# Patient Record
Sex: MALE | Race: WHITE | NOT HISPANIC OR LATINO | Employment: PART TIME | URBAN - METROPOLITAN AREA
[De-identification: names, ages, dates, MRNs, and addresses within clinical notes are randomized per-mention and may not be internally consistent; named-entity substitution may affect disease eponyms.]

---

## 2020-03-25 ENCOUNTER — PATIENT OUTREACH (OUTPATIENT)
Dept: SURGERY | Facility: CLINIC | Age: 45
End: 2020-03-25

## 2020-03-25 ENCOUNTER — CLINICAL SUPPORT (OUTPATIENT)
Dept: SURGERY | Facility: CLINIC | Age: 45
End: 2020-03-25

## 2020-03-25 DIAGNOSIS — Z11.3 SCREENING FOR STD (SEXUALLY TRANSMITTED DISEASE): ICD-10-CM

## 2020-03-25 DIAGNOSIS — Z20.2 EXPOSURE TO SEXUALLY TRANSMITTED DISEASE (STD): ICD-10-CM

## 2020-03-25 DIAGNOSIS — Z72.89 OTHER PROBLEMS RELATED TO LIFESTYLE: ICD-10-CM

## 2020-03-25 DIAGNOSIS — D72.89 OTHER SPECIFIED DISORDERS OF WHITE BLOOD CELLS: ICD-10-CM

## 2020-03-25 DIAGNOSIS — B20 HUMAN IMMUNODEFICIENCY VIRUS (HIV) DISEASE (HCC): Primary | ICD-10-CM

## 2020-03-25 RX ORDER — TRAZODONE HYDROCHLORIDE 50 MG/1
50 TABLET ORAL DAILY PRN
COMMUNITY
End: 2020-04-22 | Stop reason: HOSPADM

## 2020-03-25 NOTE — PROGRESS NOTES
Ct presented to intake  Ct is a 38year old homosexual male dx in 11/1998 through [facility] contact  Ct is currently is residing in [place] recently moved from [place]  Ct's current housing is stable and living with his   Ct stated he would like assistance from legal to help with living will and will  Ct reports he is sexually active with  and does not always use protection  Ct is currently in good health  CM and Ct discussed at risk behaviors and importance of using protection and maintaining medication adherence  Ct is currently medically adherent and has about 2 months worth of medication left  cm and Ct applying for SPBP and MA and faxed it  Ct reports he currently does not have any issues with transportation and drives himself  Ct currently does not work and currently is living off of savings  Ct will soon be seeing Dr Jimmie Galvan for specialty care and Leopold Bell for PCP  Ct does not smoke cigarettes  Ct reports he has been dx with anxitey disorder and is on medication  Ct interested in speaking with behavioral health about keeping up with tx  Ct reports no past issues with drugs or alcohol  Ct states there are no domestic violence issues  Ct believes that his last dental appointment was 10/2019 and has stated he will set up an appointment with Gurjit Landon

## 2020-03-25 NOTE — PROGRESS NOTES
Pt into clinic for new pt intake  Pt also met with Kieran Perera CM  Pt will be coming to [place] for primary and ID care  [facility] and was previously getting care at [facility] signed for those records  Pt is  and partner also intends to receive care from [address] [place] [address] Ave  Pt takes [email or web address]  Reports issue with remembering meds and missing 1-2 doses every other week  Discussed barriers to taking meds-he forgets cause he takes them midday to separate from other meds in morning  Encouraged pt to use phone alarm to remind him and discussed importance of 100% adherence as it relates to VL, CD4 and resistance  Pt stated understanding  Pt deos not have health coverage since moving to [facility] [place]  SPBP and MA application completed with [facility]  Pt has transportation  [place] policies reviewed and lab slips given to pt with instructions to complete once SPBP approved  Once labs complete, pt will be scheduled for primary and ID appts

## 2020-03-30 ENCOUNTER — PATIENT OUTREACH (OUTPATIENT)
Dept: SURGERY | Facility: CLINIC | Age: 45
End: 2020-03-30

## 2020-03-30 NOTE — PROGRESS NOTES
SPBP called and stated the application has some missed documents and missed scanned pages  Cm completed proof of residency and social security letter and re-scanned and e-mailed

## 2020-04-02 ENCOUNTER — PATIENT OUTREACH (OUTPATIENT)
Dept: SURGERY | Facility: CLINIC | Age: 45
End: 2020-04-02

## 2020-04-07 ENCOUNTER — APPOINTMENT (OUTPATIENT)
Dept: LAB | Facility: CLINIC | Age: 45
End: 2020-04-07

## 2020-04-07 DIAGNOSIS — B20 HUMAN IMMUNODEFICIENCY VIRUS (HIV) DISEASE (HCC): ICD-10-CM

## 2020-04-07 DIAGNOSIS — D72.89 OTHER SPECIFIED DISORDERS OF WHITE BLOOD CELLS: ICD-10-CM

## 2020-04-07 DIAGNOSIS — Z11.3 SCREENING FOR STD (SEXUALLY TRANSMITTED DISEASE): ICD-10-CM

## 2020-04-07 DIAGNOSIS — Z72.89 OTHER PROBLEMS RELATED TO LIFESTYLE: ICD-10-CM

## 2020-04-07 DIAGNOSIS — Z20.2 EXPOSURE TO SEXUALLY TRANSMITTED DISEASE (STD): ICD-10-CM

## 2020-04-07 LAB
ALBUMIN SERPL BCP-MCNC: 3.4 G/DL (ref 3.5–5)
ALP SERPL-CCNC: 57 U/L (ref 46–116)
ALT SERPL W P-5'-P-CCNC: 41 U/L (ref 12–78)
ANION GAP SERPL CALCULATED.3IONS-SCNC: 10 MMOL/L (ref 4–13)
AST SERPL W P-5'-P-CCNC: 22 U/L (ref 5–45)
BASOPHILS # BLD AUTO: 0.03 THOUSANDS/ΜL (ref 0–0.1)
BASOPHILS NFR BLD AUTO: 1 % (ref 0–1)
BILIRUB SERPL-MCNC: 0.5 MG/DL (ref 0.2–1)
BILIRUB UR QL STRIP: NEGATIVE
BUN SERPL-MCNC: 18 MG/DL (ref 5–25)
CALCIUM SERPL-MCNC: 8.8 MG/DL (ref 8.3–10.1)
CHLORIDE SERPL-SCNC: 105 MMOL/L (ref 100–108)
CHOLEST SERPL-MCNC: 248 MG/DL (ref 50–200)
CLARITY UR: CLEAR
CO2 SERPL-SCNC: 25 MMOL/L (ref 21–32)
COLOR UR: NORMAL
CREAT SERPL-MCNC: 1.06 MG/DL (ref 0.6–1.3)
EOSINOPHIL # BLD AUTO: 0.1 THOUSAND/ΜL (ref 0–0.61)
EOSINOPHIL NFR BLD AUTO: 2 % (ref 0–6)
ERYTHROCYTE [DISTWIDTH] IN BLOOD BY AUTOMATED COUNT: 12.8 % (ref 11.6–15.1)
EST. AVERAGE GLUCOSE BLD GHB EST-MCNC: 100 MG/DL
GFR SERPL CREATININE-BSD FRML MDRD: 84 ML/MIN/1.73SQ M
GLUCOSE P FAST SERPL-MCNC: 97 MG/DL (ref 65–99)
GLUCOSE UR STRIP-MCNC: NEGATIVE MG/DL
HAV AB SER QL IA: REACTIVE
HBA1C MFR BLD: 5.1 %
HBV SURFACE AB SER-ACNC: 78.47 MIU/ML
HBV SURFACE AG SER QL: NORMAL
HCT VFR BLD AUTO: 48 % (ref 36.5–49.3)
HCV AB SER QL: NORMAL
HDLC SERPL-MCNC: 54 MG/DL
HGB BLD-MCNC: 16.2 G/DL (ref 12–17)
HGB UR QL STRIP.AUTO: NEGATIVE
IMM GRANULOCYTES # BLD AUTO: 0.02 THOUSAND/UL (ref 0–0.2)
IMM GRANULOCYTES NFR BLD AUTO: 0 % (ref 0–2)
KETONES UR STRIP-MCNC: NEGATIVE MG/DL
LDLC SERPL CALC-MCNC: 156 MG/DL (ref 0–100)
LEUKOCYTE ESTERASE UR QL STRIP: NEGATIVE
LYMPHOCYTES # BLD AUTO: 2.57 THOUSANDS/ΜL (ref 0.6–4.47)
LYMPHOCYTES NFR BLD AUTO: 40 % (ref 14–44)
MCH RBC QN AUTO: 30.3 PG (ref 26.8–34.3)
MCHC RBC AUTO-ENTMCNC: 33.8 G/DL (ref 31.4–37.4)
MCV RBC AUTO: 90 FL (ref 82–98)
MONOCYTES # BLD AUTO: 0.56 THOUSAND/ΜL (ref 0.17–1.22)
MONOCYTES NFR BLD AUTO: 9 % (ref 4–12)
NEUTROPHILS # BLD AUTO: 3.11 THOUSANDS/ΜL (ref 1.85–7.62)
NEUTS SEG NFR BLD AUTO: 48 % (ref 43–75)
NITRITE UR QL STRIP: NEGATIVE
NRBC BLD AUTO-RTO: 0 /100 WBCS
PH UR STRIP.AUTO: 6.5 [PH]
PLATELET # BLD AUTO: 245 THOUSANDS/UL (ref 149–390)
PMV BLD AUTO: 11.1 FL (ref 8.9–12.7)
POTASSIUM SERPL-SCNC: 4 MMOL/L (ref 3.5–5.3)
PROT SERPL-MCNC: 7.6 G/DL (ref 6.4–8.2)
PROT UR STRIP-MCNC: NEGATIVE MG/DL
RBC # BLD AUTO: 5.34 MILLION/UL (ref 3.88–5.62)
RPR SER QL: NORMAL
SODIUM SERPL-SCNC: 140 MMOL/L (ref 136–145)
SP GR UR STRIP.AUTO: <=1.005 (ref 1–1.03)
TRIGL SERPL-MCNC: 190 MG/DL
UROBILINOGEN UR QL STRIP.AUTO: 0.2 E.U./DL
WBC # BLD AUTO: 6.39 THOUSAND/UL (ref 4.31–10.16)

## 2020-04-07 PROCEDURE — 80053 COMPREHEN METABOLIC PANEL: CPT

## 2020-04-07 PROCEDURE — 86645 CMV ANTIBODY IGM: CPT

## 2020-04-07 PROCEDURE — 86778 TOXOPLASMA ANTIBODY IGM: CPT

## 2020-04-07 PROCEDURE — 86708 HEPATITIS A ANTIBODY: CPT

## 2020-04-07 PROCEDURE — 86803 HEPATITIS C AB TEST: CPT

## 2020-04-07 PROCEDURE — 85025 COMPLETE CBC W/AUTO DIFF WBC: CPT

## 2020-04-07 PROCEDURE — 86706 HEP B SURFACE ANTIBODY: CPT

## 2020-04-07 PROCEDURE — 87389 HIV-1 AG W/HIV-1&-2 AB AG IA: CPT

## 2020-04-07 PROCEDURE — 81381 HLA I TYPING 1 ALLELE HR: CPT

## 2020-04-07 PROCEDURE — 86644 CMV ANTIBODY: CPT

## 2020-04-07 PROCEDURE — 81003 URINALYSIS AUTO W/O SCOPE: CPT

## 2020-04-07 PROCEDURE — 86702 HIV-2 ANTIBODY: CPT

## 2020-04-07 PROCEDURE — 83036 HEMOGLOBIN GLYCOSYLATED A1C: CPT

## 2020-04-07 PROCEDURE — 86592 SYPHILIS TEST NON-TREP QUAL: CPT

## 2020-04-07 PROCEDURE — 87340 HEPATITIS B SURFACE AG IA: CPT

## 2020-04-07 PROCEDURE — 86777 TOXOPLASMA ANTIBODY: CPT

## 2020-04-07 PROCEDURE — 80061 LIPID PANEL: CPT

## 2020-04-07 PROCEDURE — 87491 CHLMYD TRACH DNA AMP PROBE: CPT

## 2020-04-07 PROCEDURE — 86360 T CELL ABSOLUTE COUNT/RATIO: CPT

## 2020-04-07 PROCEDURE — 87591 N.GONORRHOEAE DNA AMP PROB: CPT

## 2020-04-07 PROCEDURE — 87536 HIV-1 QUANT&REVRSE TRNSCRPJ: CPT

## 2020-04-07 PROCEDURE — 86701 HIV-1ANTIBODY: CPT

## 2020-04-07 PROCEDURE — 36415 COLL VENOUS BLD VENIPUNCTURE: CPT

## 2020-04-08 LAB
BASOPHILS # BLD AUTO: 0 X10E3/UL (ref 0–0.2)
BASOPHILS NFR BLD AUTO: 1 %
C TRACH DNA SPEC QL NAA+PROBE: NEGATIVE
CD3+CD4+ CELLS # BLD: 788 /UL (ref 359–1519)
CD3+CD4+ CELLS NFR BLD: 37.5 % (ref 30.8–58.5)
CD3+CD4+ CELLS/CD3+CD8+ CLL BLD: 0.99 % (ref 0.92–3.72)
CD3+CD8+ CELLS # BLD: 796 /UL (ref 109–897)
CD3+CD8+ CELLS NFR BLD: 37.9 % (ref 12–35.5)
CLINICAL COMMENT: NORMAL
CMV IGG SERPL IA-ACNC: >10 U/ML (ref 0–0.59)
CMV IGM SERPL IA-ACNC: <30 AU/ML (ref 0–29.9)
EOSINOPHIL # BLD AUTO: 0.1 X10E3/UL (ref 0–0.4)
EOSINOPHIL NFR BLD AUTO: 2 %
ERYTHROCYTE [DISTWIDTH] IN BLOOD BY AUTOMATED COUNT: 13 % (ref 11.6–15.4)
HCT VFR BLD AUTO: 34.5 % (ref 37.5–51)
HGB BLD-MCNC: 11.8 G/DL (ref 13–17.7)
IMM GRANULOCYTES # BLD: 0 X10E3/UL (ref 0–0.1)
IMM GRANULOCYTES NFR BLD: 0 %
LYMPHOCYTES # BLD AUTO: 2.1 X10E3/UL (ref 0.7–3.1)
LYMPHOCYTES NFR BLD AUTO: 35 %
MCH RBC QN AUTO: 30.3 PG (ref 26.6–33)
MCHC RBC AUTO-ENTMCNC: 34.2 G/DL (ref 31.5–35.7)
MCV RBC AUTO: 89 FL (ref 79–97)
MONOCYTES # BLD AUTO: 0.6 X10E3/UL (ref 0.1–0.9)
MONOCYTES NFR BLD AUTO: 11 %
N GONORRHOEA DNA SPEC QL NAA+PROBE: NEGATIVE
NEUTROPHILS # BLD AUTO: 3.1 X10E3/UL (ref 1.4–7)
NEUTROPHILS NFR BLD AUTO: 51 %
PLATELET # BLD AUTO: 301 X10E3/UL (ref 150–450)
RBC # BLD AUTO: 3.89 X10E6/UL (ref 4.14–5.8)
T GONDII IGG SERPL IA-ACNC: <3 IU/ML (ref 0–7.1)
T GONDII IGM SER IA-ACNC: <3 AU/ML (ref 0–7.9)
WBC # BLD AUTO: 6 X10E3/UL (ref 3.4–10.8)

## 2020-04-09 LAB
HIV 1+2 AB+HIV1 P24 AG SERPL QL IA: REACTIVE
HIV1 RNA # SERPL NAA+PROBE: 270 COPIES/ML
HIV1 RNA SERPL NAA+PROBE-LOG#: 2.43 LOG10COPY/ML

## 2020-04-10 LAB
HIV 2 AB SERPL QL IA: NEGATIVE
HIV1 AB SERPL QL IA: POSITIVE
SL AMB NOTE:: ABNORMAL

## 2020-04-13 ENCOUNTER — PATIENT OUTREACH (OUTPATIENT)
Dept: SURGERY | Facility: CLINIC | Age: 45
End: 2020-04-13

## 2020-04-14 LAB — HLA-B*57:01 SPEC QL: NEGATIVE

## 2020-04-22 ENCOUNTER — TELEMEDICINE (OUTPATIENT)
Dept: SURGERY | Facility: CLINIC | Age: 45
End: 2020-04-22

## 2020-04-22 DIAGNOSIS — R03.0 TRANSIENT ELEVATED BLOOD PRESSURE: ICD-10-CM

## 2020-04-22 DIAGNOSIS — B20 SYMPTOMATIC HIV INFECTION (HCC): ICD-10-CM

## 2020-04-22 DIAGNOSIS — F41.9 ANXIETY: Primary | ICD-10-CM

## 2020-04-22 PROCEDURE — G2012 BRIEF CHECK IN BY MD/QHP: HCPCS | Performed by: NURSE PRACTITIONER

## 2020-04-22 RX ORDER — BUSPIRONE HYDROCHLORIDE 5 MG/1
5 TABLET ORAL 2 TIMES DAILY
Qty: 60 TABLET | Refills: 1 | Status: SHIPPED | OUTPATIENT
Start: 2020-04-22 | End: 2020-05-20 | Stop reason: HOSPADM

## 2020-04-27 ENCOUNTER — TELEPHONE (OUTPATIENT)
Dept: SURGERY | Facility: CLINIC | Age: 45
End: 2020-04-27

## 2020-04-28 ENCOUNTER — PATIENT OUTREACH (OUTPATIENT)
Dept: SURGERY | Facility: CLINIC | Age: 45
End: 2020-04-28

## 2020-04-28 ENCOUNTER — TELEPHONE (OUTPATIENT)
Dept: SURGERY | Facility: CLINIC | Age: 45
End: 2020-04-28

## 2020-05-07 ENCOUNTER — DOCUMENTATION (OUTPATIENT)
Dept: SURGERY | Facility: CLINIC | Age: 45
End: 2020-05-07

## 2020-05-08 ENCOUNTER — PATIENT OUTREACH (OUTPATIENT)
Dept: SURGERY | Facility: CLINIC | Age: 45
End: 2020-05-08

## 2020-05-19 ENCOUNTER — TELEPHONE (OUTPATIENT)
Dept: SURGERY | Facility: CLINIC | Age: 45
End: 2020-05-19

## 2020-05-20 ENCOUNTER — TELEMEDICINE (OUTPATIENT)
Dept: SURGERY | Facility: CLINIC | Age: 45
End: 2020-05-20

## 2020-05-20 DIAGNOSIS — B20 SYMPTOMATIC HIV INFECTION (HCC): Primary | ICD-10-CM

## 2020-05-20 DIAGNOSIS — Z23 NEED FOR PNEUMOCOCCAL VACCINATION: ICD-10-CM

## 2020-05-20 DIAGNOSIS — Z23 NEED FOR MENINGITIS VACCINATION: ICD-10-CM

## 2020-05-20 DIAGNOSIS — F32.A ANXIETY AND DEPRESSION: ICD-10-CM

## 2020-05-20 DIAGNOSIS — F41.9 ANXIETY AND DEPRESSION: ICD-10-CM

## 2020-05-20 DIAGNOSIS — Z71.89 EDUCATED ABOUT COVID-19 VIRUS INFECTION: ICD-10-CM

## 2020-05-20 PROCEDURE — 99214 OFFICE O/P EST MOD 30 MIN: CPT | Performed by: NURSE PRACTITIONER

## 2020-05-20 RX ORDER — NEFAZODONE HYDROCHLORIDE 50 MG/1
50 TABLET ORAL 2 TIMES DAILY
Qty: 60 TABLET | Refills: 1 | Status: SHIPPED | OUTPATIENT
Start: 2020-05-20 | End: 2020-07-06 | Stop reason: HOSPADM

## 2020-05-22 ENCOUNTER — TELEPHONE (OUTPATIENT)
Dept: SURGERY | Facility: CLINIC | Age: 45
End: 2020-05-22

## 2020-05-26 ENCOUNTER — TELEPHONE (OUTPATIENT)
Dept: SURGERY | Facility: CLINIC | Age: 45
End: 2020-05-26

## 2020-05-26 ENCOUNTER — OFFICE VISIT (OUTPATIENT)
Dept: SURGERY | Facility: CLINIC | Age: 45
End: 2020-05-26

## 2020-05-26 VITALS
BODY MASS INDEX: 31.45 KG/M2 | HEART RATE: 87 BPM | HEIGHT: 67 IN | DIASTOLIC BLOOD PRESSURE: 100 MMHG | SYSTOLIC BLOOD PRESSURE: 130 MMHG | OXYGEN SATURATION: 97 % | TEMPERATURE: 98.9 F | WEIGHT: 200.4 LBS

## 2020-05-26 DIAGNOSIS — Z23 NEED FOR MENINGOCOCCAL VACCINATION: ICD-10-CM

## 2020-05-26 DIAGNOSIS — R03.0 TRANSIENT ELEVATED BLOOD PRESSURE: ICD-10-CM

## 2020-05-26 DIAGNOSIS — F32.A ANXIETY AND DEPRESSION: ICD-10-CM

## 2020-05-26 DIAGNOSIS — E78.5 DYSLIPIDEMIA: ICD-10-CM

## 2020-05-26 DIAGNOSIS — R63.5 WEIGHT GAIN: ICD-10-CM

## 2020-05-26 DIAGNOSIS — B20 SYMPTOMATIC HIV INFECTION (HCC): Primary | ICD-10-CM

## 2020-05-26 DIAGNOSIS — Z23 NEED FOR PNEUMOCOCCAL VACCINE: ICD-10-CM

## 2020-05-26 DIAGNOSIS — F41.9 ANXIETY AND DEPRESSION: ICD-10-CM

## 2020-05-26 PROCEDURE — 90734 MENACWYD/MENACWYCRM VACC IM: CPT

## 2020-05-26 PROCEDURE — 90732 PPSV23 VACC 2 YRS+ SUBQ/IM: CPT

## 2020-05-26 PROCEDURE — 90471 IMMUNIZATION ADMIN: CPT

## 2020-05-26 PROCEDURE — 99205 OFFICE O/P NEW HI 60 MIN: CPT | Performed by: INTERNAL MEDICINE

## 2020-05-26 PROCEDURE — 90472 IMMUNIZATION ADMIN EACH ADD: CPT

## 2020-05-28 ENCOUNTER — PATIENT OUTREACH (OUTPATIENT)
Dept: SURGERY | Facility: CLINIC | Age: 45
End: 2020-05-28

## 2020-06-03 ENCOUNTER — TELEPHONE (OUTPATIENT)
Dept: SURGERY | Facility: CLINIC | Age: 45
End: 2020-06-03

## 2020-06-03 ENCOUNTER — PATIENT OUTREACH (OUTPATIENT)
Dept: SURGERY | Facility: CLINIC | Age: 45
End: 2020-06-03

## 2020-06-04 ENCOUNTER — PATIENT OUTREACH (OUTPATIENT)
Dept: SURGERY | Facility: CLINIC | Age: 45
End: 2020-06-04

## 2020-06-08 ENCOUNTER — TELEPHONE (OUTPATIENT)
Dept: SURGERY | Facility: CLINIC | Age: 45
End: 2020-06-08

## 2020-06-09 ENCOUNTER — TRANSCRIBE ORDERS (OUTPATIENT)
Dept: LAB | Facility: CLINIC | Age: 45
End: 2020-06-09

## 2020-06-09 ENCOUNTER — APPOINTMENT (OUTPATIENT)
Dept: LAB | Facility: CLINIC | Age: 45
End: 2020-06-09
Payer: COMMERCIAL

## 2020-06-09 DIAGNOSIS — F32.A ANXIETY AND DEPRESSION: ICD-10-CM

## 2020-06-09 DIAGNOSIS — B20 SYMPTOMATIC HIV INFECTION (HCC): ICD-10-CM

## 2020-06-09 DIAGNOSIS — F41.9 ANXIETY AND DEPRESSION: ICD-10-CM

## 2020-06-09 LAB
ALBUMIN SERPL BCP-MCNC: 3.7 G/DL (ref 3.5–5)
ALP SERPL-CCNC: 53 U/L (ref 46–116)
ALT SERPL W P-5'-P-CCNC: 30 U/L (ref 12–78)
ANION GAP SERPL CALCULATED.3IONS-SCNC: 9 MMOL/L (ref 4–13)
AST SERPL W P-5'-P-CCNC: 24 U/L (ref 5–45)
BASOPHILS # BLD AUTO: 0.04 THOUSANDS/ΜL (ref 0–0.1)
BASOPHILS NFR BLD AUTO: 1 % (ref 0–1)
BILIRUB SERPL-MCNC: 0.62 MG/DL (ref 0.2–1)
BUN SERPL-MCNC: 24 MG/DL (ref 5–25)
CALCIUM SERPL-MCNC: 9 MG/DL (ref 8.3–10.1)
CHLORIDE SERPL-SCNC: 105 MMOL/L (ref 100–108)
CO2 SERPL-SCNC: 25 MMOL/L (ref 21–32)
CREAT SERPL-MCNC: 1.21 MG/DL (ref 0.6–1.3)
EOSINOPHIL # BLD AUTO: 0.09 THOUSAND/ΜL (ref 0–0.61)
EOSINOPHIL NFR BLD AUTO: 1 % (ref 0–6)
ERYTHROCYTE [DISTWIDTH] IN BLOOD BY AUTOMATED COUNT: 12.3 % (ref 11.6–15.1)
GFR SERPL CREATININE-BSD FRML MDRD: 71 ML/MIN/1.73SQ M
GLUCOSE P FAST SERPL-MCNC: 93 MG/DL (ref 65–99)
HCT VFR BLD AUTO: 48 % (ref 36.5–49.3)
HGB BLD-MCNC: 16 G/DL (ref 12–17)
IMM GRANULOCYTES # BLD AUTO: 0.01 THOUSAND/UL (ref 0–0.2)
IMM GRANULOCYTES NFR BLD AUTO: 0 % (ref 0–2)
LYMPHOCYTES # BLD AUTO: 2.68 THOUSANDS/ΜL (ref 0.6–4.47)
LYMPHOCYTES NFR BLD AUTO: 39 % (ref 14–44)
MCH RBC QN AUTO: 30.3 PG (ref 26.8–34.3)
MCHC RBC AUTO-ENTMCNC: 33.3 G/DL (ref 31.4–37.4)
MCV RBC AUTO: 91 FL (ref 82–98)
MONOCYTES # BLD AUTO: 0.6 THOUSAND/ΜL (ref 0.17–1.22)
MONOCYTES NFR BLD AUTO: 9 % (ref 4–12)
NEUTROPHILS # BLD AUTO: 3.47 THOUSANDS/ΜL (ref 1.85–7.62)
NEUTS SEG NFR BLD AUTO: 50 % (ref 43–75)
NRBC BLD AUTO-RTO: 0 /100 WBCS
PLATELET # BLD AUTO: 273 THOUSANDS/UL (ref 149–390)
PMV BLD AUTO: 10.9 FL (ref 8.9–12.7)
POTASSIUM SERPL-SCNC: 4.1 MMOL/L (ref 3.5–5.3)
PROT SERPL-MCNC: 7.8 G/DL (ref 6.4–8.2)
RBC # BLD AUTO: 5.28 MILLION/UL (ref 3.88–5.62)
SODIUM SERPL-SCNC: 139 MMOL/L (ref 136–145)
WBC # BLD AUTO: 6.89 THOUSAND/UL (ref 4.31–10.16)

## 2020-06-09 PROCEDURE — 87536 HIV-1 QUANT&REVRSE TRNSCRPJ: CPT

## 2020-06-09 PROCEDURE — 85025 COMPLETE CBC W/AUTO DIFF WBC: CPT

## 2020-06-09 PROCEDURE — 36415 COLL VENOUS BLD VENIPUNCTURE: CPT

## 2020-06-09 PROCEDURE — 86360 T CELL ABSOLUTE COUNT/RATIO: CPT

## 2020-06-09 PROCEDURE — 80053 COMPREHEN METABOLIC PANEL: CPT

## 2020-06-10 ENCOUNTER — TELEPHONE (OUTPATIENT)
Dept: SURGERY | Facility: CLINIC | Age: 45
End: 2020-06-10

## 2020-06-10 LAB
BASOPHILS # BLD AUTO: 0 X10E3/UL (ref 0–0.2)
BASOPHILS NFR BLD AUTO: 1 %
CD3+CD4+ CELLS # BLD: 1035 /UL (ref 359–1519)
CD3+CD4+ CELLS NFR BLD: 39.8 % (ref 30.8–58.5)
CD3+CD4+ CELLS/CD3+CD8+ CLL BLD: 1.07 % (ref 0.92–3.72)
CD3+CD8+ CELLS # BLD: 965 /UL (ref 109–897)
CD3+CD8+ CELLS NFR BLD: 37.1 % (ref 12–35.5)
EOSINOPHIL # BLD AUTO: 0.1 X10E3/UL (ref 0–0.4)
EOSINOPHIL NFR BLD AUTO: 1 %
ERYTHROCYTE [DISTWIDTH] IN BLOOD BY AUTOMATED COUNT: 13 % (ref 11.6–15.4)
HCT VFR BLD AUTO: 43.5 % (ref 37.5–51)
HGB BLD-MCNC: 14.4 G/DL (ref 13–17.7)
IMM GRANULOCYTES # BLD: 0 X10E3/UL (ref 0–0.1)
IMM GRANULOCYTES NFR BLD: 0 %
LYMPHOCYTES # BLD AUTO: 2.6 X10E3/UL (ref 0.7–3.1)
LYMPHOCYTES NFR BLD AUTO: 39 %
MCH RBC QN AUTO: 30.6 PG (ref 26.6–33)
MCHC RBC AUTO-ENTMCNC: 33.1 G/DL (ref 31.5–35.7)
MCV RBC AUTO: 93 FL (ref 79–97)
MONOCYTES # BLD AUTO: 0.5 X10E3/UL (ref 0.1–0.9)
MONOCYTES NFR BLD AUTO: 8 %
NEUTROPHILS # BLD AUTO: 3.4 X10E3/UL (ref 1.4–7)
NEUTROPHILS NFR BLD AUTO: 51 %
PLATELET # BLD AUTO: 297 X10E3/UL (ref 150–450)
RBC # BLD AUTO: 4.7 X10E6/UL (ref 4.14–5.8)
WBC # BLD AUTO: 6.7 X10E3/UL (ref 3.4–10.8)

## 2020-06-12 ENCOUNTER — PATIENT OUTREACH (OUTPATIENT)
Dept: SURGERY | Facility: CLINIC | Age: 45
End: 2020-06-12

## 2020-06-12 LAB
HIV1 RNA # SERPL NAA+PROBE: <20 COPIES/ML
HIV1 RNA SERPL NAA+PROBE-LOG#: NORMAL LOG10COPY/ML

## 2020-06-15 ENCOUNTER — APPOINTMENT (OUTPATIENT)
Dept: LAB | Facility: CLINIC | Age: 45
End: 2020-06-15
Payer: COMMERCIAL

## 2020-06-15 PROCEDURE — 36415 COLL VENOUS BLD VENIPUNCTURE: CPT

## 2020-06-15 PROCEDURE — 86480 TB TEST CELL IMMUN MEASURE: CPT

## 2020-06-16 LAB
GAMMA INTERFERON BACKGROUND BLD IA-ACNC: 0.04 IU/ML
M TB IFN-G BLD-IMP: NEGATIVE
M TB IFN-G CD4+ BCKGRND COR BLD-ACNC: 0.01 IU/ML
M TB IFN-G CD4+ BCKGRND COR BLD-ACNC: 0.03 IU/ML
MITOGEN IGNF BCKGRD COR BLD-ACNC: >10 IU/ML

## 2020-06-19 ENCOUNTER — PATIENT OUTREACH (OUTPATIENT)
Dept: SURGERY | Facility: CLINIC | Age: 45
End: 2020-06-19

## 2020-06-22 ENCOUNTER — PATIENT OUTREACH (OUTPATIENT)
Dept: SURGERY | Facility: CLINIC | Age: 45
End: 2020-06-22

## 2020-06-23 DIAGNOSIS — B20 SYMPTOMATIC HIV INFECTION (HCC): ICD-10-CM

## 2020-06-23 RX ORDER — BICTEGRAVIR SODIUM, EMTRICITABINE, AND TENOFOVIR ALAFENAMIDE FUMARATE 50; 200; 25 MG/1; MG/1; MG/1
TABLET ORAL
Qty: 30 TABLET | Refills: 1 | Status: SHIPPED | OUTPATIENT
Start: 2020-06-23 | End: 2020-07-29 | Stop reason: SDUPTHER

## 2020-07-06 ENCOUNTER — TELEPHONE (OUTPATIENT)
Dept: SURGERY | Facility: CLINIC | Age: 45
End: 2020-07-06

## 2020-07-06 NOTE — TELEPHONE ENCOUNTER
Called pt to confirm kimber for tomorrow and to ask the screening questions  Also wanted to give him instructions for when he gets to the office and to move his kimber to 5pm  Pt voice mail full and cannot leave a message

## 2020-07-07 ENCOUNTER — TELEPHONE (OUTPATIENT)
Dept: SURGERY | Facility: CLINIC | Age: 45
End: 2020-07-07

## 2020-07-07 ENCOUNTER — OFFICE VISIT (OUTPATIENT)
Dept: SURGERY | Facility: CLINIC | Age: 45
End: 2020-07-07
Payer: COMMERCIAL

## 2020-07-07 VITALS
HEIGHT: 67 IN | TEMPERATURE: 98.3 F | BODY MASS INDEX: 31.36 KG/M2 | DIASTOLIC BLOOD PRESSURE: 90 MMHG | SYSTOLIC BLOOD PRESSURE: 130 MMHG | WEIGHT: 199.8 LBS | HEART RATE: 92 BPM | OXYGEN SATURATION: 96 %

## 2020-07-07 DIAGNOSIS — F41.9 ANXIETY AND DEPRESSION: ICD-10-CM

## 2020-07-07 DIAGNOSIS — F32.A ANXIETY AND DEPRESSION: ICD-10-CM

## 2020-07-07 DIAGNOSIS — R03.0 TRANSIENT ELEVATED BLOOD PRESSURE: ICD-10-CM

## 2020-07-07 DIAGNOSIS — R63.5 WEIGHT GAIN: ICD-10-CM

## 2020-07-07 DIAGNOSIS — E78.5 DYSLIPIDEMIA: ICD-10-CM

## 2020-07-07 DIAGNOSIS — B20 SYMPTOMATIC HIV INFECTION (HCC): Primary | ICD-10-CM

## 2020-07-07 PROCEDURE — 99215 OFFICE O/P EST HI 40 MIN: CPT | Performed by: INTERNAL MEDICINE

## 2020-07-07 NOTE — PROGRESS NOTES
Progress Note - Infectious Disease   Beatrice Pratt 54 y o  male MRN: [id number]  Unit/Bed#:  Encounter: [id number]      Impression/Plan:  1  HIV-improved with viral load now undetectable and a CD4 count of greater than 1000  He missed a dose earlier this week but does not know why  Suggested cell phone alarm to make sure he does not miss doses  Perhaps the previous bump in the viral load was related to drug interactions with the vitamins or flaxseed oil; could be from non adherent  Will continue Donna Ordaz for now, recheck labs in 2 months and follow up in 3 month period stressed adherence     2  Anxiety and depression-treatment as per the primary  Will connect to mental health services      3  Dyslipidemia-focus on diet and exercise for now with weight loss      4  Weight gain-weight is stable  will continue stressed the importance of diet and exercise      5  Elevated blood pressure-slightly elevated diastolic  asymptomatic  Discussed in detail with the primary who will address      Patient was provided medication, adherence and prevention education    Subjective:  Routine follow-up for HIV  Patient claims 100% adherence with Biktarvy    Patient denies any notable side effects  Overall the feeling well  The patient denies any fever chills or sweats, denies any nausea vomiting or diarrhea, denies any cough or shortness of breath  ROS:  A complete review of systems is negative other than that noted above in the subjective    Followup portions patient history reviewed and updated as: Allergies, current medications, past medical history, past social history, past surgical history, and the problem list    Objective:  Vitals:  Vitals:    07/07/20 1659   BP: 130/90   Pulse: 92   Temp: 98 3 °F (36 8 °C)   SpO2: 96%   Weight: 90 6 kg (199 lb 12 8 oz)   Height: 5' 7" (1 702 m)       Physical Exam:   General Appearance:  Alert, interactive, appearing well,  nontoxic, no acute distress     Neck:   Supple without lymphadenopathy, no thyromegaly or masses   Throat: Oropharynx moist without lesions  Lungs:   Clear to auscultation bilaterally; no wheezes, rhonchi or rales; respirations unlabored   Heart:  RRR; no murmur, rub or gallop   Abdomen:   Soft, non-tender, non-distended, positive bowel sounds  Extremities: No clubbing, cyanosis or edema   Skin: No new rashes or lesions  No draining wounds noted  Labs, Imaging, & Other studies:   All pertinent labs and imaging studies were personally reviewed    Lab Results   Component Value Date    K 4 1 06/09/2020     06/09/2020    CO2 25 06/09/2020    BUN 24 06/09/2020    CREATININE 1 21 06/09/2020    GLUF 93 06/09/2020    CALCIUM 9 0 06/09/2020    AST 24 06/09/2020    ALT 30 06/09/2020    ALKPHOS 53 06/09/2020    EGFR 71 06/09/2020     Lab Results   Component Value Date    WBC 6 89 06/09/2020    WBC 6 7 06/09/2020    HGB 16 0 06/09/2020    HGB 14 4 06/09/2020    HCT 48 0 06/09/2020    HCT 43 5 06/09/2020    MCV 91 06/09/2020    MCV 93 06/09/2020     06/09/2020     06/09/2020     Lab Results   Component Value Date    HEPCAB Non-reactive 04/07/2020     Lab Results   Component Value Date    HAV Reactive (A) 04/07/2020    HEPCAB Non-reactive 04/07/2020     Lab Results   Component Value Date    RPR Non-Reactive 04/07/2020     CD4 ABS   Date/Time Value Ref Range Status   06/09/2020 10:27 AM 1035 359 - 1519 /uL Final     HIV-1 RNA by PCR, Qn   Date/Time Value Ref Range Status   06/09/2020 10:27 AM <20 copies/mL Final     Comment:     HIV-1 RNA not detected  The reportable range for this assay is 20 to 10,000,000  copies HIV-1 RNA/mL             Current Outpatient Medications:     BIKTARVY -25 MG tablet, TAKE 1 TABLET BY MOUTH EVERY DAY, Disp: 30 tablet, Rfl: 1

## 2020-07-08 NOTE — PROGRESS NOTES
Assessment/Plan:      Diagnoses and all orders for this visit:    Symptomatic HIV infection ([address] )  -     CBC and differential; Future  -     T-helper cells CD4/CD8 %; Future  -     HIV-1 RNA, quantitative, PCR; Future  -     Comprehensive metabolic panel; Future    Anxiety and depression    Transient elevated blood pressure    Dyslipidemia    Weight gain          Discussion: S met with patient for his behavioral health consultation, meeting pt in person for the first time due to [place] which had limited previous sessions to phone sessions  BHS inquired about pt's anxiety and depression symptoms  Pt reported doing well at this time  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) however he is not distressed about this  BHS reviewed status of pt's insurance with him confirming that his insurance is now active  BHS confirmed that pt still desires to be referred to a [facility] agency so he can receive ongoing counseling and psychiatry services to manage his anxiety  BHS informed pt that she will put the referral in to [facility] and gave pt a copy of [facility] brochure for more info  PHQ-9 screener was completed  Score = 7  Subjective:     Patient ID: Chriss Gardner is a 54 y o  male      HPI: The patient is being seen at the [facility] today for a routine behavioral health follow up     Objective:    Orientation    Person: Yes   Place: Yes   Time: Yes     Appearance     Well Developed: Yes   Healthy: Yes   Uncomfortable: No  Normal Body Odor: Yes   Grooming Unkempt: No  Poor Eye Contact: No    Mood and Affect    Appropriate: Yes   Euthymic: Yes   Anxious: No  Depressed: No    Harm to Self or Others: denied  Substance Abuse: none reported or observed

## 2020-07-16 ENCOUNTER — PATIENT OUTREACH (OUTPATIENT)
Dept: SURGERY | Facility: CLINIC | Age: 45
End: 2020-07-16

## 2020-07-16 ENCOUNTER — TRANSCRIBE ORDERS (OUTPATIENT)
Dept: LAB | Facility: HOSPITAL | Age: 45
End: 2020-07-16

## 2020-07-16 NOTE — PROGRESS NOTES
Ct called to state that he took a covid test on 7- updating staff  Cm updated Clinic  Cm stated to please let cm results

## 2020-07-17 NOTE — PROGRESS NOTES
Per order from [address] [place] [address], made pt aware he should continue to self quarantine until he receives his results  Pt aware to seek treatment at ED if his s/s worsen, call us with results as soon as he gets them, continue quarantining if the results are positive and that his  should also be self quarantining pending pt's results  Pt stated his understanding to all above

## 2020-07-17 NOTE — PROGRESS NOTES
Called pt to check in  Pt states he is congested, has a sore throat and mild headache  Pt did have diarrhea and aching in his legs previously but denies those symptoms now  Pt denies fever, SOB, nausea, vomiting, loss of taste or smell  Pt had COVID test done at [facility] and [facility] on 7/14/20 and is waiting on results  I counseled pt on self isolation in home, cleaning and masking  His  is not exhibiting any s/s  Recommended his  also quarantine until results come back

## 2020-07-20 NOTE — PROGRESS NOTES
Pt called stating he still hasn't received results for covid test  He was told results could take up to 6-10 days and today is day 6  He states his symptoms have subsided and he wanted to know if he should still quarantine  Told him he would to continue to quarantine particularly since results still not back and hasn't been long enough since his last symptom  Pt stated understanding

## 2020-07-21 ENCOUNTER — PATIENT OUTREACH (OUTPATIENT)
Dept: SURGERY | Facility: CLINIC | Age: 45
End: 2020-07-21

## 2020-07-21 NOTE — PROGRESS NOTES
Ct text Cm frustrated have not received his labs yet  Cm stated that labs are backed up at the moment and too give it more time  Ct stated he understands and just wanted to express his feelings about it  Ct states he feels symptoms free at the moment

## 2020-07-29 ENCOUNTER — OFFICE VISIT (OUTPATIENT)
Dept: SURGERY | Facility: CLINIC | Age: 45
End: 2020-07-29
Payer: COMMERCIAL

## 2020-07-29 VITALS
HEIGHT: 67 IN | TEMPERATURE: 97.6 F | SYSTOLIC BLOOD PRESSURE: 142 MMHG | DIASTOLIC BLOOD PRESSURE: 118 MMHG | OXYGEN SATURATION: 98 % | BODY MASS INDEX: 31.77 KG/M2 | HEART RATE: 76 BPM | WEIGHT: 202.4 LBS

## 2020-07-29 DIAGNOSIS — U07.1 COVID-19: ICD-10-CM

## 2020-07-29 DIAGNOSIS — Z23 NEED FOR MENACTRA VACCINATION: ICD-10-CM

## 2020-07-29 DIAGNOSIS — E78.5 DYSLIPIDEMIA: ICD-10-CM

## 2020-07-29 DIAGNOSIS — B20 SYMPTOMATIC HIV INFECTION (HCC): Primary | ICD-10-CM

## 2020-07-29 DIAGNOSIS — I10 ESSENTIAL HYPERTENSION: ICD-10-CM

## 2020-07-29 PROCEDURE — 90734 MENACWYD/MENACWYCRM VACC IM: CPT

## 2020-07-29 PROCEDURE — 99214 OFFICE O/P EST MOD 30 MIN: CPT | Performed by: NURSE PRACTITIONER

## 2020-07-29 PROCEDURE — 90471 IMMUNIZATION ADMIN: CPT

## 2020-07-29 RX ORDER — ATORVASTATIN CALCIUM 40 MG/1
40 TABLET, FILM COATED ORAL DAILY
Qty: 30 TABLET | Refills: 2 | Status: SHIPPED | OUTPATIENT
Start: 2020-07-29 | End: 2020-10-19

## 2020-07-29 RX ORDER — ATORVASTATIN CALCIUM 40 MG/1
40 TABLET, FILM COATED ORAL DAILY
Qty: 30 TABLET | Refills: 2 | Status: SHIPPED | OUTPATIENT
Start: 2020-07-29 | End: 2020-07-29 | Stop reason: SDUPTHER

## 2020-07-29 RX ORDER — ASPIRIN 81 MG/1
81 TABLET, CHEWABLE ORAL DAILY
Qty: 30 TABLET | Refills: 2 | Status: SHIPPED | OUTPATIENT
Start: 2020-07-29 | End: 2020-07-29 | Stop reason: SDUPTHER

## 2020-07-29 RX ORDER — LISINOPRIL AND HYDROCHLOROTHIAZIDE 12.5; 1 MG/1; MG/1
1 TABLET ORAL DAILY
Qty: 30 TABLET | Refills: 2 | Status: SHIPPED | OUTPATIENT
Start: 2020-07-29 | End: 2020-09-09 | Stop reason: HOSPADM

## 2020-07-29 RX ORDER — ASPIRIN 81 MG/1
81 TABLET, CHEWABLE ORAL DAILY
Qty: 30 TABLET | Refills: 2 | Status: SHIPPED | OUTPATIENT
Start: 2020-07-29 | End: 2020-10-19

## 2020-07-29 RX ORDER — LISINOPRIL AND HYDROCHLOROTHIAZIDE 12.5; 1 MG/1; MG/1
1 TABLET ORAL DAILY
Qty: 30 TABLET | Refills: 2 | Status: SHIPPED | OUTPATIENT
Start: 2020-07-29 | End: 2020-07-29 | Stop reason: SDUPTHER

## 2020-07-29 NOTE — ASSESSMENT & PLAN NOTE
Blood pressure:   BP Readings from Last 3 Encounters:   07/29/20 (!) 142/118   07/07/20 130/90   05/26/20 130/100       Start lisinopril/HCTZ  Check CMP in 4 weeks  Provided with pt  educational handout  Educated on potential side effects  Educated on the following lifestyle modifications to lower BP and decrease cardiovascular risk factors  limit alcohol intake, reduce salt in diet, maintain a healthy weight, engage in 30 minutes of cardiovascular exercise daily, and not smoke  ASCVD score 9 0%  Start low dose ASA

## 2020-07-29 NOTE — ASSESSMENT & PLAN NOTE
LIPIDS:   Total cholesterol 248    HDL 54  Triglycerides 190    Start atorvastatin    Eat a low fat/low cholesterol diet  Follow up with dietician for additional education

## 2020-07-29 NOTE — PROGRESS NOTES
Assessment/Plan:    Essential hypertension  Blood pressure:   BP Readings from Last 3 Encounters:   20 (!) 142/118   20 130/90   20 130/100       Start lisinopril/HCTZ  Check CMP in 4 weeks  Provided with pt  educational handout  Educated on potential side effects  Educated on the following lifestyle modifications to lower BP and decrease cardiovascular risk factors  limit alcohol intake, reduce salt in diet, maintain a healthy weight, engage in 30 minutes of cardiovascular exercise daily, and not smoke  ASCVD score 9 0%  Start low dose ASA  Symptomatic HIV infection ([facility] )  No results found for: [id number]  CD4 ABS   Date/Time Value Ref Range Status   2020 10:27 AM 1035 359 - 1519 /uL Final     HIV-1 RNA by PCR, Qn   Date/Time Value Ref Range Status   2020 10:27 AM <20 copies/mL Final     Comment:     HIV-1 RNA not detected  The reportable range for this assay is 20 to 10,000,000  copies HIV-1 RNA/mL  HIV-1 RNA Viral Load Log   Date/Time Value Ref Range Status   2020 10:27 AM COMMENT [id number]/mL Final     Comment:     Unable to calculate result since non-numeric result obtained for  component test          ART: Christel  Reports 100% adherence  Denies side effects  Stressed the importance of adherence  Continue 3 month follow up with ID clinic  Reviewed most recent labs, including Cd4 and viral load  Discussed the risks and benefits of treatment options, instructions for management, importance of treatment adherence, and reduction of risk factor  Educated on possible  medication side effects  Counseled on routes of HIV transmission, including the risk of  infection  Emphasized that viral suppression is the best method to prevent HIV transmission  At this time pt denies the need for HIV testing of anyone in their life  Total encounter time was 45 minutes  Greater then 20 minutes were spent on counseling and patient education   Pt voices understanding and agreement with treatment plan  Dyslipidemia  LIPIDS:   Total cholesterol 248    HDL 54  Triglycerides 190    Start atorvastatin    Eat a low fat/low cholesterol diet  Follow up with dietician for additional education  Diagnoses and all orders for this visit:    Essential hypertension  -     Discontinue: aspirin 81 mg chewable tablet; Chew 1 tablet (81 mg total) daily  -     Discontinue: lisinopril-hydrochlorothiazide (PRINZIDE,ZESTORETIC) 10-12 5 MG per tablet; Take 1 tablet by mouth daily  -     aspirin 81 mg chewable tablet; Chew 1 tablet (81 mg total) daily  -     lisinopril-hydrochlorothiazide (PRINZIDE,ZESTORETIC) 10-12 5 MG per tablet; Take 1 tablet by mouth daily  -     CBC and differential; Future  -     Comprehensive metabolic panel; Future    Need for Menactra vaccination  -     Meningococcal Conjugate Vaccine MCV4P IM    Symptomatic HIV infection ([facility] [address] )  -     Discontinue: bictegravir-emtricitab-tenofovir alafenamide (Biktarvy) -25 MG tablet; Take 1 tablet by mouth daily  -     bictegravir-emtricitab-tenofovir alafenamide (Biktarvy) -25 MG tablet; Take 1 tablet by mouth daily    Dyslipidemia  -     Discontinue: aspirin 81 mg chewable tablet; Chew 1 tablet (81 mg total) daily  -     Discontinue: atorvastatin (LIPITOR) 40 mg tablet; Take 1 tablet (40 mg total) by mouth daily  -     aspirin 81 mg chewable tablet; Chew 1 tablet (81 mg total) daily  -     atorvastatin (LIPITOR) 40 mg tablet; Take 1 tablet (40 mg total) by mouth daily    COVID-19  -     SARS-CoV2 Antibody, Total (IgG, IgA, IgM) [facility]; Future          Subjective:      Patient ID: Marianna Dent is a 54 y o  male  Luz Tolentino presents to the clinic today for primary care follow-up of chronic conditions  I have previously only seen Luz Tolentino via telemedicine visit due to [place]  He had a COVID test performed by [facility]   He received the results 3 days ago which were negative    Past medical history significant for HIV, anxiety, and hyperlipidemia  Barrett Leung reported elevated blood pressures at last virtual visit  He was provided with a home blood pressure cuff and instructed to keep a log and review to follow-up at next PCP visit  Barrett Leung did not do this, but his blood pressure is 148/118 today  He denies headache, chest pain, or change in vision  Barrett Leung denies cough, shortness of breath,fever, nausea, diarrhea,anosmia,or ageusia  Denies exposure to sick contacts  The following portions of the patient's history were reviewed and updated as appropriate: allergies, current medications, past family history, past medical history, past social history, past surgical history and problem list     Review of Systems   Constitutional: Negative for activity change, appetite change, chills, diaphoresis, fatigue, fever and unexpected weight change  HENT: Negative for congestion, dental problem, ear pain, hearing loss, mouth sores, rhinorrhea and sore throat  Eyes: Negative for pain, redness and visual disturbance  Respiratory: Negative for shortness of breath and wheezing  Cardiovascular: Negative for chest pain and leg swelling  Gastrointestinal: Negative for abdominal pain, constipation, diarrhea, nausea and vomiting  Endocrine: Negative for polydipsia, polyphagia and polyuria  Genitourinary: Negative for difficulty urinating and dysuria  Musculoskeletal: Negative for back pain, joint swelling and myalgias  Skin: Negative for rash  Neurological: Negative for syncope and headaches  Psychiatric/Behavioral: Negative for behavioral problems and suicidal ideas           Objective:      BP (!) 142/118   Pulse 76   Temp 97 6 °F (36 4 °C)   Ht 5' 7" (1 702 m)   Wt 91 8 kg (202 lb 6 4 oz)   SpO2 98%   BMI 31 70 kg/m²       Lab Results   Component Value Date    K 4 1 06/09/2020     06/09/2020    CO2 25 06/09/2020    BUN 24 06/09/2020    CREATININE 1 21 06/09/2020    GLUF 93 06/09/2020    CALCIUM 9 0 06/09/2020    AST 24 06/09/2020    ALT 30 06/09/2020    ALKPHOS 53 06/09/2020    EGFR 71 06/09/2020     Lab Results   Component Value Date    WBC 6 89 06/09/2020    WBC 6 7 06/09/2020    HGB 16 0 06/09/2020    HGB 14 4 06/09/2020    HCT 48 0 06/09/2020    HCT 43 5 06/09/2020    MCV 91 06/09/2020    MCV 93 06/09/2020     06/09/2020     06/09/2020     Lab Results   Component Value Date    HEPCAB Non-reactive 04/07/2020     Lab Results   Component Value Date    HAV Reactive (A) 04/07/2020    HEPCAB Non-reactive 04/07/2020     Lab Results   Component Value Date    RPR Non-Reactive 04/07/2020            Physical Exam   Constitutional: He is oriented to person, place, and time  He appears well-developed and well-nourished  No distress  HENT:   Head: Normocephalic  Right Ear: External ear normal    Left Ear: External ear normal    Nose: Nose normal    Mouth/Throat: Oropharynx is clear and moist  No oropharyngeal exudate  Eyes: Pupils are equal, round, and reactive to light  Conjunctivae are normal  Right eye exhibits no discharge  Left eye exhibits no discharge  Neck: Normal range of motion  No thyromegaly present  Cardiovascular: Normal rate, regular rhythm, normal heart sounds and intact distal pulses  No murmur heard  Pulmonary/Chest: Effort normal and breath sounds normal  He has no wheezes  Abdominal: Soft  Bowel sounds are normal  He exhibits no mass  There is no tenderness  Musculoskeletal: Normal range of motion  He exhibits no edema or tenderness  Lymphadenopathy:     He has no cervical adenopathy  Neurological: He is alert and oriented to person, place, and time  Skin: Skin is warm and dry  No rash noted  Psychiatric: He has a normal mood and affect   His behavior is normal

## 2020-07-29 NOTE — PATIENT INSTRUCTIONS
Lisinopril/Hydrochlorothiazide (By mouth)   Hydrochlorothiazide (fabián-droe-klor-yc-QNKM-l-zide), Lisinopril (lye-SIN-oh-pril)  Treats high blood pressure  This medicine contains an ACE inhibitor and a diuretic (water pill)  Brand Name(s): Prinzide, Zestoretic   There may be other brand names for this medicine  When This Medicine Should Not Be Used: This medicine is not right for everyone  Do not use it if you had an allergic reaction to lisinopril, hydrochlorothiazide, another ACE inhibitor, or a sulfa drug, or if you are pregnant  How to Use This Medicine:   Tablet  · Take your medicine as directed  Your dose may need to be changed several times to find what works best for you  · Missed dose: Take a dose as soon as you remember  If it is almost time for your next dose, wait until then and take a regular dose  Do not take extra medicine to make up for a missed dose  · Store the medicine in a closed container at room temperature, away from heat, moisture, and direct light  Drugs and Foods to Avoid:   Ask your doctor or pharmacist before using any other medicine, including over-the-counter medicines, vitamins, and herbal products  · Do not use this medicine together with aliskiren if you have diabetes  · Some foods and medicines can affect how lisinopril/hydrochlorothiazide works  Tell your doctor if you are using any of the following:  ¨ Aliskiren, cholestyramine, colestipol, everolimus, lithium, sirolimus, temsirolimus  ¨ Another blood pressure medicine, including an angiotensin receptor blocker (ARB)  ¨ Insulin or oral diabetes medicine  ¨ NSAID pain or arthritis medicine (including aspirin, celecoxib, diclofenac, ibuprofen, or naproxen)  ¨ Steroid medicine (including hydrocortisone, methylprednisolone, prednisolone, prednisone)  · Ask your doctor before you use any medicine, supplement, or salt substitute that contains potassium    · Alcohol, narcotic pain relievers, or sleeping pills may cause you to feel more lightheaded, dizzy, or faint when used with this medicine  Warnings While Using This Medicine:   · It is not safe to take this medicine during pregnancy  It could harm an unborn baby  Tell your doctor right away if you become pregnant  · Tell your doctor if you are breastfeeding, or if you have kidney disease, liver disease, diabetes, heart failure, heart or blood vessel disease, high cholesterol, gout, lupus or a similar disorder, trouble urinating, or a history of allergies or asthma  · This medicine may cause the following problems:  ¨ Angioedema (severe swelling)  ¨ Serious liver problems  ¨ Kidney problems  ¨ Glaucoma  · This medicine could lower your blood pressure too much, especially when you first use it or if you are dehydrated  Stand or sit up slowly if you feel lightheaded or dizzy  · Do not stop using the medicine without asking your doctor, even if you feel well  This medicine will not cure your high blood pressure, but it will help keep it in the normal range  You may have to take blood pressure medicine for the rest of your life  · Tell any doctor or dentist who treats you that you are using this medicine  · Your doctor will do lab tests at regular visits to check on the effects of this medicine  Keep all appointments  · Keep all medicine out of the reach of children  Never share your medicine with anyone    Possible Side Effects While Using This Medicine:   Call your doctor right away if you notice any of these side effects:  · Allergic reaction: Itching or hives, swelling in your face or hands, swelling or tingling in your mouth or throat, chest tightness, trouble breathing  · Blistering, peeling, or red skin rash  · Change in how much or how often you urinate  · Confusion, weakness, uneven heartbeat, trouble breathing, numbness in your hands, feet, or lips  · Dark urine or pale stools, nausea, vomiting, loss of appetite, stomach pain, yellow skin or eyes  · Dry mouth, increased thirst, muscle cramps  · Eye pain, vision changes, seeing halos around lights  · Fever, chills, sore throat, body aches  · Lightheadedness, dizziness, fainting  · Severe stomach pain (with or without nausea or vomiting)  If you notice these less serious side effects, talk with your doctor:   · Dry cough  If you notice other side effects that you think are caused by this medicine, tell your doctor  Call your doctor for medical advice about side effects  You may report side effects to FDA at 0-490-FDA-9010  © 2017 [address] Information is for End User's use only and may not be sold, redistributed or otherwise used for commercial purposes  The above information is an  only  It is not intended as medical advice for individual conditions or treatments  Talk to your doctor, nurse or pharmacist before following any medical regimen to see if it is safe and effective for you  Aspirin (By mouth)   Aspirin (AS-pir-in)  Treats pain, fever, and inflammation  May lower risk of heart attack and stroke  Brand Name(s): Ascriptin Regular Strength, Aspergum, Aspir Low, Aspirin Adult Low Dose, Aspirin Low Dose, Hi Aspirin Children's, Hi Aspirin Regimen, Hi Extra Strength, Hi Genuine Aspirin, Bufferin, Bufferin Low Dose, Durlaza, Ecotrin, Ecpirin, Enteric Aspirin   There may be other brand names for this medicine  When This Medicine Should Not Be Used: This medicine is not right for everyone  Do not use it if you had an allergic reaction to aspirin or other NSAIDs, or if you have a history of asthma with nasal polyps and rhinitis  How to Use This Medicine:   Delayed Release Capsule, Long Acting Capsule, Gum, Tablet, Chewable Tablet, Fizzy Tablet, Coated Tablet, Long Acting Tablet, 24 Hour Capsule  · Your doctor will tell you how much medicine to use  Do not use more than directed  · It is best to take this medicine with food or milk    · Capsule, tablet, or coated tablet: Swallow whole  Do not crush, break, or chew it  · Chewable tablet: You may chew it completely or swallow it whole  · Gum: Chew completely to make sure you get as much medicine as possible  Drink a full glass (8 ounces) of water after chewing the gum  · Swallow the extended-release capsule whole  Do not crush, break, or chew it  Take the capsule with a full glass of water at the same time each day  · Follow the instructions on the medicine label if you are using this medicine without a prescription  · Missed dose: If you miss a dose of Durlaza, skip the missed dose and go back to your regular dosing schedule  Do not take extra medicine to make up for a missed dose  · Store the medicine in a closed container at room temperature, away from heat, moisture, and direct light  Drugs and Foods to Avoid:   Ask your doctor or pharmacist before using any other medicine, including over-the-counter medicines, vitamins, and herbal products  · Some foods and medicines can affect how aspirin works  Tell your doctor if you are using any of the following:  ¨ Dipyridamole, methotrexate, probenecid, sulfinpyrazone, ticlopidine  ¨ Blood thinner (including clopidogrel, prasugrel, ticagrelor, warfarin)  ¨ Blood pressure medicine  ¨ Medicine to treat seizures (including phenytoin, valproic acid)  ¨ NSAID pain or arthritis medicine (including celecoxib, diclofenac, ibuprofen, naproxen)  ¨ Steroid medicine (including dexamethasone, hydrocortisone, methylprednisolone, prednisolone, prednisone)  · Do not take Durlaza 2 hours before or 1 hour after you drink alcohol or take medicines that contain alcohol  Warnings While Using This Medicine:   · Tell your doctor if you are pregnant or breastfeeding  Do not use this medicine during the later part of a pregnancy unless your doctor tells you to  · Tell your doctor if you have kidney disease, liver disease, high blood pressure, heart disease, or a history of stomach bleeding or ulcers    · This medicine may increase your risk for bleeding, including stomach ulcers  · Do not give aspirin to a child or teenager who has chickenpox or flu symptoms, unless the doctor says it is okay  Aspirin can cause a life-threatening reaction called Reye syndrome  · Tell any doctor or dentist who treats you that you are using this medicine  This medicine may affect certain medical test results  · Keep all medicine out of the reach of children  Never share your medicine with anyone  Possible Side Effects While Using This Medicine:   Call your doctor right away if you notice any of these side effects:  · Allergic reaction: Itching or hives, swelling in your face or hands, swelling or tingling in your mouth or throat, chest tightness, trouble breathing  · Bloody or black stools, bloody vomit or vomit that looks like coffee grounds  · Chest tightness, wheezing  · Ringing in the ears  · Severe stomach pain  · Unusual bleeding, bruising, or weakness  If you notice other side effects that you think are caused by this medicine, tell your doctor  Call your doctor for medical advice about side effects  You may report side effects to FDA at 5-962-FDA-1994  © 2017 [address] Information is for End User's use only and may not be sold, redistributed or otherwise used for commercial purposes  The above information is an  only  It is not intended as medical advice for individual conditions or treatments  Talk to your doctor, nurse or pharmacist before following any medical regimen to see if it is safe and effective for you  Atorvastatin (By mouth)   Atorvastatin (a-tor-va-STAT-in)  Treats high cholesterol and triglyceride levels  Reduces the risk of angina, stroke, heart attack, or certain heart and blood vessel problems  This medicine is a statin  Brand Name(s): Lipitor   There may be other brand names for this medicine  When This Medicine Should Not Be Used:    This medicine is not right for everyone  Do not use it if you had an allergic reaction to atorvastatin, if you have active liver disease, or if you are pregnant or breastfeeding  How to Use This Medicine:   Tablet  · Take your medicine as directed  Your dose may need to be changed several times to find what works best for you  · Take this medicine at the same time each day  · Swallow the tablet whole  Do not break it  · Missed dose: Take a dose as soon as you remember  If it is less than 12 hours until your next dose, skip the missed dose and take the next dose at the regular time  Do not take 2 doses of this medicine within 12 hours  · Read and follow the patient instructions that come with this medicine  Talk to your doctor or pharmacist if you have any questions  · Store the medicine in a closed container at room temperature, away from heat, moisture, and direct light  Drugs and Foods to Avoid:   Ask your doctor or pharmacist before using any other medicine, including over-the-counter medicines, vitamins, and herbal products  · Some medicines can affect how atorvastatin works  Tell your doctor if you also use birth control pills, boceprevir, cimetidine, colchicine, cyclosporine, digoxin, niacin, rifampin, spironolactone, telaprevir, medicine to treat an infection, or medicine to treat HIV/AIDS  Warnings While Using This Medicine:   · It is not safe to take this medicine during pregnancy  It could harm an unborn baby  Tell your doctor right away if you become pregnant  · Tell your doctor if you have kidney disease, diabetes, muscle pain or weakness, thyroid problems, have recently had a stroke or TIA (transient ischemic attack), or have a history of liver disease  Tell your doctor if you drink grapefruit juice or drink alcohol regularly  · This medicine can cause muscle problems, which can lead to kidney problems  · Tell any doctor or dentist who treats you that you use this medicine   You may need to stop using it if you have surgery, have an injury, or develop serious health problems  · Your doctor will do lab tests at regular visits to check on the effects of this medicine  Keep all appointments  · Keep all medicine out of the reach of children  Never share your medicine with anyone  Possible Side Effects While Using This Medicine:   Call your doctor right away if you notice any of these side effects:  · Allergic reaction: Itching or hives, swelling in your face or hands, swelling or tingling in your mouth or throat, chest tightness, trouble breathing  · Blistering, peeling, red skin rash  · Change in how much or how often you urinate  · Dark urine or pale stools, nausea, vomiting, loss of appetite, stomach pain, yellow skin or eyes  · Fever  · Muscle pain, tenderness, or weakness  · Unusual tiredness  If you notice these less serious side effects, talk with your doctor:   · Diarrhea  · Joint pain  If you notice other side effects that you think are caused by this medicine, tell your doctor  Call your doctor for medical advice about side effects  You may report side effects to FDA at 7-867-FDA-1354  © 2017 [address] Information is for End User's use only and may not be sold, redistributed or otherwise used for commercial purposes  The above information is an  only  It is not intended as medical advice for individual conditions or treatments  Talk to your doctor, nurse or pharmacist before following any medical regimen to see if it is safe and effective for you

## 2020-07-29 NOTE — ASSESSMENT & PLAN NOTE
No results found for: [id number]  CD4 ABS   Date/Time Value Ref Range Status   2020 10:27 AM 1035 359 - 1519 /uL Final     HIV-1 RNA by PCR, Qn   Date/Time Value Ref Range Status   2020 10:27 AM <20 copies/mL Final     Comment:     HIV-1 RNA not detected  The reportable range for this assay is 20 to 10,000,000  copies HIV-1 RNA/mL  HIV-1 RNA Viral Load Log   Date/Time Value Ref Range Status   2020 10:27 AM COMMENT [id number]/mL Final     Comment:     Unable to calculate result since non-numeric result obtained for  component test          ART: Christel  Reports 100% adherence  Denies side effects  Stressed the importance of adherence  Continue 3 month follow up with ID clinic  Reviewed most recent labs, including Cd4 and viral load  Discussed the risks and benefits of treatment options, instructions for management, importance of treatment adherence, and reduction of risk factor  Educated on possible  medication side effects  Counseled on routes of HIV transmission, including the risk of  infection  Emphasized that viral suppression is the best method to prevent HIV transmission  At this time pt denies the need for HIV testing of anyone in their life  Total encounter time was 45 minutes  Greater then 20 minutes were spent on counseling and patient education  Pt voices understanding and agreement with treatment plan

## 2020-07-29 NOTE — PROGRESS NOTES
Assessment/Plan:      Diagnoses and all orders for this visit:    Symptomatic HIV infection ([address] )  -     Discontinue: bictegravir-emtricitab-tenofovir alafenamide (Biktarvy) -25 MG tablet; Take 1 tablet by mouth daily  -     bictegravir-emtricitab-tenofovir alafenamide (Biktarvy) -25 MG tablet; Take 1 tablet by mouth daily    Need for Menactra vaccination  -     Meningococcal Conjugate Vaccine MCV4P IM    Essential hypertension  -     Discontinue: aspirin 81 mg chewable tablet; Chew 1 tablet (81 mg total) daily  -     Discontinue: lisinopril-hydrochlorothiazide (PRINZIDE,ZESTORETIC) 10-12 5 MG per tablet; Take 1 tablet by mouth daily  -     aspirin 81 mg chewable tablet; Chew 1 tablet (81 mg total) daily  -     lisinopril-hydrochlorothiazide (PRINZIDE,ZESTORETIC) 10-12 5 MG per tablet; Take 1 tablet by mouth daily  -     CBC and differential; Future  -     Comprehensive metabolic panel; Future    Dyslipidemia  -     Discontinue: aspirin 81 mg chewable tablet; Chew 1 tablet (81 mg total) daily  -     Discontinue: atorvastatin (LIPITOR) 40 mg tablet; Take 1 tablet (40 mg total) by mouth daily  -     aspirin 81 mg chewable tablet; Chew 1 tablet (81 mg total) daily  -     atorvastatin (LIPITOR) 40 mg tablet; Take 1 tablet (40 mg total) by mouth daily    COVID-19  -     SARS-CoV2 Antibody, Total (IgG, IgA, IgM) [facility]; Future              Discussion: S checked in with pt today regarding linking with mental health services  Pt stated that he had not heard from referred [facility]  provider yet  S informed pt that she will follow up on his behalf  [facility] assessed anxiety symptoms  No acute concerns  [facility] reviewed previously discussed coping skills, pt seemed to be using them well  Subjective:     Patient ID: Lisa Friend is a 54 y o  male      HPI: The patient is being seen at the [facility] today for a routine behavioral health follow up     Objective:    Orientation    Person: Yes   Place: Yes   Time: Yes Appearance     Well Developed: Yes   Healthy: Yes   Uncomfortable: No  Normal Body Odor: Yes   Grooming Unkempt: No  Poor Eye Contact: No    Mood and Affect    Appropriate: Yes   Euthymic: Yes   Anxious: No     Harm to Self or Others: denied    Substance Abuse: none reported or observed

## 2020-07-30 ENCOUNTER — APPOINTMENT (OUTPATIENT)
Dept: LAB | Facility: CLINIC | Age: 45
End: 2020-07-30
Payer: COMMERCIAL

## 2020-07-30 DIAGNOSIS — U07.1 COVID-19: ICD-10-CM

## 2020-07-30 DIAGNOSIS — I10 ESSENTIAL HYPERTENSION: ICD-10-CM

## 2020-07-30 LAB
ALBUMIN SERPL BCP-MCNC: 3.8 G/DL (ref 3.5–5)
ALP SERPL-CCNC: 61 U/L (ref 46–116)
ALT SERPL W P-5'-P-CCNC: 43 U/L (ref 12–78)
ANION GAP SERPL CALCULATED.3IONS-SCNC: 12 MMOL/L (ref 4–13)
AST SERPL W P-5'-P-CCNC: 34 U/L (ref 5–45)
BASOPHILS # BLD AUTO: 0.05 THOUSANDS/ΜL (ref 0–0.1)
BASOPHILS NFR BLD AUTO: 1 % (ref 0–1)
BILIRUB SERPL-MCNC: 0.58 MG/DL (ref 0.2–1)
BUN SERPL-MCNC: 19 MG/DL (ref 5–25)
CALCIUM SERPL-MCNC: 8.8 MG/DL (ref 8.3–10.1)
CHLORIDE SERPL-SCNC: 102 MMOL/L (ref 100–108)
CO2 SERPL-SCNC: 25 MMOL/L (ref 21–32)
CREAT SERPL-MCNC: 1.06 MG/DL (ref 0.6–1.3)
EOSINOPHIL # BLD AUTO: 0.08 THOUSAND/ΜL (ref 0–0.61)
EOSINOPHIL NFR BLD AUTO: 1 % (ref 0–6)
ERYTHROCYTE [DISTWIDTH] IN BLOOD BY AUTOMATED COUNT: 12 % (ref 11.6–15.1)
GFR SERPL CREATININE-BSD FRML MDRD: 84 ML/MIN/1.73SQ M
GLUCOSE P FAST SERPL-MCNC: 93 MG/DL (ref 65–99)
HCT VFR BLD AUTO: 46.6 % (ref 36.5–49.3)
HGB BLD-MCNC: 15.8 G/DL (ref 12–17)
IMM GRANULOCYTES # BLD AUTO: 0.02 THOUSAND/UL (ref 0–0.2)
IMM GRANULOCYTES NFR BLD AUTO: 0 % (ref 0–2)
LYMPHOCYTES # BLD AUTO: 2.88 THOUSANDS/ΜL (ref 0.6–4.47)
LYMPHOCYTES NFR BLD AUTO: 31 % (ref 14–44)
MCH RBC QN AUTO: 30.9 PG (ref 26.8–34.3)
MCHC RBC AUTO-ENTMCNC: 33.9 G/DL (ref 31.4–37.4)
MCV RBC AUTO: 91 FL (ref 82–98)
MONOCYTES # BLD AUTO: 0.77 THOUSAND/ΜL (ref 0.17–1.22)
MONOCYTES NFR BLD AUTO: 8 % (ref 4–12)
NEUTROPHILS # BLD AUTO: 5.53 THOUSANDS/ΜL (ref 1.85–7.62)
NEUTS SEG NFR BLD AUTO: 59 % (ref 43–75)
NRBC BLD AUTO-RTO: 0 /100 WBCS
PLATELET # BLD AUTO: 269 THOUSANDS/UL (ref 149–390)
PMV BLD AUTO: 11.5 FL (ref 8.9–12.7)
POTASSIUM SERPL-SCNC: 3.6 MMOL/L (ref 3.5–5.3)
PROT SERPL-MCNC: 7.9 G/DL (ref 6.4–8.2)
RBC # BLD AUTO: 5.11 MILLION/UL (ref 3.88–5.62)
SODIUM SERPL-SCNC: 139 MMOL/L (ref 136–145)
WBC # BLD AUTO: 9.33 THOUSAND/UL (ref 4.31–10.16)

## 2020-07-30 PROCEDURE — 80053 COMPREHEN METABOLIC PANEL: CPT

## 2020-07-30 PROCEDURE — 86769 SARS-COV-2 COVID-19 ANTIBODY: CPT

## 2020-07-30 PROCEDURE — 85025 COMPLETE CBC W/AUTO DIFF WBC: CPT

## 2020-07-30 PROCEDURE — 36415 COLL VENOUS BLD VENIPUNCTURE: CPT

## 2020-07-31 LAB — SARS-COV-2 IGG+IGM SERPL QL IA: NORMAL

## 2020-08-05 ENCOUNTER — TELEPHONE (OUTPATIENT)
Dept: SURGERY | Facility: CLINIC | Age: 45
End: 2020-08-05

## 2020-08-07 NOTE — TELEPHONE ENCOUNTER
Per pt he has not yet received his shipment of medication  Pt given Riya's number @ [facility] to track package  I also called Carlos Flores, who had not yet heard from the pt  Per Carlos Flores she tracked the package to [facility] and it should be delivered to the patient tomorrow  Carlos Flores will call the patient to update him

## 2020-08-10 NOTE — TELEPHONE ENCOUNTER
Pt called and was unable to get a hold of pharmacy  He said he was told by Gerardo Mathews the med shipment would be received by Saturday  Told pt I would contact [facility] and see if package can be tracked   LVM for [facility]

## 2020-08-10 NOTE — TELEPHONE ENCOUNTER
I called the pt to find out if his medication was delivered and he has still not received it  Pt will call Marilynn Wen at [facility] and have her track the package

## 2020-08-11 ENCOUNTER — TELEPHONE (OUTPATIENT)
Dept: SURGERY | Facility: CLINIC | Age: 45
End: 2020-08-11

## 2020-08-11 NOTE — TELEPHONE ENCOUNTER
[facility] called pt to discuss and confirm that pt is no longer interested in receiving MH services because [place] sent [facility] an email that when called, pt said he was not interested  Pt was unavailable and VM box was full

## 2020-09-08 ENCOUNTER — TELEPHONE (OUTPATIENT)
Dept: SURGERY | Facility: CLINIC | Age: 45
End: 2020-09-08

## 2020-09-09 ENCOUNTER — OFFICE VISIT (OUTPATIENT)
Dept: SURGERY | Facility: CLINIC | Age: 45
End: 2020-09-09
Payer: COMMERCIAL

## 2020-09-09 VITALS
DIASTOLIC BLOOD PRESSURE: 80 MMHG | HEART RATE: 90 BPM | SYSTOLIC BLOOD PRESSURE: 110 MMHG | WEIGHT: 197 LBS | OXYGEN SATURATION: 96 % | BODY MASS INDEX: 30.92 KG/M2 | HEIGHT: 67 IN | TEMPERATURE: 97.4 F

## 2020-09-09 DIAGNOSIS — I10 ESSENTIAL HYPERTENSION: ICD-10-CM

## 2020-09-09 DIAGNOSIS — B20 SYMPTOMATIC HIV INFECTION (HCC): Primary | ICD-10-CM

## 2020-09-09 DIAGNOSIS — E78.5 DYSLIPIDEMIA: ICD-10-CM

## 2020-09-09 PROCEDURE — 99214 OFFICE O/P EST MOD 30 MIN: CPT | Performed by: NURSE PRACTITIONER

## 2020-09-09 RX ORDER — LISINOPRIL 5 MG/1
5 TABLET ORAL DAILY
Qty: 30 TABLET | Refills: 1 | Status: SHIPPED | OUTPATIENT
Start: 2020-09-09 | End: 2020-10-19

## 2020-09-09 RX ORDER — HYDROCHLOROTHIAZIDE 12.5 MG/1
12.5 CAPSULE, GELATIN COATED ORAL DAILY
Qty: 30 CAPSULE | Refills: 2 | Status: SHIPPED | OUTPATIENT
Start: 2020-09-09 | End: 2020-11-16

## 2020-09-09 NOTE — ASSESSMENT & PLAN NOTE
No results found for: [id number]  CD4 ABS   Date/Time Value Ref Range Status   2020 10:27 AM 1035 359 - 1519 /uL Final     HIV-1 RNA by PCR, Qn   Date/Time Value Ref Range Status   2020 10:27 AM <20 copies/mL Final     Comment:     HIV-1 RNA not detected  The reportable range for this assay is 20 to 10,000,000  copies HIV-1 RNA/mL  HIV-1 RNA Viral Load Log   Date/Time Value Ref Range Status   2020 10:27 AM COMMENT [id number]/mL Final     Comment:     Unable to calculate result since non-numeric result obtained for  component test          ART: Christel  Reports 100% adherence  Denies side effects  Stressed the importance of adherence  Continue follow up with ID clinic  Reviewed most recent labs, including Cd4 and viral load  Discussed the risks and benefits of treatment options, instructions for management, importance of treatment adherence, and reduction of risk factor  Educated on possible  medication side effects  Counseled on routes of HIV transmission, including the risk of  infection  Emphasized that viral suppression is the best method to prevent HIV transmission  At this time pt denies the need for HIV testing of anyone in their life  Total encounter time was 45 minutes  Greater then 20 minutes were spent on counseling and patient education  Pt voices understanding and agreement with treatment plan

## 2020-09-09 NOTE — PROGRESS NOTES
Assessment/Plan:      Diagnoses and all orders for this visit:    Symptomatic HIV infection ([address] )    Essential hypertension  -     lisinopril (ZESTRIL) 5 mg tablet; Take 1 tablet (5 mg total) by mouth daily  -     hydrochlorothiazide (MICROZIDE) 12 5 mg capsule; Take 1 capsule (12 5 mg total) by mouth daily    Dyslipidemia            Discussion: [facility] checked in on pt's [address] as well as followed up regarding him declining ongoing [address] services after he had previously expressed strong interest in it  Patient stated that his mood fluctuates but overall he feels that he is okay and does not need therapy at this time  Pt discussed general disinterest in doing anything 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 or finding a new career path  [facility] validated pt's feelings acknowledging that this is a tough time due to CoVID  S provided encouragement and suggested that pt needs to find a new purpose or venture to engage in which would helps boost his mood and reduce his anxiety about what's next  Pt agreed  [facility] shared a volunteer opportunity with pt by giving him a flyer and also sent by email  Pt shared that he started looking in to getting involved with the [employer] campaign but abandoned those efforts  [facility] encouraged pt to push through and get at least 1 thing going regularly so he can have something to look forward to, focus on, and enjoy a sense of accomplishment  Pt agreed to look in to the volunteer opportunities discussed  [facility] reminded pt to call [facility] if symptoms worsened or he needed support  Also suggested to call [facility] if he changes his mind re:ongoing  services  Pt agreed  TOMASA-7 was completed and score = 6 indicative of mild anxiety  Pt continues to decline ongoing therapy and will use coping skills discussed  Pt declined depression stating that his depression "comes and goes "        Subjective:     Patient ID: Claudetta Bones is a 54 y o  male      HPI: The patient is being seen at the [facility] [address] [place] [address] today for a routine behavioral health follow up     Objective:    Orientation    Person: Yes   Place: Yes   Time: Yes     Appearance     Well Developed: Yes   Healthy: Yes   Uncomfortable: No  Normal Body Odor: Yes   Grooming Unkempt: No  Poor Eye Contact: No    Mood and Affect    Appropriate: Yes   Anxious: Yes, mild   Depresses: Yes, mild     Harm to Self or Others: denied    Substance Abuse: none reported or observed

## 2020-09-09 NOTE — ASSESSMENT & PLAN NOTE
Blood pressure: Improved  Renal function stable  BP Readings from Last 3 Encounters:   09/09/20 110/80   07/29/20 (!) 142/118   07/07/20 130/90       Reduce antihypertensive dose to 5mg lisinopril and 12 5mg HCTZ  Educated on the following lifestyle modifications to lower BP and decrease cardiovascular risk factors  limit alcohol intake, reduce salt in diet, maintain a healthy weight, engage in 30 minutes of cardiovascular exercise daily, and not smoke

## 2020-09-09 NOTE — ASSESSMENT & PLAN NOTE
Continue current statin  Tolerating medication well and denies muscle pain or weakness  LFTs are not elevated  Eat a low fat/low cholesterol diet  Follow up with dietician for additional education

## 2020-09-09 NOTE — PATIENT INSTRUCTIONS
Hypertension   AMBULATORY CARE:   Hypertension  is high blood pressure (BP)  Your BP is the force of your blood moving against the walls of your arteries  Normal BP is less than 120/80  Prehypertension is between 120/80 and 139/89  Hypertension is 140/90 or higher  Hypertension causes your BP to get so high that your heart has to work much harder than normal  This can damage your heart  You can control hypertension with a healthy lifestyle or medicines  A controlled blood pressure helps protect your organs, such as your heart, lungs, brain, and kidneys  Common symptoms include the following:   · Headache     · Blurred vision     · Chest pain     · Dizziness or weakness     · Trouble breathing    · Nosebleeds  Call 911 for any of the following:   · You have discomfort in your chest that feels like squeezing, pressure, fullness, or pain  · You become confused or have difficulty speaking  · You suddenly feel lightheaded or have trouble breathing  · You have pain or discomfort in your back, neck, jaw, stomach, or arm  Seek care immediately if:   · You have a severe headache or vision loss  · You have weakness in an arm or leg  Contact your healthcare provider if:   · You feel faint, dizzy, confused, or drowsy  · You have been taking your BP medicine and your BP is still higher than your healthcare provider says it should be  · You have questions or concerns about your condition or care  Treatment for hypertension  may include medicine to lower your BP and lower your cholesterol level  A low cholesterol level helps prevent heart disease and makes it easier to control your blood pressure  You may also need to make lifestyle changes  Take your medicine exactly as directed  Manage hypertension:  Talk with your healthcare provider about these and other ways to manage hypertension:  · Check your BP at home  Sit and rest for 5 minutes before you take your BP   Extend your arm and support it on a flat surface  Your arm should be at the same level as your heart  Follow the directions that came with your BP monitor  If possible, take at least 2 BP readings each time  Take your BP at least twice a day at the same times each day, such as morning and evening  Keep a record of your BP readings and bring it to your follow-up visits  Ask your healthcare provider what your BP should be  · Limit sodium (salt) as directed  Too much sodium can affect your fluid balance  Check labels to find low-sodium or no-salt-added foods  Some low-sodium foods use potassium salts for flavor  Too much potassium can also cause health problems  Your healthcare provider will tell you how much sodium and potassium are safe for you to have in a day  He or she may recommend that you limit sodium to 2,300 mg a day  · Follow the meal plan recommended by your healthcare provider  A dietitian or your provider can give you more information on low-sodium plans or the DASH (Dietary Approaches to Stop Hypertension) eating plan  The DASH plan is low in sodium, unhealthy fats, and total fat  It is high in potassium, calcium, and fiber  · Exercise to maintain a healthy weight  Exercise at least 30 minutes per day, on most days of the week  This will help decrease your blood pressure  Ask your healthcare provider about the best exercise plan for you  · Decrease stress  This may help lower your BP  Learn ways to relax, such as deep breathing or listening to music  · Limit alcohol  Women should limit alcohol to 1 drink a day  Men should limit alcohol to 2 drinks a day  A drink of alcohol is 12 ounces of beer, 5 ounces of wine, or 1½ ounces of liquor  · Do not smoke  Nicotine and other chemicals in cigarettes and cigars can increase your BP and also cause lung damage  Ask your healthcare provider for information if you currently smoke and need help to quit  E-cigarettes or smokeless tobacco still contain nicotine  Talk to your healthcare provider before you use these products  · Manage any other health conditions you have  Health conditions such as diabetes can increase your risk for hypertension  Follow your healthcare provider's instructions and take all your medicines as directed  Follow up with your healthcare provider as directed: You will need to return to have your BP checked and to have other lab tests done  Write down your questions so you remember to ask them during your visits  © 2017 [employer] Information is for End User's use only and may not be sold, redistributed or otherwise used for commercial purposes  All illustrations and images included in CareNotes® are the copyrighted property of A [employer]  or [employer]annon [employer]  The above information is an  only  It is not intended as medical advice for individual conditions or treatments  Talk to your doctor, nurse or pharmacist before following any medical regimen to see if it is safe and effective for you

## 2020-09-09 NOTE — PROGRESS NOTES
Assessment/Plan:    Symptomatic HIV infection ([facility] [address] )  No results found for: [id number]  CD4 ABS   Date/Time Value Ref Range Status   2020 10:27 AM 1035 359 - 1519 /uL Final     HIV-1 RNA by PCR, Qn   Date/Time Value Ref Range Status   2020 10:27 AM <20 copies/mL Final     Comment:     HIV-1 RNA not detected  The reportable range for this assay is 20 to 10,000,000  copies HIV-1 RNA/mL  HIV-1 RNA Viral Load Log   Date/Time Value Ref Range Status   2020 10:27 AM COMMENT [id number]/mL Final     Comment:     Unable to calculate result since non-numeric result obtained for  component test          ART: Biktarvy  Reports 100% adherence  Denies side effects  Stressed the importance of adherence  Continue follow up with ID clinic  Reviewed most recent labs, including Cd4 and viral load  Discussed the risks and benefits of treatment options, instructions for management, importance of treatment adherence, and reduction of risk factor  Educated on possible  medication side effects  Counseled on routes of HIV transmission, including the risk of  infection  Emphasized that viral suppression is the best method to prevent HIV transmission  At this time pt denies the need for HIV testing of anyone in their life  Total encounter time was 45 minutes  Greater then 20 minutes were spent on counseling and patient education  Pt voices understanding and agreement with treatment plan  Essential hypertension  Blood pressure: Improved  Renal function stable  BP Readings from Last 3 Encounters:   20 110/80   20 (!) 142/118   20 130/90       Reduce antihypertensive dose to 5mg lisinopril and 12 5mg HCTZ  Educated on the following lifestyle modifications to lower BP and decrease cardiovascular risk factors  limit alcohol intake, reduce salt in diet, maintain a healthy weight, engage in 30 minutes of cardiovascular exercise daily, and not smoke  Dyslipidemia  Continue current statin  Tolerating medication well and denies muscle pain or weakness  LFTs are not elevated  Eat a low fat/low cholesterol diet  Follow up with dietician for additional education  Diagnoses and all orders for this visit:    Symptomatic HIV infection ([address] )    Essential hypertension  -     lisinopril (ZESTRIL) 5 mg tablet; Take 1 tablet (5 mg total) by mouth daily  -     hydrochlorothiazide (MICROZIDE) 12 5 mg capsule; Take 1 capsule (12 5 mg total) by mouth daily    Dyslipidemia          Subjective:      Patient ID: Mitali Campos is a 54 y o  male  Rosmery Dhillon presents to the clinic today for primary care follow-up for HTN and new start lisinopril-HCTZ  Past medical history significant for HIV, anxiety, and hyperlipidemia  Rosmery Dhillon reports occasional dizziness since starting new medication  He has been taking half a tablet of his BP medication and symptoms have improved  The following portions of the patient's history were reviewed and updated as appropriate: allergies, current medications, past family history, past medical history, past social history, past surgical history and problem list     Review of Systems   Constitutional: Negative for activity change, appetite change, chills, diaphoresis, fatigue, fever and unexpected weight change  HENT: Negative for congestion, dental problem, ear pain, hearing loss, mouth sores, rhinorrhea and sore throat  Eyes: Negative for pain, redness and visual disturbance  Respiratory: Negative for shortness of breath and wheezing  Cardiovascular: Negative for chest pain and leg swelling  Gastrointestinal: Negative for abdominal pain, constipation, diarrhea, nausea and vomiting  Endocrine: Negative for polydipsia, polyphagia and polyuria  Genitourinary: Negative for difficulty urinating and dysuria  Musculoskeletal: Negative for back pain, joint swelling and myalgias     Skin: Negative for rash    Neurological: Negative for syncope and headaches  Psychiatric/Behavioral: Negative for behavioral problems and suicidal ideas  Objective:      /80   Pulse 90   Temp (!) 97 4 °F (36 3 °C)   Ht 5' 7" (1 702 m)   Wt 89 4 kg (197 lb)   SpO2 96%   BMI 30 85 kg/m²       Lab Results   Component Value Date    K 3 6 07/30/2020     07/30/2020    CO2 25 07/30/2020    BUN 19 07/30/2020    CREATININE 1 06 07/30/2020    GLUF 93 07/30/2020    CALCIUM 8 8 07/30/2020    AST 34 07/30/2020    ALT 43 07/30/2020    ALKPHOS 61 07/30/2020    EGFR 84 07/30/2020     Lab Results   Component Value Date    WBC 9 33 07/30/2020    HGB 15 8 07/30/2020    HCT 46 6 07/30/2020    MCV 91 07/30/2020     07/30/2020     Lab Results   Component Value Date    HEPCAB Non-reactive 04/07/2020     Lab Results   Component Value Date    HAV Reactive (A) 04/07/2020    HEPCAB Non-reactive 04/07/2020     Lab Results   Component Value Date    RPR Non-Reactive 04/07/2020            Physical Exam  Constitutional:       General: He is not in acute distress  Appearance: He is well-developed  HENT:      Head: Normocephalic  Right Ear: External ear normal       Left Ear: External ear normal       Nose: Nose normal       Mouth/Throat:      Pharynx: No oropharyngeal exudate  Eyes:      General:         Right eye: No discharge  Left eye: No discharge  Conjunctiva/sclera: Conjunctivae normal       Pupils: Pupils are equal, round, and reactive to light  Neck:      Musculoskeletal: Normal range of motion  Thyroid: No thyromegaly  Cardiovascular:      Rate and Rhythm: Normal rate and regular rhythm  Heart sounds: Normal heart sounds  No murmur  Pulmonary:      Effort: Pulmonary effort is normal       Breath sounds: Normal breath sounds  No wheezing  Abdominal:      General: Bowel sounds are normal       Palpations: Abdomen is soft  There is no mass  Tenderness:  There is no abdominal tenderness  Musculoskeletal: Normal range of motion  General: No tenderness  Lymphadenopathy:      Cervical: No cervical adenopathy  Skin:     General: Skin is warm and dry  Findings: No rash  Neurological:      Mental Status: He is alert and oriented to person, place, and time     Psychiatric:         Behavior: Behavior normal

## 2020-09-11 ENCOUNTER — PATIENT OUTREACH (OUTPATIENT)
Dept: SURGERY | Facility: CLINIC | Age: 45
End: 2020-09-11

## 2020-09-18 ENCOUNTER — TELEPHONE (OUTPATIENT)
Dept: SURGERY | Facility: CLINIC | Age: 45
End: 2020-09-18

## 2020-09-18 DIAGNOSIS — B20 SYMPTOMATIC HIV INFECTION (HCC): ICD-10-CM

## 2020-09-18 NOTE — TELEPHONE ENCOUNTER
[facility] called pt for MH check following office visit last week and discussion of symptoms  Pt did not   LVM

## 2020-09-21 RX ORDER — BICTEGRAVIR SODIUM, EMTRICITABINE, AND TENOFOVIR ALAFENAMIDE FUMARATE 50; 200; 25 MG/1; MG/1; MG/1
TABLET ORAL
Qty: 30 TABLET | Refills: 1 | Status: SHIPPED | OUTPATIENT
Start: 2020-09-21 | End: 2020-11-16

## 2020-09-23 ENCOUNTER — TELEPHONE (OUTPATIENT)
Dept: SURGERY | Facility: CLINIC | Age: 45
End: 2020-09-23

## 2020-09-28 ENCOUNTER — APPOINTMENT (OUTPATIENT)
Dept: LAB | Facility: CLINIC | Age: 45
End: 2020-09-28
Payer: COMMERCIAL

## 2020-09-28 DIAGNOSIS — B20 SYMPTOMATIC HIV INFECTION (HCC): ICD-10-CM

## 2020-09-28 LAB
ALBUMIN SERPL BCP-MCNC: 3.8 G/DL (ref 3.5–5)
ALP SERPL-CCNC: 58 U/L (ref 46–116)
ALT SERPL W P-5'-P-CCNC: 41 U/L (ref 12–78)
ANION GAP SERPL CALCULATED.3IONS-SCNC: 14 MMOL/L (ref 4–13)
AST SERPL W P-5'-P-CCNC: 26 U/L (ref 5–45)
BASOPHILS # BLD AUTO: 0.03 THOUSANDS/ΜL (ref 0–0.1)
BASOPHILS NFR BLD AUTO: 0 % (ref 0–1)
BILIRUB SERPL-MCNC: 0.6 MG/DL (ref 0.2–1)
BUN SERPL-MCNC: 19 MG/DL (ref 5–25)
CALCIUM SERPL-MCNC: 9.2 MG/DL (ref 8.3–10.1)
CHLORIDE SERPL-SCNC: 105 MMOL/L (ref 100–108)
CO2 SERPL-SCNC: 24 MMOL/L (ref 21–32)
CREAT SERPL-MCNC: 1.11 MG/DL (ref 0.6–1.3)
EOSINOPHIL # BLD AUTO: 0.09 THOUSAND/ΜL (ref 0–0.61)
EOSINOPHIL NFR BLD AUTO: 1 % (ref 0–6)
ERYTHROCYTE [DISTWIDTH] IN BLOOD BY AUTOMATED COUNT: 11.9 % (ref 11.6–15.1)
GFR SERPL CREATININE-BSD FRML MDRD: 79 ML/MIN/1.73SQ M
GLUCOSE P FAST SERPL-MCNC: 99 MG/DL (ref 65–99)
HCT VFR BLD AUTO: 46.8 % (ref 36.5–49.3)
HGB BLD-MCNC: 15.9 G/DL (ref 12–17)
IMM GRANULOCYTES # BLD AUTO: 0.03 THOUSAND/UL (ref 0–0.2)
IMM GRANULOCYTES NFR BLD AUTO: 0 % (ref 0–2)
LYMPHOCYTES # BLD AUTO: 2.52 THOUSANDS/ΜL (ref 0.6–4.47)
LYMPHOCYTES NFR BLD AUTO: 35 % (ref 14–44)
MCH RBC QN AUTO: 30.7 PG (ref 26.8–34.3)
MCHC RBC AUTO-ENTMCNC: 34 G/DL (ref 31.4–37.4)
MCV RBC AUTO: 90 FL (ref 82–98)
MONOCYTES # BLD AUTO: 0.57 THOUSAND/ΜL (ref 0.17–1.22)
MONOCYTES NFR BLD AUTO: 8 % (ref 4–12)
NEUTROPHILS # BLD AUTO: 4.04 THOUSANDS/ΜL (ref 1.85–7.62)
NEUTS SEG NFR BLD AUTO: 56 % (ref 43–75)
NRBC BLD AUTO-RTO: 0 /100 WBCS
PLATELET # BLD AUTO: 275 THOUSANDS/UL (ref 149–390)
PMV BLD AUTO: 10.4 FL (ref 8.9–12.7)
POTASSIUM SERPL-SCNC: 4.3 MMOL/L (ref 3.5–5.3)
PROT SERPL-MCNC: 7.4 G/DL (ref 6.4–8.2)
RBC # BLD AUTO: 5.18 MILLION/UL (ref 3.88–5.62)
SODIUM SERPL-SCNC: 143 MMOL/L (ref 136–145)
WBC # BLD AUTO: 7.28 THOUSAND/UL (ref 4.31–10.16)

## 2020-09-28 PROCEDURE — 86360 T CELL ABSOLUTE COUNT/RATIO: CPT

## 2020-09-28 PROCEDURE — 36415 COLL VENOUS BLD VENIPUNCTURE: CPT

## 2020-09-28 PROCEDURE — 80053 COMPREHEN METABOLIC PANEL: CPT

## 2020-09-28 PROCEDURE — 87536 HIV-1 QUANT&REVRSE TRNSCRPJ: CPT

## 2020-09-28 PROCEDURE — 85025 COMPLETE CBC W/AUTO DIFF WBC: CPT

## 2020-09-29 LAB
BASOPHILS # BLD AUTO: 0 X10E3/UL (ref 0–0.2)
BASOPHILS NFR BLD AUTO: 0 %
CD3+CD4+ CELLS # BLD: 994 /UL (ref 359–1519)
CD3+CD4+ CELLS NFR BLD: 41.4 % (ref 30.8–58.5)
CD3+CD4+ CELLS/CD3+CD8+ CLL BLD: 1.19 % (ref 0.92–3.72)
CD3+CD8+ CELLS # BLD: 838 /UL (ref 109–897)
CD3+CD8+ CELLS NFR BLD: 34.9 % (ref 12–35.5)
EOSINOPHIL # BLD AUTO: 0.1 X10E3/UL (ref 0–0.4)
EOSINOPHIL NFR BLD AUTO: 1 %
ERYTHROCYTE [DISTWIDTH] IN BLOOD BY AUTOMATED COUNT: 12.5 % (ref 11.6–15.4)
HCT VFR BLD AUTO: 45.5 % (ref 37.5–51)
HGB BLD-MCNC: 15.4 G/DL (ref 13–17.7)
IMM GRANULOCYTES # BLD: 0 X10E3/UL (ref 0–0.1)
IMM GRANULOCYTES NFR BLD: 0 %
LYMPHOCYTES # BLD AUTO: 2.4 X10E3/UL (ref 0.7–3.1)
LYMPHOCYTES NFR BLD AUTO: 34 %
MCH RBC QN AUTO: 30.9 PG (ref 26.6–33)
MCHC RBC AUTO-ENTMCNC: 33.8 G/DL (ref 31.5–35.7)
MCV RBC AUTO: 91 FL (ref 79–97)
MONOCYTES # BLD AUTO: 0.6 X10E3/UL (ref 0.1–0.9)
MONOCYTES NFR BLD AUTO: 8 %
NEUTROPHILS # BLD AUTO: 3.8 X10E3/UL (ref 1.4–7)
NEUTROPHILS NFR BLD AUTO: 57 %
PLATELET # BLD AUTO: 286 X10E3/UL (ref 150–450)
RBC # BLD AUTO: 4.99 X10E6/UL (ref 4.14–5.8)
WBC # BLD AUTO: 6.9 X10E3/UL (ref 3.4–10.8)

## 2020-09-30 LAB
HIV1 RNA # SERPL NAA+PROBE: <20 COPIES/ML
HIV1 RNA SERPL NAA+PROBE-LOG#: NORMAL LOG10COPY/ML

## 2020-10-01 ENCOUNTER — TELEPHONE (OUTPATIENT)
Dept: SURGERY | Facility: CLINIC | Age: 45
End: 2020-10-01

## 2020-10-06 ENCOUNTER — OFFICE VISIT (OUTPATIENT)
Dept: SURGERY | Facility: CLINIC | Age: 45
End: 2020-10-06
Payer: COMMERCIAL

## 2020-10-06 VITALS
OXYGEN SATURATION: 96 % | DIASTOLIC BLOOD PRESSURE: 80 MMHG | WEIGHT: 201.6 LBS | HEIGHT: 67 IN | TEMPERATURE: 97.6 F | HEART RATE: 95 BPM | SYSTOLIC BLOOD PRESSURE: 110 MMHG | BODY MASS INDEX: 31.64 KG/M2

## 2020-10-06 DIAGNOSIS — I10 ESSENTIAL HYPERTENSION: ICD-10-CM

## 2020-10-06 DIAGNOSIS — B20 SYMPTOMATIC HIV INFECTION (HCC): Primary | ICD-10-CM

## 2020-10-06 DIAGNOSIS — E78.5 DYSLIPIDEMIA: ICD-10-CM

## 2020-10-06 DIAGNOSIS — R63.5 WEIGHT GAIN: ICD-10-CM

## 2020-10-06 DIAGNOSIS — R11.0 NAUSEA: ICD-10-CM

## 2020-10-06 DIAGNOSIS — F32.A ANXIETY AND DEPRESSION: ICD-10-CM

## 2020-10-06 DIAGNOSIS — F41.9 ANXIETY AND DEPRESSION: ICD-10-CM

## 2020-10-06 DIAGNOSIS — Z23 NEED FOR INFLUENZA VACCINATION: ICD-10-CM

## 2020-10-06 PROCEDURE — 90682 RIV4 VACC RECOMBINANT DNA IM: CPT

## 2020-10-06 PROCEDURE — 99215 OFFICE O/P EST HI 40 MIN: CPT | Performed by: INTERNAL MEDICINE

## 2020-10-06 PROCEDURE — 90471 IMMUNIZATION ADMIN: CPT

## 2020-10-12 ENCOUNTER — PATIENT OUTREACH (OUTPATIENT)
Dept: SURGERY | Facility: CLINIC | Age: 45
End: 2020-10-12

## 2020-10-13 ENCOUNTER — PATIENT OUTREACH (OUTPATIENT)
Dept: SURGERY | Facility: CLINIC | Age: 45
End: 2020-10-13

## 2020-10-14 ENCOUNTER — PATIENT OUTREACH (OUTPATIENT)
Dept: SURGERY | Facility: CLINIC | Age: 45
End: 2020-10-14

## 2020-10-16 DIAGNOSIS — E78.5 DYSLIPIDEMIA: ICD-10-CM

## 2020-10-16 DIAGNOSIS — I10 ESSENTIAL HYPERTENSION: ICD-10-CM

## 2020-10-19 RX ORDER — ATORVASTATIN CALCIUM 40 MG/1
TABLET, FILM COATED ORAL
Qty: 30 TABLET | Refills: 2 | Status: SHIPPED | OUTPATIENT
Start: 2020-10-19 | End: 2021-01-08

## 2020-10-19 RX ORDER — ASPIRIN 81 MG/1
TABLET, CHEWABLE ORAL
Qty: 30 TABLET | Refills: 2 | Status: SHIPPED | OUTPATIENT
Start: 2020-10-19 | End: 2021-01-08

## 2020-10-19 RX ORDER — LISINOPRIL 5 MG/1
TABLET ORAL
Qty: 30 TABLET | Refills: 1 | Status: SHIPPED | OUTPATIENT
Start: 2020-10-19 | End: 2020-12-15

## 2020-10-27 ENCOUNTER — PATIENT OUTREACH (OUTPATIENT)
Dept: SURGERY | Facility: CLINIC | Age: 45
End: 2020-10-27

## 2020-11-13 DIAGNOSIS — B20 SYMPTOMATIC HIV INFECTION (HCC): ICD-10-CM

## 2020-11-13 DIAGNOSIS — I10 ESSENTIAL HYPERTENSION: ICD-10-CM

## 2020-11-16 RX ORDER — BICTEGRAVIR SODIUM, EMTRICITABINE, AND TENOFOVIR ALAFENAMIDE FUMARATE 50; 200; 25 MG/1; MG/1; MG/1
TABLET ORAL
Qty: 30 TABLET | Refills: 1 | Status: SHIPPED | OUTPATIENT
Start: 2020-11-16 | End: 2021-01-08

## 2020-11-16 RX ORDER — HYDROCHLOROTHIAZIDE 12.5 MG/1
CAPSULE, GELATIN COATED ORAL
Qty: 30 CAPSULE | Refills: 2 | Status: SHIPPED | OUTPATIENT
Start: 2020-11-16 | End: 2021-02-05

## 2020-12-07 ENCOUNTER — PATIENT OUTREACH (OUTPATIENT)
Dept: SURGERY | Facility: CLINIC | Age: 45
End: 2020-12-07

## 2020-12-08 ENCOUNTER — TELEPHONE (OUTPATIENT)
Dept: SURGERY | Facility: CLINIC | Age: 45
End: 2020-12-08

## 2020-12-09 ENCOUNTER — OFFICE VISIT (OUTPATIENT)
Dept: SURGERY | Facility: CLINIC | Age: 45
End: 2020-12-09
Payer: COMMERCIAL

## 2020-12-09 VITALS
OXYGEN SATURATION: 96 % | WEIGHT: 199.6 LBS | HEIGHT: 67 IN | SYSTOLIC BLOOD PRESSURE: 120 MMHG | TEMPERATURE: 99.1 F | DIASTOLIC BLOOD PRESSURE: 88 MMHG | BODY MASS INDEX: 31.33 KG/M2 | HEART RATE: 78 BPM

## 2020-12-09 DIAGNOSIS — I10 ESSENTIAL HYPERTENSION: ICD-10-CM

## 2020-12-09 DIAGNOSIS — F32.A ANXIETY AND DEPRESSION: ICD-10-CM

## 2020-12-09 DIAGNOSIS — E78.5 DYSLIPIDEMIA: ICD-10-CM

## 2020-12-09 DIAGNOSIS — B20 SYMPTOMATIC HIV INFECTION (HCC): Primary | ICD-10-CM

## 2020-12-09 DIAGNOSIS — R07.9 CHEST PAIN, UNSPECIFIED TYPE: ICD-10-CM

## 2020-12-09 DIAGNOSIS — F41.9 ANXIETY AND DEPRESSION: ICD-10-CM

## 2020-12-09 PROCEDURE — 99214 OFFICE O/P EST MOD 30 MIN: CPT | Performed by: NURSE PRACTITIONER

## 2020-12-14 DIAGNOSIS — I10 ESSENTIAL HYPERTENSION: ICD-10-CM

## 2020-12-15 RX ORDER — LISINOPRIL 5 MG/1
TABLET ORAL
Qty: 30 TABLET | Refills: 1 | Status: SHIPPED | OUTPATIENT
Start: 2020-12-15 | End: 2021-02-05

## 2020-12-30 ENCOUNTER — APPOINTMENT (OUTPATIENT)
Dept: LAB | Facility: CLINIC | Age: 45
End: 2020-12-30
Payer: COMMERCIAL

## 2020-12-30 DIAGNOSIS — B20 SYMPTOMATIC HIV INFECTION (HCC): ICD-10-CM

## 2020-12-30 DIAGNOSIS — E78.5 DYSLIPIDEMIA: ICD-10-CM

## 2020-12-30 LAB
ALBUMIN SERPL BCP-MCNC: 3.6 G/DL (ref 3.5–5)
ALP SERPL-CCNC: 65 U/L (ref 46–116)
ALT SERPL W P-5'-P-CCNC: 54 U/L (ref 12–78)
ANION GAP SERPL CALCULATED.3IONS-SCNC: 7 MMOL/L (ref 4–13)
AST SERPL W P-5'-P-CCNC: 31 U/L (ref 5–45)
BASOPHILS # BLD AUTO: 0.04 THOUSANDS/ΜL (ref 0–0.1)
BASOPHILS NFR BLD AUTO: 1 % (ref 0–1)
BILIRUB SERPL-MCNC: 0.68 MG/DL (ref 0.2–1)
BUN SERPL-MCNC: 17 MG/DL (ref 5–25)
CALCIUM SERPL-MCNC: 9 MG/DL (ref 8.3–10.1)
CHLORIDE SERPL-SCNC: 102 MMOL/L (ref 100–108)
CHOLEST SERPL-MCNC: 155 MG/DL (ref 50–200)
CO2 SERPL-SCNC: 28 MMOL/L (ref 21–32)
CREAT SERPL-MCNC: 1.11 MG/DL (ref 0.6–1.3)
EOSINOPHIL # BLD AUTO: 0.09 THOUSAND/ΜL (ref 0–0.61)
EOSINOPHIL NFR BLD AUTO: 1 % (ref 0–6)
ERYTHROCYTE [DISTWIDTH] IN BLOOD BY AUTOMATED COUNT: 12.3 % (ref 11.6–15.1)
GFR SERPL CREATININE-BSD FRML MDRD: 79 ML/MIN/1.73SQ M
GLUCOSE P FAST SERPL-MCNC: 79 MG/DL (ref 65–99)
HCT VFR BLD AUTO: 46.1 % (ref 36.5–49.3)
HDLC SERPL-MCNC: 59 MG/DL
HGB BLD-MCNC: 15.5 G/DL (ref 12–17)
IMM GRANULOCYTES # BLD AUTO: 0.02 THOUSAND/UL (ref 0–0.2)
IMM GRANULOCYTES NFR BLD AUTO: 0 % (ref 0–2)
LDLC SERPL CALC-MCNC: 73 MG/DL (ref 0–100)
LYMPHOCYTES # BLD AUTO: 2.76 THOUSANDS/ΜL (ref 0.6–4.47)
LYMPHOCYTES NFR BLD AUTO: 36 % (ref 14–44)
MCH RBC QN AUTO: 30.6 PG (ref 26.8–34.3)
MCHC RBC AUTO-ENTMCNC: 33.6 G/DL (ref 31.4–37.4)
MCV RBC AUTO: 91 FL (ref 82–98)
MONOCYTES # BLD AUTO: 0.73 THOUSAND/ΜL (ref 0.17–1.22)
MONOCYTES NFR BLD AUTO: 10 % (ref 4–12)
NEUTROPHILS # BLD AUTO: 4.04 THOUSANDS/ΜL (ref 1.85–7.62)
NEUTS SEG NFR BLD AUTO: 52 % (ref 43–75)
NRBC BLD AUTO-RTO: 0 /100 WBCS
PLATELET # BLD AUTO: 277 THOUSANDS/UL (ref 149–390)
PMV BLD AUTO: 10.4 FL (ref 8.9–12.7)
POTASSIUM SERPL-SCNC: 3.7 MMOL/L (ref 3.5–5.3)
PROT SERPL-MCNC: 7.3 G/DL (ref 6.4–8.2)
RBC # BLD AUTO: 5.07 MILLION/UL (ref 3.88–5.62)
SODIUM SERPL-SCNC: 137 MMOL/L (ref 136–145)
TRIGL SERPL-MCNC: 114 MG/DL
WBC # BLD AUTO: 7.68 THOUSAND/UL (ref 4.31–10.16)

## 2020-12-30 PROCEDURE — 87536 HIV-1 QUANT&REVRSE TRNSCRPJ: CPT

## 2020-12-30 PROCEDURE — 80053 COMPREHEN METABOLIC PANEL: CPT

## 2020-12-30 PROCEDURE — 85025 COMPLETE CBC W/AUTO DIFF WBC: CPT

## 2020-12-30 PROCEDURE — 80061 LIPID PANEL: CPT

## 2020-12-30 PROCEDURE — 86360 T CELL ABSOLUTE COUNT/RATIO: CPT

## 2020-12-30 PROCEDURE — 36415 COLL VENOUS BLD VENIPUNCTURE: CPT

## 2020-12-31 LAB
BASOPHILS # BLD AUTO: 0.1 X10E3/UL (ref 0–0.2)
BASOPHILS NFR BLD AUTO: 1 %
CD3+CD4+ CELLS # BLD: 1031 /UL (ref 359–1519)
CD3+CD4+ CELLS NFR BLD: 38.2 % (ref 30.8–58.5)
CD3+CD4+ CELLS/CD3+CD8+ CLL BLD: 1.03 % (ref 0.92–3.72)
CD3+CD8+ CELLS # BLD: 999 /UL (ref 109–897)
CD3+CD8+ CELLS NFR BLD: 37 % (ref 12–35.5)
EOSINOPHIL # BLD AUTO: 0.1 X10E3/UL (ref 0–0.4)
EOSINOPHIL NFR BLD AUTO: 1 %
ERYTHROCYTE [DISTWIDTH] IN BLOOD BY AUTOMATED COUNT: 12.8 % (ref 11.6–15.4)
HCT VFR BLD AUTO: 45.7 % (ref 37.5–51)
HGB BLD-MCNC: 15.5 G/DL (ref 13–17.7)
IMM GRANULOCYTES # BLD: 0 X10E3/UL (ref 0–0.1)
IMM GRANULOCYTES NFR BLD: 0 %
LYMPHOCYTES # BLD AUTO: 2.7 X10E3/UL (ref 0.7–3.1)
LYMPHOCYTES NFR BLD AUTO: 36 %
MCH RBC QN AUTO: 31.2 PG (ref 26.6–33)
MCHC RBC AUTO-ENTMCNC: 33.9 G/DL (ref 31.5–35.7)
MCV RBC AUTO: 92 FL (ref 79–97)
MONOCYTES # BLD AUTO: 0.7 X10E3/UL (ref 0.1–0.9)
MONOCYTES NFR BLD AUTO: 9 %
NEUTROPHILS # BLD AUTO: 4.1 X10E3/UL (ref 1.4–7)
NEUTROPHILS NFR BLD AUTO: 53 %
PLATELET # BLD AUTO: 294 X10E3/UL (ref 150–450)
RBC # BLD AUTO: 4.97 X10E6/UL (ref 4.14–5.8)
WBC # BLD AUTO: 7.6 X10E3/UL (ref 3.4–10.8)

## 2021-01-01 LAB
HIV1 RNA # SERPL NAA+PROBE: <20 COPIES/ML
HIV1 RNA SERPL NAA+PROBE-LOG#: NORMAL LOG10COPY/ML

## 2021-01-06 ENCOUNTER — HOSPITAL ENCOUNTER (OUTPATIENT)
Dept: NON INVASIVE DIAGNOSTICS | Facility: HOSPITAL | Age: 46
Discharge: HOME | End: 2021-01-06
Payer: COMMERCIAL

## 2021-01-06 DIAGNOSIS — R07.9 CHEST PAIN, UNSPECIFIED TYPE: ICD-10-CM

## 2021-01-06 PROCEDURE — 93351 STRESS TTE COMPLETE: CPT | Performed by: INTERNAL MEDICINE

## 2021-01-06 PROCEDURE — 93350 STRESS TTE ONLY: CPT

## 2021-01-07 LAB
CHEST PAIN STATEMENT: NORMAL
MAX DIASTOLIC BP: 90 MMHG
MAX HEART RATE: 155 BPM
MAX PREDICTED HEART RATE: 174 BPM
MAX. SYSTOLIC BP: 168 MMHG
PROTOCOL NAME: NORMAL
REASON FOR TERMINATION: NORMAL
TARGET HR FORMULA: NORMAL
TEST INDICATION: NORMAL
TIME IN EXERCISE PHASE: NORMAL

## 2021-01-08 DIAGNOSIS — E78.5 DYSLIPIDEMIA: ICD-10-CM

## 2021-01-08 DIAGNOSIS — I10 ESSENTIAL HYPERTENSION: ICD-10-CM

## 2021-01-08 DIAGNOSIS — B20 SYMPTOMATIC HIV INFECTION (HCC): ICD-10-CM

## 2021-01-08 RX ORDER — ATORVASTATIN CALCIUM 40 MG/1
TABLET, FILM COATED ORAL
Qty: 30 TABLET | Refills: 2 | Status: SHIPPED | OUTPATIENT
Start: 2021-01-08 | End: 2021-03-10 | Stop reason: SDUPTHER

## 2021-01-08 RX ORDER — BICTEGRAVIR SODIUM, EMTRICITABINE, AND TENOFOVIR ALAFENAMIDE FUMARATE 50; 200; 25 MG/1; MG/1; MG/1
TABLET ORAL
Qty: 30 TABLET | Refills: 1 | Status: SHIPPED | OUTPATIENT
Start: 2021-01-08 | End: 2021-03-08

## 2021-01-08 RX ORDER — ASPIRIN 81 MG/1
TABLET, CHEWABLE ORAL
Qty: 30 TABLET | Refills: 2 | Status: SHIPPED | OUTPATIENT
Start: 2021-01-08 | End: 2021-03-10 | Stop reason: SDUPTHER

## 2021-01-12 ENCOUNTER — OFFICE VISIT (OUTPATIENT)
Dept: SURGERY | Facility: CLINIC | Age: 46
End: 2021-01-12
Payer: COMMERCIAL

## 2021-01-12 VITALS
DIASTOLIC BLOOD PRESSURE: 90 MMHG | HEIGHT: 67 IN | HEART RATE: 86 BPM | OXYGEN SATURATION: 96 % | TEMPERATURE: 98.6 F | SYSTOLIC BLOOD PRESSURE: 138 MMHG | WEIGHT: 201.2 LBS | BODY MASS INDEX: 31.58 KG/M2

## 2021-01-12 DIAGNOSIS — E78.5 DYSLIPIDEMIA: ICD-10-CM

## 2021-01-12 DIAGNOSIS — F41.9 ANXIETY AND DEPRESSION: ICD-10-CM

## 2021-01-12 DIAGNOSIS — Z72.89 OTHER PROBLEMS RELATED TO LIFESTYLE: ICD-10-CM

## 2021-01-12 DIAGNOSIS — I10 ESSENTIAL HYPERTENSION: ICD-10-CM

## 2021-01-12 DIAGNOSIS — R63.5 WEIGHT GAIN: ICD-10-CM

## 2021-01-12 DIAGNOSIS — B20 SYMPTOMATIC HIV INFECTION (HCC): Primary | ICD-10-CM

## 2021-01-12 DIAGNOSIS — Z79.899 OTHER LONG TERM (CURRENT) DRUG THERAPY: ICD-10-CM

## 2021-01-12 DIAGNOSIS — Z20.2 CONTACT WITH AND (SUSPECTED) EXPOSURE TO INFECTIONS WITH A PREDOMINANTLY SEXUAL MODE OF TRANSMISSION: ICD-10-CM

## 2021-01-12 DIAGNOSIS — D72.89 OTHER SPECIFIED DISORDERS OF WHITE BLOOD CELLS: ICD-10-CM

## 2021-01-12 DIAGNOSIS — Z13.1 SCREENING FOR DIABETES MELLITUS: ICD-10-CM

## 2021-01-12 DIAGNOSIS — Z11.3 ENCOUNTER FOR SCREENING FOR BACTERIAL SEXUALLY TRANSMITTED DISEASE: ICD-10-CM

## 2021-01-12 DIAGNOSIS — F32.A ANXIETY AND DEPRESSION: ICD-10-CM

## 2021-01-12 PROCEDURE — 99215 OFFICE O/P EST HI 40 MIN: CPT | Performed by: INTERNAL MEDICINE

## 2021-01-12 NOTE — PROGRESS NOTES
Progress Note - Infectious Disease   Heladio Leung 54 y o  male MRN: [id number]  Unit/Bed#:  Encounter: [id number]      Impression/Plan:  1  HIV-improved with viral load now undetectable and a CD4 count of greater than  1000  Will continue Jordin Tony for now, recheck labs in 2 months and follow up in 3 month period stressed adherence     2  Anxiety and depression-treatment as per the primary  Patient refused to connect with mental health services      3  Dyslipidemia-focus on diet and exercise for now with weight loss      4  Weight gain-weight is stable   will continue stressed the importance of diet and exercise      5  Hypertension-improved blood pressure on hydrochlorothiazide and lisinopril but suboptimal   Monitor  Discussed with the primary      6  Nausea-possibly medication effect  Doubt related to the Jordin Tony and has improved  Discussed with the primary      Patient was provided medication, adherence and prevention education    Subjective:  Routine follow-up for HIV  Patient claims 100% adherence with Biktarvy    Patient denies any notable side effects  Overall the feeling well  The patient denies any fever chills or sweats, denies any nausea vomiting or diarrhea, denies any cough or shortness of breath  ROS:  A complete review of systems is negative other than that noted above in the subjective    Followup portions patient history reviewed and updated as: Allergies, current medications, past medical history, past social history, past surgical history, and the problem list    Objective:  Vitals:  Vitals:    01/12/21 1706   BP: 138/90   Pulse: 86   Temp: 98 6 °F (37 °C)   SpO2: 96%   Weight: 91 3 kg (201 lb 3 2 oz)   Height: 5' 7" (1 702 m)       Physical Exam:   General Appearance:  Alert, interactive, appearing well,  nontoxic, no acute distress  Neck:   Supple without lymphadenopathy, no thyromegaly or masses   Throat: Oropharynx moist without lesions      Lungs:   Clear to auscultation bilaterally; no wheezes, rhonchi or rales; respirations unlabored   Heart:  RRR; no murmur, rub or gallop   Abdomen:   Soft, non-tender, non-distended, positive bowel sounds  Extremities: No clubbing, cyanosis or edema   Skin: No new rashes or lesions  No draining wounds noted  Labs, Imaging, & Other studies:   All pertinent labs and imaging studies were personally reviewed    Lab Results   Component Value Date    K 3 7 12/30/2020     12/30/2020    CO2 28 12/30/2020    BUN 17 12/30/2020    CREATININE 1 11 12/30/2020    GLUF 79 12/30/2020    CALCIUM 9 0 12/30/2020    AST 31 12/30/2020    ALT 54 12/30/2020    ALKPHOS 65 12/30/2020    EGFR 79 12/30/2020     Lab Results   Component Value Date    WBC 7 68 12/30/2020    WBC 7 6 12/30/2020    HGB 15 5 12/30/2020    HGB 15 5 12/30/2020    HCT 46 1 12/30/2020    HCT 45 7 12/30/2020    MCV 91 12/30/2020    MCV 92 12/30/2020     12/30/2020     12/30/2020     Lab Results   Component Value Date    HEPCAB Non-reactive 04/07/2020     Lab Results   Component Value Date    HAV Reactive (A) 04/07/2020    HEPCAB Non-reactive 04/07/2020     Lab Results   Component Value Date    RPR Non-Reactive 04/07/2020     CD4 ABS   Date/Time Value Ref Range Status   12/30/2020 11:06 AM 1031 359 - 1519 /uL Final     HIV-1 RNA by PCR, Qn   Date/Time Value Ref Range Status   12/30/2020 11:06 AM <20 copies/mL Final     Comment:     HIV-1 RNA not detected  The reportable range for this assay is 20 to 10,000,000  copies HIV-1 RNA/mL             Current Outpatient Medications:     aspirin 81 mg chewable tablet, CHEW 1 TABLET DAILY, Disp: 30 tablet, Rfl: 2    atorvastatin (LIPITOR) 40 mg tablet, TAKE 1 TABLET BY MOUTH DAILY, Disp: 30 tablet, Rfl: 2    Biktarvy -25 MG tablet, TAKE 1 TABLET BY MOUTH DAILY, Disp: 30 tablet, Rfl: 1    hydrochlorothiazide (MICROZIDE) 12 5 mg capsule, TAKE 1 CAPSULE BY MOUTH DAILY, Disp: 30 capsule, Rfl: 2    lisinopril (ZESTRIL) 5 mg tablet, TAKE 1 TABLET BY MOUTH DAILY, Disp: 30 tablet, Rfl: 1

## 2021-01-13 NOTE — PROGRESS NOTES
Assessment/Plan:      Diagnoses and all orders for this visit:    Symptomatic HIV infection ([facility] [address] )  -     CBC and differential; Future  -     T-helper cells CD4/CD8 %; Future  -     HIV-1 RNA, quantitative, PCR; Future  -     Comprehensive metabolic panel; Future  -     RPR; Future  -     Hepatitis C antibody; Future  -     Chlamydia/GC amplified DNA by PCR; Future  -     UA (URINE) with reflex to Scope; Future  -     Hemoglobin A1C; Future    Essential hypertension  -     Hemoglobin A1C; Future    Weight gain  -     Hemoglobin A1C; Future    Screening for diabetes mellitus  -     Hemoglobin A1C; Future    Other long term (current) drug therapy  -     Hemoglobin A1C; Future    Contact with and (suspected) exposure to infections with a predominantly sexual mode of transmission  -     RPR; Future  -     Chlamydia/GC amplified DNA by PCR; Future    Encounter for screening for bacterial sexually transmitted disease  -     RPR; Future  -     Chlamydia/GC amplified DNA by PCR; Future    Other problems related to lifestyle  -     RPR; Future  -     Chlamydia/GC amplified DNA by PCR; Future    Other specified disorders of white blood cells  -     RPR; Future  -     Chlamydia/GC amplified DNA by PCR; Future    Anxiety and depression    Dyslipidemia          Behavorial Health Recommends continuing to follow up with mental health services, increasing his physical activity and enjoying his painting  Discussion: S met with pt to follow up on referral made (12/9/20) to link pt with ongoing mental health services  Pt was pleased to report that he finally followed through this time and that he had completed his intake interview with [facility]  Pt expressed some disappointment that ongoing counseling sessions had not yet begun  Pt also expressed not being sure what to expect in and from counseling  S gave an overview of the process and expectation of counseling to alleviate pt's concerns   Pt admitted that he has a new voicemail from his new therapist at [facility] and that he is yet to return the call  S encouraged pt to call back so he can begin his ongoing counseling  Pt agreed to do so  [facility] evaluated current depression symptoms  Pt seemed to be doing a little better  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 today for a routine behavioral health follow up     Objective:    Orientation    Person: Yes   Place: Yes   Time: Yes     Appearance     Well Developed: Yes   Healthy: Yes   Acutely Ill: No  Normal Body Odor: Yes   Grooming Unkempt: No  Poor Eye Contact: No    Mood and Affect    Appropriate: Yes   Euthymic: Yes   Anxious: No  Depressed: No    Harm to Self or Others: denied    Substance Abuse: none reported or observed

## 2021-01-22 ENCOUNTER — PATIENT OUTREACH (OUTPATIENT)
Dept: SURGERY | Facility: CLINIC | Age: 46
End: 2021-01-22

## 2021-02-05 DIAGNOSIS — I10 ESSENTIAL HYPERTENSION: ICD-10-CM

## 2021-02-05 RX ORDER — LISINOPRIL 5 MG/1
TABLET ORAL
Qty: 30 TABLET | Refills: 1 | Status: SHIPPED | OUTPATIENT
Start: 2021-02-05 | End: 2021-03-10 | Stop reason: SDUPTHER

## 2021-02-05 RX ORDER — HYDROCHLOROTHIAZIDE 12.5 MG/1
CAPSULE, GELATIN COATED ORAL
Qty: 30 CAPSULE | Refills: 2 | Status: SHIPPED | OUTPATIENT
Start: 2021-02-05 | End: 2021-03-10 | Stop reason: SDUPTHER

## 2021-03-08 ENCOUNTER — PATIENT OUTREACH (OUTPATIENT)
Dept: SURGERY | Facility: CLINIC | Age: 46
End: 2021-03-08

## 2021-03-08 DIAGNOSIS — B20 SYMPTOMATIC HIV INFECTION (HCC): ICD-10-CM

## 2021-03-08 RX ORDER — BICTEGRAVIR SODIUM, EMTRICITABINE, AND TENOFOVIR ALAFENAMIDE FUMARATE 50; 200; 25 MG/1; MG/1; MG/1
TABLET ORAL
Qty: 30 TABLET | Refills: 1 | Status: SHIPPED | OUTPATIENT
Start: 2021-03-08 | End: 2021-03-10 | Stop reason: SDUPTHER

## 2021-03-09 ENCOUNTER — TELEPHONE (OUTPATIENT)
Dept: SURGERY | Facility: CLINIC | Age: 46
End: 2021-03-09

## 2021-03-10 ENCOUNTER — OFFICE VISIT (OUTPATIENT)
Dept: SURGERY | Facility: CLINIC | Age: 46
End: 2021-03-10
Payer: COMMERCIAL

## 2021-03-10 VITALS
TEMPERATURE: 98.7 F | BODY MASS INDEX: 31.39 KG/M2 | DIASTOLIC BLOOD PRESSURE: 90 MMHG | HEIGHT: 67 IN | OXYGEN SATURATION: 97 % | WEIGHT: 200 LBS | SYSTOLIC BLOOD PRESSURE: 122 MMHG | HEART RATE: 77 BPM

## 2021-03-10 DIAGNOSIS — Z00.00 ANNUAL PHYSICAL EXAM: ICD-10-CM

## 2021-03-10 DIAGNOSIS — B20 SYMPTOMATIC HIV INFECTION (HCC): Primary | ICD-10-CM

## 2021-03-10 DIAGNOSIS — J30.2 SEASONAL ALLERGIES: ICD-10-CM

## 2021-03-10 DIAGNOSIS — Z23 ENCOUNTER FOR IMMUNIZATION: ICD-10-CM

## 2021-03-10 DIAGNOSIS — I10 ESSENTIAL HYPERTENSION: ICD-10-CM

## 2021-03-10 DIAGNOSIS — E78.5 DYSLIPIDEMIA: ICD-10-CM

## 2021-03-10 DIAGNOSIS — D22.9 ATYPICAL MOLE: ICD-10-CM

## 2021-03-10 PROCEDURE — 3080F DIAST BP >= 90 MM HG: CPT | Performed by: NURSE PRACTITIONER

## 2021-03-10 PROCEDURE — 99396 PREV VISIT EST AGE 40-64: CPT | Performed by: NURSE PRACTITIONER

## 2021-03-10 PROCEDURE — 3008F BODY MASS INDEX DOCD: CPT | Performed by: DERMATOLOGY

## 2021-03-10 PROCEDURE — 3074F SYST BP LT 130 MM HG: CPT | Performed by: NURSE PRACTITIONER

## 2021-03-10 RX ORDER — LISINOPRIL 5 MG/1
5 TABLET ORAL DAILY
Qty: 30 TABLET | Refills: 1 | Status: SHIPPED | OUTPATIENT
Start: 2021-03-10 | End: 2021-06-14

## 2021-03-10 RX ORDER — HYDROCHLOROTHIAZIDE 12.5 MG/1
12.5 CAPSULE, GELATIN COATED ORAL DAILY
Qty: 30 CAPSULE | Refills: 2 | Status: SHIPPED | OUTPATIENT
Start: 2021-03-10 | End: 2021-08-23

## 2021-03-10 RX ORDER — FLUTICASONE PROPIONATE 50 MCG
1 SPRAY, SUSPENSION (ML) NASAL DAILY
Qty: 1 BOTTLE | Refills: 2 | Status: SHIPPED | OUTPATIENT
Start: 2021-03-10 | End: 2021-07-13

## 2021-03-10 RX ORDER — BICTEGRAVIR SODIUM, EMTRICITABINE, AND TENOFOVIR ALAFENAMIDE FUMARATE 50; 200; 25 MG/1; MG/1; MG/1
1 TABLET ORAL DAILY
Qty: 30 TABLET | Refills: 1 | Status: SHIPPED | OUTPATIENT
Start: 2021-03-10 | End: 2021-07-13

## 2021-03-10 RX ORDER — ATORVASTATIN CALCIUM 40 MG/1
40 TABLET, FILM COATED ORAL DAILY
Qty: 30 TABLET | Refills: 2 | Status: SHIPPED | OUTPATIENT
Start: 2021-03-10 | End: 2021-07-13

## 2021-03-10 RX ORDER — ASPIRIN 81 MG/1
81 TABLET, CHEWABLE ORAL DAILY
Qty: 30 TABLET | Refills: 2 | Status: SHIPPED | OUTPATIENT
Start: 2021-03-10 | End: 2021-07-13

## 2021-03-10 NOTE — ASSESSMENT & PLAN NOTE
Blood pressure: Stable  BP Readings from Last 3 Encounters:   03/10/21 122/90   01/12/21 138/90   12/09/20 120/88       Continue current antihypertensive  Provided with BP cuff today and reviewed acceptable BP range  Educated on the following lifestyle modifications to lower BP and decrease cardiovascular risk factors  limit alcohol intake, reduce salt in diet, maintain a healthy weight, engage in 30 minutes of cardiovascular exercise daily, and not smoke

## 2021-03-10 NOTE — PROGRESS NOTES
[address] [place]    NAME: To Fonseca  AGE: 54 y o  SEX: male  : 1975     DATE: 3/10/2021     Assessment and Plan:     Problem List Items Addressed This Visit        Cardiovascular and Mediastinum    Essential hypertension     Blood pressure: Stable  BP Readings from Last 3 Encounters:   03/10/21 122/90   21 138/90   20 120/88       Continue current antihypertensive  Provided with BP cuff today and reviewed acceptable BP range  Educated on the following lifestyle modifications to lower BP and decrease cardiovascular risk factors  limit alcohol intake, reduce salt in diet, maintain a healthy weight, engage in 30 minutes of cardiovascular exercise daily, and not smoke  Relevant Medications    aspirin 81 mg chewable tablet    hydrochlorothiazide (MICROZIDE) 12 5 mg capsule    lisinopril (ZESTRIL) 5 mg tablet       Other    Symptomatic HIV infection ([facility] [address] ) - Primary     No results found for: [id number]  CD4 ABS   Date/Time Value Ref Range Status   2020 11:06 AM 1031 359 - 1519 /uL Final     HIV-1 RNA by PCR, Qn   Date/Time Value Ref Range Status   2020 11:06 AM <20 copies/mL Final     Comment:     HIV-1 RNA not detected  The reportable range for this assay is 20 to 10,000,000  copies HIV-1 RNA/mL  HIV-1 RNA Viral Load Log   Date/Time Value Ref Range Status   2020 11:06 AM COMMENT [id number]/mL Final     Comment:     Unable to calculate result since non-numeric result obtained for  component test          ART: Joseph Raymond        Denies side effects  Stressed the importance of adherence  Continue follow up with ID clinic  Reviewed most recent labs, including Cd4 and viral load  Discussed the risks and benefits of treatment options, instructions for management, importance of treatment adherence, and reduction of risk factor  Educated on possible  medication side effects       Counseled on routes of HIV transmission, including the risk of  infection  Emphasized that viral suppression is the best method to prevent HIV transmission  At this time pt denies the need for HIV testing of anyone in their life  Total encounter time was 45 minutes  Greater then 20 minutes were spent on counseling and patient education  Pt voices understanding and agreement with treatment plan  Relevant Medications    bictegravir-emtricitab-tenofovir alafenamide (Biktarvy) -25 MG tablet    Dyslipidemia     Continue current statin  Tolerating medication well and denies muscle pain or weakness  LFTs are not elevated  Eat a low fat/low cholesterol diet  Follow up with dietician for additional education  Relevant Medications    aspirin 81 mg chewable tablet    atorvastatin (LIPITOR) 40 mg tablet    Seasonal allergies    Relevant Medications    fluticasone (FLONASE) 50 mcg/act nasal spray      Other Visit Diagnoses     Atypical mole        Relevant Orders    Ambulatory referral to Dermatology    Annual physical exam              Immunizations and preventive care screenings were discussed with patient today  Appropriate education was printed on patient's after visit summary  Counseling:  Dental Health: discussed importance of regular tooth brushing, flossing, and dental visits  · Exercise: the importance of regular exercise/physical activity was discussed  Recommend exercise 3-5 times per week for at least 30 minutes  No follow-ups on file  Chief Complaint:     Chief Complaint   Patient presents with    Facial Pain     Pt c/o nasal pain and pressure x 3 weeks   Nevus     Pt c/o mole on head x 2-3 months   Skin Lesion     Pt c/o "spots" on head x few years  History of Present Illness:     Adult Annual Physical   Patient here for a comprehensive physical exam  The patient reports problems - seasonal allergies and atypical moles on head      Diet and Physical Activity  · Diet/Nutrition: well balanced diet and consuming 3-5 servings of fruits/vegetables daily  · Exercise: walking and 3-4 times a week on average  Depression Screening  PHQ-9 Depression Screening    PHQ-9:   Frequency of the following problems over the past two weeks:           General Health  · Sleep: gets 7-8 hours of sleep on average  · Hearing: normal - bilateral   · Vision: no vision problems  · Dental: regular dental visits   Health  · Symptoms include: none     Review of Systems:     Review of Systems   Constitutional: Negative for activity change, appetite change, chills, diaphoresis, fatigue, fever and unexpected weight change  HENT: Negative for congestion, dental problem, ear pain, hearing loss, mouth sores, rhinorrhea and sore throat  Eyes: Negative for pain, redness and visual disturbance  Respiratory: Negative for shortness of breath and wheezing  Cardiovascular: Negative for chest pain and leg swelling  Gastrointestinal: Negative for abdominal pain, constipation, diarrhea, nausea and vomiting  Endocrine: Negative for polydipsia, polyphagia and polyuria  Genitourinary: Negative for difficulty urinating and dysuria  Musculoskeletal: Negative for back pain, joint swelling and myalgias  Skin: Negative for rash  Multiple moles on top of head  Concern regarding one mole, raised, hypopigmented, recent change in size and shape  Neurological: Negative for syncope and headaches  Psychiatric/Behavioral: Negative for behavioral problems and suicidal ideas  Past Medical History:     Past Medical History:   Diagnosis Date    Abscess of left groin     Dental decay     Depression     Elevated BP without diagnosis of hypertension     HIV disease ([facility] [address] ) 11/25/1998    mode of transmission: MSM    Hx of herpes zoster     Hypogonadism in male     Tear of right biceps muscle 2017      Past Surgical History:     History reviewed   No pertinent surgical history     Family History:     Family History   Problem Relation Age of Onset    Hypertension Mother     Hypertension Father     Heart attack Father       Social History:        Social History     Socioeconomic History    Marital status: /Civil Union     Spouse name: None    Number of children: None    Years of education: None    Highest education level: None   Occupational History    None   Social Needs    Financial resource strain: None    Food insecurity     Worry: None     Inability: None    Transportation needs     Medical: None     Non-medical: None   Tobacco Use    Smoking status: Never Smoker    Smokeless tobacco: Never Used   Substance and Sexual Activity    Alcohol use: Yes     Frequency: 4 or more times a week     Drinks per session: 1 or 2     Comment: 1 glass wine/day    Drug use: Never    Sexual activity: Not Currently     Partners: Male     Birth control/protection: Condom Male   Lifestyle    Physical activity     Days per week: None     Minutes per session: None    Stress: None   Relationships    Social connections     Talks on phone: None     Gets together: None     Attends Buddhist service: None     Active member of club or organization: None     Attends meetings of clubs or organizations: None     Relationship status: None    Intimate partner violence     Fear of current or ex partner: None     Emotionally abused: None     Physically abused: None     Forced sexual activity: None   Other Topics Concern    None   Social History Narrative    None      Current Medications:     Current Outpatient Medications   Medication Sig Dispense Refill    aspirin 81 mg chewable tablet Chew 1 tablet (81 mg total) daily 30 tablet 2    atorvastatin (LIPITOR) 40 mg tablet Take 1 tablet (40 mg total) by mouth daily 30 tablet 2    bictegravir-emtricitab-tenofovir alafenamide (Biktarvy) -25 MG tablet Take 1 tablet by mouth daily 30 tablet 1    hydrochlorothiazide (MICROZIDE) 12 5 mg capsule Take 1 capsule (12 5 mg total) by mouth daily 30 capsule 2    lisinopril (ZESTRIL) 5 mg tablet Take 1 tablet (5 mg total) by mouth daily 30 tablet 1    fluticasone (FLONASE) 50 mcg/act nasal spray 1 spray into each nostril daily 1 Bottle 2     No current facility-administered medications for this visit  Allergies:     No Known Allergies   Physical Exam:     /90   Pulse 77   Temp 98 7 °F (37 1 °C)   Ht 5' 7" (1 702 m)   Wt 90 7 kg (200 lb)   SpO2 97%   BMI 31 32 kg/m²     Physical Exam  Vitals signs and nursing note reviewed  Constitutional:       Appearance: He is well-developed  HENT:      Head: Normocephalic and atraumatic  Eyes:      Conjunctiva/sclera: Conjunctivae normal    Neck:      Musculoskeletal: Neck supple  Cardiovascular:      Rate and Rhythm: Normal rate and regular rhythm  Heart sounds: No murmur  Pulmonary:      Effort: Pulmonary effort is normal  No respiratory distress  Breath sounds: Normal breath sounds  Abdominal:      Palpations: Abdomen is soft  Tenderness: There is no abdominal tenderness  Skin:     General: Skin is warm and dry  Comments: Multiple nevi on entire body  Neurological:      Mental Status: He is alert  CHERRI Hartman  ASC AT [facility]  BMI Counseling: Body mass index is 31 32 kg/m²  The BMI is above normal  Nutrition recommendations include 3-5 servings of fruits/vegetables daily and consuming healthier snacks  Exercise recommendations include exercising 3-5 times per week

## 2021-03-10 NOTE — ASSESSMENT & PLAN NOTE
No results found for: [id number]  CD4 ABS   Date/Time Value Ref Range Status   2020 11:06 AM 1031 359 - 1519 /uL Final     HIV-1 RNA by PCR, Qn   Date/Time Value Ref Range Status   2020 11:06 AM <20 copies/mL Final     Comment:     HIV-1 RNA not detected  The reportable range for this assay is 20 to 10,000,000  copies HIV-1 RNA/mL  HIV-1 RNA Viral Load Log   Date/Time Value Ref Range Status   2020 11:06 AM COMMENT [id number]/mL Final     Comment:     Unable to calculate result since non-numeric result obtained for  component test          ART: Brook Sykes        Denies side effects  Stressed the importance of adherence  Continue follow up with ID clinic  Reviewed most recent labs, including Cd4 and viral load  Discussed the risks and benefits of treatment options, instructions for management, importance of treatment adherence, and reduction of risk factor  Educated on possible  medication side effects  Counseled on routes of HIV transmission, including the risk of  infection  Emphasized that viral suppression is the best method to prevent HIV transmission  At this time pt denies the need for HIV testing of anyone in their life  Total encounter time was 45 minutes  Greater then 20 minutes were spent on counseling and patient education  Pt voices understanding and agreement with treatment plan

## 2021-03-10 NOTE — PATIENT INSTRUCTIONS

## 2021-03-10 NOTE — PROGRESS NOTES
Assessment/Plan:      Diagnoses and all orders for this visit:    Symptomatic HIV infection ([facility] [address] )  -     bictegravir-emtricitab-tenofovir alafenamide (Biktarvy) -25 MG tablet; Take 1 tablet by mouth daily    Atypical mole  -     Ambulatory referral to Dermatology; Future    Annual physical exam    Seasonal allergies  -     fluticasone (FLONASE) 50 mcg/act nasal spray; 1 spray into each nostril daily    Essential hypertension  -     aspirin 81 mg chewable tablet; Chew 1 tablet (81 mg total) daily  -     hydrochlorothiazide (MICROZIDE) 12 5 mg capsule; Take 1 capsule (12 5 mg total) by mouth daily  -     lisinopril (ZESTRIL) 5 mg tablet; Take 1 tablet (5 mg total) by mouth daily    Dyslipidemia  -     aspirin 81 mg chewable tablet; Chew 1 tablet (81 mg total) daily  -     atorvastatin (LIPITOR) 40 mg tablet; Take 1 tablet (40 mg total) by mouth daily            Discussion: Today patient presents with no mental health concerns  [facility] reviewed pt's compliance and [facility]  Patient shared that he had attended his virtual therapy sessions for about a month and is now considering discontinuing them  S explored pt's concerns regarding therapy and reasons for considering termination  Pt shared that therapy might be getting "too intense" for him and that the focus might be drifting away from what he originally intended to work on  [facility] discovered that pt had not raised concerns with his therapist therefore [facility] encouraged pt to bring up his concerns in a future session before deciding on how to proceed  [facility] provided education on how therapy typically progresses and suggested that pt can revisit and revise his goals with his therapist if needed  Pt can also take a break from therapy if needed and return at a later time however [facility] strongly suggested that pt have the conversation with his therapist rather than just no showing and falling off  Pt understood   [facility] reminded pt to call if things changes and pt would like to process thoughts/feelings with [facility]  [facility] reviewed pt's other coping skills  Pt shared that he is going to the gym more regularly now at about 3-4x/week  Pt denied depression symptoms  [facility] observed pt being in an improved mood this day  Subjective:     Patient ID: Yonis Zelaya is a 54 y o  male      HPI: The patient is being seen at the [facility] today for a routine behavioral health follow up     Objective:    Orientation    Person: Yes   Place: Yes   Time: Yes     Appearance     Well Developed: Yes   Healthy: Yes   Acutely Ill: No  Normal Body Odor: Yes   Grooming Unkempt: No  Poor Eye Contact: No    Mood and Affect    Appropriate: Yes   Euthymic: Yes   Anxious: No  Depressed: No    Harm to Self or Others: denied    Substance Abuse: none reported or observed

## 2021-03-12 NOTE — PROGRESS NOTES
Assessment:     Elevated BMI, hypertension, previous elevated lipid panel    Nutrition Diagnosis    Problem: Excessive energy intake    Related to: Estimated intake inconsistent with measured nutritional needs    As Evidenced By: Patient Interview, Dietary Recall and BMI >30    Nutrition Education Intervention: Provided     Person Educated: Patient    Topics Discussed: Wt loss goals/counseling education intervention    Teaching Method: Verbal and Other appt made for additional nutrition education    Visit Summary    [place] was seen today to access readiness to change r/t elevated BMI, elevated lipid panel prior to starting atorvastatin, HTN  Pt reports limited cooking of food, eating out often  Prior limited exercise r/t COVID restrictions  Indicates resuming physical activity at the gym 3-4x weekly  Discussion with patient lends towards limited understanding of nutrition intake and chronic disease  Pt has scheduled continuing nutrition education with RD on 3/15/2021    German Cortes RD,LDN,[facility]

## 2021-03-16 ENCOUNTER — CLINICAL SUPPORT (OUTPATIENT)
Dept: SURGERY | Facility: CLINIC | Age: 46
End: 2021-03-16

## 2021-03-16 DIAGNOSIS — R63.5 WEIGHT GAIN: Primary | ICD-10-CM

## 2021-03-16 NOTE — PROGRESS NOTES
Health Coaching Follow Up/Nutrition  Topics Discussed: Current nutrition/diet intake, cooking habits, portion control, balanced meals   Current Stage of Change: pre-contemplation  Goals  Goal #1: Wt loss  Goal Progress: Initiated  Barriers Identified: unemployed/need for nutrition education/identifying wt gain but not in action stage of change at this time  Session Summary/Feedback Provided   Kwesi Bauer was seen today for f/u from interventions at PCP visit r/t wt gain, BMI 31, current eating patterns  Pt has identified lack of cooking and nutrition education, snacking/drinking, elevated stress  Education provided on topics of cooking with less fat, food groups CHO, calorie, fat levels of each, portion control, grocery store psychology, and creating balanced meals  Intervention done verbal, written, and teach back method  No follow up visit scheduled at this time, Kwesi Bauer is pre-contemplative at working on goals at this time  Will continue to provide interventions and follow up    Lexis Hooker RD,LDN,[facility]  Weight: 200 BMI: 31

## 2021-03-17 ENCOUNTER — PATIENT OUTREACH (OUTPATIENT)
Dept: SURGERY | Facility: CLINIC | Age: 46
End: 2021-03-17

## 2021-03-18 ENCOUNTER — OFFICE VISIT (OUTPATIENT)
Dept: DERMATOLOGY | Facility: CLINIC | Age: 46
End: 2021-03-18
Payer: COMMERCIAL

## 2021-03-18 VITALS — TEMPERATURE: 97.3 F | BODY MASS INDEX: 31.83 KG/M2 | WEIGHT: 203.2 LBS

## 2021-03-18 DIAGNOSIS — L81.4 LENTIGO: ICD-10-CM

## 2021-03-18 DIAGNOSIS — L82.1 SEBORRHEIC KERATOSIS: Primary | ICD-10-CM

## 2021-03-18 DIAGNOSIS — L63.9 ALOPECIA AREATA: ICD-10-CM

## 2021-03-18 PROCEDURE — 1036F TOBACCO NON-USER: CPT | Performed by: DERMATOLOGY

## 2021-03-18 PROCEDURE — 17110 DESTRUCTION B9 LES UP TO 14: CPT | Performed by: DERMATOLOGY

## 2021-03-18 PROCEDURE — 99243 OFF/OP CNSLTJ NEW/EST LOW 30: CPT | Performed by: DERMATOLOGY

## 2021-03-18 NOTE — PROGRESS NOTES
[facility] Note     Patient Name: Melia Delgadillo  Encounter Date: 03/18/202     Have you been cared for by a [facility] Dermatologist in the last 3 years and, if so, which one? No    · Have you traveled outside of the [address] [place] [address] in the past 3 months or outside of the [facility] area in the last 2 weeks? No     May we call your Preferred Phone number to discuss your specific medical information? Yes     May we leave a detailed message that includes your specific medical information? Yes      Today's Chief Concerns:   Concern #1:  Mole check     Past Medical History:  Have you personally ever had or currently have any of the following? · Skin cancer (such as Melanoma, Basal Cell Carcinoma, Squamous Cell Carcinoma? (If Yes, please provide more detail)- No  · Eczema: No  · Psoriasis: No  · HIV/AIDS: YES, HIV  · Hepatitis B or C: No  · Tuberculosis: No  · Systemic Immunosuppression such as Diabetes, Biologic or Immunotherapy, Chemotherapy, Organ Transplantation, Bone Marrow Transplantation (If YES, please provide more detail): No  · Radiation Treatment (If YES, please provide more detail): No  · Any other major medical conditions/concerns? 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 No  · Eczema, Asthma, Hay Fever or Seasonal Allergies: No  · Psoriasis or Psoriatic Arthritis: No  · Do any other medical conditions seem to run in your family? If Yes, what condition and which relatives? YES, Heart issues       Current Medications:         Current Outpatient Medications:     aspirin 81 mg chewable tablet, Chew 1 tablet (81 mg total) daily, Disp: 30 tablet, Rfl: 2    atorvastatin (LIPITOR) 40 mg tablet, Take 1 tablet (40 mg total) by mouth daily, Disp: 30 tablet, Rfl: 2    bictegravir-emtricitab-tenofovir alafenamide (Biktarvy) -25 MG tablet, Take 1 tablet by mouth daily, Disp: 30 tablet, Rfl: 1    fluticasone (FLONASE) 50 mcg/act nasal spray, 1 spray into each nostril daily, Disp: 1 Bottle, Rfl: 2    hydrochlorothiazide (MICROZIDE) 12 5 mg capsule, Take 1 capsule (12 5 mg total) by mouth daily, Disp: 30 capsule, Rfl: 2    lisinopril (ZESTRIL) 5 mg tablet, Take 1 tablet (5 mg total) by mouth daily, Disp: 30 tablet, Rfl: 1      Review of Systems:  Have you recently had or currently have any of the following? If YES, what are you doing for the problem? · Fever, chills or unintended weight loss: No  · Sudden loss or change in your vision: No  · Nausea, vomiting or blood in your stool: No  · Painful or swollen joints: No  · Wheezing or cough: No  · Changing mole or non-healing wound: YES, changing moles   · Nosebleeds: No  · Excessive sweating: No  · Easy or prolonged bleeding? No  · Over the last 2 weeks, how often have you been bothered by the following problems? · Taking little interest or pleasure in doing things: 1 - Not at All  · Feeling down, depressed, or hopeless: 1 - Not at All  · Rapid heartbeat with epinephrine:  No    Any known allergies? No Known Allergies      Physical Exam:     Was a chaperone (Derm Clinical Assistant) present throughout the entire Physical Exam? Yes     Did the Dermatology Team specifically  the patient on the importance of a Full Skin Exam to be sure that nothing is missed clinically?  Yes}  o Did the patient ultimately request or accept a Full Skin Exam?  NO  o Did the patient specifically refuse to have the areas "under-the-bra" examined by the Dermatologist? Kailyn helay Did the patient specifically refuse to have the areas "under-the-underwear" examined by the Dermatologist? YES    CONSTITUTIONAL:   Vitals:    03/18/21 1023   Temp: Leora Metzger 97 3 °F (36 3 °C)   TempSrc: Tympanic   Weight: 92 2 kg (203 lb 3 2 oz)       PSYCH: Normal mood and affect  EYES: Normal conjunctiva  ENT: Normal lips and oral mucosa  CARDIOVASCULAR: No edema  RESPIRATORY: Normal respirations  HEME/LYMPH/IMMUNO:  No regional lymphadenopathy except as noted below in "ASSESSMENT AND PLAN BY DIAGNOSIS"    SKIN:  FULL ORGAN SYSTEM EXAM  Hair, Scalp, Ears, Face Normal except as noted below in Assessment   Neck, Normal except as noted below in Assessment        Assessment and Plan by Diagnosis:    History of Present Condition:     Duration:  How long has this been an issue for you?    o  4-5 months    Location Affected:  Where on the body is this affecting you?    o  Right temple   Quality:  Is there any bleeding, pain, itch, burning/irritation, or redness associated with the skin lesion? o  pain, itching   Severity:  Describe any bleeding, pain, itch, burning/irritation, or redness on a scale of 1 to 10 (with 10 being the worst)  o  5   Timing:  Does this condition seem to be there pretty constantly or do you notice it more at specific times throughout the day?    o  Consistent    Context:  Have you ever noticed that this condition seems to be associated with specific activities you do?    o  denies    Modifying Factors:    o Anything that seems to make the condition worse?    -  denies  o What have you tried to do to make the condition better?    -  denies   Associated Signs and Symptoms:  Does this skin lesion seem to be associated with any of the following:  o  SL AMB DERM SIGNS AND SYMPTOMS: Itching and Scratching   1  LENTIGO    Physical Exam:   Anatomic Location Affected:  Scalp   Morphological Description:  Light tan to brown macule   Pertinent Positives:   Pertinent Negatives: Additional History of Present Condition:  Patient presents for a scan on the scalp  He has some sun spots on top of the scalp that he would like to have looked at   There is also one spot on the right temple that has grown in the last 4-5 months, patient states that it itches form time to time  Assessment and Plan:  Based on a thorough discussion of this condition and the management approach to it (including a comprehensive discussion of the known risks, side effects and potential benefits of treatment), the patient (family) agrees to implement the following specific plan:   monitor for changes   Benign, monitor for changes  What is a lentigo? A lentigo is a pigmented flat or slightly raised lesion with a clearly defined edge  Unlike an ephelis (freckle), it does not fade in the winter months  There are several kinds of lentigo  The name lentigo originally referred to its appearance resembling a small lentil  The plural of lentigo is lentigines, although lentigos is also in common use  Who gets lentigines? Lentigines can affect males and females of all ages and races  Solar lentigines are especially prevalent in fair skinned adults  Lentigines associated with syndromes are present at birth or arise during childhood  What causes lentigines? Common forms of lentigo are due to exposure to ultraviolet radiation:   Sun damage including sunburn    Indoor tanning    Phototherapy, especially photochemotherapy (PUVA)    Ionizing radiation, eg radiation therapy, can also cause lentigines  Several familial syndromes associated with widespread lentigines originate from mutations in Wali-MAP kinase, mTOR signaling and PTEN pathways  What are the clinical features of lentigines? Lentigines have been classified into several different types depending on what they look like, where they appear on the body, causative factors, and whether they are associated to other diseases or conditions  Lentigines may be solitary or more often, multiple  Most lentigines are smaller than 5 mm in diameter      Lentigo simplex   A precursor to junctional naevus    Arises during childhood and early adult life    Found on trunk and limbs    Small brown round or oval macule or thin plaque    Jagged or smooth edge    May have a dry surface    May disappear in time  Solar lentigo   A precursor to seborrhoeic keratosis    Found on chronically sun exposed sites such as hands, face, lower legs    May also follow sunburn to shoulders    Yellow, light or dark brown regular or irregular macule or thin plaque    May have a dry surface    Often has moth-eaten outline    Can slowly enlarge to several centimeters in diameter    May disappear, often through the process known as lichenoid keratosis    When atypical in appearance, may be difficult to distinguish from melanoma in situ  Ink spot lentigo   Also known as reticulated lentigo    Few in number compared to solar lentigines    Follows sunburn in very fair skinned individuals    Dark brown to black irregular ink spot-like macule  PUVA lentigo   Similar to ink spot lentigo but follows photochemotherapy (PUVA)    Location anywhere exposed to PUVA  Tanning bed lentigo   Similar to ink spot lentigo but follows indoor tanning    Location anywhere exposed to tanning bed  Radiation lentigo   Occurs in site of irradiation (accidental or therapeutic)    Associated with late-stage radiation dermatitis: epidermal atrophy, subcutaneous fibrosis, keratosis, telangiectasias  Melanotic macule   Mucosal surfaces or adjacent glabrous skin eg lip, vulva, penis, anus    Light to dark brown    Also called mucosal melanosis  Generalised lentigines   Found on any exposed or covered site from early childhood    Small macules may merge to form larger patches    Not associated with a syndrome    Also called lentigines profusa, multiple lentigines  Agminated lentigines   Naevoid eruption of lentigos confined to a single segmental area    Sharp demarcation in midline    May have associated neurological and developmental abnormalities  Patterned lentigines   Inherited tendency to lentigines on face, lips, buttocks, palms, soles    Recognised mainly in people of  ethnicity  Centrofacial neurodysraphic lentiginosis  Associated with mental retardation  Lentiginosis syndromes   Syndromes include LEOPARD/Mikhail, Peutz-Jeghers, Laugier-Hunziker, Moynahan, Xeroderma pigmentosum, myxoma syndromes (PATTEN, NAME, Ruano), Ruvalcaba-Myhre-Gray, Bannayan-Zonnana syndrome, Cowden disease (multiple hamartoma syndrome )    Inheritance is autosomal dominant; sporadic cases common    Widespread lentigines present at birth or arise in early childhood    Associated with neural, endocrine, and mesenchymal tumors    How is the diagnosis made? Lentigines are usually diagnosed clinically by their typical appearance  Concern regarding possibility of melanoma may lead to:   Dermatoscopy    Diagnostic excision biopsy    Histopathology of a lentigo shows:   Thickened epidermis    An increased number of melanocytes along the basal layer of epidermis    Unlike junctional melanocytic naevus, there are no nests of melanocytes    Increased melanin pigment within the keratinocytes    Additional features depending on type of lentigo    In contrast, an ephelis (freckle) shows sun-induced increased melanin within the keratinocytes, without an increase in number of cells  What is the treatment for lentigines? Most lentigines are left alone  Attempts to lighten them may not be successful  The following approaches are used:   SPF 50+ broad-spectrum sunscreen    Hydroquinone bleaching cream    Alpha hydroxy acids    Vitamin C    Retinoids    Azelaic acid    Chemical peels  Individual lesions can be permanently removed using:   Cryotherapy    Intense pulsed light    Pigment lasers    How can lentigines be prevented? Lentigines associated with exposure ultraviolet radiation can be prevented by very careful sun protection   Clothing is more successful at preventing new lentigines than are sunscreens  What is the outlook for lentigines? Lentigines usually persist  They may increase in number with age and sun exposure  Some in sun-protected sites may fade and disappear  2  SEBORRHEIC KERATOSIS; INFLAMED    Physical Exam:   Anatomic Location Affected:  Right frontal scalp   Morphological Description:  Flat and raised, waxy, smooth to warty textured, yellow to brownish-grey to dark brown to blackish, discrete, "stuck-on" appearing papules   Pertinent Positives:   Pertinent Negatives: Additional History of Present Condition:  Patient reports new bumps on the skin  Denies itch, burn, pain, bleeding or ulceration  Present constantly; nothing seems to make it worse or better  No prior treatment  Assessment and Plan:  Based on a thorough discussion of this condition and the management approach to it (including a comprehensive discussion of the known risks, side effects and potential benefits of treatment), the patient (family) agrees to implement the following specific plan:   Cryotherapy done today in office   Consent obtained  Seborrheic Keratosis  A seborrheic keratosis is a harmless warty spot that appears during adult life as a common sign of skin aging  Seborrheic keratoses can arise on any area of skin, covered or uncovered, with the usual exception of the palms and soles  They do not arise from mucous membranes  Seborrheic keratoses can have highly variable appearance  Seborrheic keratoses are extremely common  It has been estimated that over 90% of adults over the age of 61 years have one or more of them  They occur in males and females of all races, typically beginning to erupt in the 35s or 45s  They are uncommon under the age of 21 years  The precise cause of seborrhoeic keratoses is not known  Seborrhoeic keratoses are considered degenerative in nature  As time goes by, seborrheic keratoses tend to become more numerous   Some people inherit a tendency to develop a very large number of them; some people may have hundreds of them  The name "seborrheic keratosis" is misleading, because these lesions are not limited to a seborrhoeic distribution (scalp, mid-face, chest, upper back), nor are they formed from sebaceous glands, nor are they associated with sebum -- which is greasy  Seborrheic keratosis may also be called "SK," "Seb K," "basal cell papilloma," "senile wart," or "barnacle "      Researchers have noted:   Eruptive seborrhoeic keratoses can follow sunburn or dermatitis   Skin friction may be the reason they appear in body folds   Viral cause (e g , human papillomavirus) seems unlikely   Stable and clonal mutations or activation of FRFR3, PIK3CA, MAO, AKT1 and EGFR genes are found in seborrhoeic keratoses   Seborrhoeic keratosis can arise from solar lentigo   FRFR3 mutations also arise in solar lentigines  These mutations are associated with increased age and location on the head and neck, suggesting a role of ultraviolet radiation in these lesions   Seborrheic keratoses do not harbour tumour suppressor gene mutations   Epidermal growth factor receptor inhibitors, which are used to treat some cancers, often result in an increase in verrucal (warty) keratoses  There is no easy way to remove multiple lesions on a single occasion  Unless a specific lesion is "inflamed" and is causing pain or stinging/burning or is bleeding, most insurance companies do not authorize treatment  PROCEDURE:  DESTRUCTION OF PRE-MALIGNANT LESIONS  After a thorough discussion of treatment options and risk/benefits/alternatives (including but not limited to local pain, scarring, dyspigmentation, blistering, and possible superinfection), verbal and written consent were obtained and the aforementioned lesions were treated on with cryotherapy using liquid nitrogen x 1 cycle for 5-10 seconds       TOTAL NUMBER of 1 pre-malignant lesions were treated today on the ANATOMIC LOCATION: right frontal scalp  The patient tolerated the procedure well, and after-care instructions were provided  3  ALOPECIA    Physical Exam:   Anatomic Location Affected:  Frontal scalp    Morphological Description:  Male pattern    Pertinent Positives:   Pertinent Negatives: Additional History of Present Condition:  Patient states that he has noticed hair loss on top of his scalp  Patient has used minoxidil in the past with no improvement  Assessment and Plan:  Based on a thorough discussion of this condition and the management approach to it (including a comprehensive discussion of the known risks, side effects and potential benefits of treatment), the patient (family) agrees to implement the following specific plan:   Advised patient can try minoxidil for about 6 months at the [facility], patient can also try Propecia   PRP discussed as a possible treatment   Hair transplant discussed as a possible treatment at a outpatient facility  It may be characterized by one or more of the following signs and symptoms:   Patchy hair loss that begins in one or more coin-sized areas on the scalp   A few short hairs that occur in or at the edges of the bare spots that get narrower at the bottom like an exclamation micah   Clumps of hair on the pillow or in the shower    Hair loss can occur mostly on the scalp, but can involve eyebrows, eyelashes, and beards   Nail problems such as pitting, white spots, or lines  Rarely, the nails can change shape or fall off    Benign lentigos noted on exam along with male pattern alopecia  Discussed benign quality and treatments  Patient to try minoxidil for 6 months and consider propecia and PRP in the future  Irritated SK treated with LN2 x 8 seconds x 3 cycles  Well tolerated  Discussed after effects of LN2 and need for return for new or changing lesions      Scribe Attestation    I,:  [employer], MA am acting as a scribe while in the presence of the attending physician :       I,:  Johnny Rubin MD personally performed the services described in this documentation    as scribed in my presence :

## 2021-03-18 NOTE — PATIENT INSTRUCTIONS
1  LENTIGO    Assessment and Plan:  Based on a thorough discussion of this condition and the management approach to it (including a comprehensive discussion of the known risks, side effects and potential benefits of treatment), the patient (family) agrees to implement the following specific plan:   monitor for changes   Benign, monitor for changes  What is a lentigo? A lentigo is a pigmented flat or slightly raised lesion with a clearly defined edge  Unlike an ephelis (freckle), it does not fade in the winter months  There are several kinds of lentigo  The name lentigo originally referred to its appearance resembling a small lentil  The plural of lentigo is lentigines, although lentigos is also in common use  Who gets lentigines? Lentigines can affect males and females of all ages and races  Solar lentigines are especially prevalent in fair skinned adults  Lentigines associated with syndromes are present at birth or arise during childhood  What causes lentigines? Common forms of lentigo are due to exposure to ultraviolet radiation:   Sun damage including sunburn    Indoor tanning    Phototherapy, especially photochemotherapy (PUVA)    Ionizing radiation, eg radiation therapy, can also cause lentigines  Several familial syndromes associated with widespread lentigines originate from mutations in Wali-MAP kinase, mTOR signaling and PTEN pathways  What are the clinical features of lentigines? Lentigines have been classified into several different types depending on what they look like, where they appear on the body, causative factors, and whether they are associated to other diseases or conditions  Lentigines may be solitary or more often, multiple  Most lentigines are smaller than 5 mm in diameter      Lentigo simplex   A precursor to junctional naevus    Arises during childhood and early adult life    Found on trunk and limbs    Small brown round or oval macule or thin plaque    Jagged or smooth edge    May have a dry surface    May disappear in time  Solar lentigo   A precursor to seborrhoeic keratosis    Found on chronically sun exposed sites such as hands, face, lower legs    May also follow sunburn to shoulders    Yellow, light or dark brown regular or irregular macule or thin plaque    May have a dry surface    Often has moth-eaten outline    Can slowly enlarge to several centimeters in diameter    May disappear, often through the process known as lichenoid keratosis    When atypical in appearance, may be difficult to distinguish from melanoma in situ  Ink spot lentigo   Also known as reticulated lentigo    Few in number compared to solar lentigines    Follows sunburn in very fair skinned individuals    Dark brown to black irregular ink spot-like macule  PUVA lentigo   Similar to ink spot lentigo but follows photochemotherapy (PUVA)    Location anywhere exposed to PUVA  Tanning bed lentigo   Similar to ink spot lentigo but follows indoor tanning    Location anywhere exposed to tanning bed  Radiation lentigo   Occurs in site of irradiation (accidental or therapeutic)    Associated with late-stage radiation dermatitis: epidermal atrophy, subcutaneous fibrosis, keratosis, telangiectasias  Melanotic macule   Mucosal surfaces or adjacent glabrous skin eg lip, vulva, penis, anus    Light to dark brown    Also called mucosal melanosis  Generalised lentigines   Found on any exposed or covered site from early childhood    Small macules may merge to form larger patches    Not associated with a syndrome    Also called lentigines profusa, multiple lentigines  Agminated lentigines   Naevoid eruption of lentigos confined to a single segmental area    Sharp demarcation in midline    May have associated neurological and developmental abnormalities  Patterned lentigines   Inherited tendency to lentigines on face, lips, buttocks, palms, soles    Recognised mainly in people of  ethnicity  Centrofacial neurodysraphic lentiginosis  Associated with mental retardation  Lentiginosis syndromes   Syndromes include LEOPARD/Copeland, Peutz-Jeghers, Laugier-Hunziker, Moynahan, Xeroderma pigmentosum, myxoma syndromes (PATTEN, NAME, Ruano), Ruvalcaba-Myhre-Gray, Bannayan-Zonnana syndrome, Cowden disease (multiple hamartoma syndrome )    Inheritance is autosomal dominant; sporadic cases common    Widespread lentigines present at birth or arise in early childhood    Associated with neural, endocrine, and mesenchymal tumors    How is the diagnosis made? Lentigines are usually diagnosed clinically by their typical appearance  Concern regarding possibility of melanoma may lead to:   Dermatoscopy    Diagnostic excision biopsy    Histopathology of a lentigo shows:   Thickened epidermis    An increased number of melanocytes along the basal layer of epidermis    Unlike junctional melanocytic naevus, there are no nests of melanocytes    Increased melanin pigment within the keratinocytes    Additional features depending on type of lentigo    In contrast, an ephelis (freckle) shows sun-induced increased melanin within the keratinocytes, without an increase in number of cells  What is the treatment for lentigines? Most lentigines are left alone  Attempts to lighten them may not be successful  The following approaches are used:   SPF 50+ broad-spectrum sunscreen    Hydroquinone bleaching cream    Alpha hydroxy acids    Vitamin C    Retinoids    Azelaic acid    Chemical peels  Individual lesions can be permanently removed using:   Cryotherapy    Intense pulsed light    Pigment lasers    How can lentigines be prevented? Lentigines associated with exposure ultraviolet radiation can be prevented by very careful sun protection  Clothing is more successful at preventing new lentigines than are sunscreens  What is the outlook for lentigines?   Lentigines usually persist  They may increase in number with age and sun exposure  Some in sun-protected sites may fade and disappear  2  SEBORRHEIC KERATOSIS; INFLAMED    Assessment and Plan:  Based on a thorough discussion of this condition and the management approach to it (including a comprehensive discussion of the known risks, side effects and potential benefits of treatment), the patient (family) agrees to implement the following specific plan:   Cryotherapy done today in office   Consent obtained  Seborrheic Keratosis  A seborrheic keratosis is a harmless warty spot that appears during adult life as a common sign of skin aging  Seborrheic keratoses can arise on any area of skin, covered or uncovered, with the usual exception of the palms and soles  They do not arise from mucous membranes  Seborrheic keratoses can have highly variable appearance  Seborrheic keratoses are extremely common  It has been estimated that over 90% of adults over the age of 61 years have one or more of them  They occur in males and females of all races, typically beginning to erupt in the 35s or 45s  They are uncommon under the age of 21 years  The precise cause of seborrhoeic keratoses is not known  Seborrhoeic keratoses are considered degenerative in nature  As time goes by, seborrheic keratoses tend to become more numerous  Some people inherit a tendency to develop a very large number of them; some people may have hundreds of them  The name "seborrheic keratosis" is misleading, because these lesions are not limited to a seborrhoeic distribution (scalp, mid-face, chest, upper back), nor are they formed from sebaceous glands, nor are they associated with sebum -- which is greasy    Seborrheic keratosis may also be called "SK," "Seb K," "basal cell papilloma," "senile wart," or "barnacle "      Researchers have noted:   Eruptive seborrhoeic keratoses can follow sunburn or dermatitis   Skin friction may be the reason they appear in body folds   Viral cause (e g , human papillomavirus) seems unlikely   Stable and clonal mutations or activation of FRFR3, PIK3CA, MAO, AKT1 and EGFR genes are found in seborrhoeic keratoses   Seborrhoeic keratosis can arise from solar lentigo   FRFR3 mutations also arise in solar lentigines  These mutations are associated with increased age and location on the head and neck, suggesting a role of ultraviolet radiation in these lesions   Seborrheic keratoses do not harbour tumour suppressor gene mutations   Epidermal growth factor receptor inhibitors, which are used to treat some cancers, often result in an increase in verrucal (warty) keratoses  There is no easy way to remove multiple lesions on a single occasion  Unless a specific lesion is "inflamed" and is causing pain or stinging/burning or is bleeding, most insurance companies do not authorize treatment  PROCEDURE:  DESTRUCTION OF PRE-MALIGNANT LESIONS  After a thorough discussion of treatment options and risk/benefits/alternatives (including but not limited to local pain, scarring, dyspigmentation, blistering, and possible superinfection), verbal and written consent were obtained and the aforementioned lesions were treated on with cryotherapy using liquid nitrogen x 1 cycle for 5-10 seconds   TOTAL NUMBER of 1 pre-malignant lesions were treated today on the ANATOMIC LOCATION: right frontal scalp  The patient tolerated the procedure well, and after-care instructions were provided  3  ALOPECIA AREATA    Assessment and Plan:  Based on a thorough discussion of this condition and the management approach to it (including a comprehensive discussion of the known risks, side effects and potential benefits of treatment), the patient (family) agrees to implement the following specific plan:   Advised patient can try minoxidil for about 6 months at the mini, patient can also try Propecia   PRP discussed as a possible treatment      Hair transplant discussed as a possible treatment at a outpatient facility  It may be characterized by one or more of the following signs and symptoms:   Patchy hair loss that begins in one or more coin-sized areas on the scalp   A few short hairs that occur in or at the edges of the bare spots that get narrower at the bottom like an exclamation micah   Clumps of hair on the pillow or in the shower    Hair loss can occur mostly on the scalp, but can involve eyebrows, eyelashes, and beards   Nail problems such as pitting, white spots, or lines  Rarely, the nails can change shape or fall off    Diagnosis of alopecia areata can usually be made with a good history and careful physical exam  Most patients with alopecia areata are otherwise healthy; screening with blood work may be warranted for those with specific symptoms such as thyroid dysfunction  The dermatologist will sometimes perform a skin biopsy to confirm the diagnosis  When alopecia areata occurs in a child, it is important to to give proper support and education about the condition  Providing your child with positive messages and praise is crucial for self-esteem  In many cases, alopecia areata will resolve by itself over the course of several months  Many patients lose their hair more than once before the condition goes away on its own  No one can predict when hair might re-grow or fall out again  Treatment can help the hair regrow more quickly  Some specific treatment options include:   Corticosteroids in the form of strong topical creams and ointments  These are applied to bare spots and surrounding skin  Steroid injections work better than topical medciations and may be used in older children who can tolerate needles  o Patients receive shots ever 3 to 6 weeks, with hair growth generally beginning 4 weeks after the last shot   Minoxidil 5% is a hair regrowth medicine that is applied twice a day to the scalp, brows, or beard   It generally takes about 3 months to begin working   Anthralin is a tar-like substance that alters the skin's immune function  It is left on the skin for 20 to 60 minutes and then washed off to avoid irritation   Diphencyprone can be used when other treatments fail  It causes a small allergic reaction when applied to bare skin, which tricks the immune system into fighting the inflammation on the scalp  This method can take up to three months for the hair to start re-growing

## 2021-03-21 ENCOUNTER — IMMUNIZATIONS (OUTPATIENT)
Dept: FAMILY MEDICINE CLINIC | Facility: HOSPITAL | Age: 46
End: 2021-03-21

## 2021-03-21 DIAGNOSIS — Z23 ENCOUNTER FOR IMMUNIZATION: Primary | ICD-10-CM

## 2021-03-21 PROCEDURE — 91300 SARS-COV-2 / COVID-19 MRNA VACCINE (PFIZER-BIONTECH) 30 MCG: CPT

## 2021-03-21 PROCEDURE — 0001A SARS-COV-2 / COVID-19 MRNA VACCINE (PFIZER-BIONTECH) 30 MCG: CPT

## 2021-03-26 ENCOUNTER — PATIENT OUTREACH (OUTPATIENT)
Dept: SURGERY | Facility: CLINIC | Age: 46
End: 2021-03-26

## 2021-03-26 NOTE — PROGRESS NOTES
Ct completed [employer] satisfaction survey with writer, stated aware he need to meet with  to update file, stated he will call [facility]ee to set up a face to face appointment for this

## 2021-03-29 ENCOUNTER — PATIENT OUTREACH (OUTPATIENT)
Dept: SURGERY | Facility: CLINIC | Age: 46
End: 2021-03-29

## 2021-03-31 NOTE — PROGRESS NOTES
Cm received notice that Survey was complete by Cm supervisor  Cm called Ct to schedule recert  Cm left Vm

## 2021-04-05 ENCOUNTER — PATIENT OUTREACH (OUTPATIENT)
Dept: SURGERY | Facility: CLINIC | Age: 46
End: 2021-04-05

## 2021-04-06 ENCOUNTER — PATIENT OUTREACH (OUTPATIENT)
Dept: SURGERY | Facility: CLINIC | Age: 46
End: 2021-04-06

## 2021-04-11 ENCOUNTER — IMMUNIZATIONS (OUTPATIENT)
Dept: FAMILY MEDICINE CLINIC | Facility: HOSPITAL | Age: 46
End: 2021-04-11

## 2021-04-11 DIAGNOSIS — Z23 ENCOUNTER FOR IMMUNIZATION: Primary | ICD-10-CM

## 2021-04-11 PROCEDURE — 91300 SARS-COV-2 / COVID-19 MRNA VACCINE (PFIZER-BIONTECH) 30 MCG: CPT

## 2021-04-11 PROCEDURE — 0002A SARS-COV-2 / COVID-19 MRNA VACCINE (PFIZER-BIONTECH) 30 MCG: CPT

## 2021-04-23 ENCOUNTER — PATIENT OUTREACH (OUTPATIENT)
Dept: SURGERY | Facility: CLINIC | Age: 46
End: 2021-04-23

## 2021-04-28 ENCOUNTER — PATIENT OUTREACH (OUTPATIENT)
Dept: SURGERY | Facility: CLINIC | Age: 46
End: 2021-04-28

## 2021-04-28 NOTE — PROGRESS NOTES
Ct presented to [facility] due to [id number]  Ct's current housing is stable and living with his   Ct reports he is not sexually active  Ct is currently in good health  CM and Ct discussed at risk behaviors and importance of using protection and maintaining medication adherence  Ct is currently medically adherent and attending all of his appointments  Ct reports to have been struggling mentally  Ct states he has been in care with amauri before but fell out of care  Cm will refer Ct back to [facility]  Ct has [place] for [place]  Cm assisted in completing a [place] application for [place]  Ct reports he currently does not have any issues with transportation and drives himself  Ct currently does not work and currently is living off of savings and is on unemployment  Ct is seeing Dr Shai Anderson for ID care and [facility] for PCP care  Ct does not smoke cigarettes  Ct reports no past issues with drugs or alcohol  Ct states there are no domestic violence issues  Ct last dental appointment was 4/2021 and with Ralph Jeff  There are no signatures due to COVID-19  Cm completed Acuity Scale Ct's score is a 16  Cm completed a  sliding fee scale application will send 7-  Ct did bring a bill from [address] [facility]  Ct had a bill from 5- for a Sohail visit  Cm called [employer] and seen if they can retro active the bill due to ct being approved from [place] shortly after  Billing states that Ct should call [facility]  Cm assisted ct in calling [facility] states Ct will need to send bill to be reviewed  Cm faxed renewal kimber and bill to [address]

## 2021-05-06 ENCOUNTER — PATIENT OUTREACH (OUTPATIENT)
Dept: SURGERY | Facility: CLINIC | Age: 46
End: 2021-05-06

## 2021-05-07 NOTE — PROGRESS NOTES
Cm received text from Ct regarding his DHS renewal  Ct states he will come in Monday to bring in better documentation

## 2021-05-10 ENCOUNTER — PATIENT OUTREACH (OUTPATIENT)
Dept: SURGERY | Facility: CLINIC | Age: 46
End: 2021-05-10

## 2021-05-12 ENCOUNTER — APPOINTMENT (OUTPATIENT)
Dept: LAB | Facility: CLINIC | Age: 46
End: 2021-05-12
Payer: COMMERCIAL

## 2021-05-12 ENCOUNTER — TRANSCRIBE ORDERS (OUTPATIENT)
Dept: LAB | Facility: CLINIC | Age: 46
End: 2021-05-12

## 2021-05-12 DIAGNOSIS — I10 ESSENTIAL HYPERTENSION: ICD-10-CM

## 2021-05-12 DIAGNOSIS — Z72.89 OTHER PROBLEMS RELATED TO LIFESTYLE: ICD-10-CM

## 2021-05-12 DIAGNOSIS — B20 SYMPTOMATIC HIV INFECTION (HCC): ICD-10-CM

## 2021-05-12 DIAGNOSIS — D72.89 OTHER SPECIFIED DISORDERS OF WHITE BLOOD CELLS: ICD-10-CM

## 2021-05-12 DIAGNOSIS — Z11.3 ENCOUNTER FOR SCREENING FOR BACTERIAL SEXUALLY TRANSMITTED DISEASE: ICD-10-CM

## 2021-05-12 DIAGNOSIS — Z20.2 CONTACT WITH AND (SUSPECTED) EXPOSURE TO INFECTIONS WITH A PREDOMINANTLY SEXUAL MODE OF TRANSMISSION: ICD-10-CM

## 2021-05-12 DIAGNOSIS — Z79.899 OTHER LONG TERM (CURRENT) DRUG THERAPY: ICD-10-CM

## 2021-05-12 DIAGNOSIS — Z13.1 SCREENING FOR DIABETES MELLITUS: ICD-10-CM

## 2021-05-12 DIAGNOSIS — R63.5 WEIGHT GAIN: ICD-10-CM

## 2021-05-12 LAB
ALBUMIN SERPL BCP-MCNC: 3.9 G/DL (ref 3.5–5)
ALP SERPL-CCNC: 61 U/L (ref 46–116)
ALT SERPL W P-5'-P-CCNC: 37 U/L (ref 12–78)
ANION GAP SERPL CALCULATED.3IONS-SCNC: 9 MMOL/L (ref 4–13)
AST SERPL W P-5'-P-CCNC: 26 U/L (ref 5–45)
BACTERIA UR QL AUTO: NORMAL /HPF
BASOPHILS # BLD AUTO: 0.03 THOUSANDS/ΜL (ref 0–0.1)
BASOPHILS NFR BLD AUTO: 0 % (ref 0–1)
BILIRUB SERPL-MCNC: 0.65 MG/DL (ref 0.2–1)
BILIRUB UR QL STRIP: NEGATIVE
BUN SERPL-MCNC: 20 MG/DL (ref 5–25)
CALCIUM SERPL-MCNC: 9.2 MG/DL (ref 8.3–10.1)
CHLORIDE SERPL-SCNC: 102 MMOL/L (ref 100–108)
CLARITY UR: CLEAR
CO2 SERPL-SCNC: 27 MMOL/L (ref 21–32)
COLOR UR: ABNORMAL
CREAT SERPL-MCNC: 1.15 MG/DL (ref 0.6–1.3)
EOSINOPHIL # BLD AUTO: 0.07 THOUSAND/ΜL (ref 0–0.61)
EOSINOPHIL NFR BLD AUTO: 1 % (ref 0–6)
ERYTHROCYTE [DISTWIDTH] IN BLOOD BY AUTOMATED COUNT: 12.2 % (ref 11.6–15.1)
EST. AVERAGE GLUCOSE BLD GHB EST-MCNC: 103 MG/DL
GFR SERPL CREATININE-BSD FRML MDRD: 75 ML/MIN/1.73SQ M
GLUCOSE P FAST SERPL-MCNC: 92 MG/DL (ref 65–99)
GLUCOSE UR STRIP-MCNC: NEGATIVE MG/DL
HBA1C MFR BLD: 5.2 %
HCT VFR BLD AUTO: 46.9 % (ref 36.5–49.3)
HCV AB SER QL: NORMAL
HGB BLD-MCNC: 15.9 G/DL (ref 12–17)
HGB UR QL STRIP.AUTO: NEGATIVE
IMM GRANULOCYTES # BLD AUTO: 0.02 THOUSAND/UL (ref 0–0.2)
IMM GRANULOCYTES NFR BLD AUTO: 0 % (ref 0–2)
KETONES UR STRIP-MCNC: NEGATIVE MG/DL
LEUKOCYTE ESTERASE UR QL STRIP: ABNORMAL
LYMPHOCYTES # BLD AUTO: 2.32 THOUSANDS/ΜL (ref 0.6–4.47)
LYMPHOCYTES NFR BLD AUTO: 33 % (ref 14–44)
MCH RBC QN AUTO: 30.3 PG (ref 26.8–34.3)
MCHC RBC AUTO-ENTMCNC: 33.9 G/DL (ref 31.4–37.4)
MCV RBC AUTO: 89 FL (ref 82–98)
MONOCYTES # BLD AUTO: 0.55 THOUSAND/ΜL (ref 0.17–1.22)
MONOCYTES NFR BLD AUTO: 8 % (ref 4–12)
NEUTROPHILS # BLD AUTO: 4.04 THOUSANDS/ΜL (ref 1.85–7.62)
NEUTS SEG NFR BLD AUTO: 58 % (ref 43–75)
NITRITE UR QL STRIP: NEGATIVE
NON-SQ EPI CELLS URNS QL MICRO: NORMAL /HPF
NRBC BLD AUTO-RTO: 0 /100 WBCS
PH UR STRIP.AUTO: 6.5 [PH]
PLATELET # BLD AUTO: 263 THOUSANDS/UL (ref 149–390)
PMV BLD AUTO: 11.4 FL (ref 8.9–12.7)
POTASSIUM SERPL-SCNC: 3.7 MMOL/L (ref 3.5–5.3)
PROT SERPL-MCNC: 8 G/DL (ref 6.4–8.2)
PROT UR STRIP-MCNC: NEGATIVE MG/DL
RBC # BLD AUTO: 5.25 MILLION/UL (ref 3.88–5.62)
RBC #/AREA URNS AUTO: NORMAL /HPF
SODIUM SERPL-SCNC: 138 MMOL/L (ref 136–145)
SP GR UR STRIP.AUTO: 1.02 (ref 1–1.03)
UROBILINOGEN UR QL STRIP.AUTO: 0.2 E.U./DL
WBC # BLD AUTO: 7.03 THOUSAND/UL (ref 4.31–10.16)
WBC #/AREA URNS AUTO: NORMAL /HPF

## 2021-05-12 PROCEDURE — 83036 HEMOGLOBIN GLYCOSYLATED A1C: CPT

## 2021-05-12 PROCEDURE — 86803 HEPATITIS C AB TEST: CPT

## 2021-05-12 PROCEDURE — 85025 COMPLETE CBC W/AUTO DIFF WBC: CPT

## 2021-05-12 PROCEDURE — 87591 N.GONORRHOEAE DNA AMP PROB: CPT

## 2021-05-12 PROCEDURE — 87536 HIV-1 QUANT&REVRSE TRNSCRPJ: CPT

## 2021-05-12 PROCEDURE — 86592 SYPHILIS TEST NON-TREP QUAL: CPT

## 2021-05-12 PROCEDURE — 80053 COMPREHEN METABOLIC PANEL: CPT

## 2021-05-12 PROCEDURE — 87491 CHLMYD TRACH DNA AMP PROBE: CPT

## 2021-05-12 PROCEDURE — 36415 COLL VENOUS BLD VENIPUNCTURE: CPT

## 2021-05-12 PROCEDURE — 86360 T CELL ABSOLUTE COUNT/RATIO: CPT

## 2021-05-12 PROCEDURE — 81001 URINALYSIS AUTO W/SCOPE: CPT

## 2021-05-13 LAB
BASOPHILS # BLD AUTO: 0 X10E3/UL (ref 0–0.2)
BASOPHILS NFR BLD AUTO: 1 %
C TRACH DNA SPEC QL NAA+PROBE: NEGATIVE
CD3+CD4+ CELLS # BLD: 867 /UL (ref 359–1519)
CD3+CD4+ CELLS NFR BLD: 37.7 % (ref 30.8–58.5)
CD3+CD4+ CELLS/CD3+CD8+ CLL BLD: 1.1 % (ref 0.92–3.72)
CD3+CD8+ CELLS # BLD: 789 /UL (ref 109–897)
CD3+CD8+ CELLS NFR BLD: 34.3 % (ref 12–35.5)
EOSINOPHIL # BLD AUTO: 0.1 X10E3/UL (ref 0–0.4)
EOSINOPHIL NFR BLD AUTO: 1 %
ERYTHROCYTE [DISTWIDTH] IN BLOOD BY AUTOMATED COUNT: 12.8 % (ref 11.6–15.4)
HCT VFR BLD AUTO: 47.6 % (ref 37.5–51)
HGB BLD-MCNC: 16.1 G/DL (ref 13–17.7)
HIV1 RNA # SERPL NAA+PROBE: <20 COPIES/ML
HIV1 RNA SERPL NAA+PROBE-LOG#: NORMAL LOG10COPY/ML
IMM GRANULOCYTES # BLD: 0 X10E3/UL (ref 0–0.1)
IMM GRANULOCYTES NFR BLD: 0 %
LYMPHOCYTES # BLD AUTO: 2.3 X10E3/UL (ref 0.7–3.1)
LYMPHOCYTES NFR BLD AUTO: 33 %
MCH RBC QN AUTO: 30.4 PG (ref 26.6–33)
MCHC RBC AUTO-ENTMCNC: 33.8 G/DL (ref 31.5–35.7)
MCV RBC AUTO: 90 FL (ref 79–97)
MONOCYTES # BLD AUTO: 0.5 X10E3/UL (ref 0.1–0.9)
MONOCYTES NFR BLD AUTO: 8 %
N GONORRHOEA DNA SPEC QL NAA+PROBE: NEGATIVE
NEUTROPHILS # BLD AUTO: 3.9 X10E3/UL (ref 1.4–7)
NEUTROPHILS NFR BLD AUTO: 57 %
PLATELET # BLD AUTO: 279 X10E3/UL (ref 150–450)
RBC # BLD AUTO: 5.29 X10E6/UL (ref 4.14–5.8)
RPR SER QL: NORMAL
WBC # BLD AUTO: 6.8 X10E3/UL (ref 3.4–10.8)

## 2021-05-17 ENCOUNTER — PATIENT OUTREACH (OUTPATIENT)
Dept: SURGERY | Facility: CLINIC | Age: 46
End: 2021-05-17

## 2021-05-20 ENCOUNTER — PATIENT OUTREACH (OUTPATIENT)
Dept: SURGERY | Facility: CLINIC | Age: 46
End: 2021-05-20

## 2021-05-20 NOTE — PROGRESS NOTES
Cm text ct for follow up documentation  Ct text that he doesn't have the income proof of the stimulus  Cm explained they need just updated income  Ct text pdf of bank statement  Cm requested Ct e-mail the pdf  Ct e-mailed pdf  Cm sent to [facility]

## 2021-06-01 ENCOUNTER — OFFICE VISIT (OUTPATIENT)
Dept: SURGERY | Facility: CLINIC | Age: 46
End: 2021-06-01
Payer: COMMERCIAL

## 2021-06-01 VITALS
HEART RATE: 87 BPM | BODY MASS INDEX: 30.13 KG/M2 | TEMPERATURE: 97.9 F | DIASTOLIC BLOOD PRESSURE: 86 MMHG | WEIGHT: 192 LBS | HEIGHT: 67 IN | SYSTOLIC BLOOD PRESSURE: 122 MMHG | OXYGEN SATURATION: 95 %

## 2021-06-01 DIAGNOSIS — B20 SYMPTOMATIC HIV INFECTION (HCC): Primary | ICD-10-CM

## 2021-06-01 DIAGNOSIS — F32.A ANXIETY AND DEPRESSION: ICD-10-CM

## 2021-06-01 DIAGNOSIS — I10 ESSENTIAL HYPERTENSION: ICD-10-CM

## 2021-06-01 DIAGNOSIS — R63.5 WEIGHT GAIN: ICD-10-CM

## 2021-06-01 DIAGNOSIS — E78.5 DYSLIPIDEMIA: ICD-10-CM

## 2021-06-01 DIAGNOSIS — F41.9 ANXIETY AND DEPRESSION: ICD-10-CM

## 2021-06-01 DIAGNOSIS — R11.0 NAUSEA: ICD-10-CM

## 2021-06-01 PROCEDURE — 99215 OFFICE O/P EST HI 40 MIN: CPT | Performed by: INTERNAL MEDICINE

## 2021-06-01 NOTE — PROGRESS NOTES
Progress Note - Infectious Disease   Marianna Dent 51 y o  male MRN: [id number]  Unit/Bed#:  Encounter: [id number]      Impression/Plan:  1  HIV-improved with viral load now undetectable and a CD4 count of greater than  800  Will continue [facility] for now, recheck labs in 2 months and follow up in 3 month period stressed adherence     2  Anxiety and depression-treatment as per the primary   Patient refused to connect with mental health services      3  Dyslipidemia-focus on diet and exercise for now with weight loss  On Lipitor     4  Weight gain- has lost some weight   will continue stressed the importance of diet and exercise      5  Hypertension-improved blood pressure on hydrochlorothiazide and lisinopril but suboptimal   Monitor   Discussed with the primary      6  Nausea-Doubt related to the [facility] and has improved  Discussed with the primary  Patient was provided medication, adherence and prevention education    Subjective:  Routine follow-up for HIV  Patient claims 100% adherence with   Biktarvy    Patient denies any notable side effects  Overall the feeling well  The patient denies any fever chills or sweats, denies any nausea vomiting or diarrhea, denies any cough or shortness of breath  ROS:  A complete review of systems is negative other than that noted above in the subjective    Followup portions patient history reviewed and updated as: Allergies, current medications, past medical history, past social history, past surgical history, and the problem list    Objective:  Vitals:  Vitals:    06/01/21 1633   BP: 122/86   Pulse: 87   Temp: 97 9 °F (36 6 °C)   SpO2: 95%   Weight: 87 1 kg (192 lb)   Height: 5' 7" (1 702 m)       Physical Exam:   General Appearance:  Alert, interactive, appearing well,  nontoxic, no acute distress  Neck:   Supple without lymphadenopathy, no thyromegaly or masses   Throat: Oropharynx moist without lesions      Lungs:   Clear to auscultation bilaterally; no wheezes, rhonchi or rales; respirations unlabored   Heart:  RRR; no murmur, rub or gallop   Abdomen:   Soft, non-tender, non-distended, positive bowel sounds  Extremities: No clubbing, cyanosis or edema   Skin: No new rashes or lesions  No draining wounds noted  Labs, Imaging, & Other studies:   All pertinent labs and imaging studies were personally reviewed    Lab Results   Component Value Date    K 3 7 05/12/2021     05/12/2021    CO2 27 05/12/2021    BUN 20 05/12/2021    CREATININE 1 15 05/12/2021    GLUF 92 05/12/2021    CALCIUM 9 2 05/12/2021    AST 26 05/12/2021    ALT 37 05/12/2021    ALKPHOS 61 05/12/2021    EGFR 75 05/12/2021     Lab Results   Component Value Date    WBC 7 03 05/12/2021    WBC 6 8 05/12/2021    HGB 15 9 05/12/2021    HGB 16 1 05/12/2021    HCT 46 9 05/12/2021    HCT 47 6 05/12/2021    MCV 89 05/12/2021    MCV 90 05/12/2021     05/12/2021     05/12/2021     Lab Results   Component Value Date    HEPCAB Non-reactive 05/12/2021     Lab Results   Component Value Date    HAV Reactive (A) 04/07/2020    HEPCAB Non-reactive 05/12/2021     Lab Results   Component Value Date    RPR Non-Reactive 05/12/2021     CD4 ABS   Date/Time Value Ref Range Status   05/12/2021 11:26  359 - 1519 /uL Final     HIV-1 RNA by PCR, Qn   Date/Time Value Ref Range Status   05/12/2021 11:26 AM <20 copies/mL Final     Comment:     HIV-1 RNA not detected  The reportable range for this assay is 20 to 10,000,000  copies HIV-1 RNA/mL             Current Outpatient Medications:     aspirin 81 mg chewable tablet, Chew 1 tablet (81 mg total) daily, Disp: 30 tablet, Rfl: 2    atorvastatin (LIPITOR) 40 mg tablet, Take 1 tablet (40 mg total) by mouth daily, Disp: 30 tablet, Rfl: 2    bictegravir-emtricitab-tenofovir alafenamide (Biktarvy) -25 MG tablet, Take 1 tablet by mouth daily, Disp: 30 tablet, Rfl: 1    hydrochlorothiazide (MICROZIDE) 12 5 mg capsule, Take 1 capsule (12 5 mg total) by mouth daily, Disp: 30 capsule, Rfl: 2    lisinopril (ZESTRIL) 5 mg tablet, Take 1 tablet (5 mg total) by mouth daily, Disp: 30 tablet, Rfl: 1    fluticasone (FLONASE) 50 mcg/act nasal spray, 1 spray into each nostril daily (Patient not taking: Reported on 6/1/2021), Disp: 1 Bottle, Rfl: 2

## 2021-06-02 ENCOUNTER — PATIENT OUTREACH (OUTPATIENT)
Dept: SURGERY | Facility: CLINIC | Age: 46
End: 2021-06-02

## 2021-06-02 NOTE — PROGRESS NOTES
Cm text Ct to check in about his renewal  Ct called and states that he has not heard from them but states his insurance is still active

## 2021-06-04 ENCOUNTER — PATIENT OUTREACH (OUTPATIENT)
Dept: SURGERY | Facility: CLINIC | Age: 46
End: 2021-06-04

## 2021-06-07 ENCOUNTER — PATIENT OUTREACH (OUTPATIENT)
Dept: SURGERY | Facility: CLINIC | Age: 46
End: 2021-06-07

## 2021-06-07 NOTE — PROGRESS NOTES
Ct was discussed at team meeting regarding her appointment  Cm discussed Ct's appointment being rescheduled  Cm text Ct to check-in

## 2021-06-08 ENCOUNTER — OFFICE VISIT (OUTPATIENT)
Dept: SURGERY | Facility: CLINIC | Age: 46
End: 2021-06-08
Payer: COMMERCIAL

## 2021-06-08 VITALS
WEIGHT: 190.8 LBS | HEIGHT: 67 IN | BODY MASS INDEX: 29.95 KG/M2 | TEMPERATURE: 98 F | DIASTOLIC BLOOD PRESSURE: 74 MMHG | SYSTOLIC BLOOD PRESSURE: 128 MMHG | OXYGEN SATURATION: 96 % | HEART RATE: 76 BPM

## 2021-06-08 DIAGNOSIS — F32.A DEPRESSION, UNSPECIFIED DEPRESSION TYPE: ICD-10-CM

## 2021-06-08 DIAGNOSIS — I10 ESSENTIAL HYPERTENSION: ICD-10-CM

## 2021-06-08 DIAGNOSIS — R63.5 WEIGHT GAIN: ICD-10-CM

## 2021-06-08 DIAGNOSIS — B20 SYMPTOMATIC HIV INFECTION (HCC): Primary | ICD-10-CM

## 2021-06-08 DIAGNOSIS — F33.1 MODERATE EPISODE OF RECURRENT MAJOR DEPRESSIVE DISORDER (HCC): ICD-10-CM

## 2021-06-08 PROCEDURE — 3074F SYST BP LT 130 MM HG: CPT | Performed by: NURSE PRACTITIONER

## 2021-06-08 PROCEDURE — 99214 OFFICE O/P EST MOD 30 MIN: CPT | Performed by: NURSE PRACTITIONER

## 2021-06-08 PROCEDURE — 3008F BODY MASS INDEX DOCD: CPT | Performed by: NURSE PRACTITIONER

## 2021-06-08 PROCEDURE — 3078F DIAST BP <80 MM HG: CPT | Performed by: NURSE PRACTITIONER

## 2021-06-08 PROCEDURE — 3725F SCREEN DEPRESSION PERFORMED: CPT | Performed by: NURSE PRACTITIONER

## 2021-06-08 PROCEDURE — 1036F TOBACCO NON-USER: CPT | Performed by: NURSE PRACTITIONER

## 2021-06-08 RX ORDER — BUPROPION HYDROCHLORIDE 150 MG/1
150 TABLET ORAL DAILY
Qty: 30 TABLET | Refills: 1 | Status: SHIPPED | OUTPATIENT
Start: 2021-06-08 | End: 2021-07-27

## 2021-06-08 NOTE — PROGRESS NOTES
Assessment/Plan:    Symptomatic HIV infection ([facility] [address] )  No results found for: [id number]  CD4 ABS   Date/Time Value Ref Range Status   2021 11:26  359 - 1519 /uL Final     HIV-1 RNA by PCR, Qn   Date/Time Value Ref Range Status   2021 11:26 AM <20 copies/mL Final     Comment:     HIV-1 RNA not detected  The reportable range for this assay is 20 to 10,000,000  copies HIV-1 RNA/mL  HIV-1 RNA Viral Load Log   Date/Time Value Ref Range Status   2021 11:26 AM COMMENT [id number]/mL Final     Comment:     Unable to calculate result since non-numeric result obtained for  component test          Siobhan Duenas          Denies side effects  Stressed the importance of adherence  Continue follow up with ID clinic  Reviewed most recent labs, including Cd4 and viral load  Discussed the risks and benefits of treatment options, instructions for management, importance of treatment adherence, and reduction of risk factor  Educated on possible  medication side effects  Counseled on routes of HIV transmission, including the risk of  infection  Emphasized that viral suppression is the best method to prevent HIV transmission  At this time pt denies the need for HIV testing of anyone in their life  Total encounter time was 45 minutes  Greater then 20 minutes were spent on counseling and patient education  Pt voices understanding and agreement with treatment plan  Essential hypertension  Blood pressure:   BP Readings from Last 3 Encounters:   21 128/74   21 122/86   03/10/21 122/90       Continue current antihypertensive  Educated on the following lifestyle modifications to lower BP and decrease cardiovascular risk factors  limit alcohol intake, reduce salt in diet, maintain a healthy weight, engage in 30 minutes of cardiovascular exercise daily, and not smoke  Moderate episode of recurrent major depressive disorder (HCC)  Start bupropion 150mg SR daily   Genevieve Linares has been on this medication in the past with good result  Reports some nausea in the past, but educated to take medication with food  Return in 4 weeks to access effectiveness of treatment  [facility]/U with [facility]  Weight gain  Body mass index is 29 88 kg/m²  Wt Readings from Last 3 Encounters:   06/08/21 86 5 kg (190 lb 12 8 oz)   06/01/21 87 1 kg (192 lb)   03/18/21 92 2 kg (203 lb 3 2 oz)     Height: 5' 7" (170 2 cm)     Leesa Voss has been using diet and exercise to lose weight with success  Lab Results   Component Value Date    HGBA1C 5 2 05/12/2021     No results found for: GLUCOSE;    Educated on the health risks of obesity  Advised to lose weight  Encouraged eating a healthy diet and daily cardiac exercise  Recommended increasing fruits and vegetables and limiting processed foods  Refer to dietitian for additional education  Diagnoses and all orders for this visit:    Symptomatic HIV infection ([address] )    Essential hypertension    Depression, unspecified depression type  -     buPROPion (WELLBUTRIN XL) 150 mg 24 hr tablet; Take 1 tablet (150 mg total) by mouth daily    Moderate episode of recurrent major depressive disorder (HCC)    Weight gain          Subjective:      Patient ID: Ivon Saab is a 51 y o  male  Leesa Voss presents to the clinic today for primary care follow-up  Nayely Underwood Past medical history significant for HIV, anxiety, HTN and hyperlipidemia  Rica Gray is c/o increased depression and lack of motivation  He is requesting to restart treatment with bupropion  The following portions of the patient's history were reviewed and updated as appropriate: allergies, current medications, past family history, past medical history, past social history, past surgical history and problem list     Review of Systems   Constitutional: Negative for chills and fever  HENT: Negative for ear pain and sore throat  Eyes: Negative for pain and visual disturbance     Respiratory: Negative for cough and shortness of breath  Cardiovascular: Negative for chest pain and palpitations  Gastrointestinal: Negative for abdominal pain and vomiting  Genitourinary: Negative for dysuria and hematuria  Musculoskeletal: Negative for arthralgias and back pain  Skin: Negative for color change and rash  Neurological: Negative for seizures and syncope  Psychiatric/Behavioral:        Depressed   All other systems reviewed and are negative  Objective:      /74   Pulse 76   Temp 98 °F (36 7 °C)   Ht 5' 7" (1 702 m)   Wt 86 5 kg (190 lb 12 8 oz)   SpO2 96%   BMI 29 88 kg/m²       Lab Results   Component Value Date    K 3 7 05/12/2021     05/12/2021    CO2 27 05/12/2021    BUN 20 05/12/2021    CREATININE 1 15 05/12/2021    GLUF 92 05/12/2021    CALCIUM 9 2 05/12/2021    AST 26 05/12/2021    ALT 37 05/12/2021    ALKPHOS 61 05/12/2021    EGFR 75 05/12/2021     Lab Results   Component Value Date    WBC 7 03 05/12/2021    WBC 6 8 05/12/2021    HGB 15 9 05/12/2021    HGB 16 1 05/12/2021    HCT 46 9 05/12/2021    HCT 47 6 05/12/2021    MCV 89 05/12/2021    MCV 90 05/12/2021     05/12/2021     05/12/2021     Lab Results   Component Value Date    HEPCAB Non-reactive 05/12/2021     Lab Results   Component Value Date    HAV Reactive (A) 04/07/2020    HEPCAB Non-reactive 05/12/2021     Lab Results   Component Value Date    RPR Non-Reactive 05/12/2021              Physical Exam  Constitutional:       General: He is not in acute distress  Appearance: He is well-developed  HENT:      Head: Normocephalic  Right Ear: External ear normal       Left Ear: External ear normal       Nose: Nose normal       Mouth/Throat:      Pharynx: No oropharyngeal exudate  Eyes:      General:         Right eye: No discharge  Left eye: No discharge  Conjunctiva/sclera: Conjunctivae normal       Pupils: Pupils are equal, round, and reactive to light     Neck: Musculoskeletal: Normal range of motion  Thyroid: No thyromegaly  Cardiovascular:      Rate and Rhythm: Normal rate and regular rhythm  Heart sounds: Normal heart sounds  No murmur  Pulmonary:      Effort: Pulmonary effort is normal       Breath sounds: Normal breath sounds  No wheezing  Abdominal:      General: Bowel sounds are normal       Palpations: Abdomen is soft  There is no mass  Tenderness: There is no abdominal tenderness  Musculoskeletal: Normal range of motion  General: No tenderness  Lymphadenopathy:      Cervical: No cervical adenopathy  Skin:     General: Skin is warm and dry  Findings: No rash  Neurological:      Mental Status: He is alert and oriented to person, place, and time  Psychiatric:         Mood and Affect: Affect is flat           Behavior: Behavior normal

## 2021-06-08 NOTE — ASSESSMENT & PLAN NOTE
Start bupropion 150mg SR daily  Beti Mariscal has been on this medication in the past with good result  Reports some nausea in the past, but educated to take medication with food  Return in 4 weeks to access effectiveness of treatment  F/U with [facility]

## 2021-06-08 NOTE — ASSESSMENT & PLAN NOTE
Body mass index is 29 88 kg/m²  Wt Readings from Last 3 Encounters:   06/08/21 86 5 kg (190 lb 12 8 oz)   06/01/21 87 1 kg (192 lb)   03/18/21 92 2 kg (203 lb 3 2 oz)     Height: 5' 7" (170 2 cm)     Chaim Montiel has been using diet and exercise to lose weight with success  Lab Results   Component Value Date    HGBA1C 5 2 05/12/2021     No results found for: GLUCOSE;    Educated on the health risks of obesity  Advised to lose weight  Encouraged eating a healthy diet and daily cardiac exercise  Recommended increasing fruits and vegetables and limiting processed foods  Refer to dietitian for additional education

## 2021-06-08 NOTE — ASSESSMENT & PLAN NOTE
Blood pressure:   BP Readings from Last 3 Encounters:   06/08/21 128/74   06/01/21 122/86   03/10/21 122/90       Continue current antihypertensive  Educated on the following lifestyle modifications to lower BP and decrease cardiovascular risk factors  limit alcohol intake, reduce salt in diet, maintain a healthy weight, engage in 30 minutes of cardiovascular exercise daily, and not smoke

## 2021-06-08 NOTE — ASSESSMENT & PLAN NOTE
No results found for: [id number]  CD4 ABS   Date/Time Value Ref Range Status   2021 11:26  359 - 1519 /uL Final     HIV-1 RNA by PCR, Qn   Date/Time Value Ref Range Status   2021 11:26 AM <20 copies/mL Final     Comment:     HIV-1 RNA not detected  The reportable range for this assay is 20 to 10,000,000  copies HIV-1 RNA/mL  HIV-1 RNA Viral Load Log   Date/Time Value Ref Range Status   2021 11:26 AM COMMENT [id number]/mL Final     Comment:     Unable to calculate result since non-numeric result obtained for  component test          Solo Youngblood          Denies side effects  Stressed the importance of adherence  Continue follow up with ID clinic  Reviewed most recent labs, including Cd4 and viral load  Discussed the risks and benefits of treatment options, instructions for management, importance of treatment adherence, and reduction of risk factor  Educated on possible  medication side effects  Counseled on routes of HIV transmission, including the risk of  infection  Emphasized that viral suppression is the best method to prevent HIV transmission  At this time pt denies the need for HIV testing of anyone in their life  Total encounter time was 45 minutes  Greater then 20 minutes were spent on counseling and patient education  Pt voices understanding and agreement with treatment plan

## 2021-06-08 NOTE — PROGRESS NOTES
Assessment/Plan:      Diagnoses and all orders for this visit:    Symptomatic HIV infection ([address] )    Essential hypertension    Depression, unspecified depression type  -     buPROPion (WELLBUTRIN XL) 150 mg 24 hr tablet; Take 1 tablet (150 mg total) by mouth daily            Discussion: Patient shared that he is experiencing increased depression and anxiety symptoms  PHQ-9 screener was completed for a more thorough assessment  Score = 13 indicative of major depressive disorder, mild  Pt shared he has occasional fleeting suicidal thoughts but no plans  BHS reviewed past h/x of suicidality, assessed for current risk and determined risk was low  BHS reminded pt to call 911 if symptoms worsen  Pt has been restarted on Wellbutrin today by PCP  Patient is also willing to re-start MH therapy through [facility] and will call to schedule  BHS reviewed with pt his likes/dislikes about therapy based on his prior experience and encouraged pt to advocate for his preferred approach which included sessions every 2 weeks instead of every week  Pt shared he was on a hiking trip in [place] which he enjoyed and is looking forward to going to [id number] to celebrate his parents 50th wedding anniversary this summer  Subjective:     Patient ID: Horacio Olvera is a 51 y o  male      HPI: The patient is being seen at the [facility] today for a routine behavioral health follow up     Objective:    Orientation    Person: Yes   Place: Yes   Time: Yes     Appearance     Well Developed: Yes   Healthy: Yes   Uncomfortable: No  Normal Body Odor: Yes   Grooming Unkempt: No  Poor Eye Contact: No    Mood and Affect    Appropriate: Yes   Euthymic: Yes   Angry: No  Anxious: No  Depressed: No      Harm to Self or Others: denied    Substance Abuse: none reported or observed

## 2021-06-08 NOTE — PROGRESS NOTES
[address] Affiliation None   Spiritual Beliefs/Perceptions   Concept of God Nonexistent  (Nothing is known or may be known about God's existeniences)   Psychosocial   Psychosocial (WDL) WDL   Length of Time/Family Visitation 6-15 min   Stress Factors   Patient Stress Factors None identified   Plan of Care   Care Plan Initiated Yes   Provide Spiritual Support (Visits) 1 time   Comments   (Did introduction and encouraged f/u assessment  )    Pastoral Care Progress Note    2021  Patient: Arleen Landon : 1975  Encounter Date & Time: 2021   MRN: [id number]      The  initiated a relationship of care and support  Mr Marina Nieto was hesitant to meet with the , but agreed to a brief introduction  The  introduced herself and shared her role in the interdisplinary team   Mr Marina Nieto shared the [address] was his childhood Yazdanism; however, he currently considers himself Agnostic  The  honored Mr Alvarez time and encouraged a follow-up spiritual assessment meeting  Mr Marina Nieto nodded  The  will follow-up with him for future assessment  Chaplaincy Interventions Utilized:     Relationship Building: Cultivated a relationship of care and support      Chaplaincy Outcomes Achieved:  Unknown outcome       Spiritual Coping Strategies Utilized:   Acknowledgement of his current beliefs

## 2021-06-09 NOTE — PROGRESS NOTES
Assessment    Annual    Clinical Data/Client History    HIV: YES  AIDS: NO    : Roni Yepez    Socio- Economic Status: Eats Out and Cooks    Living Situation: Apartment    Food Prep/Access: Refrigerator, Stove and Microwave    Psycho Social Factors: Depression and Anxiety    Functional Status: Ambulatory, Able to Food Shop and  prepares most meals    Activity Level: Normal    Oral Problems: None reported    Typical Food/Beverage Intake:  · Dinner protein, vegetables, starch  · Snacks various snacks, trying to cut back  · Fluids water    Appetite: Good    Supplements: NO    Food Intolerance: none reported    Usual Body Weight: 200#    Current Body Weight: 190#    Nutrition Diagnosis    Problem: Excessive energy intake and Overweight/Obesity    Related to: Estimated intake inconsistent with measured nutritional needs    As Evidenced By: Patient Interview and Dietary Recall    Intervention Diet Prescription    Nutritional needs based on: # (72kg)    · 1656-1800kcal  · 72g-20g protein  · 1656mL-1800mL fluid  · 2 0g Na    Current intake: exceeds nutritional needs    Nutrition Education Intervention: Provided     Person Educated: Patient    Topics Discussed: Current intake/wt loss/activity level    Teaching Method: Verbal    Visit Summary    Annual nutrition assessment  Pt has recently lost 10# # (86 5kg)  BMI remains elevated at 29, encourage pt for wt loss and desired continued loss  Cal Gleason was not as engaged as prior encounters at this time  Discussed current quality of intake, utilizied mobile market but forgot to  food twice  Offered to pick food up and have at [facility] for him to get, not interested at this time  HbA1c from 5/12/20 at 5 2% WNL  Will continue to assess and provide interventions    Rick Gross RD,LDN,[facility]

## 2021-06-11 DIAGNOSIS — I10 ESSENTIAL HYPERTENSION: ICD-10-CM

## 2021-06-14 ENCOUNTER — PATIENT OUTREACH (OUTPATIENT)
Dept: SURGERY | Facility: CLINIC | Age: 46
End: 2021-06-14

## 2021-06-14 RX ORDER — LISINOPRIL 5 MG/1
TABLET ORAL
Qty: 30 TABLET | Refills: 1 | Status: SHIPPED | OUTPATIENT
Start: 2021-06-14 | End: 2021-08-23

## 2021-07-07 ENCOUNTER — PATIENT OUTREACH (OUTPATIENT)
Dept: SURGERY | Facility: CLINIC | Age: 46
End: 2021-07-07

## 2021-07-12 DIAGNOSIS — I10 ESSENTIAL HYPERTENSION: ICD-10-CM

## 2021-07-12 DIAGNOSIS — J30.2 SEASONAL ALLERGIES: ICD-10-CM

## 2021-07-12 DIAGNOSIS — E78.5 DYSLIPIDEMIA: ICD-10-CM

## 2021-07-12 DIAGNOSIS — B20 SYMPTOMATIC HIV INFECTION (HCC): ICD-10-CM

## 2021-07-13 RX ORDER — ATORVASTATIN CALCIUM 40 MG/1
TABLET, FILM COATED ORAL
Qty: 30 TABLET | Refills: 2 | Status: SHIPPED | OUTPATIENT
Start: 2021-07-13 | End: 2021-11-02

## 2021-07-13 RX ORDER — FLUTICASONE PROPIONATE 50 MCG
SPRAY, SUSPENSION (ML) NASAL
Qty: 16 G | Refills: 2 | Status: SHIPPED | OUTPATIENT
Start: 2021-07-13 | End: 2021-11-08 | Stop reason: SDUPTHER

## 2021-07-13 RX ORDER — BICTEGRAVIR SODIUM, EMTRICITABINE, AND TENOFOVIR ALAFENAMIDE FUMARATE 50; 200; 25 MG/1; MG/1; MG/1
1 TABLET ORAL DAILY
Qty: 30 TABLET | Refills: 1 | Status: SHIPPED | OUTPATIENT
Start: 2021-07-13 | End: 2021-09-27

## 2021-07-13 RX ORDER — ASPIRIN 81 MG/1
TABLET, CHEWABLE ORAL
Qty: 30 TABLET | Refills: 2 | Status: SHIPPED | OUTPATIENT
Start: 2021-07-13 | End: 2021-11-02

## 2021-07-26 DIAGNOSIS — F32.A DEPRESSION, UNSPECIFIED DEPRESSION TYPE: ICD-10-CM

## 2021-07-27 RX ORDER — BUPROPION HYDROCHLORIDE 150 MG/1
TABLET ORAL
Qty: 30 TABLET | Refills: 1 | Status: SHIPPED | OUTPATIENT
Start: 2021-07-27 | End: 2021-08-09 | Stop reason: HOSPADM

## 2021-08-04 ENCOUNTER — PATIENT OUTREACH (OUTPATIENT)
Dept: SURGERY | Facility: CLINIC | Age: 46
End: 2021-08-04

## 2021-08-04 ENCOUNTER — APPOINTMENT (OUTPATIENT)
Dept: LAB | Facility: CLINIC | Age: 46
End: 2021-08-04
Payer: COMMERCIAL

## 2021-08-04 DIAGNOSIS — B20 SYMPTOMATIC HIV INFECTION (HCC): ICD-10-CM

## 2021-08-04 LAB
ALBUMIN SERPL BCP-MCNC: 3.5 G/DL (ref 3.5–5)
ALP SERPL-CCNC: 60 U/L (ref 46–116)
ALT SERPL W P-5'-P-CCNC: 20 U/L (ref 12–78)
ANION GAP SERPL CALCULATED.3IONS-SCNC: 9 MMOL/L (ref 4–13)
AST SERPL W P-5'-P-CCNC: 21 U/L (ref 5–45)
BASOPHILS # BLD AUTO: 0.05 THOUSANDS/ΜL (ref 0–0.1)
BASOPHILS NFR BLD AUTO: 1 % (ref 0–1)
BILIRUB SERPL-MCNC: 0.43 MG/DL (ref 0.2–1)
BUN SERPL-MCNC: 18 MG/DL (ref 5–25)
CALCIUM SERPL-MCNC: 8.8 MG/DL (ref 8.3–10.1)
CHLORIDE SERPL-SCNC: 107 MMOL/L (ref 100–108)
CO2 SERPL-SCNC: 26 MMOL/L (ref 21–32)
CREAT SERPL-MCNC: 1.09 MG/DL (ref 0.6–1.3)
EOSINOPHIL # BLD AUTO: 0.05 THOUSAND/ΜL (ref 0–0.61)
EOSINOPHIL NFR BLD AUTO: 1 % (ref 0–6)
ERYTHROCYTE [DISTWIDTH] IN BLOOD BY AUTOMATED COUNT: 12.6 % (ref 11.6–15.1)
GFR SERPL CREATININE-BSD FRML MDRD: 80 ML/MIN/1.73SQ M
GLUCOSE P FAST SERPL-MCNC: 90 MG/DL (ref 65–99)
HCT VFR BLD AUTO: 45.5 % (ref 36.5–49.3)
HGB BLD-MCNC: 15.3 G/DL (ref 12–17)
IMM GRANULOCYTES # BLD AUTO: 0.03 THOUSAND/UL (ref 0–0.2)
IMM GRANULOCYTES NFR BLD AUTO: 0 % (ref 0–2)
LYMPHOCYTES # BLD AUTO: 1.97 THOUSANDS/ΜL (ref 0.6–4.47)
LYMPHOCYTES NFR BLD AUTO: 19 % (ref 14–44)
MCH RBC QN AUTO: 31 PG (ref 26.8–34.3)
MCHC RBC AUTO-ENTMCNC: 33.6 G/DL (ref 31.4–37.4)
MCV RBC AUTO: 92 FL (ref 82–98)
MONOCYTES # BLD AUTO: 0.62 THOUSAND/ΜL (ref 0.17–1.22)
MONOCYTES NFR BLD AUTO: 6 % (ref 4–12)
NEUTROPHILS # BLD AUTO: 7.42 THOUSANDS/ΜL (ref 1.85–7.62)
NEUTS SEG NFR BLD AUTO: 73 % (ref 43–75)
NRBC BLD AUTO-RTO: 0 /100 WBCS
PLATELET # BLD AUTO: 283 THOUSANDS/UL (ref 149–390)
PMV BLD AUTO: 11.1 FL (ref 8.9–12.7)
POTASSIUM SERPL-SCNC: 3.8 MMOL/L (ref 3.5–5.3)
PROT SERPL-MCNC: 7.4 G/DL (ref 6.4–8.2)
RBC # BLD AUTO: 4.94 MILLION/UL (ref 3.88–5.62)
SODIUM SERPL-SCNC: 142 MMOL/L (ref 136–145)
WBC # BLD AUTO: 10.14 THOUSAND/UL (ref 4.31–10.16)

## 2021-08-04 PROCEDURE — 85025 COMPLETE CBC W/AUTO DIFF WBC: CPT

## 2021-08-04 PROCEDURE — 36415 COLL VENOUS BLD VENIPUNCTURE: CPT

## 2021-08-04 PROCEDURE — 86480 TB TEST CELL IMMUN MEASURE: CPT

## 2021-08-04 PROCEDURE — 86360 T CELL ABSOLUTE COUNT/RATIO: CPT

## 2021-08-04 PROCEDURE — 87536 HIV-1 QUANT&REVRSE TRNSCRPJ: CPT

## 2021-08-04 PROCEDURE — 80053 COMPREHEN METABOLIC PANEL: CPT

## 2021-08-05 LAB
BASOPHILS # BLD AUTO: 0.1 X10E3/UL (ref 0–0.2)
BASOPHILS NFR BLD AUTO: 1 %
CD3+CD4+ CELLS # BLD: 830 /UL (ref 359–1519)
CD3+CD4+ CELLS NFR BLD: 41.5 % (ref 30.8–58.5)
CD3+CD4+ CELLS/CD3+CD8+ CLL BLD: 1.24 % (ref 0.92–3.72)
CD3+CD8+ CELLS # BLD: 672 /UL (ref 109–897)
CD3+CD8+ CELLS NFR BLD: 33.6 % (ref 12–35.5)
EOSINOPHIL # BLD AUTO: 0.1 X10E3/UL (ref 0–0.4)
EOSINOPHIL NFR BLD AUTO: 1 %
ERYTHROCYTE [DISTWIDTH] IN BLOOD BY AUTOMATED COUNT: 13 % (ref 11.6–15.4)
GAMMA INTERFERON BACKGROUND BLD IA-ACNC: 0.05 IU/ML
HCT VFR BLD AUTO: 45.6 % (ref 37.5–51)
HGB BLD-MCNC: 15.5 G/DL (ref 13–17.7)
IMM GRANULOCYTES # BLD: 0 X10E3/UL (ref 0–0.1)
IMM GRANULOCYTES NFR BLD: 0 %
LYMPHOCYTES # BLD AUTO: 2 X10E3/UL (ref 0.7–3.1)
LYMPHOCYTES NFR BLD AUTO: 20 %
M TB IFN-G BLD-IMP: NEGATIVE
M TB IFN-G CD4+ BCKGRND COR BLD-ACNC: 0 IU/ML
M TB IFN-G CD4+ BCKGRND COR BLD-ACNC: 0.02 IU/ML
MCH RBC QN AUTO: 31 PG (ref 26.6–33)
MCHC RBC AUTO-ENTMCNC: 34 G/DL (ref 31.5–35.7)
MCV RBC AUTO: 91 FL (ref 79–97)
MITOGEN IGNF BCKGRD COR BLD-ACNC: 9.75 IU/ML
MONOCYTES # BLD AUTO: 0.6 X10E3/UL (ref 0.1–0.9)
MONOCYTES NFR BLD AUTO: 6 %
NEUTROPHILS # BLD AUTO: 7.3 X10E3/UL (ref 1.4–7)
NEUTROPHILS NFR BLD AUTO: 72 %
PLATELET # BLD AUTO: 276 X10E3/UL (ref 150–450)
RBC # BLD AUTO: 5 X10E6/UL (ref 4.14–5.8)
WBC # BLD AUTO: 10 X10E3/UL (ref 3.4–10.8)

## 2021-08-06 LAB
HIV1 RNA # SERPL NAA+PROBE: <20 COPIES/ML
HIV1 RNA SERPL NAA+PROBE-LOG#: NORMAL LOG10COPY/ML

## 2021-08-09 ENCOUNTER — PATIENT OUTREACH (OUTPATIENT)
Dept: SURGERY | Facility: CLINIC | Age: 46
End: 2021-08-09

## 2021-08-09 ENCOUNTER — OFFICE VISIT (OUTPATIENT)
Dept: SURGERY | Facility: CLINIC | Age: 46
End: 2021-08-09
Payer: COMMERCIAL

## 2021-08-09 VITALS
HEART RATE: 93 BPM | TEMPERATURE: 97.4 F | WEIGHT: 192.2 LBS | SYSTOLIC BLOOD PRESSURE: 122 MMHG | DIASTOLIC BLOOD PRESSURE: 74 MMHG | OXYGEN SATURATION: 95 % | BODY MASS INDEX: 30.17 KG/M2 | HEIGHT: 67 IN

## 2021-08-09 DIAGNOSIS — F33.1 MODERATE EPISODE OF RECURRENT MAJOR DEPRESSIVE DISORDER (HCC): ICD-10-CM

## 2021-08-09 DIAGNOSIS — I10 ESSENTIAL HYPERTENSION: ICD-10-CM

## 2021-08-09 DIAGNOSIS — B20 SYMPTOMATIC HIV INFECTION (HCC): Primary | ICD-10-CM

## 2021-08-09 PROBLEM — R63.5 WEIGHT GAIN: Status: RESOLVED | Noted: 2020-05-26 | Resolved: 2021-08-09

## 2021-08-09 PROBLEM — J30.2 SEASONAL ALLERGIES: Status: RESOLVED | Noted: 2021-03-10 | Resolved: 2021-08-09

## 2021-08-09 PROCEDURE — 3008F BODY MASS INDEX DOCD: CPT | Performed by: NURSE PRACTITIONER

## 2021-08-09 PROCEDURE — 99214 OFFICE O/P EST MOD 30 MIN: CPT | Performed by: NURSE PRACTITIONER

## 2021-08-09 NOTE — ASSESSMENT & PLAN NOTE
No results found for: [id number]  CD4 ABS   Date/Time Value Ref Range Status   2021 12:09  359 - 1519 /uL Final     HIV-1 RNA by PCR, Qn   Date/Time Value Ref Range Status   2021 12:09 PM <20 copies/mL Final     Comment:     HIV-1 RNA detected  The reportable range for this assay is 20 to 10,000,000  copies HIV-1 RNA/mL  HIV-1 RNA Viral Load Log   Date/Time Value Ref Range Status   2021 12:09 PM COMMENT [id number]/mL Final     Comment:     Unable to calculate result since non-numeric result obtained for  component test          ART: Lyla Jennings        Denies side effects  Stressed the importance of adherence  Continue follow up with ID clinic  Reviewed most recent labs, including Cd4 and viral load  Discussed the risks and benefits of treatment options, instructions for management, importance of treatment adherence, and reduction of risk factor  Educated on possible  medication side effects  Counseled on routes of HIV transmission, including the risk of  infection  Emphasized that viral suppression is the best method to prevent HIV transmission  At this time pt denies the need for HIV testing of anyone in their life  Total encounter time was 45 minutes  Greater then 20 minutes were spent on counseling and patient education  Pt voices understanding and agreement with treatment plan

## 2021-08-09 NOTE — ASSESSMENT & PLAN NOTE
Encouraged open communication with his spouse  Follow-up with [facility]  for additional support

## 2021-08-09 NOTE — PROGRESS NOTES
Assessment/Plan:    Symptomatic HIV infection ([facility] [address] )  No results found for: [id number]  CD4 ABS   Date/Time Value Ref Range Status   2021 12:09  359 - 1519 /uL Final     HIV-1 RNA by PCR, Qn   Date/Time Value Ref Range Status   2021 12:09 PM <20 copies/mL Final     Comment:     HIV-1 RNA detected  The reportable range for this assay is 20 to 10,000,000  copies HIV-1 RNA/mL  HIV-1 RNA Viral Load Log   Date/Time Value Ref Range Status   2021 12:09 PM COMMENT [id number]/mL Final     Comment:     Unable to calculate result since non-numeric result obtained for  component test          ART: Sheri Lorenzo        Denies side effects  Stressed the importance of adherence  Continue follow up with ID clinic  Reviewed most recent labs, including Cd4 and viral load  Discussed the risks and benefits of treatment options, instructions for management, importance of treatment adherence, and reduction of risk factor  Educated on possible  medication side effects  Counseled on routes of HIV transmission, including the risk of  infection  Emphasized that viral suppression is the best method to prevent HIV transmission  At this time pt denies the need for HIV testing of anyone in their life  Total encounter time was 45 minutes  Greater then 20 minutes were spent on counseling and patient education  Pt voices understanding and agreement with treatment plan  Moderate episode of recurrent major depressive disorder (HCC)    Encouraged open communication with his spouse  Follow-up with [facility]  for additional support  Essential hypertension  Blood pressure:   Stable  BP Readings from Last 3 Encounters:   21 122/74   21 128/74   21 122/86       Continue current antihypertensive  Educated on the following lifestyle modifications to lower BP and decrease cardiovascular risk factors    limit alcohol intake, reduce salt in diet, maintain a healthy weight, engage in 30 minutes of cardiovascular exercise daily, and not smoke  Diagnoses and all orders for this visit:    Symptomatic HIV infection ([facility] [address] )    Moderate episode of recurrent major depressive disorder ([facility] [address] )    Essential hypertension          Subjective:      Patient ID: Marlena Martini is a 51 y o  male  Mateo Fernandes presents to the clinic today for primary care follow-up  He was evaluated in June was increased symptoms of depression and started on  bupropion  Mateo Fernandes states he did not start the medication due to fear of increased weight gain  He reports his symptoms of depression have improved due to increased physical activity and change in the seasons  He declines additional intervention for now  Mateo Fernandes does report continued dissatisfaction in his marriage but declines marriage counseling  The following portions of the patient's history were reviewed and updated as appropriate: allergies, current medications, past family history, past medical history, past social history, past surgical history and problem list     Review of Systems   Constitutional: Negative for chills and fever  HENT: Negative for ear pain and sore throat  Eyes: Negative for pain and visual disturbance  Respiratory: Negative for cough and shortness of breath  Cardiovascular: Negative for chest pain and palpitations  Gastrointestinal: Negative for abdominal pain and vomiting  Genitourinary: Negative for dysuria and hematuria  Musculoskeletal: Negative for arthralgias and back pain  Skin: Negative for color change and rash  Neurological: Negative for seizures and syncope  All other systems reviewed and are negative          Objective:      /74 (BP Location: Left arm)   Pulse 93   Temp (!) 97 4 °F (36 3 °C)   Ht 5' 7" (1 702 m)   Wt 87 2 kg (192 lb 3 2 oz)   SpO2 95%   BMI 30 10 kg/m²       Lab Results   Component Value Date    K 3 8 08/04/2021     08/04/2021    CO2 26 08/04/2021    BUN 18 08/04/2021    CREATININE 1 09 08/04/2021    GLUF 90 08/04/2021    CALCIUM 8 8 08/04/2021    AST 21 08/04/2021    ALT 20 08/04/2021    ALKPHOS 60 08/04/2021    EGFR 80 08/04/2021     Lab Results   Component Value Date    WBC 10 14 08/04/2021    WBC 10 0 08/04/2021    HGB 15 3 08/04/2021    HGB 15 5 08/04/2021    HCT 45 5 08/04/2021    HCT 45 6 08/04/2021    MCV 92 08/04/2021    MCV 91 08/04/2021     08/04/2021     08/04/2021     Lab Results   Component Value Date    HEPCAB Non-reactive 05/12/2021     Lab Results   Component Value Date    HAV Reactive (A) 04/07/2020    HEPCAB Non-reactive 05/12/2021     Lab Results   Component Value Date    RPR Non-Reactive 05/12/2021            Physical Exam  Constitutional:       General: He is not in acute distress  Appearance: He is well-developed  HENT:      Head: Normocephalic  Right Ear: External ear normal       Left Ear: External ear normal       Nose: Nose normal       Mouth/Throat:      Pharynx: No oropharyngeal exudate  Eyes:      General:         Right eye: No discharge  Left eye: No discharge  Conjunctiva/sclera: Conjunctivae normal       Pupils: Pupils are equal, round, and reactive to light  Neck:      Thyroid: No thyromegaly  Cardiovascular:      Rate and Rhythm: Normal rate and regular rhythm  Heart sounds: Normal heart sounds  No murmur heard  Pulmonary:      Effort: Pulmonary effort is normal       Breath sounds: Normal breath sounds  No wheezing  Abdominal:      General: Bowel sounds are normal       Palpations: Abdomen is soft  There is no mass  Tenderness: There is no abdominal tenderness  Musculoskeletal:         General: No tenderness  Normal range of motion  Cervical back: Normal range of motion  Lymphadenopathy:      Cervical: No cervical adenopathy  Skin:     General: Skin is warm and dry  Findings: No rash     Neurological:      Mental Status: He is alert and oriented to person, place, and time     Psychiatric:         Behavior: Behavior normal

## 2021-08-09 NOTE — PROGRESS NOTES
Assessment/Plan:      There are no diagnoses linked to this encounter  [address] continued maintenance of well-being  Discussion: BHS met with pt to inquire on whether he followed up with [facility] to reinstate MH therapy to manage his then increased depression symptoms and how his recent r/x of Wellbutrin was working  Pt reported that he did not f/u with [facility] because his depression symptoms had greatly reduced  In addition, pt discontinued Wellbutrin because he was "not feeling it " Pt stated that he was doing well and denied depression multiple times  Pt speculated that maybe his symptoms were seasonal  BHS provided related psychoEd and suggested pt monitors his symptoms as seasons change to fall and to winter  Pt agreed  Note that pt just returned from a 10-day trip to [place] to celebrate his parent's anniversary which probably contributed to his improved mood  Pt has also begun to read more regularly and believes this is helping his mood  BHS validated pt's observation and encouraged continued use of this coping skill  Subjective:     Patient ID: Filiberto Glover is a 51 y o  male      HPI: The patient is being seen at the [facility] today for a routine behavioral health follow up     Objective:    Orientation    Person: Yes   Place: Yes   Time: Yes     Appearance     Well Developed: Yes   Healthy: Yes   Uncomfortable: No  Normal Body Odor: Yes   Grooming Unkempt: No  Poor Eye Contact: No    Mood and Affect    Appropriate: Yes   Euthymic: Yes   Irritable: No  Angry: No  Anxious: No  Depressed: No    Harm to Self or Others: denied    Substance Abuse: none reported or observed

## 2021-08-09 NOTE — ASSESSMENT & PLAN NOTE
Blood pressure:   Stable  BP Readings from Last 3 Encounters:   08/09/21 122/74   06/08/21 128/74   06/01/21 122/86       Continue current antihypertensive  Educated on the following lifestyle modifications to lower BP and decrease cardiovascular risk factors  limit alcohol intake, reduce salt in diet, maintain a healthy weight, engage in 30 minutes of cardiovascular exercise daily, and not smoke

## 2021-08-20 DIAGNOSIS — I10 ESSENTIAL HYPERTENSION: ICD-10-CM

## 2021-08-23 RX ORDER — HYDROCHLOROTHIAZIDE 12.5 MG/1
CAPSULE, GELATIN COATED ORAL
Qty: 30 CAPSULE | Refills: 2 | Status: SHIPPED | OUTPATIENT
Start: 2021-08-23 | End: 2021-11-08 | Stop reason: HOSPADM

## 2021-08-23 RX ORDER — LISINOPRIL 5 MG/1
TABLET ORAL
Qty: 30 TABLET | Refills: 1 | Status: SHIPPED | OUTPATIENT
Start: 2021-08-23 | End: 2021-11-02

## 2021-09-03 ENCOUNTER — PATIENT OUTREACH (OUTPATIENT)
Dept: SURGERY | Facility: CLINIC | Age: 46
End: 2021-09-03

## 2021-09-07 ENCOUNTER — OFFICE VISIT (OUTPATIENT)
Dept: SURGERY | Facility: CLINIC | Age: 46
End: 2021-09-07
Payer: COMMERCIAL

## 2021-09-07 VITALS
OXYGEN SATURATION: 95 % | HEIGHT: 67 IN | SYSTOLIC BLOOD PRESSURE: 121 MMHG | WEIGHT: 192.8 LBS | DIASTOLIC BLOOD PRESSURE: 85 MMHG | BODY MASS INDEX: 30.26 KG/M2 | TEMPERATURE: 97.3 F | HEART RATE: 84 BPM

## 2021-09-07 DIAGNOSIS — I10 ESSENTIAL HYPERTENSION: ICD-10-CM

## 2021-09-07 DIAGNOSIS — R63.5 WEIGHT GAIN: ICD-10-CM

## 2021-09-07 DIAGNOSIS — Z13.220 ENCOUNTER FOR LIPID SCREENING FOR CARDIOVASCULAR DISEASE: ICD-10-CM

## 2021-09-07 DIAGNOSIS — Z13.6 ENCOUNTER FOR LIPID SCREENING FOR CARDIOVASCULAR DISEASE: ICD-10-CM

## 2021-09-07 DIAGNOSIS — E78.5 DYSLIPIDEMIA: ICD-10-CM

## 2021-09-07 DIAGNOSIS — B20 SYMPTOMATIC HIV INFECTION (HCC): Primary | ICD-10-CM

## 2021-09-07 DIAGNOSIS — F33.1 MODERATE EPISODE OF RECURRENT MAJOR DEPRESSIVE DISORDER (HCC): ICD-10-CM

## 2021-09-07 PROCEDURE — 99215 OFFICE O/P EST HI 40 MIN: CPT | Performed by: INTERNAL MEDICINE

## 2021-09-07 NOTE — PROGRESS NOTES
Progress Note - Infectious Disease   Ger Farias 51 y o  male MRN: [id number]  Unit/Bed#:  Encounter: [id number]      Impression/Plan:  1  HIV-improved with viral load now undetectable and a CD4 count of greater than  800  Will continue Valere Peppers for now, recheck labs in 2 months and follow up in 3 month period stressed adherence  Patient interested in looking into injectable HIV treatment  Discussed this with the primary who will begin the arrangements      2  Anxiety and depression-treatment as per the primary   stopped all his medications and he seems to be doing better      3  Dyslipidemia-focus on diet and exercise for now with weight loss  On Lipitor     4  Weight gain- has lost some weight  And now stabilized   will continue stressed the importance of diet and exercise      5  Hypertension-improved blood pressure on hydrochlorothiazide and lisinopril but suboptimal   Monitor   Discussed with the primary       6  Healthcare maintenance- patient is interested in changing to injectable HIV treatment  Patient was provided medication, adherence and prevention education    Subjective:  Routine follow-up for HIV  Patient claims 100% adherence with    Biktarvy   Patient denies any notable side effects  Overall the feeling well  The patient denies any fever chills or sweats, denies any nausea vomiting or diarrhea, denies any cough or shortness of breath  ROS:  A complete review of systems is negative other than that noted above in the subjective    Followup portions patient history reviewed and updated as:   Allergies, current medications, past medical history, past social history, past surgical history, and the problem list    Objective:  Vitals:  Vitals:    09/07/21 1659   BP: 121/85   Pulse: 84   Temp: (!) 97 3 °F (36 3 °C)   SpO2: 95%   Weight: 87 5 kg (192 lb 12 8 oz)   Height: 5' 7" (1 702 m)       Physical Exam:   General Appearance:  Alert, interactive, appearing well,  nontoxic, no acute distress  Neck:   Supple without lymphadenopathy, no thyromegaly or masses   Throat: Oropharynx moist without lesions  Lungs:   Clear to auscultation bilaterally; no wheezes, rhonchi or rales; respirations unlabored   Heart:  RRR; no murmur, rub or gallop   Abdomen:   Soft, non-tender, non-distended, positive bowel sounds  Extremities: No clubbing, cyanosis or edema   Skin: No new rashes or lesions  No draining wounds noted  Labs, Imaging, & Other studies:   All pertinent labs and imaging studies were personally reviewed    Lab Results   Component Value Date    K 3 8 08/04/2021     08/04/2021    CO2 26 08/04/2021    BUN 18 08/04/2021    CREATININE 1 09 08/04/2021    GLUF 90 08/04/2021    CALCIUM 8 8 08/04/2021    AST 21 08/04/2021    ALT 20 08/04/2021    ALKPHOS 60 08/04/2021    EGFR 80 08/04/2021     Lab Results   Component Value Date    WBC 10 14 08/04/2021    WBC 10 0 08/04/2021    HGB 15 3 08/04/2021    HGB 15 5 08/04/2021    HCT 45 5 08/04/2021    HCT 45 6 08/04/2021    MCV 92 08/04/2021    MCV 91 08/04/2021     08/04/2021     08/04/2021     Lab Results   Component Value Date    HEPCAB Non-reactive 05/12/2021     Lab Results   Component Value Date    HAV Reactive (A) 04/07/2020    HEPCAB Non-reactive 05/12/2021     Lab Results   Component Value Date    RPR Non-Reactive 05/12/2021     CD4 ABS   Date/Time Value Ref Range Status   08/04/2021 12:09  359 - 1519 /uL Final     HIV-1 RNA by PCR, Qn   Date/Time Value Ref Range Status   08/04/2021 12:09 PM <20 copies/mL Final     Comment:     HIV-1 RNA detected  The reportable range for this assay is 20 to 10,000,000  copies HIV-1 RNA/mL             Current Outpatient Medications:     aspirin 81 mg chewable tablet, CHEW 1 TABLET DAILY, Disp: 30 tablet, Rfl: 2    atorvastatin (LIPITOR) 40 mg tablet, TAKE 1 TABLET BY MOUTH DAILY, Disp: 30 tablet, Rfl: 2    Biktarvy -25 MG tablet, TAKE 1 TABLET BY MOUTH DAILY, Disp: 30 tablet, Rfl: 1    fluticasone (FLONASE) 50 mcg/act nasal spray, USE 1 SPRAY INTO IN EACH NOSTRIL DAILY, Disp: 16 g, Rfl: 2    hydrochlorothiazide (MICROZIDE) 12 5 mg capsule, TAKE 1 CAPSULE BY MOUTH DAILY, Disp: 30 capsule, Rfl: 2    lisinopril (ZESTRIL) 5 mg tablet, TAKE 1 TABLET BY MOUTH DAILY, Disp: 30 tablet, Rfl: 1

## 2021-09-08 ENCOUNTER — PATIENT OUTREACH (OUTPATIENT)
Dept: SURGERY | Facility: CLINIC | Age: 46
End: 2021-09-08

## 2021-09-16 ENCOUNTER — PATIENT OUTREACH (OUTPATIENT)
Dept: SURGERY | Facility: CLINIC | Age: 46
End: 2021-09-16

## 2021-09-16 NOTE — PROGRESS NOTES
Ct text Cm to check if Cm was aware of any CNA appointments  Cm looked into options there wasn't much free options   The options found Ct would need to provide information for as far as payment and answering questions of interest

## 2021-09-24 DIAGNOSIS — B20 SYMPTOMATIC HIV INFECTION (HCC): ICD-10-CM

## 2021-09-27 RX ORDER — BICTEGRAVIR SODIUM, EMTRICITABINE, AND TENOFOVIR ALAFENAMIDE FUMARATE 50; 200; 25 MG/1; MG/1; MG/1
1 TABLET ORAL DAILY
Qty: 30 TABLET | Refills: 1 | Status: SHIPPED | OUTPATIENT
Start: 2021-09-27 | End: 2021-11-08 | Stop reason: SDUPTHER

## 2021-09-30 ENCOUNTER — PATIENT OUTREACH (OUTPATIENT)
Dept: SURGERY | Facility: CLINIC | Age: 46
End: 2021-09-30

## 2021-10-12 ENCOUNTER — TELEPHONE (OUTPATIENT)
Dept: SURGERY | Facility: CLINIC | Age: 46
End: 2021-10-12

## 2021-10-12 ENCOUNTER — PATIENT OUTREACH (OUTPATIENT)
Dept: SURGERY | Facility: CLINIC | Age: 46
End: 2021-10-12

## 2021-10-13 DIAGNOSIS — B20 HIV INFECTION, UNSPECIFIED SYMPTOM STATUS (HCC): Primary | ICD-10-CM

## 2021-10-15 ENCOUNTER — PATIENT OUTREACH (OUTPATIENT)
Dept: SURGERY | Facility: CLINIC | Age: 46
End: 2021-10-15

## 2021-10-18 ENCOUNTER — PATIENT OUTREACH (OUTPATIENT)
Dept: SURGERY | Facility: CLINIC | Age: 46
End: 2021-10-18

## 2021-10-19 ENCOUNTER — PATIENT OUTREACH (OUTPATIENT)
Dept: SURGERY | Facility: CLINIC | Age: 46
End: 2021-10-19

## 2021-10-25 ENCOUNTER — TELEPHONE (OUTPATIENT)
Dept: SURGERY | Facility: CLINIC | Age: 46
End: 2021-10-25

## 2021-10-26 ENCOUNTER — OFFICE VISIT (OUTPATIENT)
Dept: SURGERY | Facility: CLINIC | Age: 46
End: 2021-10-26
Payer: COMMERCIAL

## 2021-10-26 VITALS
OXYGEN SATURATION: 96 % | TEMPERATURE: 98.3 F | SYSTOLIC BLOOD PRESSURE: 126 MMHG | HEIGHT: 67 IN | BODY MASS INDEX: 30.29 KG/M2 | WEIGHT: 193 LBS | DIASTOLIC BLOOD PRESSURE: 77 MMHG | HEART RATE: 94 BPM

## 2021-10-26 DIAGNOSIS — I10 ESSENTIAL HYPERTENSION: ICD-10-CM

## 2021-10-26 DIAGNOSIS — Z23 NEED FOR INFLUENZA VACCINATION: ICD-10-CM

## 2021-10-26 DIAGNOSIS — J30.2 SEASONAL ALLERGIES: ICD-10-CM

## 2021-10-26 DIAGNOSIS — B20 SYMPTOMATIC HIV INFECTION (HCC): Primary | ICD-10-CM

## 2021-10-26 PROCEDURE — 99214 OFFICE O/P EST MOD 30 MIN: CPT | Performed by: NURSE PRACTITIONER

## 2021-10-29 DIAGNOSIS — E78.5 DYSLIPIDEMIA: ICD-10-CM

## 2021-10-29 DIAGNOSIS — I10 ESSENTIAL HYPERTENSION: ICD-10-CM

## 2021-11-02 ENCOUNTER — APPOINTMENT (OUTPATIENT)
Dept: LAB | Facility: CLINIC | Age: 46
End: 2021-11-02
Payer: COMMERCIAL

## 2021-11-02 DIAGNOSIS — I10 ESSENTIAL HYPERTENSION: ICD-10-CM

## 2021-11-02 DIAGNOSIS — Z13.6 ENCOUNTER FOR LIPID SCREENING FOR CARDIOVASCULAR DISEASE: ICD-10-CM

## 2021-11-02 DIAGNOSIS — B20 SYMPTOMATIC HIV INFECTION (HCC): ICD-10-CM

## 2021-11-02 DIAGNOSIS — Z13.220 ENCOUNTER FOR LIPID SCREENING FOR CARDIOVASCULAR DISEASE: ICD-10-CM

## 2021-11-02 LAB
ALBUMIN SERPL BCP-MCNC: 3.6 G/DL (ref 3.5–5)
ALP SERPL-CCNC: 62 U/L (ref 46–116)
ALT SERPL W P-5'-P-CCNC: 42 U/L (ref 12–78)
ANION GAP SERPL CALCULATED.3IONS-SCNC: 10 MMOL/L (ref 4–13)
AST SERPL W P-5'-P-CCNC: 29 U/L (ref 5–45)
BASOPHILS # BLD AUTO: 0.04 THOUSANDS/ΜL (ref 0–0.1)
BASOPHILS NFR BLD AUTO: 1 % (ref 0–1)
BILIRUB SERPL-MCNC: 0.44 MG/DL (ref 0.2–1)
BUN SERPL-MCNC: 22 MG/DL (ref 5–25)
CALCIUM SERPL-MCNC: 8.9 MG/DL (ref 8.3–10.1)
CHLORIDE SERPL-SCNC: 106 MMOL/L (ref 100–108)
CHOLEST SERPL-MCNC: 189 MG/DL (ref 50–200)
CO2 SERPL-SCNC: 26 MMOL/L (ref 21–32)
CREAT SERPL-MCNC: 1.05 MG/DL (ref 0.6–1.3)
EOSINOPHIL # BLD AUTO: 0.14 THOUSAND/ΜL (ref 0–0.61)
EOSINOPHIL NFR BLD AUTO: 2 % (ref 0–6)
ERYTHROCYTE [DISTWIDTH] IN BLOOD BY AUTOMATED COUNT: 12.4 % (ref 11.6–15.1)
GFR SERPL CREATININE-BSD FRML MDRD: 84 ML/MIN/1.73SQ M
GLUCOSE P FAST SERPL-MCNC: 92 MG/DL (ref 65–99)
HCT VFR BLD AUTO: 44.7 % (ref 36.5–49.3)
HDLC SERPL-MCNC: 55 MG/DL
HGB BLD-MCNC: 14.9 G/DL (ref 12–17)
IMM GRANULOCYTES # BLD AUTO: 0.02 THOUSAND/UL (ref 0–0.2)
IMM GRANULOCYTES NFR BLD AUTO: 0 % (ref 0–2)
LDLC SERPL CALC-MCNC: 103 MG/DL (ref 0–100)
LYMPHOCYTES # BLD AUTO: 2.56 THOUSANDS/ΜL (ref 0.6–4.47)
LYMPHOCYTES NFR BLD AUTO: 35 % (ref 14–44)
MCH RBC QN AUTO: 30.5 PG (ref 26.8–34.3)
MCHC RBC AUTO-ENTMCNC: 33.3 G/DL (ref 31.4–37.4)
MCV RBC AUTO: 91 FL (ref 82–98)
MONOCYTES # BLD AUTO: 0.49 THOUSAND/ΜL (ref 0.17–1.22)
MONOCYTES NFR BLD AUTO: 7 % (ref 4–12)
NEUTROPHILS # BLD AUTO: 4.09 THOUSANDS/ΜL (ref 1.85–7.62)
NEUTS SEG NFR BLD AUTO: 55 % (ref 43–75)
NRBC BLD AUTO-RTO: 0 /100 WBCS
PLATELET # BLD AUTO: 294 THOUSANDS/UL (ref 149–390)
PMV BLD AUTO: 10.6 FL (ref 8.9–12.7)
POTASSIUM SERPL-SCNC: 4.2 MMOL/L (ref 3.5–5.3)
PROT SERPL-MCNC: 7.2 G/DL (ref 6.4–8.2)
RBC # BLD AUTO: 4.89 MILLION/UL (ref 3.88–5.62)
SODIUM SERPL-SCNC: 142 MMOL/L (ref 136–145)
TRIGL SERPL-MCNC: 154 MG/DL
WBC # BLD AUTO: 7.34 THOUSAND/UL (ref 4.31–10.16)

## 2021-11-02 PROCEDURE — 36415 COLL VENOUS BLD VENIPUNCTURE: CPT

## 2021-11-02 PROCEDURE — 80053 COMPREHEN METABOLIC PANEL: CPT

## 2021-11-02 PROCEDURE — 85025 COMPLETE CBC W/AUTO DIFF WBC: CPT

## 2021-11-02 PROCEDURE — 86360 T CELL ABSOLUTE COUNT/RATIO: CPT

## 2021-11-02 PROCEDURE — 87536 HIV-1 QUANT&REVRSE TRNSCRPJ: CPT

## 2021-11-02 PROCEDURE — 80061 LIPID PANEL: CPT

## 2021-11-02 RX ORDER — LISINOPRIL 5 MG/1
TABLET ORAL
Qty: 30 TABLET | Refills: 1 | Status: SHIPPED | OUTPATIENT
Start: 2021-11-02 | End: 2021-11-08 | Stop reason: HOSPADM

## 2021-11-02 RX ORDER — ATORVASTATIN CALCIUM 40 MG/1
TABLET, FILM COATED ORAL
Qty: 30 TABLET | Refills: 2 | Status: SHIPPED | OUTPATIENT
Start: 2021-11-02 | End: 2021-11-08 | Stop reason: SDUPTHER

## 2021-11-02 RX ORDER — ASPIRIN 81 MG/1
TABLET, CHEWABLE ORAL
Qty: 30 TABLET | Refills: 2 | Status: SHIPPED | OUTPATIENT
Start: 2021-11-02 | End: 2021-11-08 | Stop reason: SDUPTHER

## 2021-11-03 LAB
BASOPHILS # BLD AUTO: 0 X10E3/UL (ref 0–0.2)
BASOPHILS NFR BLD AUTO: 1 %
CD3+CD4+ CELLS # BLD: 931 /UL (ref 359–1519)
CD3+CD4+ CELLS NFR BLD: 38.8 % (ref 30.8–58.5)
CD3+CD4+ CELLS/CD3+CD8+ CLL BLD: 1.09 % (ref 0.92–3.72)
CD3+CD8+ CELLS # BLD: 854 /UL (ref 109–897)
CD3+CD8+ CELLS NFR BLD: 35.6 % (ref 12–35.5)
EOSINOPHIL # BLD AUTO: 0.1 X10E3/UL (ref 0–0.4)
EOSINOPHIL NFR BLD AUTO: 2 %
ERYTHROCYTE [DISTWIDTH] IN BLOOD BY AUTOMATED COUNT: 12.5 % (ref 11.6–15.4)
HCT VFR BLD AUTO: 45 % (ref 37.5–51)
HGB BLD-MCNC: 15.1 G/DL (ref 13–17.7)
IMM GRANULOCYTES # BLD: 0 X10E3/UL (ref 0–0.1)
IMM GRANULOCYTES NFR BLD: 0 %
LYMPHOCYTES # BLD AUTO: 2.4 X10E3/UL (ref 0.7–3.1)
LYMPHOCYTES NFR BLD AUTO: 35 %
MCH RBC QN AUTO: 31.1 PG (ref 26.6–33)
MCHC RBC AUTO-ENTMCNC: 33.6 G/DL (ref 31.5–35.7)
MCV RBC AUTO: 93 FL (ref 79–97)
MONOCYTES # BLD AUTO: 0.5 X10E3/UL (ref 0.1–0.9)
MONOCYTES NFR BLD AUTO: 7 %
NEUTROPHILS # BLD AUTO: 3.9 X10E3/UL (ref 1.4–7)
NEUTROPHILS NFR BLD AUTO: 55 %
PLATELET # BLD AUTO: 280 X10E3/UL (ref 150–450)
RBC # BLD AUTO: 4.86 X10E6/UL (ref 4.14–5.8)
WBC # BLD AUTO: 7 X10E3/UL (ref 3.4–10.8)

## 2021-11-04 LAB
HIV1 RNA # SERPL NAA+PROBE: <20 COPIES/ML
HIV1 RNA SERPL NAA+PROBE-LOG#: NORMAL LOG10COPY/ML

## 2021-11-05 ENCOUNTER — PATIENT OUTREACH (OUTPATIENT)
Dept: SURGERY | Facility: CLINIC | Age: 46
End: 2021-11-05

## 2021-11-08 ENCOUNTER — PATIENT OUTREACH (OUTPATIENT)
Dept: SURGERY | Facility: CLINIC | Age: 46
End: 2021-11-08

## 2021-11-08 ENCOUNTER — OFFICE VISIT (OUTPATIENT)
Dept: SURGERY | Facility: CLINIC | Age: 46
End: 2021-11-08
Payer: COMMERCIAL

## 2021-11-08 VITALS
OXYGEN SATURATION: 95 % | HEIGHT: 67 IN | HEART RATE: 100 BPM | BODY MASS INDEX: 30.57 KG/M2 | SYSTOLIC BLOOD PRESSURE: 132 MMHG | TEMPERATURE: 96.7 F | DIASTOLIC BLOOD PRESSURE: 100 MMHG | WEIGHT: 194.8 LBS

## 2021-11-08 DIAGNOSIS — I10 ESSENTIAL HYPERTENSION: ICD-10-CM

## 2021-11-08 DIAGNOSIS — J30.2 SEASONAL ALLERGIES: ICD-10-CM

## 2021-11-08 DIAGNOSIS — B20 SYMPTOMATIC HIV INFECTION (HCC): Primary | ICD-10-CM

## 2021-11-08 DIAGNOSIS — Z23 NEED FOR INFLUENZA VACCINATION: ICD-10-CM

## 2021-11-08 DIAGNOSIS — E78.5 DYSLIPIDEMIA: ICD-10-CM

## 2021-11-08 PROCEDURE — 99214 OFFICE O/P EST MOD 30 MIN: CPT | Performed by: NURSE PRACTITIONER

## 2021-11-08 PROCEDURE — 90471 IMMUNIZATION ADMIN: CPT

## 2021-11-08 PROCEDURE — 90682 RIV4 VACC RECOMBINANT DNA IM: CPT

## 2021-11-08 RX ORDER — ATORVASTATIN CALCIUM 40 MG/1
40 TABLET, FILM COATED ORAL DAILY
Qty: 30 TABLET | Refills: 2 | Status: SHIPPED | OUTPATIENT
Start: 2021-11-08 | End: 2021-12-06 | Stop reason: SDUPTHER

## 2021-11-08 RX ORDER — ASPIRIN 81 MG/1
81 TABLET, CHEWABLE ORAL DAILY
Qty: 30 TABLET | Refills: 2 | Status: SHIPPED | OUTPATIENT
Start: 2021-11-08 | End: 2021-12-06 | Stop reason: HOSPADM

## 2021-11-08 RX ORDER — FLUTICASONE PROPIONATE 50 MCG
1 SPRAY, SUSPENSION (ML) NASAL DAILY
Qty: 16 G | Refills: 2 | Status: SHIPPED | OUTPATIENT
Start: 2021-11-08 | End: 2022-03-07 | Stop reason: SDUPTHER

## 2021-11-08 RX ORDER — HYDROCHLOROTHIAZIDE 25 MG/1
25 TABLET ORAL DAILY
Qty: 30 TABLET | Refills: 0 | Status: SHIPPED | OUTPATIENT
Start: 2021-11-08 | End: 2021-11-30

## 2021-11-08 RX ORDER — LISINOPRIL 10 MG/1
10 TABLET ORAL DAILY
Qty: 30 TABLET | Refills: 0 | Status: SHIPPED | OUTPATIENT
Start: 2021-11-08 | End: 2021-11-30

## 2021-11-08 RX ORDER — BICTEGRAVIR SODIUM, EMTRICITABINE, AND TENOFOVIR ALAFENAMIDE FUMARATE 50; 200; 25 MG/1; MG/1; MG/1
1 TABLET ORAL DAILY
Qty: 30 TABLET | Refills: 1 | Status: SHIPPED | OUTPATIENT
Start: 2021-11-08 | End: 2021-12-06 | Stop reason: SDUPTHER

## 2021-11-27 ENCOUNTER — IMMUNIZATIONS (OUTPATIENT)
Dept: FAMILY MEDICINE CLINIC | Facility: HOSPITAL | Age: 46
End: 2021-11-27

## 2021-11-27 DIAGNOSIS — Z23 ENCOUNTER FOR IMMUNIZATION: Primary | ICD-10-CM

## 2021-11-27 PROCEDURE — 91300 COVID-19 PFIZER VACC 0.3 ML: CPT

## 2021-11-27 PROCEDURE — 0001A COVID-19 PFIZER VACC 0.3 ML: CPT

## 2021-12-06 ENCOUNTER — OFFICE VISIT (OUTPATIENT)
Dept: SURGERY | Facility: CLINIC | Age: 46
End: 2021-12-06
Payer: COMMERCIAL

## 2021-12-06 VITALS
TEMPERATURE: 98.8 F | HEIGHT: 67 IN | OXYGEN SATURATION: 95 % | WEIGHT: 198 LBS | SYSTOLIC BLOOD PRESSURE: 133 MMHG | BODY MASS INDEX: 31.08 KG/M2 | DIASTOLIC BLOOD PRESSURE: 85 MMHG | HEART RATE: 69 BPM

## 2021-12-06 DIAGNOSIS — I10 ESSENTIAL HYPERTENSION: ICD-10-CM

## 2021-12-06 DIAGNOSIS — B20 SYMPTOMATIC HIV INFECTION (HCC): Primary | ICD-10-CM

## 2021-12-06 DIAGNOSIS — G44.229 CHRONIC TENSION-TYPE HEADACHE, NOT INTRACTABLE: ICD-10-CM

## 2021-12-06 DIAGNOSIS — E78.5 DYSLIPIDEMIA: ICD-10-CM

## 2021-12-06 PROCEDURE — 99214 OFFICE O/P EST MOD 30 MIN: CPT | Performed by: NURSE PRACTITIONER

## 2021-12-06 RX ORDER — HYDROCHLOROTHIAZIDE 25 MG/1
25 TABLET ORAL DAILY
Qty: 30 TABLET | Refills: 5 | Status: SHIPPED | OUTPATIENT
Start: 2021-12-06 | End: 2022-03-07 | Stop reason: SDUPTHER

## 2021-12-06 RX ORDER — BICTEGRAVIR SODIUM, EMTRICITABINE, AND TENOFOVIR ALAFENAMIDE FUMARATE 50; 200; 25 MG/1; MG/1; MG/1
1 TABLET ORAL DAILY
Qty: 30 TABLET | Refills: 1 | Status: SHIPPED | OUTPATIENT
Start: 2021-12-06 | End: 2022-01-21

## 2021-12-06 RX ORDER — LISINOPRIL 10 MG/1
10 TABLET ORAL DAILY
Qty: 30 TABLET | Refills: 5 | Status: SHIPPED | OUTPATIENT
Start: 2021-12-06 | End: 2022-03-07 | Stop reason: SDUPTHER

## 2021-12-06 RX ORDER — ATORVASTATIN CALCIUM 40 MG/1
40 TABLET, FILM COATED ORAL DAILY
Qty: 30 TABLET | Refills: 2 | Status: SHIPPED | OUTPATIENT
Start: 2021-12-06 | End: 2022-03-07 | Stop reason: SDUPTHER

## 2021-12-08 ENCOUNTER — PATIENT OUTREACH (OUTPATIENT)
Dept: SURGERY | Facility: CLINIC | Age: 46
End: 2021-12-08

## 2021-12-10 ENCOUNTER — PATIENT OUTREACH (OUTPATIENT)
Dept: SURGERY | Facility: CLINIC | Age: 46
End: 2021-12-10

## 2021-12-14 ENCOUNTER — OFFICE VISIT (OUTPATIENT)
Dept: SURGERY | Facility: CLINIC | Age: 46
End: 2021-12-14
Payer: COMMERCIAL

## 2021-12-14 VITALS
DIASTOLIC BLOOD PRESSURE: 92 MMHG | HEIGHT: 67 IN | BODY MASS INDEX: 30.86 KG/M2 | OXYGEN SATURATION: 98 % | TEMPERATURE: 98.7 F | SYSTOLIC BLOOD PRESSURE: 126 MMHG | WEIGHT: 196.6 LBS | HEART RATE: 81 BPM

## 2021-12-14 DIAGNOSIS — E78.5 DYSLIPIDEMIA: ICD-10-CM

## 2021-12-14 DIAGNOSIS — F33.1 MODERATE EPISODE OF RECURRENT MAJOR DEPRESSIVE DISORDER (HCC): ICD-10-CM

## 2021-12-14 DIAGNOSIS — I10 ESSENTIAL HYPERTENSION: ICD-10-CM

## 2021-12-14 DIAGNOSIS — R63.5 WEIGHT GAIN: ICD-10-CM

## 2021-12-14 DIAGNOSIS — B20 SYMPTOMATIC HIV INFECTION (HCC): Primary | ICD-10-CM

## 2021-12-14 PROCEDURE — 99215 OFFICE O/P EST HI 40 MIN: CPT | Performed by: INTERNAL MEDICINE

## 2022-01-06 ENCOUNTER — TELEPHONE (OUTPATIENT)
Dept: SURGERY | Facility: CLINIC | Age: 47
End: 2022-01-06

## 2022-01-06 NOTE — TELEPHONE ENCOUNTER
Pt would like to be tested for covid  Pt did have an at home covid test and it was positive  Pt has a stuffy nose, sore throat, headache and fatigue that started yesterday  Pt denies fever/chills, vomiting, diarrhea, sob,loss of taste and smell

## 2022-01-12 ENCOUNTER — TELEPHONE (OUTPATIENT)
Dept: SURGERY | Facility: CLINIC | Age: 47
End: 2022-01-12

## 2022-01-12 NOTE — TELEPHONE ENCOUNTER
Pt wanted to be retested for covid for his employer  Provider stated that due to high test volumes retesting is not necessary and if his employer is requesting retesting that the pt should retest at a pharmacy to obtain the proper documentation for his employer  Pt stated understanding

## 2022-01-20 DIAGNOSIS — B20 SYMPTOMATIC HIV INFECTION (HCC): ICD-10-CM

## 2022-01-21 RX ORDER — BICTEGRAVIR SODIUM, EMTRICITABINE, AND TENOFOVIR ALAFENAMIDE FUMARATE 50; 200; 25 MG/1; MG/1; MG/1
1 TABLET ORAL DAILY
Qty: 30 TABLET | Refills: 1 | Status: SHIPPED | OUTPATIENT
Start: 2022-01-21 | End: 2022-03-07 | Stop reason: SDUPTHER

## 2022-02-24 ENCOUNTER — PATIENT OUTREACH (OUTPATIENT)
Dept: SURGERY | Facility: CLINIC | Age: 47
End: 2022-02-24

## 2022-03-04 ENCOUNTER — PATIENT OUTREACH (OUTPATIENT)
Dept: SURGERY | Facility: CLINIC | Age: 47
End: 2022-03-04

## 2022-03-07 ENCOUNTER — OFFICE VISIT (OUTPATIENT)
Dept: SURGERY | Facility: CLINIC | Age: 47
End: 2022-03-07
Payer: COMMERCIAL

## 2022-03-07 VITALS
DIASTOLIC BLOOD PRESSURE: 87 MMHG | WEIGHT: 195.4 LBS | TEMPERATURE: 98.2 F | SYSTOLIC BLOOD PRESSURE: 121 MMHG | BODY MASS INDEX: 30.67 KG/M2 | HEART RATE: 88 BPM | OXYGEN SATURATION: 97 % | HEIGHT: 67 IN

## 2022-03-07 DIAGNOSIS — J30.2 SEASONAL ALLERGIES: ICD-10-CM

## 2022-03-07 DIAGNOSIS — I10 ESSENTIAL HYPERTENSION: ICD-10-CM

## 2022-03-07 DIAGNOSIS — Z00.00 ANNUAL PHYSICAL EXAM: ICD-10-CM

## 2022-03-07 DIAGNOSIS — E78.5 DYSLIPIDEMIA: ICD-10-CM

## 2022-03-07 DIAGNOSIS — B20 SYMPTOMATIC HIV INFECTION (HCC): Primary | ICD-10-CM

## 2022-03-07 PROCEDURE — 99396 PREV VISIT EST AGE 40-64: CPT | Performed by: NURSE PRACTITIONER

## 2022-03-07 RX ORDER — BICTEGRAVIR SODIUM, EMTRICITABINE, AND TENOFOVIR ALAFENAMIDE FUMARATE 50; 200; 25 MG/1; MG/1; MG/1
1 TABLET ORAL DAILY
Qty: 30 TABLET | Refills: 1 | Status: SHIPPED | OUTPATIENT
Start: 2022-03-07 | End: 2022-04-29

## 2022-03-07 RX ORDER — FLUTICASONE PROPIONATE 50 MCG
1 SPRAY, SUSPENSION (ML) NASAL DAILY
Qty: 16 G | Refills: 2 | Status: SHIPPED | OUTPATIENT
Start: 2022-03-07

## 2022-03-07 RX ORDER — HYDROCHLOROTHIAZIDE 25 MG/1
25 TABLET ORAL DAILY
Qty: 30 TABLET | Refills: 5 | Status: SHIPPED | OUTPATIENT
Start: 2022-03-07 | End: 2022-06-06 | Stop reason: HOSPADM

## 2022-03-07 RX ORDER — LISINOPRIL 10 MG/1
10 TABLET ORAL DAILY
Qty: 30 TABLET | Refills: 5 | Status: SHIPPED | OUTPATIENT
Start: 2022-03-07 | End: 2022-06-06 | Stop reason: HOSPADM

## 2022-03-07 RX ORDER — ATORVASTATIN CALCIUM 40 MG/1
40 TABLET, FILM COATED ORAL DAILY
Qty: 30 TABLET | Refills: 2 | Status: SHIPPED | OUTPATIENT
Start: 2022-03-07 | End: 2022-05-31

## 2022-03-07 NOTE — PATIENT INSTRUCTIONS
Wellness Visit for Adults   AMBULATORY CARE:   A wellness visit  is when you see your healthcare provider to get screened for health problems  Your healthcare provider will also give you advice on how to stay healthy  Write down your questions so you remember to ask them  Ask your healthcare provider how often you should have a wellness visit  What happens at a wellness visit:  Your healthcare provider will ask about your health, and your family history of health problems  This includes high blood pressure, heart disease, and cancer  He or she will ask if you have symptoms that concern you, if you smoke, and about your mood  You may also be asked about your intake of medicines, supplements, food, and alcohol  Any of the following may be done:  · Your weight  will be checked  Your height may also be checked so your body mass index (BMI) can be calculated  Your BMI shows if you are at a healthy weight  · Your blood pressure  and heart rate will be checked  Your temperature may also be checked  · Blood and urine tests  may be done  Blood tests may be done to check your cholesterol levels  Abnormal cholesterol levels increase your risk for heart disease and stroke  You may also need a blood or urine test to check for diabetes if you are at increased risk  Urine tests may be done to look for signs of an infection or kidney disease  · A physical exam  includes checking your heartbeat and lungs with a stethoscope  Your healthcare provider may also check your skin to look for sun damage  · Screening tests  may be recommended  A screening test is done to check for diseases that may not cause symptoms  The screening tests you may need depend on your age, gender, family history, and lifestyle habits  For example, colorectal screening may be recommended if you are 48years old or older  Screening tests you need if you are a woman:   · A Pap smear  is used to screen for cervical cancer   Pap smears are usually done every 3 to 5 years depending on your age  You may need them more often if you have had abnormal Pap smear test results in the past  Ask your healthcare provider how often you should have a Pap smear  · A mammogram  is an x-ray of your breasts to screen for breast cancer  Experts recommend mammograms every 2 years starting at age 48 years  You may need a mammogram at age 52 years or younger if you have an increased risk for breast cancer  Talk to your healthcare provider about when you should start having mammograms and how often you need them  Vaccines you may need:   · Get an influenza vaccine  every year  The influenza vaccine protects you from the flu  Several types of viruses cause the flu  The viruses change over time, so new vaccines are made each year  · Get a tetanus-diphtheria (Td) booster vaccine  every 10 years  This vaccine protects you against tetanus and diphtheria  Tetanus is a severe infection that may cause painful muscle spasms and lockjaw  Diphtheria is a severe bacterial infection that causes a thick covering in the back of your mouth and throat  · Get a human papillomavirus (HPV) vaccine  if you are female and aged 23 to 32 or male 23 to 24 and never received it  This vaccine protects you from HPV infection  HPV is the most common infection spread by sexual contact  HPV may also cause vaginal, penile, and anal cancers  · Get a pneumococcal vaccine  if you are aged 72 years or older  The pneumococcal vaccine is an injection given to protect you from pneumococcal disease  Pneumococcal disease is an infection caused by pneumococcal bacteria  The infection may cause pneumonia, meningitis, or an ear infection  · Get a shingles vaccine  if you are 60 or older, even if you have had shingles before  The shingles vaccine is an injection to protect you from the varicella-zoster virus  This is the same virus that causes chickenpox   Shingles is a painful rash that develops in people who had chickenpox or have been exposed to the virus  How to eat healthy:  My Plate is a model for planning healthy meals  It shows the types and amounts of foods that should go on your plate  Fruits and vegetables make up about half of your plate, and grains and protein make up the other half  A serving of dairy is included on the side of your plate  The amount of calories and serving sizes you need depends on your age, gender, weight, and height  Examples of healthy foods are listed below:  · Eat a variety of vegetables  such as dark green, red, and orange vegetables  You can also include canned vegetables low in sodium (salt) and frozen vegetables without added butter or sauces  · Eat a variety of fresh fruits , canned fruit in 100% juice, frozen fruit, and dried fruit  · Include whole grains  At least half of the grains you eat should be whole grains  Examples include whole-wheat bread, wheat pasta, brown rice, and whole-grain cereals such as oatmeal     · Eat a variety of protein foods such as seafood (fish and shellfish), lean meat, and poultry without skin (turkey and chicken)  Examples of lean meats include pork leg, shoulder, or tenderloin, and beef round, sirloin, tenderloin, and extra lean ground beef  Other protein foods include eggs and egg substitutes, beans, peas, soy products, nuts, and seeds  · Choose low-fat dairy products such as skim or 1% milk or low-fat yogurt, cheese, and cottage cheese  · Limit unhealthy fats  such as butter, hard margarine, and shortening  Exercise:  Exercise at least 30 minutes per day on most days of the week  Some examples of exercise include walking, biking, dancing, and swimming  You can also fit in more physical activity by taking the stairs instead of the elevator or parking farther away from stores  Include muscle strengthening activities 2 days each week  Regular exercise provides many health benefits   It helps you manage your weight, and decreases your risk for type 2 diabetes, heart disease, stroke, and high blood pressure  Exercise can also help improve your mood  Ask your healthcare provider about the best exercise plan for you  General health and safety guidelines:   · Do not smoke  Nicotine and other chemicals in cigarettes and cigars can cause lung damage  Ask your healthcare provider for information if you currently smoke and need help to quit  E-cigarettes or smokeless tobacco still contain nicotine  Talk to your healthcare provider before you use these products  · Limit alcohol  A drink of alcohol is 12 ounces of beer, 5 ounces of wine, or 1½ ounces of liquor  · Lose weight, if needed  Being overweight increases your risk of certain health conditions  These include heart disease, high blood pressure, type 2 diabetes, and certain types of cancer  · Protect your skin  Do not sunbathe or use tanning beds  Use sunscreen with a SPF 15 or higher  Apply sunscreen at least 15 minutes before you go outside  Reapply sunscreen every 2 hours  Wear protective clothing, hats, and sunglasses when you are outside  · Drive safely  Always wear your seatbelt  Make sure everyone in your car wears a seatbelt  A seatbelt can save your life if you are in an accident  Do not use your cell phone when you are driving  This could distract you and cause an accident  Pull over if you need to make a call or send a text message  · Practice safe sex  Use latex condoms if are sexually active and have more than one partner  Your healthcare provider may recommend screening tests for sexually transmitted infections (STIs)  · Wear helmets, lifejackets, and protective gear  Always wear a helmet when you ride a bike or motorcycle, go skiing, or play sports that could cause a head injury  Wear protective equipment when you play sports  Wear a lifejacket when you are on a boat or doing water sports      © Copyright [employer] 2022 Information is for End User's use only and may not be sold, redistributed or otherwise used for commercial purposes  All illustrations and images included in CareNotes® are the copyrighted property of [employer]  or Radha Rodriguez  The above information is an  only  It is not intended as medical advice for individual conditions or treatments  Talk to your doctor, nurse or pharmacist before following any medical regimen to see if it is safe and effective for you  Cholesterol and Your Health   AMBULATORY CARE:   Cholesterol  is a waxy, fat-like substance  Your body uses cholesterol to make hormones and new cells, and to protect nerves  Cholesterol is made by your body  It also comes from certain foods you eat, such as meat and dairy products  Your healthcare provider can help you set goals for your cholesterol levels  He or she can help you create a plan to meet your goals  Cholesterol level goals: Your cholesterol level goals depend on your risk for heart disease, your age, and your other health conditions  The following are general guidelines:  · Total cholesterol  includes low-density lipoprotein (LDL), high-density lipoprotein (HDL), and triglyceride levels  The total cholesterol level should be lower than 200 mg/dL and is best at about 150 mg/dL  · LDL cholesterol  is called bad cholesterol  because it forms plaque in your arteries  As plaque builds up, your arteries become narrow, and less blood flows through  When plaque decreases blood flow to your heart, you may have chest pain  If plaque completely blocks an artery that brings blood to your heart, you may have a heart attack  Plaque can break off and form blood clots  Blood clots may block arteries in your brain and cause a stroke  The level should be less than 130 mg/dL and is best at about 100 mg/dL  · HDL cholesterol  is called good cholesterol  because it helps remove LDL cholesterol from your arteries   It does this by attaching to LDL cholesterol and carrying it to your liver  Your liver breaks down LDL cholesterol so your body can get rid of it  High levels of HDL cholesterol can help prevent a heart attack and stroke  Low levels of HDL cholesterol can increase your risk for heart disease, heart attack, and stroke  The level should be 60 mg/dL or higher  · Triglycerides  are a type of fat that store energy from foods you eat  High levels of triglycerides also cause plaque buildup  This can increase your risk for a heart attack or stroke  If your triglyceride level is high, your LDL cholesterol level may also be high  The level should be less than 150 mg/dL  Any of the following can increase your risk for high cholesterol:   · Smoking cigarettes    · Being overweight or obese, or not getting enough exercise    · Drinking large amounts of alcohol    · A medical condition such as hypertension (high blood pressure) or diabetes    · Certain genes passed from your parents to you    · Age older than 65 years    What you need to know about having your cholesterol levels checked: Adults 21to 39years of age should have their cholesterol levels checked every 4 to 6 years  Adults 45 years or older should have their cholesterol checked every 1 to 2 years  You may need your cholesterol checked more often, or at a younger age, if you have risk factors for heart disease  You may also need to have your cholesterol checked more often if you have other health conditions, such as diabetes  Blood tests are used to check cholesterol levels  Blood tests measure your levels of triglycerides, LDL cholesterol, and HDL cholesterol  How healthy fats affect your cholesterol levels:  Healthy fats, also called unsaturated fats, help lower LDL cholesterol and triglyceride levels  Healthy fats include the following:  · Monounsaturated fats  are found in foods such as olive oil, canola oil, avocado, nuts, and olives      · Polyunsaturated fats,  such as omega 3 fats, are found in fish, such as salmon, trout, and tuna  They can also be found in plant foods such as flaxseed, walnuts, and soybeans  How unhealthy fats affect your cholesterol levels:  Unhealthy fats increase LDL cholesterol and triglyceride levels  They are found in foods high in cholesterol, saturated fat, and trans fat:  · Cholesterol  is found in eggs, dairy, and meat  · Saturated fat  is found in butter, cheese, ice cream, whole milk, and coconut oil  Saturated fat is also found in meat, such as sausage, hot dogs, and bologna  · Trans fat  is found in liquid oils and is used in fried and baked foods  Foods that contain trans fats include chips, crackers, muffins, sweet rolls, microwave popcorn, and cookies  Treatment  for high cholesterol will also decrease your risk of heart disease, heart attack, and stroke  Treatment may include any of the following:  · Lifestyle changes  may include food, exercise, weight loss, and quitting smoking  You may also need to decrease the amount of alcohol you drink  Your healthcare provider will want you to start with lifestyle changes  Other treatment may be added if lifestyle changes are not enough  Your healthcare provider may recommend you work with a team to manage hyperlipidemia  The team may include medical experts such as a dietitian, an exercise or physical therapist, and a behavior therapist  Your family members may be included in helping you create lifestyle changes  · Medicines  may be given to lower your LDL cholesterol, triglyceride levels, or total cholesterol level  You may need medicines to lower your cholesterol if any of the following is true:    ? You have a history of stroke, TIA, unstable angina, or a heart attack  ? Your LDL cholesterol level is 190 mg/dL or higher  ? You are age 36 to 76 years, have diabetes or heart disease risk factors, and your LDL cholesterol is 70 mg/dL or higher      · Supplements  include fish oil, red yeast rice, and garlic  Fish oil may help lower your triglyceride and LDL cholesterol levels  It may also increase your HDL cholesterol level  Red yeast rice may help decrease your total cholesterol level and LDL cholesterol level  Garlic may help lower your total cholesterol level  Do not take any supplements without talking to your healthcare provider  Food changes you can make to lower your cholesterol levels:  A dietitian can help you create a healthy eating plan  He or she can show you how to read food labels and choose foods low in saturated fat, trans fats, and cholesterol  · Decrease the total amount of fat you eat  Choose lean meats, fat-free or 1% fat milk, and low-fat dairy products, such as yogurt and cheese  Try to limit or avoid red meats  Limit or do not eat fried foods or baked goods, such as cookies  · Replace unhealthy fats with healthy fats  Cook foods in olive oil or canola oil  Choose soft margarines that are low in saturated fat and trans fat  Seeds, nuts, and avocados are other examples of healthy fats  · Eat foods with omega-3 fats  Examples include salmon, tuna, mackerel, walnuts, and flaxseed  Eat fish 2 times per week  Pregnant women should not eat fish that have high levels of mercury, such as shark, swordfish, and marcos mackerel  · Increase the amount of high-fiber foods you eat  High-fiber foods can help lower your LDL cholesterol  Aim to get between 20 and 30 grams of fiber each day  Fruits and vegetables are high in fiber  Eat at least 5 servings each day  Other high-fiber foods are whole-grain or whole-wheat breads, pastas, or cereals, and brown rice  Eat 3 ounces of whole-grain foods each day  Increase fiber slowly  You may have abdominal discomfort, bloating, and gas if you add fiber to your diet too quickly  · Eat healthy protein foods  Examples include low-fat dairy products, skinless chicken and turkey, fish, and nuts      · Limit foods and drinks that are high in sugar  Your dietitian or healthcare provider can help you create daily limits for high-sugar foods and drinks  The limit may be lower if you have diabetes or another health condition  Limits can also help you lose weight if needed  Lifestyle changes you can make to lower your cholesterol levels:   · Maintain a healthy weight  Ask your healthcare provider what a healthy weight is for you  Ask him or her to help you create a weight loss plan if needed  Weight loss can decrease your total cholesterol and triglyceride levels  Weight loss may also help keep your blood pressure at a healthy level  · Be physically active throughout the day  Physical activity, such as exercise, can help lower your total cholesterol level and maintain a healthy weight  Physical activity can also help increase your HDL cholesterol level  Work with your healthcare provider to create an program that is right for you  Get at least 30 to 40 minutes of moderate physical activity most days of the week  Examples of exercise include brisk walking, swimming, or biking  Also include strength training at least 2 times each week  Your healthcare providers can help you create a physical activity plan  · Do not smoke  Nicotine and other chemicals in cigarettes and cigars can raise your cholesterol levels  Ask your healthcare provider for information if you currently smoke and need help to quit  E-cigarettes or smokeless tobacco still contain nicotine  Talk to your healthcare provider before you use these products  · Limit or do not drink alcohol  Alcohol can increase your triglyceride levels  Ask your healthcare provider before you drink alcohol  Ask how much is okay for you to drink in 24 hours or 1 week  Follow up with your doctor as directed:  Write down your questions so you remember to ask them during your visits    © Copyright [employer] 2022 Information is for End User's use only and may not be sold, redistributed or otherwise used for commercial purposes  All illustrations and images included in CareNotes® are the copyrighted property of [employer]  or Radha Rodriguez  The above information is an  only  It is not intended as medical advice for individual conditions or treatments  Talk to your doctor, nurse or pharmacist before following any medical regimen to see if it is safe and effective for you  Heart Healthy Diet   AMBULATORY CARE:   A heart healthy diet  is an eating plan low in unhealthy fats and sodium (salt)  The plan is high in healthy fats and fiber  A heart healthy diet helps improve your cholesterol levels and lowers your risk for heart disease and stroke  A dietitian will teach you how to read and understand food labels  Heart healthy diet guidelines to follow:   · Choose foods that contain healthy fats  ? Unsaturated fats  include monounsaturated and polyunsaturated fats  Unsaturated fat is found in foods such as soybean, canola, olive, corn, and safflower oils  It is also found in soft tub margarine that is made with liquid vegetable oil  ? Omega-3 fat  is found in certain fish, such as salmon, tuna, and trout, and in walnuts and flaxseed  Eat fish high in omega-3 fats at least 2 times a week  · Get 20 to 30 grams of fiber each day  Fruits, vegetables, whole-grain foods, and legumes (cooked beans) are good sources of fiber  · Limit or do not have unhealthy fats  ? Cholesterol  is found in animal foods, such as eggs and lobster, and in dairy products made from whole milk  Limit cholesterol to less than 200 mg each day  ? Saturated fat  is found in meats, such as marcelino and hamburger  It is also found in chicken or turkey skin, whole milk, and butter  Limit saturated fat to less than 7% of your total daily calories  ? Trans fat  is found in packaged foods, such as potato chips and cookies   It is also in hard margarine, some fried foods, and shortening  Do not eat foods that contain trans fats  · Limit sodium as directed  You may be told to limit sodium to 2,000 to 2,300 mg each day  Choose low-sodium or no-salt-added foods  Add little or no salt to food you prepare  Use herbs and spices in place of salt  Include the following in your heart healthy plan:  Ask your dietitian or healthcare provider how many servings to have from each of the following food groups:  · Grains:      ? Whole-wheat breads, cereals, and pastas, and brown rice    ? Low-fat, low-sodium crackers and chips    · Vegetables:      ? Broccoli, green beans, green peas, and spinach    ? Collards, kale, and lima beans    ? Carrots, sweet potatoes, tomatoes, and peppers    ? Canned vegetables with no salt added    · Fruits:      ? Bananas, peaches, pears, and pineapple    ? Grapes, raisins, and dates    ? Oranges, tangerines, grapefruit, orange juice, and grapefruit juice    ? Apricots, mangoes, melons, and papaya    ? Raspberries and strawberries    ? Canned fruit with no added sugar    · Low-fat dairy:      ? Nonfat (skim) milk, 1% milk, and low-fat almond, cashew, or soy milks fortified with calcium    ? Low-fat cheese, regular or frozen yogurt, and cottage cheese    · Meats and proteins:      ? Lean cuts of beef and pork (loin, leg, round), skinless chicken and turkey    ? Legumes, soy products, egg whites, or nuts    Limit or do not include the following in your heart healthy plan:   · Unhealthy fats and oils:      ? Whole or 2% milk, cream cheese, sour cream, or cheese    ? High-fat cuts of beef (T-bone steaks, ribs), chicken or turkey with skin, and organ meats such as liver    ? Butter, stick margarine, shortening, and cooking oils such as coconut or palm oil    · Foods and liquids high in sodium:      ? Packaged foods, such as frozen dinners, cookies, macaroni and cheese, and cereals with more than 300 mg of sodium per serving    ?  Vegetables with added sodium, such as instant potatoes, vegetables with added sauces, or regular canned vegetables    ? Cured or smoked meats, such as hot dogs, marcelino, and sausage    ? High-sodium ketchup, barbecue sauce, salad dressing, pickles, olives, soy sauce, or miso    · Foods and liquids high in sugar:      ? Candy, cake, cookies, pies, or doughnuts    ? Soft drinks (soda), sports drinks, or sweetened tea    ? Canned or dry mixes for cakes, soups, sauces, or gravies    Other healthy heart guidelines:   · Do not smoke  Nicotine and other chemicals in cigarettes and cigars can cause lung and heart damage  Ask your healthcare provider for information if you currently smoke and need help to quit  E-cigarettes or smokeless tobacco still contain nicotine  Talk to your healthcare provider before you use these products  · Limit or do not drink alcohol as directed  Alcohol can damage your heart and raise your blood pressure  Your healthcare provider may give you specific daily and weekly limits  The general recommended limit is 1 drink a day for women 21 or older and for men 72 or older  Do not have more than 3 drinks in a day or 7 in a week  The recommended limit is 2 drinks a day for men 24to 59years of age  Do not have more than 4 drinks in a day or 14 in a week  A drink of alcohol is 12 ounces of beer, 5 ounces of wine, or 1½ ounces of liquor  · Exercise regularly  Exercise can help you maintain a healthy weight and improve your blood pressure and cholesterol levels  Regular exercise can also decrease your risk for heart problems  Ask your healthcare provider about the best exercise plan for you  Do not start an exercise program without asking your healthcare provider  Follow up with your doctor or cardiologist as directed:  Write down your questions so you remember to ask them during your visits    © Copyright [employer] 2022 Information is for End User's use only and may not be sold, redistributed or otherwise used for commercial purposes  All illustrations and images included in CareNotes® are the copyrighted property of [employer]  or Radha Rodriguez  The above information is an  only  It is not intended as medical advice for individual conditions or treatments  Talk to your doctor, nurse or pharmacist before following any medical regimen to see if it is safe and effective for you

## 2022-03-07 NOTE — PROGRESS NOTES
[address] [facility]    NAME: Markie Langley  AGE: 51 y o  SEX: male  : 1975     DATE: 3/7/2022     Assessment and Plan:     Problem List Items Addressed This Visit        Cardiovascular and Mediastinum    Essential hypertension     Blood pressure: Stable  BP Readings from Last 3 Encounters:   22 121/87   21 126/92   21 133/85       Continue current antihypertensive  Educated on the following lifestyle modifications to lower BP and decrease cardiovascular risk factors  limit alcohol intake, reduce salt in diet, maintain a healthy weight, engage in 30 minutes of cardiovascular exercise daily, and not smoke  Relevant Medications    hydrochlorothiazide (HYDRODIURIL) 25 mg tablet    lisinopril (ZESTRIL) 10 mg tablet       Other    Sympt[facility] ([facility] ) - Primary     No results found for: [id number]  CD4 ABS   Date/Time Value Ref Range Status   2021 12:59  359 - 1519 /uL Final     HIV-1 RNA by PCR, Qn   Date/Time Value Ref Range Status   2021 12:59 PM <20 copies/mL Final     Comment:     HIV-1 RNA not detected  The reportable range for this assay is 20 to 10,000,000  copies HIV-1 RNA/mL  HIV-1 RNA Viral Load Log   Date/Time Value Ref Range Status   2021 12:59 PM COMMENT [id number]/mL Final     Comment:     Unable to calculate result since non-numeric result obtained for  component test          ART: Warden Puentes        Denies side effects  Stressed the importance of adherence  Continue follow up with ID clinic  Reviewed most recent labs, including Cd4 and viral load  Discussed the risks and benefits of treatment options, instructions for management, importance of treatment adherence, and reduction of risk factor  Educated on possible  medication side effects  Counseled on routes of HIV transmission, including the risk of  infection   Emphasized that viral suppression is the best method to prevent HIV transmission  At this time pt denies the need for HIV testing of anyone in their life  Total encounter time was 45 minutes  Greater then 20 minutes were spent on counseling and patient education  Pt voices understanding and agreement with treatment plan  Relevant Medications    bictegravir-emtricitab-tenofovir alafenamide (Biktarvy) -25 MG tablet    Dyslipidemia    Relevant Medications    atorvastatin (LIPITOR) 40 mg tablet    Seasonal allergies    Relevant Medications    fluticasone (FLONASE) 50 mcg/act nasal spray      Other Visit Diagnoses     Annual physical exam        BMI 30 0-30 9,adult              Immunizations and preventive care screenings were discussed with patient today  Appropriate education was printed on patient's after visit summary  Counseling:  · Exercise: the importance of regular exercise/physical activity was discussed  Recommend exercise 3-5 times per week for at least 30 minutes  No follow-ups on file  Chief Complaint:     Chief Complaint   Patient presents with    Follow-up     pt offers no c/o at this time  History of Present Illness:     Adult Annual Physical   Patient here for a comprehensive physical exam  The patient reports no problems  Diet and Physical Activity  · Diet/Nutrition: well balanced diet  · Exercise: walking  Depression Screening  PHQ-2/9 Depression Screening         General Health  · Sleep: gets 4-6 hours of sleep on average  · Hearing: normal - bilateral   · Vision: due for eye exam    · Dental: regular dental visits and brushes teeth once daily   Health  · Symptoms include: none     Review of Systems:     Review of Systems   Constitutional: Negative for chills and fever  HENT: Negative for ear pain and sore throat  Eyes: Negative for pain and visual disturbance  Respiratory: Negative for cough and shortness of breath      Cardiovascular: Negative for chest pain and palpitations  Gastrointestinal: Negative for abdominal pain and vomiting  Genitourinary: Negative for dysuria and hematuria  Musculoskeletal: Negative for arthralgias and back pain  Skin: Negative for color change and rash  Neurological: Negative for seizures and syncope  All other systems reviewed and are negative  Past Medical History:     Past Medical History:   Diagnosis Date    Abscess of left groin     Dental decay     Depression     Elevated BP without diagnosis of hypertension     HIV disease ([facility] ) 11/25/1998    mode of transmission: MSM    Hx of herpes zoster     Hypogonadism in male     Tear of right biceps muscle 2017      Past Surgical History:     History reviewed  No pertinent surgical history  Family History:     Family History   Problem Relation Age of Onset    Hypertension Mother     Hypertension Father     Heart attack Father       Social History:     Social History     Socioeconomic History    Marital status: /Civil Union     Spouse name: None    Number of children: None    Years of education: None    Highest education level: None   Occupational History    None   Tobacco Use    Smoking status: Never Smoker    Smokeless tobacco: Never Used   Vaping Use    Vaping Use: Never used   Substance and Sexual Activity    Alcohol use:  Yes     Alcohol/week: 1 0 standard drink     Types: 1 Glasses of wine per week     Comment: 1 glass wine/day    Drug use: Never    Sexual activity: Not Currently     Partners: Male     Birth control/protection: Condom Male   Other Topics Concern    None   Social History Narrative    None     Social Determinants of Health     Financial Resource Strain: Not on file   Food Insecurity: Food Insecurity Present    Worried About Running Out of Food in the Last Year: Sometimes true    Opal of Food in the Last Year: Sometimes true   Transportation Needs: Not on file   Physical Activity: Not on file   Stress: Not on file   Social Connections: Not on file   Intimate Partner Violence: Not on file   Housing Stability: Not on file      Current Medications:     Current Outpatient Medications   Medication Sig Dispense Refill    atorvastatin (LIPITOR) 40 mg tablet Take 1 tablet (40 mg total) by mouth daily 30 tablet 2    bictegravir-emtricitab-tenofovir alafenamide (Biktarvy) -25 MG tablet Take 1 tablet by mouth daily 30 tablet 1    fluticasone (FLONASE) 50 mcg/act nasal spray 1 spray into each nostril daily 16 g 2    hydrochlorothiazide (HYDRODIURIL) 25 mg tablet Take 1 tablet (25 mg total) by mouth daily 30 tablet 5    lisinopril (ZESTRIL) 10 mg tablet Take 1 tablet (10 mg total) by mouth daily 30 tablet 5     No current facility-administered medications for this visit  Allergies:     No Known Allergies   Physical Exam:     /87   Pulse 88   Temp 98 2 °F (36 8 °C)   Ht 5' 7" (1 702 m)   Wt 88 6 kg (195 lb 6 4 oz)   SpO2 97%   BMI 30 60 kg/m²     Physical Exam  Vitals and nursing note reviewed  Constitutional:       Appearance: He is well-developed  HENT:      Head: Normocephalic and atraumatic  Eyes:      Conjunctiva/sclera: Conjunctivae normal    Cardiovascular:      Rate and Rhythm: Normal rate and regular rhythm  Heart sounds: No murmur heard  Pulmonary:      Effort: Pulmonary effort is normal  No respiratory distress  Breath sounds: Normal breath sounds  Abdominal:      Palpations: Abdomen is soft  Tenderness: There is no abdominal tenderness  Musculoskeletal:      Cervical back: Neck supple  Skin:     General: Skin is warm and dry  Neurological:      Mental Status: He is alert  CHERRI Hartman  [facility]  BMI Counseling: Body mass index is 30 6 kg/m²  The BMI is above normal  Nutrition recommendations include 3-5 servings of fruits/vegetables daily  Exercise recommendations include exercising 3-5 times per week

## 2022-03-07 NOTE — ASSESSMENT & PLAN NOTE
No results found for: LG0ISTD  CD4 ABS   Date/Time Value Ref Range Status   2021 12:59  359 - 1519 /uL Final     HIV-1 RNA by PCR, Qn   Date/Time Value Ref Range Status   2021 12:59 PM <20 copies/mL Final     Comment:     HIV-1 RNA not detected  The reportable range for this assay is 20 to 10,000,000  copies HIV-1 RNA/mL  HIV-1 RNA Viral Load Log   Date/Time Value Ref Range Status   2021 12:59 PM COMMENT [id number]/mL Final     Comment:     Unable to calculate result since non-numeric result obtained for  component test          ART: Brenda Aguilar        Denies side effects  Stressed the importance of adherence  Continue follow up with ID clinic  Reviewed most recent labs, including Cd4 and viral load  Discussed the risks and benefits of treatment options, instructions for management, importance of treatment adherence, and reduction of risk factor  Educated on possible  medication side effects  Counseled on routes of HIV transmission, including the risk of  infection  Emphasized that viral suppression is the best method to prevent HIV transmission  At this time pt denies the need for HIV testing of anyone in their life  Total encounter time was 45 minutes  Greater then 20 minutes were spent on counseling and patient education  Pt voices understanding and agreement with treatment plan

## 2022-03-07 NOTE — ASSESSMENT & PLAN NOTE
Blood pressure: Stable  BP Readings from Last 3 Encounters:   03/07/22 121/87   12/14/21 126/92   12/06/21 133/85       Continue current antihypertensive  Educated on the following lifestyle modifications to lower BP and decrease cardiovascular risk factors  limit alcohol intake, reduce salt in diet, maintain a healthy weight, engage in 30 minutes of cardiovascular exercise daily, and not smoke

## 2022-03-10 ENCOUNTER — PATIENT OUTREACH (OUTPATIENT)
Dept: SURGERY | Facility: CLINIC | Age: 47
End: 2022-03-10

## 2022-03-11 ENCOUNTER — PATIENT OUTREACH (OUTPATIENT)
Dept: SURGERY | Facility: CLINIC | Age: 47
End: 2022-03-11

## 2022-03-11 NOTE — PROGRESS NOTES
Ct presented to [facility] in office  Ct's current housing is stable and living with his   Ct reports he has been sexually active using protection and has disclosed  Ct is currently in good health  CM and Ct discussed at risk behaviors and importance of using protection and maintaining medication adherence  Ct is currently medically adherent and attending all of his appointments  Ct reports to have to still been struggling mentally  Ct states he would like a new referral for mental health with the [address] [place] [address] for insurance  Ct states he needs assistance calling DHS to report new income  Ct states he needs to bring in his paystubs  Ct reports he currently does not have any issues with transportation and drives himself  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  Ct is seeing Dr Claudine Muller for ID care and [facility] for PCP care  Ct does not smoke cigarettes  Ct reports no past issues with drugs or alcohol  Ct states there are no domestic violence or legal issues  Ct last dental appointment was 9/2021 and with [facility]  Ct states he is will contact dental for follow up appointment  PCP notified Cm that Ct was in need of assistance for vision  Ct states he does  Cm gave Ct information for [facility] works in [facility] that accepts his insurance  There are no signatures due to COVID-19  Cm completed Acuity Scale Ct's score is a 17  Cm completed a RW sliding fee scale application  There are no signatures due to COVID-19

## 2022-03-14 ENCOUNTER — APPOINTMENT (OUTPATIENT)
Dept: LAB | Facility: CLINIC | Age: 47
End: 2022-03-14
Payer: COMMERCIAL

## 2022-03-14 DIAGNOSIS — B20 SYMPTOMATIC HIV INFECTION (HCC): ICD-10-CM

## 2022-03-14 LAB
ALBUMIN SERPL BCP-MCNC: 3.6 G/DL (ref 3.5–5)
ALP SERPL-CCNC: 56 U/L (ref 46–116)
ALT SERPL W P-5'-P-CCNC: 40 U/L (ref 12–78)
ANION GAP SERPL CALCULATED.3IONS-SCNC: 10 MMOL/L (ref 4–13)
AST SERPL W P-5'-P-CCNC: 27 U/L (ref 5–45)
BASOPHILS # BLD AUTO: 0.05 THOUSANDS/ΜL (ref 0–0.1)
BASOPHILS NFR BLD AUTO: 1 % (ref 0–1)
BILIRUB SERPL-MCNC: 0.54 MG/DL (ref 0.2–1)
BUN SERPL-MCNC: 16 MG/DL (ref 5–25)
CALCIUM SERPL-MCNC: 9.2 MG/DL (ref 8.3–10.1)
CHLORIDE SERPL-SCNC: 105 MMOL/L (ref 100–108)
CO2 SERPL-SCNC: 26 MMOL/L (ref 21–32)
CREAT SERPL-MCNC: 1.21 MG/DL (ref 0.6–1.3)
EOSINOPHIL # BLD AUTO: 0.12 THOUSAND/ΜL (ref 0–0.61)
EOSINOPHIL NFR BLD AUTO: 2 % (ref 0–6)
ERYTHROCYTE [DISTWIDTH] IN BLOOD BY AUTOMATED COUNT: 12.6 % (ref 11.6–15.1)
GFR SERPL CREATININE-BSD FRML MDRD: 70 ML/MIN/1.73SQ M
GLUCOSE SERPL-MCNC: 91 MG/DL (ref 65–140)
HCT VFR BLD AUTO: 45.4 % (ref 36.5–49.3)
HGB BLD-MCNC: 16 G/DL (ref 12–17)
IMM GRANULOCYTES # BLD AUTO: 0.02 THOUSAND/UL (ref 0–0.2)
IMM GRANULOCYTES NFR BLD AUTO: 0 % (ref 0–2)
LYMPHOCYTES # BLD AUTO: 2.87 THOUSANDS/ΜL (ref 0.6–4.47)
LYMPHOCYTES NFR BLD AUTO: 36 % (ref 14–44)
MCH RBC QN AUTO: 30.5 PG (ref 26.8–34.3)
MCHC RBC AUTO-ENTMCNC: 35.2 G/DL (ref 31.4–37.4)
MCV RBC AUTO: 87 FL (ref 82–98)
MONOCYTES # BLD AUTO: 0.51 THOUSAND/ΜL (ref 0.17–1.22)
MONOCYTES NFR BLD AUTO: 6 % (ref 4–12)
NEUTROPHILS # BLD AUTO: 4.37 THOUSANDS/ΜL (ref 1.85–7.62)
NEUTS SEG NFR BLD AUTO: 55 % (ref 43–75)
NRBC BLD AUTO-RTO: 0 /100 WBCS
PLATELET # BLD AUTO: 288 THOUSANDS/UL (ref 149–390)
PMV BLD AUTO: 11.2 FL (ref 8.9–12.7)
POTASSIUM SERPL-SCNC: 4.2 MMOL/L (ref 3.5–5.3)
PROT SERPL-MCNC: 7.4 G/DL (ref 6.4–8.2)
RBC # BLD AUTO: 5.24 MILLION/UL (ref 3.88–5.62)
SODIUM SERPL-SCNC: 141 MMOL/L (ref 136–145)
WBC # BLD AUTO: 7.94 THOUSAND/UL (ref 4.31–10.16)

## 2022-03-14 PROCEDURE — 36415 COLL VENOUS BLD VENIPUNCTURE: CPT

## 2022-03-14 PROCEDURE — 80053 COMPREHEN METABOLIC PANEL: CPT

## 2022-03-14 PROCEDURE — 86360 T CELL ABSOLUTE COUNT/RATIO: CPT

## 2022-03-14 PROCEDURE — 85025 COMPLETE CBC W/AUTO DIFF WBC: CPT

## 2022-03-14 PROCEDURE — 87536 HIV-1 QUANT&REVRSE TRNSCRPJ: CPT

## 2022-03-15 ENCOUNTER — TELEPHONE (OUTPATIENT)
Dept: SURGERY | Facility: CLINIC | Age: 47
End: 2022-03-15

## 2022-03-15 LAB
BASOPHILS # BLD AUTO: 0.1 X10E3/UL (ref 0–0.2)
BASOPHILS NFR BLD AUTO: 1 %
CD3+CD4+ CELLS # BLD: 1114 /UL (ref 359–1519)
CD3+CD4+ CELLS NFR BLD: 38.4 % (ref 30.8–58.5)
CD3+CD4+ CELLS/CD3+CD8+ CLL BLD: 1.12 % (ref 0.92–3.72)
CD3+CD8+ CELLS # BLD: 992 /UL (ref 109–897)
CD3+CD8+ CELLS NFR BLD: 34.2 % (ref 12–35.5)
EOSINOPHIL # BLD AUTO: 0.1 X10E3/UL (ref 0–0.4)
EOSINOPHIL NFR BLD AUTO: 1 %
ERYTHROCYTE [DISTWIDTH] IN BLOOD BY AUTOMATED COUNT: 12.9 % (ref 11.6–15.4)
HCT VFR BLD AUTO: 45.7 % (ref 37.5–51)
HGB BLD-MCNC: 15.9 G/DL (ref 13–17.7)
HIV1 RNA # SERPL NAA+PROBE: 20 COPIES/ML
HIV1 RNA SERPL NAA+PROBE-LOG#: 1.3 LOG10COPY/ML
IMM GRANULOCYTES # BLD: 0 X10E3/UL (ref 0–0.1)
IMM GRANULOCYTES NFR BLD: 1 %
LYMPHOCYTES # BLD AUTO: 2.9 X10E3/UL (ref 0.7–3.1)
LYMPHOCYTES NFR BLD AUTO: 36 %
MCH RBC QN AUTO: 30.7 PG (ref 26.6–33)
MCHC RBC AUTO-ENTMCNC: 34.8 G/DL (ref 31.5–35.7)
MCV RBC AUTO: 88 FL (ref 79–97)
MONOCYTES # BLD AUTO: 0.6 X10E3/UL (ref 0.1–0.9)
MONOCYTES NFR BLD AUTO: 7 %
NEUTROPHILS # BLD AUTO: 4.4 X10E3/UL (ref 1.4–7)
NEUTROPHILS NFR BLD AUTO: 54 %
PLATELET # BLD AUTO: 291 X10E3/UL (ref 150–450)
RBC # BLD AUTO: 5.18 X10E6/UL (ref 4.14–5.8)
WBC # BLD AUTO: 8 X10E3/UL (ref 3.4–10.8)

## 2022-03-15 NOTE — TELEPHONE ENCOUNTER
Pt emailed RD:  "Hello There  I was looking to order from [place] [employer] and I cant remember the voucher 20$ code    Thanks     Favio Suh from my iPhone"  ----    RD emailed pt back:   "Parveen Singleton! Looks like you didnt get a voucher yet but your Participant ID number will be [phone]  Ill get to your  to give you! Just use the coupon code [id number] and enter your Participant ID in the [facility] section of your online order  Let me know if you have any questions! Thanks for reaching out!    Kind regards,  Skylar Camacho "

## 2022-03-18 ENCOUNTER — PATIENT OUTREACH (OUTPATIENT)
Dept: SURGERY | Facility: CLINIC | Age: 47
End: 2022-03-18

## 2022-03-18 ENCOUNTER — TELEPHONE (OUTPATIENT)
Dept: SURGERY | Facility: CLINIC | Age: 47
End: 2022-03-18

## 2022-03-18 NOTE — TELEPHONE ENCOUNTER
[facility] contacted pt at 9:30 a m  to get acquainted, along with extending services and support, leaving a voice message since pt wasn't available at the time

## 2022-03-21 ENCOUNTER — TELEPHONE (OUTPATIENT)
Dept: SURGERY | Facility: CLINIC | Age: 47
End: 2022-03-21

## 2022-03-21 NOTE — TELEPHONE ENCOUNTER
Pt contacted [facility] at 1:45 p m  requesting assistance [facility]   It was explored current difficulties towards referral  ,DirectAddress_Unknown

## 2022-03-21 NOTE — TELEPHONE ENCOUNTER
[facility] contacted the [facility] at1:50 p m , leaving a voice message for Ronald  services' referral

## 2022-03-22 ENCOUNTER — OFFICE VISIT (OUTPATIENT)
Dept: SURGERY | Facility: CLINIC | Age: 47
End: 2022-03-22
Payer: COMMERCIAL

## 2022-03-22 VITALS
HEIGHT: 67 IN | WEIGHT: 197.8 LBS | OXYGEN SATURATION: 96 % | DIASTOLIC BLOOD PRESSURE: 92 MMHG | HEART RATE: 94 BPM | TEMPERATURE: 98.9 F | SYSTOLIC BLOOD PRESSURE: 142 MMHG | BODY MASS INDEX: 31.04 KG/M2

## 2022-03-22 DIAGNOSIS — B20 SYMPTOMATIC HIV INFECTION (HCC): Primary | ICD-10-CM

## 2022-03-22 DIAGNOSIS — F41.9 ANXIETY: ICD-10-CM

## 2022-03-22 DIAGNOSIS — E66.9 OBESITY (BMI 30.0-34.9): ICD-10-CM

## 2022-03-22 DIAGNOSIS — Z72.89 OTHER PROBLEMS RELATED TO LIFESTYLE: ICD-10-CM

## 2022-03-22 DIAGNOSIS — Z79.899 OTHER LONG TERM (CURRENT) DRUG THERAPY: ICD-10-CM

## 2022-03-22 DIAGNOSIS — Z13.1 SCREENING FOR DIABETES MELLITUS: ICD-10-CM

## 2022-03-22 DIAGNOSIS — D72.89 OTHER SPECIFIED DISORDERS OF WHITE BLOOD CELLS: ICD-10-CM

## 2022-03-22 DIAGNOSIS — I10 ESSENTIAL HYPERTENSION: ICD-10-CM

## 2022-03-22 DIAGNOSIS — Z20.2 CONTACT WITH AND (SUSPECTED) EXPOSURE TO INFECTIONS WITH A PREDOMINANTLY SEXUAL MODE OF TRANSMISSION: ICD-10-CM

## 2022-03-22 DIAGNOSIS — E78.5 DYSLIPIDEMIA: ICD-10-CM

## 2022-03-22 DIAGNOSIS — Z11.3 ENCOUNTER FOR SCREENING FOR BACTERIAL SEXUALLY TRANSMITTED DISEASE: ICD-10-CM

## 2022-03-22 PROBLEM — E66.811 OBESITY (BMI 30.0-34.9): Status: ACTIVE | Noted: 2022-03-22

## 2022-03-22 PROCEDURE — 99215 OFFICE O/P EST HI 40 MIN: CPT | Performed by: INTERNAL MEDICINE

## 2022-03-22 NOTE — PROGRESS NOTES
Progress Note - Infectious Disease   Claudetta Bones 51 y o  male MRN: [id number]  Unit/Bed#:  Encounter: [id number]      Impression/Plan:  1  HIV-improved with viral load now undetectable and a CD4 count of greater than  1000  Try changed to Dovato  Recheck labs in 4 weeks and follow up in 6 weeks  Stressed adherence      2  Anxiety and depression-treatment as per the primary   stopped all his medications and he seems to be doing better      3  Dyslipidemia-focus on diet and exercise for now with weight loss  On Lipitor     4  Obesity-still gaining weight   will continue stressed the importance of diet and exercise  change ART as above      5  Hypertension-improved blood pressure on hydrochlorothiazide and lisinopril but suboptimal   Monitor   Discussed with the primary  Sounds like the patient may not be easy adherent with taking his medications regularly  Stressed the importance of regularly taking his antihypertensives  Patient was provided medication, adherence and prevention education    Subjective:  Routine follow-up for HIV  Patient claims 100% adherence with Biktarvy    Patient denies any notable side effects  Overall the feeling well  The patient denies any fever chills or sweats, denies any nausea vomiting or diarrhea, denies any cough or shortness of breath  ROS:  A complete review of systems is negative other than that noted above in the subjective    Followup portions patient history reviewed and updated as: Allergies, current medications, past medical history, past social history, past surgical history, and the problem list    Objective:  Vitals:  Vitals:    03/22/22 1728   BP: 142/92   Pulse: 94   Temp: 98 9 °F (37 2 °C)   SpO2: 96%   Weight: 89 7 kg (197 lb 12 8 oz)   Height: 5' 7" (1 702 m)       Physical Exam:   General Appearance:  Alert, interactive, appearing well,  nontoxic, no acute distress     Neck:   Supple without lymphadenopathy, no thyromegaly or masses   Throat: Oropharynx moist without lesions  Lungs:   Clear to auscultation bilaterally; no wheezes, rhonchi or rales; respirations unlabored   Heart:  RRR; no murmur, rub or gallop   Abdomen:   Soft, non-tender, non-distended, positive bowel sounds  Extremities: No clubbing, cyanosis or edema   Skin: No new rashes or lesions  No draining wounds noted  Labs, Imaging, & Other studies:   All pertinent labs and imaging studies were personally reviewed    Lab Results   Component Value Date    K 4 2 03/14/2022     03/14/2022    CO2 26 03/14/2022    BUN 16 03/14/2022    CREATININE 1 21 03/14/2022    GLUF 92 11/02/2021    CALCIUM 9 2 03/14/2022    AST 27 03/14/2022    ALT 40 03/14/2022    ALKPHOS 56 03/14/2022    EGFR 70 03/14/2022     Lab Results   Component Value Date    WBC 7 94 03/14/2022    WBC 8 0 03/14/2022    HGB 16 0 03/14/2022    HGB 15 9 03/14/2022    HCT 45 4 03/14/2022    HCT 45 7 03/14/2022    MCV 87 03/14/2022    MCV 88 03/14/2022     03/14/2022     03/14/2022     Lab Results   Component Value Date    HEPCAB Non-reactive 05/12/2021     Lab Results   Component Value Date    HAV Reactive (A) 04/07/2020    HEPCAB Non-reactive 05/12/2021     Lab Results   Component Value Date    RPR Non-Reactive 05/12/2021     CD4 ABS   Date/Time Value Ref Range Status   03/14/2022 01:15 PM 1114 359 - 1519 /uL Final     HIV-1 RNA by PCR, Qn   Date/Time Value Ref Range Status   03/14/2022 01:15 PM 20 copies/mL Final     Comment:     The reportable range for this assay is 20 to 10,000,000  copies HIV-1 RNA/mL             Current Outpatient Medications:     atorvastatin (LIPITOR) 40 mg tablet, Take 1 tablet (40 mg total) by mouth daily, Disp: 30 tablet, Rfl: 2    bictegravir-emtricitab-tenofovir alafenamide (Biktarvy) -25 MG tablet, Take 1 tablet by mouth daily, Disp: 30 tablet, Rfl: 1    fluticasone (FLONASE) 50 mcg/act nasal spray, 1 spray into each nostril daily, Disp: 16 g, Rfl: 2   hydrochlorothiazide (HYDRODIURIL) 25 mg tablet, Take 1 tablet (25 mg total) by mouth daily, Disp: 30 tablet, Rfl: 5    lisinopril (ZESTRIL) 10 mg tablet, Take 1 tablet (10 mg total) by mouth daily, Disp: 30 tablet, Rfl: 5

## 2022-03-23 NOTE — PROGRESS NOTES
Assessment    Annual nutritional assessment    Clinical Data/Client History    HIV: yes  AIDS: no    : Shakeel Valiente    Socio- Economic Status: Eats Out, Cooks and has [employer] voucher, and uses regularly    Living Situation: condo    Food Prep/Access: Refrigerator, Stove and Microwave    Functional Status: Ambulatory, Able to [employer] and and does alot of the cooking along with partner and other family members  Activity Level: patient states he knows he needs to be more active  Oral Problems: Chewing Difficulty denies, Pain denies, Inability to Chew denies     Last Dental Exam: patient states he had dental appointment this week    Procedures Performed: routine cleaning    Typical Food/Beverage Intake:    · Breakfast "vegan" eggs& sausage, waffles or pancakes w/ syrup  · Lunch "Impossible burger" or veggie burger with fries  · Dinner rice and beans, fish, chicken or steak; cooked vegetables  · Snacks fresh fruit  · Fluids water, Crystal Light, Beer    Appetite: Excellent    Supplements: No no    Food Intolerance: patient admits he has some "bloating" since he is trying to eat more beans and vegetables       Weight Change Percent: 3 % weight gain    Time Period of Weight Change: 9 months     Usual Body Weight: 192 lbs, 6/1/21  Current Body Weight: 197 75; patient is 134 % of his IBW; Adjusted body weight: 160 lbs( 73 kg)    Nutrition Diagnosis    Problem: Unintended weight gain    Related to: Estimated intake inconsistent with measured nutritional needs    As Evidenced By: Patient Interview and Dietary Recall    Intervention Diet Prescription    Nutritional needs based on: 73 kg (Adjusted body weight)    · 1825 kcal  · 60  To 73 gm protein  · 1825 ml fluid  · 2 gm Na    Current intake: exceeds estimated needs     Nutrition Recommendations: reviewed    Goals: patient to increase physical activity    Nutrition Education Intervention: Provided     Person Educated: patient    Topics Discussed: benefits of increased physical activity; ways to decrease "bloating"; benefits of drinking more water & eating more slowly  Teaching Method: Verbal    Readiness to Change: Preparation:  (Getting ready to change)     Visit Summary    Annual nutritional assessment completed  Patient is aware that his lipid levels are elevated, & at first, stated that he is trying to "go Vegan", but admitted he eats "all foods"  Discussed portion control, and benefits of increased physical activity  He verbalized understanding  Will continue to monitor patient's nutritional status    Tucker Muhammad, CARMELON, LDN, CDCES

## 2022-04-27 ENCOUNTER — PATIENT OUTREACH (OUTPATIENT)
Dept: SURGERY | Facility: CLINIC | Age: 47
End: 2022-04-27

## 2022-04-27 NOTE — PROGRESS NOTES
Ct called cm  Per ct needs assistance with DHS application and wants to come in Friday @10am      Per cm ok

## 2022-04-29 ENCOUNTER — PATIENT OUTREACH (OUTPATIENT)
Dept: SURGERY | Facility: CLINIC | Age: 47
End: 2022-04-29

## 2022-04-29 DIAGNOSIS — B20 SYMPTOMATIC HIV INFECTION (HCC): ICD-10-CM

## 2022-04-29 RX ORDER — BICTEGRAVIR SODIUM, EMTRICITABINE, AND TENOFOVIR ALAFENAMIDE FUMARATE 50; 200; 25 MG/1; MG/1; MG/1
TABLET ORAL
Qty: 30 TABLET | Refills: 1 | Status: SHIPPED | OUTPATIENT
Start: 2022-04-29 | End: 2022-05-10 | Stop reason: HOSPADM

## 2022-04-29 NOTE — PROGRESS NOTES
Ct came in today  Ct needed assistance completing DHS application for health insurance renewal      Cm completed application and printed out supporting docs   (see media)

## 2022-04-30 ENCOUNTER — APPOINTMENT (OUTPATIENT)
Dept: LAB | Facility: CLINIC | Age: 47
End: 2022-04-30
Payer: COMMERCIAL

## 2022-04-30 DIAGNOSIS — B20 SYMPTOMATIC HIV INFECTION (HCC): ICD-10-CM

## 2022-04-30 LAB
ALBUMIN SERPL BCP-MCNC: 3.6 G/DL (ref 3.5–5)
ALP SERPL-CCNC: 54 U/L (ref 46–116)
ALT SERPL W P-5'-P-CCNC: 30 U/L (ref 12–78)
ANION GAP SERPL CALCULATED.3IONS-SCNC: 11 MMOL/L (ref 4–13)
AST SERPL W P-5'-P-CCNC: 23 U/L (ref 5–45)
BASOPHILS # BLD AUTO: 0.05 THOUSANDS/ΜL (ref 0–0.1)
BASOPHILS NFR BLD AUTO: 1 % (ref 0–1)
BILIRUB SERPL-MCNC: 0.52 MG/DL (ref 0.2–1)
BILIRUB UR QL STRIP: NEGATIVE
BUN SERPL-MCNC: 18 MG/DL (ref 5–25)
CALCIUM SERPL-MCNC: 9 MG/DL (ref 8.3–10.1)
CHLORIDE SERPL-SCNC: 106 MMOL/L (ref 100–108)
CLARITY UR: CLEAR
CO2 SERPL-SCNC: 23 MMOL/L (ref 21–32)
COLOR UR: YELLOW
CREAT SERPL-MCNC: 1.25 MG/DL (ref 0.6–1.3)
EOSINOPHIL # BLD AUTO: 0.1 THOUSAND/ΜL (ref 0–0.61)
EOSINOPHIL NFR BLD AUTO: 2 % (ref 0–6)
ERYTHROCYTE [DISTWIDTH] IN BLOOD BY AUTOMATED COUNT: 12.7 % (ref 11.6–15.1)
EST. AVERAGE GLUCOSE BLD GHB EST-MCNC: 105 MG/DL
GFR SERPL CREATININE-BSD FRML MDRD: 67 ML/MIN/1.73SQ M
GLUCOSE P FAST SERPL-MCNC: 137 MG/DL (ref 65–99)
GLUCOSE UR STRIP-MCNC: NEGATIVE MG/DL
HBA1C MFR BLD: 5.3 %
HCT VFR BLD AUTO: 46.8 % (ref 36.5–49.3)
HCV AB SER QL: NORMAL
HGB BLD-MCNC: 16.2 G/DL (ref 12–17)
HGB UR QL STRIP.AUTO: NEGATIVE
IMM GRANULOCYTES # BLD AUTO: 0.01 THOUSAND/UL (ref 0–0.2)
IMM GRANULOCYTES NFR BLD AUTO: 0 % (ref 0–2)
KETONES UR STRIP-MCNC: NEGATIVE MG/DL
LEUKOCYTE ESTERASE UR QL STRIP: NEGATIVE
LYMPHOCYTES # BLD AUTO: 2.45 THOUSANDS/ΜL (ref 0.6–4.47)
LYMPHOCYTES NFR BLD AUTO: 38 % (ref 14–44)
MCH RBC QN AUTO: 31 PG (ref 26.8–34.3)
MCHC RBC AUTO-ENTMCNC: 34.6 G/DL (ref 31.4–37.4)
MCV RBC AUTO: 90 FL (ref 82–98)
MONOCYTES # BLD AUTO: 0.45 THOUSAND/ΜL (ref 0.17–1.22)
MONOCYTES NFR BLD AUTO: 7 % (ref 4–12)
NEUTROPHILS # BLD AUTO: 3.37 THOUSANDS/ΜL (ref 1.85–7.62)
NEUTS SEG NFR BLD AUTO: 52 % (ref 43–75)
NITRITE UR QL STRIP: NEGATIVE
NRBC BLD AUTO-RTO: 0 /100 WBCS
PH UR STRIP.AUTO: 6 [PH]
PLATELET # BLD AUTO: 255 THOUSANDS/UL (ref 149–390)
PMV BLD AUTO: 11.1 FL (ref 8.9–12.7)
POTASSIUM SERPL-SCNC: 3.8 MMOL/L (ref 3.5–5.3)
PROT SERPL-MCNC: 7.4 G/DL (ref 6.4–8.2)
PROT UR STRIP-MCNC: NEGATIVE MG/DL
RBC # BLD AUTO: 5.22 MILLION/UL (ref 3.88–5.62)
SODIUM SERPL-SCNC: 140 MMOL/L (ref 136–145)
SP GR UR STRIP.AUTO: >=1.03 (ref 1–1.03)
UROBILINOGEN UR QL STRIP.AUTO: 0.2 E.U./DL
WBC # BLD AUTO: 6.43 THOUSAND/UL (ref 4.31–10.16)

## 2022-04-30 PROCEDURE — 87536 HIV-1 QUANT&REVRSE TRNSCRPJ: CPT | Performed by: INTERNAL MEDICINE

## 2022-04-30 PROCEDURE — 87491 CHLMYD TRACH DNA AMP PROBE: CPT | Performed by: INTERNAL MEDICINE

## 2022-04-30 PROCEDURE — 80053 COMPREHEN METABOLIC PANEL: CPT

## 2022-04-30 PROCEDURE — 83036 HEMOGLOBIN GLYCOSYLATED A1C: CPT | Performed by: INTERNAL MEDICINE

## 2022-04-30 PROCEDURE — 36415 COLL VENOUS BLD VENIPUNCTURE: CPT | Performed by: INTERNAL MEDICINE

## 2022-04-30 PROCEDURE — 85025 COMPLETE CBC W/AUTO DIFF WBC: CPT | Performed by: INTERNAL MEDICINE

## 2022-04-30 PROCEDURE — 86480 TB TEST CELL IMMUN MEASURE: CPT | Performed by: INTERNAL MEDICINE

## 2022-04-30 PROCEDURE — 81003 URINALYSIS AUTO W/O SCOPE: CPT | Performed by: INTERNAL MEDICINE

## 2022-04-30 PROCEDURE — 86592 SYPHILIS TEST NON-TREP QUAL: CPT | Performed by: INTERNAL MEDICINE

## 2022-04-30 PROCEDURE — 86360 T CELL ABSOLUTE COUNT/RATIO: CPT | Performed by: INTERNAL MEDICINE

## 2022-04-30 PROCEDURE — 86803 HEPATITIS C AB TEST: CPT | Performed by: INTERNAL MEDICINE

## 2022-04-30 PROCEDURE — 87591 N.GONORRHOEAE DNA AMP PROB: CPT | Performed by: INTERNAL MEDICINE

## 2022-05-01 LAB
BASOPHILS # BLD AUTO: 0.1 X10E3/UL (ref 0–0.2)
BASOPHILS NFR BLD AUTO: 1 %
CD3+CD4+ CELLS # BLD: 865 /UL (ref 359–1519)
CD3+CD4+ CELLS NFR BLD: 39.3 % (ref 30.8–58.5)
CD3+CD4+ CELLS/CD3+CD8+ CLL BLD: 1.14 % (ref 0.92–3.72)
CD3+CD8+ CELLS # BLD: 759 /UL (ref 109–897)
CD3+CD8+ CELLS NFR BLD: 34.5 % (ref 12–35.5)
EOSINOPHIL # BLD AUTO: 0.1 X10E3/UL (ref 0–0.4)
EOSINOPHIL NFR BLD AUTO: 1 %
ERYTHROCYTE [DISTWIDTH] IN BLOOD BY AUTOMATED COUNT: 13.1 % (ref 11.6–15.4)
HCT VFR BLD AUTO: 45.8 % (ref 37.5–51)
HGB BLD-MCNC: 15.4 G/DL (ref 13–17.7)
IMM GRANULOCYTES # BLD: 0 X10E3/UL (ref 0–0.1)
IMM GRANULOCYTES NFR BLD: 0 %
LYMPHOCYTES # BLD AUTO: 2.2 X10E3/UL (ref 0.7–3.1)
LYMPHOCYTES NFR BLD AUTO: 37 %
MCH RBC QN AUTO: 30.1 PG (ref 26.6–33)
MCHC RBC AUTO-ENTMCNC: 33.6 G/DL (ref 31.5–35.7)
MCV RBC AUTO: 90 FL (ref 79–97)
MONOCYTES # BLD AUTO: 0.5 X10E3/UL (ref 0.1–0.9)
MONOCYTES NFR BLD AUTO: 8 %
NEUTROPHILS # BLD AUTO: 3.3 X10E3/UL (ref 1.4–7)
NEUTROPHILS NFR BLD AUTO: 53 %
PLATELET # BLD AUTO: 240 X10E3/UL (ref 150–450)
RBC # BLD AUTO: 5.11 X10E6/UL (ref 4.14–5.8)
RPR SER QL: NORMAL
WBC # BLD AUTO: 6.1 X10E3/UL (ref 3.4–10.8)

## 2022-05-02 LAB
C TRACH DNA SPEC QL NAA+PROBE: NEGATIVE
GAMMA INTERFERON BACKGROUND BLD IA-ACNC: 0.05 IU/ML
M TB IFN-G BLD-IMP: NEGATIVE
M TB IFN-G CD4+ BCKGRND COR BLD-ACNC: 0 IU/ML
M TB IFN-G CD4+ BCKGRND COR BLD-ACNC: 0.01 IU/ML
MITOGEN IGNF BCKGRD COR BLD-ACNC: >10 IU/ML
N GONORRHOEA DNA SPEC QL NAA+PROBE: NEGATIVE

## 2022-05-03 LAB
HIV1 RNA # SERPL NAA+PROBE: 40 COPIES/ML
HIV1 RNA SERPL NAA+PROBE-LOG#: 1.6 LOG10COPY/ML

## 2022-05-04 ENCOUNTER — PATIENT OUTREACH (OUTPATIENT)
Dept: SURGERY | Facility: CLINIC | Age: 47
End: 2022-05-04

## 2022-05-10 ENCOUNTER — OFFICE VISIT (OUTPATIENT)
Dept: SURGERY | Facility: CLINIC | Age: 47
End: 2022-05-10
Payer: COMMERCIAL

## 2022-05-10 VITALS
WEIGHT: 193 LBS | HEIGHT: 67 IN | SYSTOLIC BLOOD PRESSURE: 132 MMHG | DIASTOLIC BLOOD PRESSURE: 97 MMHG | TEMPERATURE: 98.1 F | OXYGEN SATURATION: 95 % | HEART RATE: 87 BPM | BODY MASS INDEX: 30.29 KG/M2

## 2022-05-10 DIAGNOSIS — B20 SYMPTOMATIC HIV INFECTION (HCC): Primary | ICD-10-CM

## 2022-05-10 DIAGNOSIS — I10 ESSENTIAL HYPERTENSION: ICD-10-CM

## 2022-05-10 DIAGNOSIS — E66.9 OBESITY (BMI 30.0-34.9): ICD-10-CM

## 2022-05-10 DIAGNOSIS — E78.5 DYSLIPIDEMIA: ICD-10-CM

## 2022-05-10 DIAGNOSIS — F33.1 MODERATE EPISODE OF RECURRENT MAJOR DEPRESSIVE DISORDER (HCC): ICD-10-CM

## 2022-05-10 PROCEDURE — 99215 OFFICE O/P EST HI 40 MIN: CPT | Performed by: INTERNAL MEDICINE

## 2022-05-10 RX ORDER — DOLUTEGRAVIR SODIUM AND LAMIVUDINE 50; 300 MG/1; MG/1
1 TABLET, FILM COATED ORAL DAILY
Qty: 30 TABLET | Refills: 2 | Status: SHIPPED | OUTPATIENT
Start: 2022-05-10 | End: 2022-06-09

## 2022-05-10 NOTE — PROGRESS NOTES
Progress Note - Infectious Disease   Liu Harry 49 y o  male MRN: [id number]  Unit/Bed#:  Encounter: [id number]      Impression/Plan:  1  HIV-improved with viral load need undetectable and a CD4 count of greater than 800  Try changed to Dovato  Recheck labs in 4 weeks and follow up in 6 weeks  Stressed adherence      2  Anxiety and depression-treatment as per the primary   stopped all his medications and he seems to be doing better      3  Dyslipidemia-focus on diet and exercise for now with weight loss  On Lipitor     4  Obesity-has now lost a few lb  will continue stressed the importance of diet and exercise   change ART as above  s      5  Hypertension-still with suboptimal blood pressure control  Does not sound like he is very tear with blood pressure medication adherence   Discussed with the primary  stressed the importance of adherence with his blood pressure medication  Patient was provided medication, adherence and prevention education    Subjective:  Routine follow-up for HIV  Patient claims 100% adherence with Biktarvy   Patient denies any notable side effects  Overall the feeling well  The patient denies any fever chills or sweats, denies any nausea vomiting or diarrhea, denies any cough or shortness of breath  ROS:  A complete review of systems is negative other than that noted above in the subjective    Followup portions patient history reviewed and updated as: Allergies, current medications, past medical history, past social history, past surgical history, and the problem list    Objective:  Vitals:  Vitals:    05/10/22 1625   BP: 132/97   Pulse: 87   Temp: 98 1 °F (36 7 °C)   SpO2: 95%   Weight: 87 5 kg (193 lb)   Height: 5' 7" (1 702 m)       Physical Exam:   General Appearance:  Alert, interactive, appearing well,  nontoxic, no acute distress  Neck:   Supple without lymphadenopathy, no thyromegaly or masses   Throat: Oropharynx moist without lesions      Lungs:   Clear to auscultation bilaterally; no wheezes, rhonchi or rales; respirations unlabored   Heart:  RRR; no murmur, rub or gallop   Abdomen:   Soft, non-tender, non-distended, positive bowel sounds  Extremities: No clubbing, cyanosis or edema   Skin: No new rashes or lesions  No draining wounds noted  Labs, Imaging, & Other studies:   All pertinent labs and imaging studies were personally reviewed    Lab Results   Component Value Date    K 3 8 04/30/2022     04/30/2022    CO2 23 04/30/2022    BUN 18 04/30/2022    CREATININE 1 25 04/30/2022    GLUF 137 (H) 04/30/2022    CALCIUM 9 0 04/30/2022    AST 23 04/30/2022    ALT 30 04/30/2022    ALKPHOS 54 04/30/2022    EGFR 67 04/30/2022     Lab Results   Component Value Date    WBC 6 43 04/30/2022    WBC 6 1 04/30/2022    HGB 16 2 04/30/2022    HGB 15 4 04/30/2022    HCT 46 8 04/30/2022    HCT 45 8 04/30/2022    MCV 90 04/30/2022    MCV 90 04/30/2022     04/30/2022     04/30/2022     Lab Results   Component Value Date    HEPCAB Non-reactive 04/30/2022     Lab Results   Component Value Date    HAV Reactive (A) 04/07/2020    HEPCAB Non-reactive 04/30/2022     Lab Results   Component Value Date    RPR Non-Reactive 04/30/2022     CD4 ABS   Date/Time Value Ref Range Status   04/30/2022 10:34  359 - 1519 /uL Final     HIV-1 RNA by PCR, Qn   Date/Time Value Ref Range Status   04/30/2022 10:34 AM 40 copies/mL Final     Comment:     The reportable range for this assay is 20 to 10,000,000  copies HIV-1 RNA/mL             Current Outpatient Medications:     atorvastatin (LIPITOR) 40 mg tablet, Take 1 tablet (40 mg total) by mouth daily, Disp: 30 tablet, Rfl: 2    Biktarvy -25 MG tablet, TAKE 1 TABLET BY MOUTH EVERY DAY, Disp: 30 tablet, Rfl: 1    fluticasone (FLONASE) 50 mcg/act nasal spray, 1 spray into each nostril daily, Disp: 16 g, Rfl: 2    hydrochlorothiazide (HYDRODIURIL) 25 mg tablet, Take 1 tablet (25 mg total) by mouth daily, Disp: 30 tablet, Rfl: 5    lisinopril (ZESTRIL) 10 mg tablet, Take 1 tablet (10 mg total) by mouth daily, Disp: 30 tablet, Rfl: 5

## 2022-05-25 DIAGNOSIS — E78.5 DYSLIPIDEMIA: ICD-10-CM

## 2022-05-31 RX ORDER — ATORVASTATIN CALCIUM 40 MG/1
TABLET, FILM COATED ORAL
Qty: 30 TABLET | Refills: 2 | Status: SHIPPED | OUTPATIENT
Start: 2022-05-31

## 2022-06-06 ENCOUNTER — OFFICE VISIT (OUTPATIENT)
Dept: SURGERY | Facility: CLINIC | Age: 47
End: 2022-06-06
Payer: COMMERCIAL

## 2022-06-06 ENCOUNTER — PATIENT OUTREACH (OUTPATIENT)
Dept: SURGERY | Facility: CLINIC | Age: 47
End: 2022-06-06

## 2022-06-06 VITALS
OXYGEN SATURATION: 97 % | SYSTOLIC BLOOD PRESSURE: 122 MMHG | TEMPERATURE: 97.1 F | HEART RATE: 79 BPM | BODY MASS INDEX: 30.04 KG/M2 | DIASTOLIC BLOOD PRESSURE: 81 MMHG | WEIGHT: 191.4 LBS | HEIGHT: 67 IN

## 2022-06-06 DIAGNOSIS — B20 SYMPTOMATIC HIV INFECTION (HCC): ICD-10-CM

## 2022-06-06 DIAGNOSIS — I10 ESSENTIAL HYPERTENSION: ICD-10-CM

## 2022-06-06 DIAGNOSIS — Z12.11 SCREENING FOR COLON CANCER: Primary | ICD-10-CM

## 2022-06-06 PROCEDURE — 99214 OFFICE O/P EST MOD 30 MIN: CPT | Performed by: NURSE PRACTITIONER

## 2022-06-06 RX ORDER — LISINOPRIL AND HYDROCHLOROTHIAZIDE 12.5; 1 MG/1; MG/1
1 TABLET ORAL DAILY
Qty: 30 TABLET | Refills: 2 | Status: SHIPPED | OUTPATIENT
Start: 2022-06-06

## 2022-06-06 NOTE — PROGRESS NOTES
Assessment/Plan:    Essential hypertension  Blood pressure:   BP Readings from Last 3 Encounters:   22 122/81   05/10/22 132/97   22 142/92       BP improved  Ordered combined lisinopril-HCTZ to decrease pill burden  Educated on the following lifestyle modifications to lower BP and decrease cardiovascular risk factors  limit alcohol intake, reduce salt in diet, maintain a healthy weight, engage in 30 minutes of cardiovascular exercise daily, and not smoke  Symptomatic HIV infection ([facility] [address] )  No results found for: [id number]  CD4 ABS   Date/Time Value Ref Range Status   2022 10:34  359 - 1519 /uL Final     HIV-1 RNA by PCR, Qn   Date/Time Value Ref Range Status   2022 10:34 AM 40 copies/mL Final     Comment:     The reportable range for this assay is 20 to 10,000,000  copies HIV-1 RNA/mL  HIV-1 RNA Viral Load Log   Date/Time Value Ref Range Status   2022 10:34 AM 1 602 [id number]/mL Final         ART: Euna Race        Denies side effects  Stressed the importance of adherence  Continue follow up with ID clinic  Reviewed most recent labs, including Cd4 and viral load  Discussed the risks and benefits of treatment options, instructions for management, importance of treatment adherence, and reduction of risk factor  Educated on possible  medication side effects  Counseled on routes of HIV transmission, including the risk of  infection  Emphasized that viral suppression is the best method to prevent HIV transmission  At this time pt denies the need for HIV testing of anyone in their life  Total encounter time was 45 minutes  Greater then 20 minutes were spent on counseling and patient education  Pt voices understanding and agreement with treatment plan  Diagnoses and all orders for this visit:    Screening for colon cancer  -     Ambulatory referral to Gastroenterology;  Future    Essential hypertension  -     lisinopril-hydrochlorothiazide (Zestoretic) 10-12 5 MG per tablet; Take 1 tablet by mouth daily    Symptomatic HIV infection ([address] )          Subjective:      Patient ID: Guilherme Romo is a 49 y o  male  Markie Hernandez presents to the clinic today for one-month follow-up for elevated blood pressure  PMHx significant for HIV,HTN, hyperlipidemia, and seasonal allergies  Markie Hernandez   Lisinopril was increased to 10 mg and HCTZ was increased to 25 mg at last visit  Markie Hernandez is tolerating medication change and denies side effects  The following portions of the patient's history were reviewed and updated as appropriate: allergies, current medications, past family history, past medical history, past social history, past surgical history and problem list     Review of Systems   Constitutional: Negative for activity change, appetite change, chills, diaphoresis, fatigue, fever and unexpected weight change  HENT: Negative for congestion, dental problem, ear pain, hearing loss, mouth sores, rhinorrhea and sore throat  Eyes: Negative for pain, redness and visual disturbance  Respiratory: Negative for cough, shortness of breath and wheezing  Cardiovascular: Negative for chest pain, palpitations and leg swelling  Gastrointestinal: Negative for abdominal pain, constipation, diarrhea, nausea and vomiting  Endocrine: Negative for polydipsia, polyphagia and polyuria  Genitourinary: Negative for difficulty urinating, dysuria and hematuria  Musculoskeletal: Negative for arthralgias, back pain, joint swelling and myalgias  Skin: Negative for color change and rash  Neurological: Negative for seizures, syncope and headaches  Psychiatric/Behavioral: Negative for behavioral problems and suicidal ideas  All other systems reviewed and are negative          Objective:      /81   Pulse 79   Temp (!) 97 1 °F (36 2 °C)   Ht 5' 7" (1 702 m)   Wt 86 8 kg (191 lb 6 4 oz)   SpO2 97%   BMI 29 98 kg/m²       Lab Results Component Value Date    K 3 8 04/30/2022     04/30/2022    CO2 23 04/30/2022    BUN 18 04/30/2022    CREATININE 1 25 04/30/2022    GLUF 137 (H) 04/30/2022    CALCIUM 9 0 04/30/2022    AST 23 04/30/2022    ALT 30 04/30/2022    ALKPHOS 54 04/30/2022    EGFR 67 04/30/2022     Lab Results   Component Value Date    WBC 6 43 04/30/2022    WBC 6 1 04/30/2022    HGB 16 2 04/30/2022    HGB 15 4 04/30/2022    HCT 46 8 04/30/2022    HCT 45 8 04/30/2022    MCV 90 04/30/2022    MCV 90 04/30/2022     04/30/2022     04/30/2022     Lab Results   Component Value Date    HEPCAB Non-reactive 04/30/2022     Lab Results   Component Value Date    HAV Reactive (A) 04/07/2020    HEPCAB Non-reactive 04/30/2022     Lab Results   Component Value Date    RPR Non-Reactive 04/30/2022            Physical Exam  Constitutional:       General: He is not in acute distress  Appearance: He is well-developed  HENT:      Head: Normocephalic  Right Ear: External ear normal       Left Ear: External ear normal       Nose: Nose normal       Mouth/Throat:      Pharynx: No oropharyngeal exudate  Eyes:      General:         Right eye: No discharge  Left eye: No discharge  Conjunctiva/sclera: Conjunctivae normal       Pupils: Pupils are equal, round, and reactive to light  Neck:      Thyroid: No thyromegaly  Cardiovascular:      Rate and Rhythm: Normal rate and regular rhythm  Heart sounds: Normal heart sounds  No murmur heard  Pulmonary:      Effort: Pulmonary effort is normal       Breath sounds: Normal breath sounds  No wheezing  Abdominal:      General: Bowel sounds are normal       Palpations: Abdomen is soft  There is no mass  Tenderness: There is no abdominal tenderness  Musculoskeletal:         General: No tenderness  Normal range of motion  Cervical back: Normal range of motion  Lymphadenopathy:      Cervical: No cervical adenopathy  Skin:     General: Skin is warm and dry  Findings: No rash  Neurological:      Mental Status: He is alert and oriented to person, place, and time     Psychiatric:         Behavior: Behavior normal

## 2022-06-06 NOTE — ASSESSMENT & PLAN NOTE
Blood pressure:   BP Readings from Last 3 Encounters:   06/06/22 122/81   05/10/22 132/97   03/22/22 142/92       BP improved  Ordered combined lisinopril-HCTZ to decrease pill burden  Educated on the following lifestyle modifications to lower BP and decrease cardiovascular risk factors  limit alcohol intake, reduce salt in diet, maintain a healthy weight, engage in 30 minutes of cardiovascular exercise daily, and not smoke

## 2022-06-06 NOTE — PATIENT INSTRUCTIONS
-[employer] trail is part of the D&L trail behind the [employer] [facility] in [facility]:   The DASH (Dietary Approaches to Stop Hypertension) Eating Plan is designed to help prevent or lower high blood pressure  It can also help to lower LDL (bad) cholesterol and decrease your risk for heart disease  The plan is low in sodium, sugar, unhealthy fats, and total fat  It is high in potassium, calcium, magnesium, and fiber  These nutrients are added when you eat more fruits, vegetables, and whole grains  With the DASH eating plan, you need to eat a certain number of servings from each food group  This will help you get enough of certain nutrients and limit others  The amount of servings you should eat depends on how many calories you need  Your dietitian can help you create meal plans with the right number of servings for each food group  DISCHARGE INSTRUCTIONS:   What you need to know about sodium:  Your dietitian will tell you how much sodium is safe for you to have each day  People with high blood pressure should have no more than 1,500 to 2,300 mg of sodium in a day  A teaspoon (tsp) of salt has 2,300 mg of sodium  This may seem like a difficult goal, but small changes to the foods you eat can make a big difference  Your healthcare provider or dietitian can help you create a meal plan that follows your sodium limit  Read food labels  Food labels can help you choose foods that are low in sodium  The amount of sodium is listed in milligrams (mg)  The % Daily Value (DV) column tells you how much of your daily needs are met by 1 serving of the food for each nutrient listed  Choose foods that have less than 5% of the DV of sodium  These foods are considered low in sodium  Foods that have 20% or more of the DV of sodium are considered high in sodium  Avoid foods that have more than 300 mg of sodium in each serving   Choose foods that say low-sodium, reduced-sodium, or no salt added on the food label  Limit added salt  Do not salt food at the table if you add salt when you cook  Use herbs and spices, such as onions, garlic, and salt-free seasonings to add flavor  Try lemon or lime juice or vinegar to add a tart flavor  Use hot peppers or a small amount of hot pepper sauce to add a spicy flavor  Limit foods high in added salt, such as the following:    Seasonings made with salt, such as garlic salt, celery salt, onion salt, seasoned salt, meat tenderizers, and monosodium glutamate (MSG)    Miso soup and canned or dried soup mixes    Regular soy sauce, barbecue sauce, teriyaki sauce, steak sauce, Worcestershire sauce, and most flavored vinegars    Snack foods, such as salted chips, popcorn, pretzels, pork rinds, salted crackers, and salted nuts    Frozen foods, such as dinners, entrees, vegetables with sauces, and breaded meats    Ask about salt substitutes  Ask your healthcare provider if you may use salt substitutes  Some salt substitutes have ingredients that can be harmful if you have certain health conditions  Choose foods carefully at restaurants  Meals from restaurants, especially fast food restaurants, are often high in sodium  Some restaurants have nutrition information that tells you the amount of sodium in their foods  Ask to have your food prepared with less, or no salt  What you need to know about fats:  Healthy fats include unsaturated fats and omega-3 fatty acids  Unhealthy fats include saturated fats and trans fats    Include healthy fats, such as the following:      Cooking oils, such as soybean, canola, olive, or sunflower    Fatty fish, such as salmon, tuna, mackerel, or sardines    Flaxseed oil or ground flaxseed    ½ cup of cooked beans, such as black beans, kidney beans, or hdez beans    1½ ounces of low-sodium nuts, such as almonds or walnuts    Low-sugar, low-sodium peanut butter    Seeds such as magui seeds or sunflower seeds       Limit or do not have unhealthy fats, such as the following:      Foods that contain fat from animals, such as fatty meats, whole milk, butter, and cream    Shortening, stick margarine, palm oil, and coconut oil    Full-fat or creamy salad dressing    Creamy soup    Crackers, chips, and baked goods made with margarine or shortening    Foods that are fried in unhealthy fats    Gravy and sauces, such as Jeremy or cheese sauces    What you need to know about carbohydrates (carbs): All carbs break down into sugar  Complex carbs contain more fiber than simple carbs  This means complex carbs go into the bloodstream more slowly and cause less of a blood sugar spike  Try to include more complex carbs and fewer simple carbs  Include complex carbs, such as the followin slice of whole-grain bread    1 ounce of dry cereal that does not contain added sugar    ½ cup of cooked oatmeal    2 ounces of cooked whole-grain pasta    ½ cup of cooked brown rice    Limit or do not have simple carbs, such as the following:      [employer], such as doughnuts, pastries, and cookies    Mixes for cornbread and biscuits    White rice and pasta mixes, such as boxed macaroni and cheese    Instant and cold cereals that contain sugar    Jelly, jam, and ice cream that contain sugar    Condiments such as ketchup    Drinks high in sugar, such as soft drinks, lemonade, and fruit juice    What you need to know about vegetables and fruits:  Vegetables and fruits can be fresh, frozen, or canned  If possible, try to choose low-sodium canned options    Include a variety of vegetables and fruits, such as the followin medium apple, pear, or peach (about ½ cup chopped)    ½ small banana    ½ cup berries, such as blueberries, strawberries, or blackberries    1 cup of raw leafy greens, such as lettuce, spinach, kale, or khadra greens    ½ cup of frozen or canned (no added salt) vegetables, such as green beans    ½ cup of fresh, frozen, or canned fruit (canned in light syrup or fruit juice)    ½ cup of vegetable or fruit juice    Limit or do not have vegetables and fruits made in the following ways:      Frozen fruit such as cherries that have added sugar    Fruit in cream or butter sauce    Canned vegetables that are high in sodium    Sauerkraut, pickled vegetables, and other foods prepared in brine    Fried vegetables or vegetables in butter or high-fat sauces    What you need to know about protein foods: Include lean or low-fat protein foods, such as the following:      Poultry (chicken, turkey) with no skin    Fish (especially fatty fish, such as salmon, fresh tuna, or mackerel)    Lean beef and pork (loin, round, extra lean hamburger)    Egg whites and egg substitutes    1 cup of nonfat (skim) or 1% milk    1½ ounces of fat-free or low-fat cheese    6 ounces of nonfat or low-fat yogurt    Limit or do not have high-fat protein foods, such as the following:      Smoked or cured meat, such as corned beef, marcelino, ham, hot dogs, and sausage    Canned beans and canned meats or spreads, such as potted meats, sardines, anchovies, and imitation seafood    Deli or lunch meats, such as bologna, ham, turkey, and roast beef    High-fat meat (T-bone steak, regular hamburger, and ribs)    Whole eggs and egg yolks    Whole milk, 2% milk, and cream    Regular cheese and processed cheese    Other guidelines to follow:   Maintain a healthy weight  Your risk for heart disease is higher if you are overweight  Your healthcare provider may suggest that you lose weight if you are overweight  You can lose weight by eating fewer calories and foods that have added sugars and fat  The DASH meal plan can help you do this  Decrease calories by eating smaller portions at each meal and fewer snacks  Ask your healthcare provider for more information about how to lose weight  Exercise regularly  Regular exercise can help you reach or maintain a healthy weight   Regular exercise can also help decrease your blood pressure and improve your cholesterol levels  Get 30 minutes or more of moderate exercise each day of the week  To lose weight, get at least 60 minutes of exercise  Talk to your healthcare provider about the best exercise program for you  Limit alcohol  Women should limit alcohol to 1 drink a day  Men should limit alcohol to 2 drinks a day  A drink of alcohol is 12 ounces of beer, 5 ounces of wine, or 1½ ounces of liquor  For more information:   [facility] and [address]  [address] [address]  [place]ita [place] [address]  Phone: 1- 552 - 169-7542  Web Address: [facility] no    © 8[address] [facility] 2022 Information is for End User's use only and may not be sold, redistributed or otherwise used for commercial purposes  All illustrations and images included in CareNotes® are the copyrighted property of A D A M , Inc  or [address] [place], [place] [address]pino   The above information is an  only  It is not intended as medical advice for individual conditions or treatments  Talk to your doctor, nurse or pharmacist before following any medical regimen to see if it is safe and effective for you

## 2022-06-06 NOTE — PROGRESS NOTES
[facility] met with pt to make introductions and to complete PHQ-9  Pt scored 4 no signs or indications of depression/anxiety noted  Pt stated he just came back from a great vacation and he feels rested  Pt spoke of family issues, he moved away at a young age and feels he is loosing the connection with his family  Pt shared his brother recently had a birthday and he was not invited to the party  Pt stated it hurt to be ignored and he mentioned it to his brother but feels his feelings were brushed off  Pt spoke of building a family with his  and his husbands family  Pt stated he tried medication for his depression/anxiety but has stopped the medication, this was overseen by his PCP  Pt stated he has been utilizing calming interventions but did state he feels he is also using gambling to manage his anxiety/depression  Pt and [facility] spoke about the negative effects of gambling and pt stated he feels he has it under control but will check in with [facility] routinely  Pt is a never smoker       No signs of SI/HI Noe care provided. STAT lock changed. Penile dc & moderate swelling. Pt denies pain.

## 2022-06-06 NOTE — ASSESSMENT & PLAN NOTE
No results found for: [id number]  CD4 ABS   Date/Time Value Ref Range Status   2022 10:34  359 - 1519 /uL Final     HIV-1 RNA by PCR, Qn   Date/Time Value Ref Range Status   2022 10:34 AM 40 copies/mL Final     Comment:     The reportable range for this assay is 20 to 10,000,000  copies HIV-1 RNA/mL  HIV-1 RNA Viral Load Log   Date/Time Value Ref Range Status   2022 10:34 AM 1 602 [id number]/mL Final         ART: Gricelda Cardozo        Denies side effects  Stressed the importance of adherence  Continue follow up with ID clinic  Reviewed most recent labs, including Cd4 and viral load  Discussed the risks and benefits of treatment options, instructions for management, importance of treatment adherence, and reduction of risk factor  Educated on possible  medication side effects  Counseled on routes of HIV transmission, including the risk of  infection  Emphasized that viral suppression is the best method to prevent HIV transmission  At this time pt denies the need for HIV testing of anyone in their life  Total encounter time was 45 minutes  Greater then 20 minutes were spent on counseling and patient education  Pt voices understanding and agreement with treatment plan

## 2022-06-17 ENCOUNTER — PATIENT OUTREACH (OUTPATIENT)
Dept: SURGERY | Facility: OTHER | Age: 47
End: 2022-06-17

## 2022-06-24 ENCOUNTER — PATIENT OUTREACH (OUTPATIENT)
Dept: SURGERY | Facility: OTHER | Age: 47
End: 2022-06-24

## 2022-06-24 NOTE — PROGRESS NOTES
Cm mailed a letter to Ct, and included her business card      "Dear Nick Rebolledo Patient,                              You might be wondering why there are different staff at [address] [place] [address] when you arrive for your appointment or call the office  For the past year, [address] [place] [address] has encountered staff shortages  Some of our staff members have taken growth opportunities within other departments in [facility], some have welcomed new lives into the world, and some have taken a step back for personal reasons  Our biggest priority through all of this is YOU and continuing to provide the highest quality of care  In order to provide this continuity, we have various staff covering roles within our program to ensure we can support our patients though this time  We recognize the discomfort you might feel when seeing someone you do not recognize or hearing an unfamiliar voice on the phone, but please rest assured that all covering staff are part of our [address] [place] [address] team   There are multiple [facility] locations, and we pull staff from other locations so we can meet the needs of our patients  We truly appreciate your patience and flexibility during this time    Thank you!     Sincerely,     Your [facility] Team"

## 2022-06-29 ENCOUNTER — APPOINTMENT (OUTPATIENT)
Dept: LAB | Facility: CLINIC | Age: 47
End: 2022-06-29
Payer: COMMERCIAL

## 2022-06-29 LAB
ALBUMIN SERPL BCP-MCNC: 4.1 G/DL (ref 3.5–5)
ALP SERPL-CCNC: 47 U/L (ref 34–104)
ALT SERPL W P-5'-P-CCNC: 20 U/L (ref 7–52)
ANION GAP SERPL CALCULATED.3IONS-SCNC: 7 MMOL/L (ref 4–13)
AST SERPL W P-5'-P-CCNC: 24 U/L (ref 13–39)
BASOPHILS # BLD AUTO: 0.03 THOUSANDS/ΜL (ref 0–0.1)
BASOPHILS NFR BLD AUTO: 0 % (ref 0–1)
BILIRUB SERPL-MCNC: 0.58 MG/DL (ref 0.2–1)
BUN SERPL-MCNC: 21 MG/DL (ref 5–25)
CALCIUM SERPL-MCNC: 9.2 MG/DL (ref 8.4–10.2)
CHLORIDE SERPL-SCNC: 106 MMOL/L (ref 96–108)
CO2 SERPL-SCNC: 27 MMOL/L (ref 21–32)
CREAT SERPL-MCNC: 1.05 MG/DL (ref 0.6–1.3)
EOSINOPHIL # BLD AUTO: 0.13 THOUSAND/ΜL (ref 0–0.61)
EOSINOPHIL NFR BLD AUTO: 2 % (ref 0–6)
ERYTHROCYTE [DISTWIDTH] IN BLOOD BY AUTOMATED COUNT: 12.3 % (ref 11.6–15.1)
GFR SERPL CREATININE-BSD FRML MDRD: 83 ML/MIN/1.73SQ M
GLUCOSE SERPL-MCNC: 84 MG/DL (ref 65–140)
HCT VFR BLD AUTO: 46.3 % (ref 36.5–49.3)
HGB BLD-MCNC: 15.7 G/DL (ref 12–17)
IMM GRANULOCYTES # BLD AUTO: 0.03 THOUSAND/UL (ref 0–0.2)
IMM GRANULOCYTES NFR BLD AUTO: 0 % (ref 0–2)
LYMPHOCYTES # BLD AUTO: 2.46 THOUSANDS/ΜL (ref 0.6–4.47)
LYMPHOCYTES NFR BLD AUTO: 37 % (ref 14–44)
MCH RBC QN AUTO: 30.6 PG (ref 26.8–34.3)
MCHC RBC AUTO-ENTMCNC: 33.9 G/DL (ref 31.4–37.4)
MCV RBC AUTO: 90 FL (ref 82–98)
MONOCYTES # BLD AUTO: 0.6 THOUSAND/ΜL (ref 0.17–1.22)
MONOCYTES NFR BLD AUTO: 9 % (ref 4–12)
NEUTROPHILS # BLD AUTO: 3.46 THOUSANDS/ΜL (ref 1.85–7.62)
NEUTS SEG NFR BLD AUTO: 52 % (ref 43–75)
NRBC BLD AUTO-RTO: 0 /100 WBCS
PLATELET # BLD AUTO: 276 THOUSANDS/UL (ref 149–390)
PMV BLD AUTO: 10.8 FL (ref 8.9–12.7)
POTASSIUM SERPL-SCNC: 3.9 MMOL/L (ref 3.5–5.3)
PROT SERPL-MCNC: 7.4 G/DL (ref 6.4–8.4)
RBC # BLD AUTO: 5.13 MILLION/UL (ref 3.88–5.62)
SODIUM SERPL-SCNC: 140 MMOL/L (ref 135–147)
WBC # BLD AUTO: 6.71 THOUSAND/UL (ref 4.31–10.16)

## 2022-06-29 PROCEDURE — 85025 COMPLETE CBC W/AUTO DIFF WBC: CPT | Performed by: INTERNAL MEDICINE

## 2022-06-29 PROCEDURE — 80053 COMPREHEN METABOLIC PANEL: CPT | Performed by: INTERNAL MEDICINE

## 2022-06-29 PROCEDURE — 87536 HIV-1 QUANT&REVRSE TRNSCRPJ: CPT | Performed by: INTERNAL MEDICINE

## 2022-06-29 PROCEDURE — 36415 COLL VENOUS BLD VENIPUNCTURE: CPT | Performed by: INTERNAL MEDICINE

## 2022-06-29 PROCEDURE — 86360 T CELL ABSOLUTE COUNT/RATIO: CPT | Performed by: INTERNAL MEDICINE

## 2022-06-30 ENCOUNTER — PATIENT OUTREACH (OUTPATIENT)
Dept: SURGERY | Facility: CLINIC | Age: 47
End: 2022-06-30

## 2022-06-30 LAB
BASOPHILS # BLD AUTO: 0.1 X10E3/UL (ref 0–0.2)
BASOPHILS NFR BLD AUTO: 1 %
CD3+CD4+ CELLS # BLD: 926 /UL (ref 359–1519)
CD3+CD4+ CELLS NFR BLD: 38.6 % (ref 30.8–58.5)
CD3+CD4+ CELLS/CD3+CD8+ CLL BLD: 1.14 % (ref 0.92–3.72)
CD3+CD8+ CELLS # BLD: 811 /UL (ref 109–897)
CD3+CD8+ CELLS NFR BLD: 33.8 % (ref 12–35.5)
EOSINOPHIL # BLD AUTO: 0.1 X10E3/UL (ref 0–0.4)
EOSINOPHIL NFR BLD AUTO: 2 %
ERYTHROCYTE [DISTWIDTH] IN BLOOD BY AUTOMATED COUNT: 12.8 % (ref 11.6–15.4)
HCT VFR BLD AUTO: 46.9 % (ref 37.5–51)
HGB BLD-MCNC: 15.8 G/DL (ref 13–17.7)
IMM GRANULOCYTES # BLD: 0 X10E3/UL (ref 0–0.1)
IMM GRANULOCYTES NFR BLD: 0 %
LYMPHOCYTES # BLD AUTO: 2.4 X10E3/UL (ref 0.7–3.1)
LYMPHOCYTES NFR BLD AUTO: 37 %
MCH RBC QN AUTO: 30.9 PG (ref 26.6–33)
MCHC RBC AUTO-ENTMCNC: 33.7 G/DL (ref 31.5–35.7)
MCV RBC AUTO: 92 FL (ref 79–97)
MONOCYTES # BLD AUTO: 0.6 X10E3/UL (ref 0.1–0.9)
MONOCYTES NFR BLD AUTO: 9 %
NEUTROPHILS # BLD AUTO: 3.3 X10E3/UL (ref 1.4–7)
NEUTROPHILS NFR BLD AUTO: 51 %
PLATELET # BLD AUTO: 267 X10E3/UL (ref 150–450)
RBC # BLD AUTO: 5.12 X10E6/UL (ref 4.14–5.8)
WBC # BLD AUTO: 6.5 X10E3/UL (ref 3.4–10.8)

## 2022-06-30 NOTE — PROGRESS NOTES
CM called Ct to remind him of his appointment on July 5th 2022  Ct's phone went to voicemail and CM left a general message about upcoming appointment

## 2022-07-01 LAB
HIV1 RNA # SERPL NAA+PROBE: 30 COPIES/ML
HIV1 RNA SERPL NAA+PROBE-LOG#: 1.48 LOG10COPY/ML

## 2022-07-05 ENCOUNTER — OFFICE VISIT (OUTPATIENT)
Dept: SURGERY | Facility: CLINIC | Age: 47
End: 2022-07-05
Payer: COMMERCIAL

## 2022-07-05 VITALS
BODY MASS INDEX: 29.57 KG/M2 | HEART RATE: 76 BPM | SYSTOLIC BLOOD PRESSURE: 128 MMHG | WEIGHT: 188.4 LBS | HEIGHT: 67 IN | TEMPERATURE: 97.4 F | OXYGEN SATURATION: 80 % | DIASTOLIC BLOOD PRESSURE: 92 MMHG

## 2022-07-05 DIAGNOSIS — F33.1 MODERATE EPISODE OF RECURRENT MAJOR DEPRESSIVE DISORDER (HCC): ICD-10-CM

## 2022-07-05 DIAGNOSIS — B20 SYMPTOMATIC HIV INFECTION (HCC): Primary | ICD-10-CM

## 2022-07-05 DIAGNOSIS — I10 ESSENTIAL HYPERTENSION: ICD-10-CM

## 2022-07-05 DIAGNOSIS — E66.9 OBESITY (BMI 30.0-34.9): ICD-10-CM

## 2022-07-05 DIAGNOSIS — E78.5 DYSLIPIDEMIA: ICD-10-CM

## 2022-07-05 DIAGNOSIS — B20 HIV INFECTION, UNSPECIFIED SYMPTOM STATUS (HCC): Primary | ICD-10-CM

## 2022-07-05 PROCEDURE — 99215 OFFICE O/P EST HI 40 MIN: CPT | Performed by: INTERNAL MEDICINE

## 2022-07-05 NOTE — PROGRESS NOTES
Progress Note - Infectious Disease   Clarissa Vogt 49 y o  male MRN: [id number]  Unit/Bed#:  Encounter: [id number]      Impression/Plan:  1  HIV-doing well on ART with a near undetectable viral load and a CD4 count of greater than 900  Continue ART, recheck labs in 2 months, follow-up in 3 months  Stressed adherence     2  Anxiety and depression-treatment as per the primary   stopped all his medications and he seems to be doing better      3  Dyslipidemia-focus on diet and exercise for now with weight loss  On Lipitor     4  Obesity-has now lost lost an additional 5 lb since last visit  Continue stressed the importance of diet and exercise for weight loss     5  Hypertension-still with suboptimal blood pressure control  Does not sound like he is very tear with blood pressure medication adherence   Discussed with the primary   stressed the importance of adherence with his blood pressure medication  Patient was provided medication, adherence and prevention education    Subjective:  Routine follow-up for HIV  Patient claims 100% adherence with Dovato    Patient denies any notable side effects  Overall the feeling well  The patient denies any fever chills or sweats, denies any nausea vomiting or diarrhea, denies any cough or shortness of breath  ROS:  A complete review of systems is negative other than that noted above in the subjective    Followup portions patient history reviewed and updated as: Allergies, current medications, past medical history, past social history, past surgical history, and the problem list    Objective:  Vitals:  Vitals:    07/05/22 1801   BP: 128/92   Pulse: 76   Temp: (!) 97 4 °F (36 3 °C)   SpO2: (!) 80%   Weight: 85 5 kg (188 lb 6 4 oz)   Height: 5' 7" (1 702 m)       Physical Exam:   General Appearance:  Alert, interactive, appearing well,  nontoxic, no acute distress     Neck:   Supple without lymphadenopathy, no thyromegaly or masses   Throat: Oropharynx moist without lesions  Lungs:   Clear to auscultation bilaterally; no wheezes, rhonchi or rales; respirations unlabored   Heart:  RRR; no murmur, rub or gallop   Abdomen:   Soft, non-tender, non-distended, positive bowel sounds  Extremities: No clubbing, cyanosis or edema   Skin: No new rashes or lesions  No draining wounds noted  Labs, Imaging, & Other studies:   All pertinent labs and imaging studies were personally reviewed    Lab Results   Component Value Date    K 3 9 06/29/2022     06/29/2022    CO2 27 06/29/2022    BUN 21 06/29/2022    CREATININE 1 05 06/29/2022    GLUF 137 (H) 04/30/2022    CALCIUM 9 2 06/29/2022    AST 24 06/29/2022    ALT 20 06/29/2022    ALKPHOS 47 06/29/2022    EGFR 83 06/29/2022     Lab Results   Component Value Date    WBC 6 71 06/29/2022    WBC 6 5 06/29/2022    HGB 15 7 06/29/2022    HGB 15 8 06/29/2022    HCT 46 3 06/29/2022    HCT 46 9 06/29/2022    MCV 90 06/29/2022    MCV 92 06/29/2022     06/29/2022     06/29/2022     Lab Results   Component Value Date    HEPCAB Non-reactive 04/30/2022     Lab Results   Component Value Date    HAV Reactive (A) 04/07/2020    HEPCAB Non-reactive 04/30/2022     Lab Results   Component Value Date    RPR Non-Reactive 04/30/2022     CD4 ABS   Date/Time Value Ref Range Status   06/29/2022 11:12  359 - 1519 /uL Final     HIV-1 RNA by PCR, Qn   Date/Time Value Ref Range Status   06/29/2022 11:12 AM 30 copies/mL Final     Comment:     The reportable range for this assay is 20 to 10,000,000  copies HIV-1 RNA/mL             Current Outpatient Medications:     atorvastatin (LIPITOR) 40 mg tablet, TAKE 1 TABLET BY MOUTH EVERY DAY, Disp: 30 tablet, Rfl: 2    Dolutegravir-lamiVUDine  MG TABS, Take 30 caplets by mouth in the morning, Disp: 30 tablet, Rfl: 3    fluticasone (FLONASE) 50 mcg/act nasal spray, 1 spray into each nostril daily, Disp: 16 g, Rfl: 2    lisinopril-hydrochlorothiazide (Zestoretic) 10-12 5 MG per tablet, Take 1 tablet by mouth daily, Disp: 30 tablet, Rfl: 2

## 2022-07-11 NOTE — PROGRESS NOTES
Assessment/Plan: [facility] met with pt to make introductions and to complete PHQ-9  Pt scored 4 no signs or indications of depression/anxiety noted       Pt stated he just came back from a great vacation and he feels rested       Pt spoke of family issues, he moved away at a young age and feels he is loosing the connection with his family  Pt shared his brother recently had a birthday and he was not invited to the party  Pt stated it hurt to be ignored and he mentioned it to his brother but feels his feelings were brushed off  Pt spoke of building a family with his  and his husbands family       Pt stated he tried medication for his depression/anxiety but has stopped the medication, this was overseen by his PCP  Pt stated he has been utilizing calming interventions but did state he feels he is also using gambling to manage his anxiety/depression  Pt and BHS spoke about the negative effects of gambling and pt stated he feels he has it under control but will check in with S routinely       Pt is a never smoker       No signs of SI/HI    [facility]     Today patient present with   Chief Complaint   Patient presents with    Follow-up     Pt offers no c/o at this time  Patient would likely benefit from annual updated PHQ-9  Consider/focus/continue n/a  Stage of change:   Plan/ Behavioral Recommendations:  Annual PHQ-9      Diagnoses and all orders for this visit:    Screening for colon cancer  -     Ambulatory referral to Gastroenterology; Future    Essential hypertension  -     lisinopril-hydrochlorothiazide (Zestoretic) 10-12 5 MG per tablet; Take 1 tablet by mouth daily    Symptomatic HIV infection ([address] )          Subjective:     Patient ID: Denzel Medina is a 49 y o  male  HPI    History of Present Illness:      Patient is being seen for annual behavioral health assessment  Patient denies current behavioral health concerns      [unfilled]       Review of Systems      Objective: Physical Exam      [facility]    Orientation     Person: yes    Place: yes    Time: yes    Appearance    Well Developed: yes healthy    Uncomfortable: no    Normal Body Odor: yes    Smells of Feces: no    Smells of Urine: no    Disheveled: no    Well Nourished: yes weight WNL of ideal    Grooming Unkempt: no    Poor Eye Contact: no    Hirsute: no    Looks Tired: no    Acutely Exhausted: noappearance reflects stated age    Mood and Affect:     Appropriate: no    Euthymicyes    Irritable: no    Angry: no    Anxious: no    Depressed:no    Blunted:no    Labile: no    Restricted: no    Harm to Self or Others: pt denies SI/HI    Substance Abuse: pt denies

## 2022-07-21 ENCOUNTER — OFFICE VISIT (OUTPATIENT)
Dept: URGENT CARE | Facility: CLINIC | Age: 47
End: 2022-07-21
Payer: COMMERCIAL

## 2022-07-21 VITALS
WEIGHT: 188 LBS | TEMPERATURE: 97.3 F | RESPIRATION RATE: 20 BRPM | DIASTOLIC BLOOD PRESSURE: 81 MMHG | BODY MASS INDEX: 29.51 KG/M2 | HEART RATE: 63 BPM | SYSTOLIC BLOOD PRESSURE: 120 MMHG | HEIGHT: 67 IN

## 2022-07-21 DIAGNOSIS — M54.50 ACUTE BILATERAL LOW BACK PAIN WITHOUT SCIATICA: Primary | ICD-10-CM

## 2022-07-21 PROCEDURE — 96372 THER/PROPH/DIAG INJ SC/IM: CPT | Performed by: NURSE PRACTITIONER

## 2022-07-21 PROCEDURE — 99204 OFFICE O/P NEW MOD 45 MIN: CPT | Performed by: NURSE PRACTITIONER

## 2022-07-21 RX ORDER — METHOCARBAMOL 750 MG/1
TABLET, FILM COATED ORAL
Qty: 20 TABLET | Refills: 0 | Status: SHIPPED | OUTPATIENT
Start: 2022-07-21

## 2022-07-21 RX ORDER — KETOROLAC TROMETHAMINE 30 MG/ML
60 INJECTION, SOLUTION INTRAMUSCULAR; INTRAVENOUS ONCE
Status: COMPLETED | OUTPATIENT
Start: 2022-07-21 | End: 2022-07-21

## 2022-07-21 RX ADMIN — KETOROLAC TROMETHAMINE 60 MG: 30 INJECTION, SOLUTION INTRAMUSCULAR; INTRAVENOUS at 10:53

## 2022-07-21 NOTE — PROGRESS NOTES
[facility]        NAME: Jigna Moreno is a 49 y o  male  : 1975    MRN: [id number]  DATE: 2022  TIME: 10:51 AM    Assessment and Plan   Acute bilateral low back pain without sciatica [M54 50]  1  Acute bilateral low back pain without sciatica  ketorolac (TORADOL) 60 mg/2 mL IM injection 60 mg    methocarbamol (ROBAXIN) 750 mg tablet    Ambulatory Referral to Comprehensive Spine Program         Patient Instructions     --Avoid heavy lifting, frequent bending, prolonged sitting, other potentially exacerbating activities  --Moist heat (OTC Thermacare patches or hot water bottle)  --OTC muscle cream or pain patches (Salonpas, Tigerbalm, Lidoderm, Voltaren gel)  --Massage, stretching exercises per handout  --Injectable anti-inflammatory given  Should start working in 30-60 minutes and last 12-24 hours  Starting tomorrow, can begin OTC naproxen (Aleve) every 12 hours as needed for ongoing pain  --Muscle relaxant as prescribed  Use caution when taking muscle relaxant due to potential for sedation  Avoid operating heavy machinery, taking with alcohol  --Follow-up with physical therapy for ongoing symptoms  --Go to ER if you experience any worsening back pain and/or numbness/weakness of legs, change in bowel or bladder function, numbness in groin  Chief Complaint     Chief Complaint   Patient presents with    Back Pain     Started 3 days ago, no known injury but woke up with pain, pain is mid to lower back  Taking Motrin         History of Present Illness       Here with complaints of bilateral mid/lower back pain x 2 days  Woke up with it  Does not recall specific injury, but 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, 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     Aching, burning sensation  Increased with most trunk movements including bending, turning  Heat, Motrin helping minimally  Rates 9/10 at present  No radiation down legs or elsewhere     Mild left calf numbness yesterday, better today  No N/T/W otherwise  No urinary changes including dysuria, hematuria, leakage  No fever/chills, N/V  Denies prior back issues/injuries  Review of Systems   Review of Systems   Constitutional: Negative for fever  Gastrointestinal: Negative for nausea and vomiting  Musculoskeletal: Positive for back pain  Neurological: Negative for weakness and numbness  Current Medications       Current Outpatient Medications:     atorvastatin (LIPITOR) 40 mg tablet, TAKE 1 TABLET BY MOUTH EVERY DAY, Disp: 30 tablet, Rfl: 2    Dolutegravir-lamiVUDine  MG TABS, Take 30 caplets by mouth in the morning, Disp: 30 tablet, Rfl: 3    fluticasone (FLONASE) 50 mcg/act nasal spray, 1 spray into each nostril daily, Disp: 16 g, Rfl: 2    lisinopril-hydrochlorothiazide (Zestoretic) 10-12 5 MG per tablet, Take 1 tablet by mouth daily, Disp: 30 tablet, Rfl: 2    methocarbamol (ROBAXIN) 750 mg tablet, Take 1-2 tablets by mouth every 6 hours as needed for muscle pain/spasm, Disp: 20 tablet, Rfl: 0    Current Facility-Administered Medications:     ketorolac (TORADOL) 60 mg/2 mL IM injection 60 mg, 60 mg, Intramuscular, Once, CHERRI Bai    Current Allergies     Allergies as of 07/21/2022    (No Known Allergies)            The following portions of the patient's history were reviewed and updated as appropriate: allergies, current medications, past family history, past medical history, past social history, past surgical history and problem list      Past Medical History:   Diagnosis Date    Abscess of left groin     Dental decay     Depression     Elevated BP without diagnosis of hypertension     [facility]1998    mode of transmission: MSM    Hx of herpes zoster     Hypogonadism in male     Tear of right biceps muscle 2017       No past surgical history on file      Family History   Problem Relation Age of Onset    Hypertension Mother    Michael Brooksamarilis Hypertension Father     Heart attack Father          Medications have been verified  Objective   /81 (Patient Position: Sitting)   Pulse 63   Temp (!) 97 3 °F (36 3 °C) (Temporal)   Resp 20   Ht 5' 7" (1 702 m)   Wt 85 3 kg (188 lb)   BMI 29 44 kg/m²   No LMP for male patient  Physical Exam     Physical Exam  Constitutional:       General: He is not in acute distress  Appearance: He is not ill-appearing or toxic-appearing  Cardiovascular:      Pulses: Normal pulses  Pulmonary:      Effort: Pulmonary effort is normal    Abdominal:      Tenderness: There is no right CVA tenderness or left CVA tenderness  Musculoskeletal:         General: Tenderness present  No swelling, deformity or signs of injury  Comments: Mild and lower back with firm, tender PVM's bilaterally  No visualized abnormalities  No axial tenderness  Trunk AROM decreased 25% rotation and extension, 50% flexion with pain  Negative SLR    5/5 LE strength bilaterally (quad, plantar, great toe)  2+ patellar DTR's  Normal gait  Neurological:      General: No focal deficit present  Mental Status: He is alert  Motor: No weakness        Deep Tendon Reflexes: Reflexes normal    Psychiatric:         Mood and Affect: Mood normal

## 2022-07-21 NOTE — PATIENT INSTRUCTIONS
--Avoid heavy lifting, frequent bending, prolonged sitting, other potentially exacerbating activities  --Moist heat (OTC Thermacare patches or hot water bottle)  --OTC muscle cream or pain patches (Salonpas, Tigerbalm, Lidoderm, Voltaren gel)  --Massage, stretching exercises per handout  --Injectable anti-inflammatory given  Should start working in 30-60 minutes and last 12-24 hours  Starting tomorrow, can begin OTC naproxen (Aleve) every 12 hours as needed for ongoing pain  --Muscle relaxant as prescribed  Use caution when taking muscle relaxant due to potential for sedation  Avoid operating heavy machinery, taking with alcohol  --Follow-up with physical therapy for ongoing symptoms  --Go to ER if you experience any worsening back pain and/or numbness/weakness of legs, change in bowel or bladder function, numbness in groin

## 2022-07-22 ENCOUNTER — TELEPHONE (OUTPATIENT)
Dept: PHYSICAL THERAPY | Facility: OTHER | Age: 47
End: 2022-07-22

## 2022-07-29 ENCOUNTER — NURSE TRIAGE (OUTPATIENT)
Dept: PHYSICAL THERAPY | Facility: OTHER | Age: 47
End: 2022-07-29

## 2022-07-29 DIAGNOSIS — M54.50 ACUTE BILATERAL LOW BACK PAIN, UNSPECIFIED WHETHER SCIATICA PRESENT: Primary | ICD-10-CM

## 2022-07-29 NOTE — TELEPHONE ENCOUNTER
Additional Information   Negative: Has the patient had unexplained weight loss?  Negative: Does the patient have a fever?  Negative: Is the patient experiencing blood in sputum?  Negative: Is the patient experiencing urine retention?  Negative: Is the patient experiencing acute drop foot or paralysis?  Negative: Has the patient experienced major trauma? (fall from height, high speed collision, direct blow to spine) and is also experiencing nausea, light-headedness, or loss of consciousness?  Negative: Is this a chronic condition? Protocols used: SL AMB COMPREHENSIVE SPINE PROGRAM PROTOCOL    This RN did review in detail the Comprehensive Spine Program and what we can provide for their back pain  Patient is agreeable to being triaged by this RN and would like to proceed with Physical Therapy  Referral was placed for Physical Therapy at the [facility] site  Patients information was sent to the  to make evaluation appointment  Patient made aware that the PT office  will be calling to schedule the appointment  Patient was provided with the phone number to the PT office  No further questions and/or concerns were voiced by the patient at this time  Patient states understanding of the referral that was placed  Referral Closed

## 2022-07-29 NOTE — TELEPHONE ENCOUNTER
Additional Information   Negative: Is this related to a work injury?  Negative: Is this related to an MVA?  Negative: Are you currently recieving homecare services? 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

## 2022-08-08 ENCOUNTER — PATIENT OUTREACH (OUTPATIENT)
Dept: SURGERY | Facility: CLINIC | Age: 47
End: 2022-08-08

## 2022-08-18 ENCOUNTER — PATIENT OUTREACH (OUTPATIENT)
Dept: SURGERY | Facility: CLINIC | Age: 47
End: 2022-08-18

## 2022-08-26 ENCOUNTER — PATIENT OUTREACH (OUTPATIENT)
Dept: SURGERY | Facility: CLINIC | Age: 47
End: 2022-08-26

## 2022-08-29 NOTE — PROGRESS NOTES
Cm text Ct to update Ct that Cm has returned from leave and to let Cm if he needs anything  Provided patient with all results and recommendations.   Pt states symptoms are the same.  Recommended to schedule follow up with Aure Rodriguez NP.  Pt states she can't schedule right now, she will call back to schedule.

## 2022-09-02 ENCOUNTER — OFFICE VISIT (OUTPATIENT)
Dept: GASTROENTEROLOGY | Facility: CLINIC | Age: 47
End: 2022-09-02
Payer: COMMERCIAL

## 2022-09-02 VITALS
HEIGHT: 67 IN | SYSTOLIC BLOOD PRESSURE: 130 MMHG | BODY MASS INDEX: 28.41 KG/M2 | HEART RATE: 70 BPM | WEIGHT: 181 LBS | DIASTOLIC BLOOD PRESSURE: 103 MMHG

## 2022-09-02 DIAGNOSIS — Z12.11 ENCOUNTER FOR SCREENING COLONOSCOPY: Primary | ICD-10-CM

## 2022-09-02 DIAGNOSIS — Z12.11 SCREENING FOR COLON CANCER: ICD-10-CM

## 2022-09-02 PROCEDURE — 99204 OFFICE O/P NEW MOD 45 MIN: CPT | Performed by: INTERNAL MEDICINE

## 2022-09-02 NOTE — PATIENT INSTRUCTIONS
Scheduled date of colonoscopy (as of today):  Oct 24 2022  Physician performing colonoscopy: Jolene  Location of colonoscopy: [facility]  Bowel prep reviewed with patient: Miralax w/ Dulcolax  Instructions reviewed with patient by: Yazmin Jacobson   Clearances:  None

## 2022-09-02 NOTE — PROGRESS NOTES
Answers for HPI/ROS submitted by the patient on 9/1/2022  Progression since onset: resolved  Pain - numeric: 2/10  Radiates to: does not radiate  anorexia: No  arthralgias: No  belching: No  constipation: No  diarrhea: No  dysuria: No  fever: No  flatus: No  frequency: No  headaches: No  hematochezia: No  hematuria: No  melena: No  myalgias: No  nausea: No  weight loss: No  vomiting: No  Aggravated by: nothing  Relieved by: nothing    [facility] - Outpatient Consultation  Vasquez De Guzman 49 y o  male MRN: [id number]  Encounter: [id number]          ASSESSMENT AND PLAN:      1  Screening for colon cancer  [de-identified] year male referred to us for screening colonoscopy, he has a history of HIV infection currently on oral anti-retroviral treatment with viral load undetectable  He denies any chronic constipation, no chronic diarrhea, no melena, no rectal bleeding, no chronic abdominal pain, he complained occasional heartburn and bloating but denies any other GI complaint  He denies any family history of colorectal cancer  I discussed in detail about screening colonoscopy, risk and benefit of the procedure was discussed, patient will stay on clear liquid diet day before the procedure, he will drink low volume bowel prep in split dosing  Depend upon colonoscopy findings will discuss about further surveillance  - Ambulatory referral to Gastroenterology  - Colonoscopy; Future    2  Encounter for screening colonoscopy  Will stay on clear liquid diet day before the procedure, he will drink 238 g of MiraLax mixed with 2 L of Gatorade and drink in the split dosing and once he finished MiraLax with Gatorade and then he take 4 tablets of Dulcolax  - Colonoscopy; Future    3  Occasional heartburn and bloating-, advised for over-the-counter Tums or antacid    Denies any dysphagia or odynophagia    ______________________________________________________________________    HPI:  49 year male with history of hypertension, HIV infection, currently on oral anti-retroviral treatment, history of hyperlipidemia referred to us for screening colonoscopy, patient denies any GI symptoms, no constipation, no chronic diarrhea, no family history of colorectal cancer, no melena, no rectal bleeding, history of occasional heartburn but no dysphagia or odynophagia  Patient lost some weight  REVIEW OF SYSTEMS:    CONSTITUTIONAL: Denies any fever, chills, rigors, and weight loss  HEENT: No earache or tinnitus  Denies hearing loss or visual disturbances  CARDIOVASCULAR: No chest pain or palpitations  RESPIRATORY: Denies any cough, hemoptysis, shortness of breath or dyspnea on exertion  GASTROINTESTINAL: As noted in the History of Present Illness  GENITOURINARY: No problems with urination  Denies any hematuria or dysuria  NEUROLOGIC: No dizziness or vertigo, denies headaches  MUSCULOSKELETAL: Denies any muscle or joint pain  SKIN: Denies skin rashes or itching  ENDOCRINE: Denies excessive thirst  Denies intolerance to heat or cold  PSYCHOSOCIAL: Denies depression or anxiety  Denies any recent memory loss  Historical Information   Past Medical History:   Diagnosis Date    Abscess of left groin     Dental decay     Depression     Elevated BP without diagnosis of hypertension     HIV disease ([facility] ) 11/25/1998    mode of transmission: MSM    Hx of herpes zoster     Hypogonadism in male     Tear of right biceps muscle 2017     History reviewed  No pertinent surgical history    Social History   Social History     Substance and Sexual Activity   Alcohol Use Yes    Alcohol/week: 1 0 standard drink    Types: 1 Glasses of wine per week    Comment: 1 glass wine/day     Social History     Substance and Sexual Activity   Drug Use Never     Social History     Tobacco Use   Smoking Status Never Smoker   Smokeless Tobacco Never Used     Family History   Problem Relation Age of Onset    Hypertension Mother  Hypertension Father     Heart attack Father        Meds/Allergies       Current Outpatient Medications:     Dolutegravir-lamiVUDine  MG TABS    fluticasone (FLONASE) 50 mcg/act nasal spray    atorvastatin (LIPITOR) 40 mg tablet    lisinopril-hydrochlorothiazide (Zestoretic) 10-12 5 MG per tablet    methocarbamol (ROBAXIN) 750 mg tablet    No Known Allergies        Objective     Blood pressure (!) 130/103, pulse 70, height 5' 7" (1 702 m), weight 82 1 kg (181 lb)  Body mass index is 28 35 kg/m²  PHYSICAL EXAM:      General Appearance:   Alert, cooperative, no distress   HEENT:   Normocephalic, atraumatic, anicteric      Neck:  Supple, symmetrical, trachea midline   Lungs:   Clear to auscultation bilaterally; no rales, rhonchi or wheezing; respirations unlabored    Heart[de-identified]   Regular rate and rhythm; no murmur, rub, or gallop  Abdomen:   Soft, non-tender, non-distended; normal bowel sounds; no masses, no organomegaly    Genitalia:   Deferred    Rectal:   Deferred    Extremities:  No cyanosis, clubbing or edema    Pulses:  2+ and symmetric    Skin:  No jaundice, rashes, or lesions    Lymph nodes:  No palpable cervical lymphadenopathy        Lab Results:   No visits with results within 1 Day(s) from this visit     Latest known visit with results is:   Office Visit on 05/10/2022   Component Date Value    WBC 06/29/2022 6 71     RBC 06/29/2022 5 13     Hemoglobin 06/29/2022 15 7     Hematocrit 06/29/2022 46 3     MCV 06/29/2022 90     MCH 06/29/2022 30 6     MCHC 06/29/2022 33 9     RDW 06/29/2022 12 3     MPV 06/29/2022 10 8     Platelets 77/68/7353 276     nRBC 06/29/2022 0     Neutrophils Relative 06/29/2022 52     Immat GRANS % 06/29/2022 0     Lymphocytes Relative 06/29/2022 37     Monocytes Relative 06/29/2022 9     Eosinophils Relative 06/29/2022 2     Basophils Relative 06/29/2022 0     Neutrophils Absolute 06/29/2022 3 46     Immature Grans Absolute 06/29/2022 0 03     Lymphocytes Absolute 06/29/2022 2 46     Monocytes Absolute 06/29/2022 0 60     Eosinophils Absolute 06/29/2022 0 13     Basophils Absolute 06/29/2022 0 03     CD4 ABS 06/29/2022 926     CD8 T Cell Abs 06/29/2022 811     CD4/CD8 Ratio 06/29/2022 1 14     CD4 % [place] T Cell 06/29/2022 38 6     CD8 % Suppressor T Cell 06/29/2022 33 8     White Blood Cell Count 06/29/2022 6 5     Red Blood Cell Count 06/29/2022 5 12     Hemoglobin 06/29/2022 15 8     HCT 06/29/2022 46 9     MCV 06/29/2022 92     MCH 06/29/2022 30 9     MCHC 06/29/2022 33 7     RDW 06/29/2022 12 8     Platelet Count 12/26/7769 267     Neutrophils 06/29/2022 51     Lymphocytes 06/29/2022 37     Monocytes 06/29/2022 9     Eosinophils 06/29/2022 2     Basophils PCT 06/29/2022 1     Neutrophils (Absolute) 06/29/2022 3 3     Lymphocytes (Absolute) 06/29/2022 2 4     Monocytes (Absolute) 06/29/2022 0 6     Eosinophils (Absolute) 06/29/2022 0 1     Basophils ABS 06/29/2022 0 1     Immature Granulocytes 06/29/2022 0     Immature Granulocytes (A* 06/29/2022 0 0     Sodium 06/29/2022 140     Potassium 06/29/2022 3 9     Chloride 06/29/2022 106     CO2 06/29/2022 27     ANION GAP 06/29/2022 7     BUN 06/29/2022 21     Creatinine 06/29/2022 1 05     Glucose 06/29/2022 84     Calcium 06/29/2022 9 2     AST 06/29/2022 24     ALT 06/29/2022 20     Alkaline Phosphatase 06/29/2022 47     Total Protein 06/29/2022 7 4     Albumin 06/29/2022 4 1     Total Bilirubin 06/29/2022 0 58     eGFR 06/29/2022 83     HIV-1 RNA by PCR, Qn 06/29/2022 30     HIV-1 RNA Viral Load Log 06/29/2022 1 477          Radiology Results:   No results found

## 2022-09-19 DIAGNOSIS — J30.2 SEASONAL ALLERGIES: ICD-10-CM

## 2022-09-19 DIAGNOSIS — E78.5 DYSLIPIDEMIA: ICD-10-CM

## 2022-09-19 DIAGNOSIS — I10 ESSENTIAL HYPERTENSION: ICD-10-CM

## 2022-09-20 RX ORDER — LISINOPRIL AND HYDROCHLOROTHIAZIDE 12.5; 1 MG/1; MG/1
1 TABLET ORAL DAILY
Qty: 30 TABLET | Refills: 2 | Status: SHIPPED | OUTPATIENT
Start: 2022-09-20

## 2022-09-20 RX ORDER — FLUTICASONE PROPIONATE 50 MCG
SPRAY, SUSPENSION (ML) NASAL
Qty: 16 G | Refills: 2 | Status: SHIPPED | OUTPATIENT
Start: 2022-09-20

## 2022-09-20 RX ORDER — ATORVASTATIN CALCIUM 40 MG/1
TABLET, FILM COATED ORAL
Qty: 30 TABLET | Refills: 2 | Status: SHIPPED | OUTPATIENT
Start: 2022-09-20

## 2022-09-24 ENCOUNTER — OFFICE VISIT (OUTPATIENT)
Dept: URGENT CARE | Facility: CLINIC | Age: 47
End: 2022-09-24
Payer: COMMERCIAL

## 2022-09-24 VITALS
RESPIRATION RATE: 16 BRPM | DIASTOLIC BLOOD PRESSURE: 101 MMHG | WEIGHT: 181 LBS | HEIGHT: 67 IN | BODY MASS INDEX: 28.41 KG/M2 | HEART RATE: 81 BPM | TEMPERATURE: 98.6 F | OXYGEN SATURATION: 99 % | SYSTOLIC BLOOD PRESSURE: 155 MMHG

## 2022-09-24 DIAGNOSIS — L73.9 FOLLICULITIS: Primary | ICD-10-CM

## 2022-09-24 PROCEDURE — 99213 OFFICE O/P EST LOW 20 MIN: CPT | Performed by: FAMILY MEDICINE

## 2022-09-25 NOTE — PROGRESS NOTES
[facility]        NAME: Jigna Moreno is a 49 y o  male  : 1975    MRN: [id number]  DATE: 2022  TIME: 1:11 PM    Assessment and Plan   Folliculitis [E65 7]  1  Folliculitis           Patient Instructions     Folliculitis likely secondary to new shaver  Recommend cleansing area with soap and water  Continue to observe to ensure improvement  Follow up with PCP in 3-5 days  Proceed to  ER if symptoms worsen  Chief Complaint     Chief Complaint   Patient presents with    Allergies     Pt is not sure if he has a rash related to getting the small px vaccine  History of Present Illness       21-year-old male presents today complaining of multiple lesions on the body  He states that they appear to be pimples, which appeared a few days ago  He denies that is spreading  He denies any pain  He notes some mild improvement  He does note that he received a new body shaver, which he has been using  He denies any drainage present  Review of Systems   Review of Systems   Constitutional: Negative for chills and fever  HENT: Negative for congestion and sore throat  Eyes: Negative for discharge and itching  Respiratory: Negative for cough and wheezing  Gastrointestinal: Negative for abdominal pain, nausea and vomiting  Genitourinary: Negative for dysuria and flank pain  Musculoskeletal: Negative for arthralgias, myalgias and neck pain  Skin: Positive for rash  Allergic/Immunologic: Negative for food allergies  Neurological: Negative for light-headedness and headaches  Psychiatric/Behavioral: Negative for agitation  The patient is not nervous/anxious            Current Medications       Current Outpatient Medications:     atorvastatin (LIPITOR) 40 mg tablet, TAKE 1 TABLET BY MOUTH DAILY, Disp: 30 tablet, Rfl: 2    Dolutegravir-lamiVUDine  MG TABS, Take 30 caplets by mouth in the morning, Disp: 30 tablet, Rfl: 3    fluticasone (FLONASE) 50 mcg/act nasal spray, USE 1 SPRAY IN EACH NOSTRIL EVERY DAY, Disp: 16 g, Rfl: 2    lisinopril-hydrochlorothiazide (PRINZIDE,ZESTORETIC) 10-12 5 MG per tablet, TAKE 1 TABLET BY MOUTH DAILY, Disp: 30 tablet, Rfl: 2    methocarbamol (ROBAXIN) 750 mg tablet, Take 1-2 tablets by mouth every 6 hours as needed for muscle pain/spasm, Disp: 20 tablet, Rfl: 0    Current Allergies     Allergies as of 09/24/2022    (No Known Allergies)            The following portions of the patient's history were reviewed and updated as appropriate: allergies, current medications, past family history, past medical history, past social history, past surgical history and problem list      Past Medical History:   Diagnosis Date    Abscess of left groin     Dental decay     Depression     Elevated BP without diagnosis of hypertension     [facility]    mode of transmission: MSM    Hx of herpes zoster     Hypogonadism in male     Tear of right biceps muscle 2017       History reviewed  No pertinent surgical history  Family History   Problem Relation Age of Onset    Hypertension Mother     Hypertension Father     Heart attack Father          Medications have been verified  Objective   BP (!) 155/101   Pulse 81   Temp 98 6 °F (37 °C)   Resp 16   Ht 5' 7" (1 702 m)   Wt 82 1 kg (181 lb)   SpO2 99%   BMI 28 35 kg/m²   No LMP for male patient  Physical Exam     Physical Exam  Vitals reviewed  Constitutional:       General: He is not in acute distress  Appearance: Normal appearance  HENT:      Head: Normocephalic and atraumatic  Eyes:      Extraocular Movements: Extraocular movements intact  Conjunctiva/sclera: Conjunctivae normal    Pulmonary:      Effort: Pulmonary effort is normal  No respiratory distress  Skin:     General: Skin is warm and dry  Findings: Rash (follicultitis) present  Neurological:      General: No focal deficit present        Mental Status: He is alert    Psychiatric:         Mood and Affect: Mood normal          Behavior: Behavior normal          Thought Content:  Thought content normal          Judgment: Judgment normal

## 2022-09-29 ENCOUNTER — APPOINTMENT (OUTPATIENT)
Dept: LAB | Facility: CLINIC | Age: 47
End: 2022-09-29
Payer: COMMERCIAL

## 2022-09-29 LAB
ALBUMIN SERPL BCP-MCNC: 3.8 G/DL (ref 3.5–5)
ALP SERPL-CCNC: 49 U/L (ref 34–104)
ALT SERPL W P-5'-P-CCNC: 28 U/L (ref 7–52)
ANION GAP SERPL CALCULATED.3IONS-SCNC: 6 MMOL/L (ref 4–13)
AST SERPL W P-5'-P-CCNC: 36 U/L (ref 13–39)
BASOPHILS # BLD AUTO: 0.05 THOUSANDS/ΜL (ref 0–0.1)
BASOPHILS NFR BLD AUTO: 1 % (ref 0–1)
BILIRUB SERPL-MCNC: 0.55 MG/DL (ref 0.2–1)
BUN SERPL-MCNC: 17 MG/DL (ref 5–25)
CALCIUM SERPL-MCNC: 9.2 MG/DL (ref 8.4–10.2)
CHLORIDE SERPL-SCNC: 105 MMOL/L (ref 96–108)
CO2 SERPL-SCNC: 26 MMOL/L (ref 21–32)
CREAT SERPL-MCNC: 0.98 MG/DL (ref 0.6–1.3)
EOSINOPHIL # BLD AUTO: 0.08 THOUSAND/ΜL (ref 0–0.61)
EOSINOPHIL NFR BLD AUTO: 1 % (ref 0–6)
ERYTHROCYTE [DISTWIDTH] IN BLOOD BY AUTOMATED COUNT: 12.6 % (ref 11.6–15.1)
GFR SERPL CREATININE-BSD FRML MDRD: 90 ML/MIN/1.73SQ M
GLUCOSE SERPL-MCNC: 78 MG/DL (ref 65–140)
HCT VFR BLD AUTO: 44.9 % (ref 36.5–49.3)
HGB BLD-MCNC: 15.2 G/DL (ref 12–17)
IMM GRANULOCYTES # BLD AUTO: 0.03 THOUSAND/UL (ref 0–0.2)
IMM GRANULOCYTES NFR BLD AUTO: 0 % (ref 0–2)
LYMPHOCYTES # BLD AUTO: 2.7 THOUSANDS/ΜL (ref 0.6–4.47)
LYMPHOCYTES NFR BLD AUTO: 37 % (ref 14–44)
MCH RBC QN AUTO: 31 PG (ref 26.8–34.3)
MCHC RBC AUTO-ENTMCNC: 33.9 G/DL (ref 31.4–37.4)
MCV RBC AUTO: 91 FL (ref 82–98)
MONOCYTES # BLD AUTO: 0.48 THOUSAND/ΜL (ref 0.17–1.22)
MONOCYTES NFR BLD AUTO: 7 % (ref 4–12)
NEUTROPHILS # BLD AUTO: 3.91 THOUSANDS/ΜL (ref 1.85–7.62)
NEUTS SEG NFR BLD AUTO: 54 % (ref 43–75)
NRBC BLD AUTO-RTO: 0 /100 WBCS
PLATELET # BLD AUTO: 340 THOUSANDS/UL (ref 149–390)
PMV BLD AUTO: 10.7 FL (ref 8.9–12.7)
POTASSIUM SERPL-SCNC: 4.2 MMOL/L (ref 3.5–5.3)
PROT SERPL-MCNC: 7.2 G/DL (ref 6.4–8.4)
RBC # BLD AUTO: 4.91 MILLION/UL (ref 3.88–5.62)
SODIUM SERPL-SCNC: 137 MMOL/L (ref 135–147)
WBC # BLD AUTO: 7.25 THOUSAND/UL (ref 4.31–10.16)

## 2022-09-29 PROCEDURE — 87536 HIV-1 QUANT&REVRSE TRNSCRPJ: CPT | Performed by: INTERNAL MEDICINE

## 2022-09-29 PROCEDURE — 80053 COMPREHEN METABOLIC PANEL: CPT | Performed by: INTERNAL MEDICINE

## 2022-09-29 PROCEDURE — 36415 COLL VENOUS BLD VENIPUNCTURE: CPT | Performed by: INTERNAL MEDICINE

## 2022-09-29 PROCEDURE — 85025 COMPLETE CBC W/AUTO DIFF WBC: CPT | Performed by: INTERNAL MEDICINE

## 2022-09-29 PROCEDURE — 86360 T CELL ABSOLUTE COUNT/RATIO: CPT | Performed by: INTERNAL MEDICINE

## 2022-09-30 LAB
BASOPHILS # BLD AUTO: 0.1 X10E3/UL (ref 0–0.2)
BASOPHILS NFR BLD AUTO: 1 %
CD3+CD4+ CELLS # BLD: 909 /UL (ref 359–1519)
CD3+CD4+ CELLS NFR BLD: 30.3 % (ref 30.8–58.5)
CD3+CD4+ CELLS/CD3+CD8+ CLL BLD: 0.9 % (ref 0.92–3.72)
CD3+CD8+ CELLS # BLD: 1008 /UL (ref 109–897)
CD3+CD8+ CELLS NFR BLD: 33.6 % (ref 12–35.5)
EOSINOPHIL # BLD AUTO: 0.1 X10E3/UL (ref 0–0.4)
EOSINOPHIL NFR BLD AUTO: 1 %
ERYTHROCYTE [DISTWIDTH] IN BLOOD BY AUTOMATED COUNT: 12.4 % (ref 11.6–15.4)
HCT VFR BLD AUTO: 45.2 % (ref 37.5–51)
HGB BLD-MCNC: 15 G/DL (ref 13–17.7)
IMM GRANULOCYTES # BLD: 0 X10E3/UL (ref 0–0.1)
IMM GRANULOCYTES NFR BLD: 0 %
LYMPHOCYTES # BLD AUTO: 3 X10E3/UL (ref 0.7–3.1)
LYMPHOCYTES NFR BLD AUTO: 39 %
MCH RBC QN AUTO: 30.7 PG (ref 26.6–33)
MCHC RBC AUTO-ENTMCNC: 33.2 G/DL (ref 31.5–35.7)
MCV RBC AUTO: 92 FL (ref 79–97)
MONOCYTES # BLD AUTO: 0.5 X10E3/UL (ref 0.1–0.9)
MONOCYTES NFR BLD AUTO: 7 %
NEUTROPHILS # BLD AUTO: 4 X10E3/UL (ref 1.4–7)
NEUTROPHILS NFR BLD AUTO: 52 %
PLATELET # BLD AUTO: 335 X10E3/UL (ref 150–450)
RBC # BLD AUTO: 4.89 X10E6/UL (ref 4.14–5.8)
WBC # BLD AUTO: 7.7 X10E3/UL (ref 3.4–10.8)

## 2022-10-03 LAB
HIV1 RNA # SERPL NAA+PROBE: <20 COPIES/ML
HIV1 RNA SERPL NAA+PROBE-LOG#: NORMAL LOG10COPY/ML

## 2022-10-04 ENCOUNTER — PATIENT OUTREACH (OUTPATIENT)
Dept: SURGERY | Facility: CLINIC | Age: 47
End: 2022-10-04

## 2022-10-05 ENCOUNTER — OFFICE VISIT (OUTPATIENT)
Dept: SURGERY | Facility: CLINIC | Age: 47
End: 2022-10-05
Payer: COMMERCIAL

## 2022-10-05 VITALS
BODY MASS INDEX: 28.88 KG/M2 | HEART RATE: 74 BPM | WEIGHT: 184 LBS | DIASTOLIC BLOOD PRESSURE: 92 MMHG | SYSTOLIC BLOOD PRESSURE: 143 MMHG | HEIGHT: 67 IN | TEMPERATURE: 98.6 F | OXYGEN SATURATION: 95 %

## 2022-10-05 DIAGNOSIS — E66.9 OBESITY (BMI 30.0-34.9): ICD-10-CM

## 2022-10-05 DIAGNOSIS — I10 ESSENTIAL HYPERTENSION: ICD-10-CM

## 2022-10-05 DIAGNOSIS — B20 SYMPTOMATIC HIV INFECTION (HCC): Primary | ICD-10-CM

## 2022-10-05 PROCEDURE — 99214 OFFICE O/P EST MOD 30 MIN: CPT | Performed by: NURSE PRACTITIONER

## 2022-10-05 NOTE — ASSESSMENT & PLAN NOTE
No results found for: TA4XCZJ  CD4 ABS   Date/Time Value Ref Range Status   2022 11:17  359 - 1519 /uL Final     HIV-1 RNA by PCR, Qn   Date/Time Value Ref Range Status   2022 11:17 AM <20 copies/mL Final     Comment:     HIV-1 RNA detected  The reportable range for this assay is 20 to 10,000,000  copies HIV-1 RNA/mL  HIV-1 RNA Viral Load Log   Date/Time Value Ref Range Status   2022 11:17 AM COMMENT [id number]/mL Final     Comment:     Unable to calculate result since non-numeric result obtained for  component test          ART: Dovato        Denies side effects  Stressed the importance of adherence  Continue follow up with ID clinic  Reviewed most recent labs, including Cd4 and viral load  Discussed the risks and benefits of treatment options, instructions for management, importance of treatment adherence, and reduction of risk factor  Educated on possible  medication side effects  Counseled on routes of HIV transmission, including the risk of  infection  Emphasized that viral suppression is the best method to prevent HIV transmission  At this time pt denies the need for HIV testing of anyone in their life  Total encounter time was 45 minutes  Greater then 20 minutes were spent on counseling and patient education  Pt voices understanding and agreement with treatment plan

## 2022-10-05 NOTE — PROGRESS NOTES
Assessment/Plan:    Symptomatic HIV infection ([facility] [address] )  No results found for: [id number]  CD4 ABS   Date/Time Value Ref Range Status   2022 11:17  359 - 1519 /uL Final     HIV-1 RNA by PCR, Qn   Date/Time Value Ref Range Status   2022 11:17 AM <20 copies/mL Final     Comment:     HIV-1 RNA detected  The reportable range for this assay is 20 to 10,000,000  copies HIV-1 RNA/mL  HIV-1 RNA Viral Load Log   Date/Time Value Ref Range Status   2022 11:17 AM COMMENT [id number]/mL Final     Comment:     Unable to calculate result since non-numeric result obtained for  component test          ART: Dovato        Denies side effects  Stressed the importance of adherence  Continue follow up with ID clinic  Reviewed most recent labs, including Cd4 and viral load  Discussed the risks and benefits of treatment options, instructions for management, importance of treatment adherence, and reduction of risk factor  Educated on possible  medication side effects  Counseled on routes of HIV transmission, including the risk of  infection  Emphasized that viral suppression is the best method to prevent HIV transmission  At this time pt denies the need for HIV testing of anyone in their life  Total encounter time was 45 minutes  Greater then 20 minutes were spent on counseling and patient education  Pt voices understanding and agreement with treatment plan  Essential hypertension  Blood pressure: Currently not taking medication as directed  BP Readings from Last 3 Encounters:   10/05/22 143/92   22 (!) 155/101   22 (!) 130/103       Continue current antihypertensive  Educated on taking medication daily  Educated on the following lifestyle modifications to lower BP and decrease cardiovascular risk factors  limit alcohol intake, reduce salt in diet, maintain a healthy weight, engage in 30 minutes of cardiovascular exercise daily, and not smoke         Obesity (BMI 30 0-34  9)  Body mass index is 28 82 kg/m²  Wt Readings from Last 3 Encounters:   10/05/22 83 5 kg (184 lb)   09/24/22 82 1 kg (181 lb)   09/02/22 82 1 kg (181 lb)     Height: 5' 7" (170 2 cm)           Lab Results   Component Value Date    HGBA1C 5 3 04/30/2022     No results found for: GLUCOSE;    Educated on the health risks of obesity  Advised to lose weight  Encouraged eating a healthy diet and daily cardiac exercise  Recommended increasing fruits and vegetables and limiting processed foods  Refer to dietitian for additional education  Diagnoses and all orders for this visit:    Symptomatic HIV infection ([address] )  -     influenza vaccine, quadrivalent, recombinant, PF, 0 5 mL, for patients 18 yr+ (FLUBLOK)    Essential hypertension    Obesity (BMI 30 0-34  9)          Subjective:      Patient ID: Lev Summers is a 49 y o  male  Adelaida Salas presents to the clinic today for three-month follow-up for elevated blood pressure  PMHx significant for HIV,HTN, hyperlipidemia, and seasonal allergies  Adelaida Clark is doing well and has no acute complaints  The following portions of the patient's history were reviewed and updated as appropriate: allergies, current medications, past family history, past medical history, past social history, past surgical history and problem list     Review of Systems   Constitutional: Negative for chills and fever  HENT: Negative for ear pain and sore throat  Eyes: Negative for pain and visual disturbance  Respiratory: Negative for cough and shortness of breath  Cardiovascular: Negative for chest pain and palpitations  Gastrointestinal: Negative for abdominal pain and vomiting  Genitourinary: Negative for dysuria and hematuria  Musculoskeletal: Negative for arthralgias and back pain  Skin: Negative for color change and rash  Neurological: Negative for seizures and syncope  All other systems reviewed and are negative  Objective:      /92   Pulse 74   Temp 98 6 °F (37 °C)   Ht 5' 7" (1 702 m)   Wt 83 5 kg (184 lb)   SpO2 95%   BMI 28 82 kg/m²       Lab Results   Component Value Date    K 4 2 09/29/2022     09/29/2022    CO2 26 09/29/2022    BUN 17 09/29/2022    CREATININE 0 98 09/29/2022    GLUF 137 (H) 04/30/2022    CALCIUM 9 2 09/29/2022    AST 36 09/29/2022    ALT 28 09/29/2022    ALKPHOS 49 09/29/2022    EGFR 90 09/29/2022     Lab Results   Component Value Date    WBC 7 25 09/29/2022    WBC 7 7 09/29/2022    HGB 15 2 09/29/2022    HGB 15 0 09/29/2022    HCT 44 9 09/29/2022    HCT 45 2 09/29/2022    MCV 91 09/29/2022    MCV 92 09/29/2022     09/29/2022     09/29/2022     Lab Results   Component Value Date    HEPCAB Non-reactive 04/30/2022     Lab Results   Component Value Date    HAV Reactive (A) 04/07/2020    HEPCAB Non-reactive 04/30/2022     Lab Results   Component Value Date    RPR Non-Reactive 04/30/2022            Physical Exam  Constitutional:       General: He is not in acute distress  Appearance: He is well-developed  HENT:      Head: Normocephalic  Right Ear: External ear normal       Left Ear: External ear normal       Nose: Nose normal       Mouth/Throat:      Pharynx: No oropharyngeal exudate  Eyes:      General:         Right eye: No discharge  Left eye: No discharge  Conjunctiva/sclera: Conjunctivae normal       Pupils: Pupils are equal, round, and reactive to light  Neck:      Thyroid: No thyromegaly  Cardiovascular:      Rate and Rhythm: Normal rate and regular rhythm  Heart sounds: Normal heart sounds  No murmur heard  Pulmonary:      Effort: Pulmonary effort is normal       Breath sounds: Normal breath sounds  No wheezing  Abdominal:      General: Bowel sounds are normal       Palpations: Abdomen is soft  There is no mass  Tenderness: There is no abdominal tenderness  Musculoskeletal:         General: No tenderness   Normal range of motion  Cervical back: Normal range of motion  Lymphadenopathy:      Cervical: No cervical adenopathy  Skin:     General: Skin is warm and dry  Findings: No rash  Neurological:      Mental Status: He is alert and oriented to person, place, and time     Psychiatric:         Behavior: Behavior normal

## 2022-10-05 NOTE — ASSESSMENT & PLAN NOTE
Body mass index is 28 82 kg/m²  Wt Readings from Last 3 Encounters:   10/05/22 83 5 kg (184 lb)   09/24/22 82 1 kg (181 lb)   09/02/22 82 1 kg (181 lb)     Height: 5' 7" (170 2 cm)           Lab Results   Component Value Date    HGBA1C 5 3 04/30/2022     No results found for: GLUCOSE;    Educated on the health risks of obesity  Advised to lose weight  Encouraged eating a healthy diet and daily cardiac exercise  Recommended increasing fruits and vegetables and limiting processed foods  Refer to dietitian for additional education

## 2022-10-05 NOTE — ASSESSMENT & PLAN NOTE
Blood pressure: Currently not taking medication as directed  BP Readings from Last 3 Encounters:   10/05/22 143/92   09/24/22 (!) 155/101   09/02/22 (!) 130/103       Continue current antihypertensive  Educated on taking medication daily  Educated on the following lifestyle modifications to lower BP and decrease cardiovascular risk factors  limit alcohol intake, reduce salt in diet, maintain a healthy weight, engage in 30 minutes of cardiovascular exercise daily, and not smoke

## 2022-10-24 ENCOUNTER — ANESTHESIA (OUTPATIENT)
Dept: GASTROENTEROLOGY | Facility: HOSPITAL | Age: 47
End: 2022-10-24

## 2022-10-24 ENCOUNTER — ANESTHESIA EVENT (OUTPATIENT)
Dept: GASTROENTEROLOGY | Facility: HOSPITAL | Age: 47
End: 2022-10-24

## 2022-10-24 ENCOUNTER — HOSPITAL ENCOUNTER (OUTPATIENT)
Dept: GASTROENTEROLOGY | Facility: HOSPITAL | Age: 47
Setting detail: OUTPATIENT SURGERY
Discharge: HOME | End: 2022-10-24
Attending: INTERNAL MEDICINE
Payer: COMMERCIAL

## 2022-10-24 VITALS
HEART RATE: 70 BPM | SYSTOLIC BLOOD PRESSURE: 117 MMHG | RESPIRATION RATE: 16 BRPM | BODY MASS INDEX: 28.25 KG/M2 | DIASTOLIC BLOOD PRESSURE: 79 MMHG | HEIGHT: 67 IN | TEMPERATURE: 98.3 F | WEIGHT: 180 LBS | OXYGEN SATURATION: 98 %

## 2022-10-24 DIAGNOSIS — Z12.11 ENCOUNTER FOR SCREENING COLONOSCOPY: ICD-10-CM

## 2022-10-24 DIAGNOSIS — Z12.11 SCREENING FOR COLON CANCER: ICD-10-CM

## 2022-10-24 RX ORDER — SODIUM CHLORIDE, SODIUM LACTATE, POTASSIUM CHLORIDE, CALCIUM CHLORIDE 600; 310; 30; 20 MG/100ML; MG/100ML; MG/100ML; MG/100ML
125 INJECTION, SOLUTION INTRAVENOUS CONTINUOUS
Status: DISCONTINUED | OUTPATIENT
Start: 2022-10-24 | End: 2022-10-28 | Stop reason: HOSPADM

## 2022-10-24 RX ORDER — SODIUM CHLORIDE, SODIUM LACTATE, POTASSIUM CHLORIDE, CALCIUM CHLORIDE 600; 310; 30; 20 MG/100ML; MG/100ML; MG/100ML; MG/100ML
125 INJECTION, SOLUTION INTRAVENOUS CONTINUOUS
Status: CANCELLED | OUTPATIENT
Start: 2022-10-24

## 2022-10-24 RX ORDER — LIDOCAINE HYDROCHLORIDE 10 MG/ML
INJECTION, SOLUTION EPIDURAL; INFILTRATION; INTRACAUDAL; PERINEURAL AS NEEDED
Status: DISCONTINUED | OUTPATIENT
Start: 2022-10-24 | End: 2022-10-24

## 2022-10-24 RX ORDER — PROPOFOL 10 MG/ML
INJECTION, EMULSION INTRAVENOUS AS NEEDED
Status: DISCONTINUED | OUTPATIENT
Start: 2022-10-24 | End: 2022-10-24

## 2022-10-24 RX ORDER — SODIUM CHLORIDE, SODIUM LACTATE, POTASSIUM CHLORIDE, CALCIUM CHLORIDE 600; 310; 30; 20 MG/100ML; MG/100ML; MG/100ML; MG/100ML
INJECTION, SOLUTION INTRAVENOUS CONTINUOUS PRN
Status: DISCONTINUED | OUTPATIENT
Start: 2022-10-24 | End: 2022-10-24

## 2022-10-24 RX ADMIN — PROPOFOL 50 MG: 10 INJECTION, EMULSION INTRAVENOUS at 13:20

## 2022-10-24 RX ADMIN — SODIUM CHLORIDE, SODIUM LACTATE, POTASSIUM CHLORIDE, AND CALCIUM CHLORIDE: .6; .31; .03; .02 INJECTION, SOLUTION INTRAVENOUS at 13:07

## 2022-10-24 RX ADMIN — PROPOFOL 50 MG: 10 INJECTION, EMULSION INTRAVENOUS at 13:13

## 2022-10-24 RX ADMIN — PROPOFOL 50 MG: 10 INJECTION, EMULSION INTRAVENOUS at 13:23

## 2022-10-24 RX ADMIN — PROPOFOL 50 MG: 10 INJECTION, EMULSION INTRAVENOUS at 13:17

## 2022-10-24 RX ADMIN — LIDOCAINE HYDROCHLORIDE 50 MG: 10 INJECTION, SOLUTION EPIDURAL; INFILTRATION; INTRACAUDAL at 13:11

## 2022-10-24 RX ADMIN — PROPOFOL 100 MG: 10 INJECTION, EMULSION INTRAVENOUS at 13:11

## 2022-10-24 NOTE — ANESTHESIA PREPROCEDURE EVALUATION
Procedure:  COLONOSCOPY    Relevant Problems   CARDIO   (+) Essential hypertension      NEURO/PSYCH   (+) Anxiety   (+) Chronic tension-type headache, not intractable   (+) Moderate episode of recurrent major depressive disorder (HCC)        Physical Exam    Airway    Mallampati score: II  TM Distance: >3 FB  Neck ROM: full     Dental   No notable dental hx     Cardiovascular      Pulmonary      Other Findings        Anesthesia Plan  ASA Score- 3     Anesthesia Type- IV sedation with anesthesia with ASA Monitors  Additional Monitors:   Airway Plan:           Plan Factors-Exercise tolerance (METS): >4 METS  Chart reviewed  Patient summary reviewed  Patient is not a current smoker  There is medical exclusion for perioperative obstructive sleep apnea risk education  Induction- intravenous  Postoperative Plan-     Informed Consent- Anesthetic plan and risks discussed with patient  I personally reviewed this patient with the CRNA  Discussed and agreed on the Anesthesia Plan with the CRNA  Elnor

## 2022-10-24 NOTE — ANESTHESIA POSTPROCEDURE EVALUATION
Post-Op Assessment Note    CV Status:  Stable    Pain management: adequate     Mental Status:  Alert and awake   Hydration Status:  Euvolemic   PONV Controlled:  Controlled   Airway Patency:  Patent      Post Op Vitals Reviewed: Yes      Staff: CRNA         No complications documented      BP   122/78   Temp   98 3   Pulse  76   Resp   16   SpO2   99%

## 2022-10-24 NOTE — H&P
History and Physical -  [facility]  Bobby Fontaine 49 y o  male MRN: [id number]                  HPI: Bobby Fontaine is a 49y o  year old male who presents for screening colonoscopy      REVIEW OF SYSTEMS: Per the HPI, and otherwise unremarkable  Historical Information   Past Medical History:   Diagnosis Date   • Abscess of left groin    • Dental decay    • Depression    • Elevated BP without diagnosis of hypertension    • HIV disease ([facility] [address] ) 11/25/1998    mode of transmission: MSM   • Hx of herpes zoster    • Hypogonadism in male    • Tear of right biceps muscle 2017     History reviewed  No pertinent surgical history  Social History   Social History     Substance and Sexual Activity   Alcohol Use Yes   • Alcohol/week: 1 0 standard drink   • Types: 1 Glasses of wine per week    Comment: 1 glass wine/day     Social History     Substance and Sexual Activity   Drug Use Never     Social History     Tobacco Use   Smoking Status Never Smoker   Smokeless Tobacco Never Used     Family History   Problem Relation Age of Onset   • Hypertension Mother    • Hypertension Father    • Heart attack Father        Meds/Allergies     (Not in a hospital admission)      No Known Allergies    Objective     /89   Pulse 62   Temp 98 2 °F (36 8 °C) (Temporal)   Resp 21   Ht 5' 7" (1 702 m)   Wt 81 6 kg (180 lb)   SpO2 95%   BMI 28 19 kg/m²       PHYSICAL EXAM    Gen: NAD  CV: RRR  CHEST: Clear  ABD: soft, NT/ND  EXT: no edema      ASSESSMENT/PLAN:  This is a 49y o  year old male here for colonoscopy, and he is stable and optimized for his procedure

## 2022-10-25 ENCOUNTER — OFFICE VISIT (OUTPATIENT)
Dept: SURGERY | Facility: CLINIC | Age: 47
End: 2022-10-25
Payer: COMMERCIAL

## 2022-10-25 ENCOUNTER — PATIENT OUTREACH (OUTPATIENT)
Dept: SURGERY | Facility: CLINIC | Age: 47
End: 2022-10-25

## 2022-10-25 VITALS
HEART RATE: 74 BPM | BODY MASS INDEX: 28.6 KG/M2 | HEIGHT: 67 IN | SYSTOLIC BLOOD PRESSURE: 136 MMHG | TEMPERATURE: 99 F | WEIGHT: 182.2 LBS | DIASTOLIC BLOOD PRESSURE: 90 MMHG | OXYGEN SATURATION: 95 %

## 2022-10-25 DIAGNOSIS — Z13.220 ENCOUNTER FOR LIPID SCREENING FOR CARDIOVASCULAR DISEASE: ICD-10-CM

## 2022-10-25 DIAGNOSIS — D12.6 ADENOMATOUS POLYP OF COLON, UNSPECIFIED PART OF COLON: ICD-10-CM

## 2022-10-25 DIAGNOSIS — F41.9 ANXIETY: ICD-10-CM

## 2022-10-25 DIAGNOSIS — I10 ESSENTIAL HYPERTENSION: ICD-10-CM

## 2022-10-25 DIAGNOSIS — B20 SYMPTOMATIC HIV INFECTION (HCC): Primary | ICD-10-CM

## 2022-10-25 DIAGNOSIS — Z13.6 ENCOUNTER FOR LIPID SCREENING FOR CARDIOVASCULAR DISEASE: ICD-10-CM

## 2022-10-25 DIAGNOSIS — E78.5 DYSLIPIDEMIA: ICD-10-CM

## 2022-10-25 PROCEDURE — 99215 OFFICE O/P EST HI 40 MIN: CPT | Performed by: INTERNAL MEDICINE

## 2022-10-25 PROCEDURE — 90471 IMMUNIZATION ADMIN: CPT

## 2022-10-25 PROCEDURE — 90682 RIV4 VACC RECOMBINANT DNA IM: CPT

## 2022-10-25 NOTE — PROGRESS NOTES
Progress Note - Infectious Disease   [facility] 49 y o  male MRN: [id number]  Unit/Bed#:  Encounter: [id number]      Impression/Plan:  1  HIV-doing well on ART with a near undetectable viral load and a CD4 count of greater than 900  Continue ART, recheck labs in 5 months, follow-up in 6 months  Stressed adherence     2  Anxiety and depression-treatment as per the primary  Stopped all his medications and he seems to be doing better      3  Dyslipidemia-focus on diet and exercise for now with weight loss  On Lipitor  Recheck lipids with next labs     4  Obesity-has now lost  additional weight since last visit  Continue stressed the importance of diet and exercise for weight loss     5  Hypertension-still with suboptimal blood pressure control   Does not sound like he is very tear with blood pressure medication adherence   Discussed with the primary   stressed the importance of adherence with his blood pressure medication  6  Colonic polyp-patient just had colonoscopy and did well  Will need repeat colonoscopy in 5 years  Patient was provided medication, adherence and prevention education    Subjective:  Routine follow-up for HIV  Patient claims 100% adherence with Dovato  Patient denies any notable side effects  Overall the feeling well  The patient denies any fever chills or sweats, denies any nausea vomiting or diarrhea, denies any cough or shortness of breath  ROS:  A complete review of systems is negative other than that noted above in the subjective    Followup portions patient history reviewed and updated as:   Allergies, current medications, past medical history, past social history, past surgical history, and the problem list    Objective:  Vitals:  Vitals:    10/25/22 1755   BP: 136/90   Pulse: 74   Temp: 99 °F (37 2 °C)   SpO2: 95%   Weight: 82 6 kg (182 lb 3 2 oz)   Height: 5' 7" (1 702 m)       Physical Exam:   General Appearance:  Alert, interactive, appearing well, nontoxic, no acute distress  Neck:   Supple without lymphadenopathy, no thyromegaly or masses   Throat: Oropharynx moist without lesions  Lungs:   Clear to auscultation bilaterally; no wheezes, rhonchi or rales; respirations unlabored   Heart:  RRR; no murmur, rub or gallop   Abdomen:   Soft, non-tender, non-distended, positive bowel sounds  Extremities: No clubbing, cyanosis or edema   Skin: No new rashes or lesions  No draining wounds noted  Labs, Imaging, & Other studies:   All pertinent labs and imaging studies were personally reviewed    Lab Results   Component Value Date    K 4 2 09/29/2022     09/29/2022    CO2 26 09/29/2022    BUN 17 09/29/2022    CREATININE 0 98 09/29/2022    GLUF 137 (H) 04/30/2022    CALCIUM 9 2 09/29/2022    AST 36 09/29/2022    ALT 28 09/29/2022    ALKPHOS 49 09/29/2022    EGFR 90 09/29/2022     Lab Results   Component Value Date    WBC 7 25 09/29/2022    WBC 7 7 09/29/2022    HGB 15 2 09/29/2022    HGB 15 0 09/29/2022    HCT 44 9 09/29/2022    HCT 45 2 09/29/2022    MCV 91 09/29/2022    MCV 92 09/29/2022     09/29/2022     09/29/2022     Lab Results   Component Value Date    HEPCAB Non-reactive 04/30/2022     Lab Results   Component Value Date    HAV Reactive (A) 04/07/2020    HEPCAB Non-reactive 04/30/2022     Lab Results   Component Value Date    RPR Non-Reactive 04/30/2022     CD4 ABS   Date/Time Value Ref Range Status   09/29/2022 11:17  359 - 1519 /uL Final     HIV-1 RNA by PCR, Qn   Date/Time Value Ref Range Status   09/29/2022 11:17 AM <20 copies/mL Final     Comment:     HIV-1 RNA detected  The reportable range for this assay is 20 to 10,000,000  copies HIV-1 RNA/mL             Current Outpatient Medications:   •  atorvastatin (LIPITOR) 40 mg tablet, TAKE 1 TABLET BY MOUTH DAILY, Disp: 30 tablet, Rfl: 2  •  Dolutegravir-lamiVUDine  MG TABS, Take 30 caplets by mouth in the morning, Disp: 30 tablet, Rfl: 3  •  fluticasone (FLONASE) 50 mcg/act nasal spray, USE 1 SPRAY IN EACH NOSTRIL EVERY DAY, Disp: 16 g, Rfl: 2  •  lisinopril-hydrochlorothiazide (PRINZIDE,ZESTORETIC) 10-12 5 MG per tablet, TAKE 1 TABLET BY MOUTH DAILY, Disp: 30 tablet, Rfl: 2  •  methocarbamol (ROBAXIN) 750 mg tablet, Take 1-2 tablets by mouth every 6 hours as needed for muscle pain/spasm, Disp: 20 tablet, Rfl: 0  No current facility-administered medications for this visit      Facility-Administered Medications Ordered in Other Visits:   •  lactated ringers infusion, 125 mL/hr, Intravenous, Continuous, Sushma Valles MD

## 2022-10-27 ENCOUNTER — TELEPHONE (OUTPATIENT)
Dept: SURGERY | Facility: CLINIC | Age: 47
End: 2022-10-27

## 2022-10-27 NOTE — TELEPHONE ENCOUNTER
Attempted to call [employer] to discuss on going weight loss  Mailbox was full  Will try again another day

## 2022-10-31 ENCOUNTER — TELEPHONE (OUTPATIENT)
Dept: SURGERY | Facility: CLINIC | Age: 47
End: 2022-10-31

## 2022-10-31 NOTE — TELEPHONE ENCOUNTER
Pt called and requested that proof of his flu vaccine be emailed to him [email or web address]

## 2022-11-18 ENCOUNTER — PATIENT OUTREACH (OUTPATIENT)
Dept: SURGERY | Facility: CLINIC | Age: 47
End: 2022-11-18

## 2022-11-21 ENCOUNTER — PATIENT OUTREACH (OUTPATIENT)
Dept: SURGERY | Facility: CLINIC | Age: 47
End: 2022-11-21

## 2022-11-21 ENCOUNTER — TELEPHONE (OUTPATIENT)
Dept: OTHER | Facility: OTHER | Age: 47
End: 2022-11-21
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
## 2022-11-30 ENCOUNTER — APPOINTMENT (OUTPATIENT)
Dept: LAB | Facility: CLINIC | Age: 47
End: 2022-11-30

## 2022-11-30 ENCOUNTER — TELEPHONE (OUTPATIENT)
Dept: SURGERY | Facility: CLINIC | Age: 47
End: 2022-11-30

## 2022-11-30 DIAGNOSIS — B20 SYMPTOMATIC HIV INFECTION (HCC): Primary | ICD-10-CM

## 2022-11-30 LAB
ALBUMIN SERPL BCP-MCNC: 4.1 G/DL (ref 3.5–5)
ALP SERPL-CCNC: 47 U/L (ref 34–104)
ALT SERPL W P-5'-P-CCNC: 21 U/L (ref 7–52)
ANION GAP SERPL CALCULATED.3IONS-SCNC: 7 MMOL/L (ref 4–13)
AST SERPL W P-5'-P-CCNC: 25 U/L (ref 13–39)
BILIRUB SERPL-MCNC: 0.82 MG/DL (ref 0.2–1)
BUN SERPL-MCNC: 17 MG/DL (ref 5–25)
CALCIUM SERPL-MCNC: 9.5 MG/DL (ref 8.4–10.2)
CHLORIDE SERPL-SCNC: 105 MMOL/L (ref 96–108)
CHOLEST SERPL-MCNC: 197 MG/DL
CO2 SERPL-SCNC: 25 MMOL/L (ref 21–32)
CREAT SERPL-MCNC: 1.09 MG/DL (ref 0.6–1.3)
GFR SERPL CREATININE-BSD FRML MDRD: 79 ML/MIN/1.73SQ M
GLUCOSE P FAST SERPL-MCNC: 92 MG/DL (ref 65–99)
HDLC SERPL-MCNC: 59 MG/DL
LDLC SERPL CALC-MCNC: 115 MG/DL (ref 0–100)
POTASSIUM SERPL-SCNC: 3.9 MMOL/L (ref 3.5–5.3)
PROT SERPL-MCNC: 7.3 G/DL (ref 6.4–8.4)
SODIUM SERPL-SCNC: 137 MMOL/L (ref 135–147)
TRIGL SERPL-MCNC: 116 MG/DL

## 2022-12-01 ENCOUNTER — APPOINTMENT (OUTPATIENT)
Dept: LAB | Facility: CLINIC | Age: 47
End: 2022-12-01

## 2022-12-01 DIAGNOSIS — B20 SYMPTOMATIC HIV INFECTION (HCC): ICD-10-CM

## 2022-12-01 LAB
BASOPHILS # BLD AUTO: 0 X10E3/UL (ref 0–0.2)
BASOPHILS # BLD AUTO: 0.05 THOUSANDS/ÂΜL (ref 0–0.1)
BASOPHILS NFR BLD AUTO: 1 %
BASOPHILS NFR BLD AUTO: 1 % (ref 0–1)
CD3+CD4+ CELLS # BLD: 977 /UL (ref 359–1519)
CD3+CD4+ CELLS NFR BLD: 34.9 % (ref 30.8–58.5)
CD3+CD4+ CELLS/CD3+CD8+ CLL BLD: 1.03 % (ref 0.92–3.72)
CD3+CD8+ CELLS # BLD: 946 /UL (ref 109–897)
CD3+CD8+ CELLS NFR BLD: 33.8 % (ref 12–35.5)
EOSINOPHIL # BLD AUTO: 0.09 THOUSAND/ÂΜL (ref 0–0.61)
EOSINOPHIL # BLD AUTO: 0.1 X10E3/UL (ref 0–0.4)
EOSINOPHIL NFR BLD AUTO: 1 % (ref 0–6)
EOSINOPHIL NFR BLD AUTO: 2 %
ERYTHROCYTE [DISTWIDTH] IN BLOOD BY AUTOMATED COUNT: 12.7 % (ref 11.6–15.1)
ERYTHROCYTE [DISTWIDTH] IN BLOOD BY AUTOMATED COUNT: 13.1 % (ref 11.6–15.4)
HCT VFR BLD AUTO: 42.8 % (ref 37.5–51)
HCT VFR BLD AUTO: 45.4 % (ref 36.5–49.3)
HGB BLD-MCNC: 14.7 G/DL (ref 13–17.7)
HGB BLD-MCNC: 15.3 G/DL (ref 12–17)
IMM GRANULOCYTES # BLD AUTO: 0.03 THOUSAND/UL (ref 0–0.2)
IMM GRANULOCYTES # BLD: 0 X10E3/UL (ref 0–0.1)
IMM GRANULOCYTES NFR BLD AUTO: 0 % (ref 0–2)
IMM GRANULOCYTES NFR BLD: 0 %
LYMPHOCYTES # BLD AUTO: 2.65 THOUSANDS/ÂΜL (ref 0.6–4.47)
LYMPHOCYTES # BLD AUTO: 2.8 X10E3/UL (ref 0.7–3.1)
LYMPHOCYTES NFR BLD AUTO: 36 %
LYMPHOCYTES NFR BLD AUTO: 38 % (ref 14–44)
MCH RBC QN AUTO: 30.7 PG (ref 26.6–33)
MCH RBC QN AUTO: 30.9 PG (ref 26.8–34.3)
MCHC RBC AUTO-ENTMCNC: 33.7 G/DL (ref 31.4–37.4)
MCHC RBC AUTO-ENTMCNC: 34.3 G/DL (ref 31.5–35.7)
MCV RBC AUTO: 89 FL (ref 79–97)
MCV RBC AUTO: 92 FL (ref 82–98)
MONOCYTES # BLD AUTO: 0.53 THOUSAND/ÂΜL (ref 0.17–1.22)
MONOCYTES # BLD AUTO: 0.6 X10E3/UL (ref 0.1–0.9)
MONOCYTES NFR BLD AUTO: 7 %
MONOCYTES NFR BLD AUTO: 8 % (ref 4–12)
NEUTROPHILS # BLD AUTO: 3.58 THOUSANDS/ÂΜL (ref 1.85–7.62)
NEUTROPHILS # BLD AUTO: 4.3 X10E3/UL (ref 1.4–7)
NEUTROPHILS NFR BLD AUTO: 54 %
NEUTS SEG NFR BLD AUTO: 52 % (ref 43–75)
NRBC BLD AUTO-RTO: 0 /100 WBCS
PLATELET # BLD AUTO: 239 THOUSANDS/UL (ref 149–390)
PLATELET # BLD AUTO: 250 X10E3/UL (ref 150–450)
PMV BLD AUTO: 10.8 FL (ref 8.9–12.7)
RBC # BLD AUTO: 4.79 X10E6/UL (ref 4.14–5.8)
RBC # BLD AUTO: 4.95 MILLION/UL (ref 3.88–5.62)
WBC # BLD AUTO: 6.93 THOUSAND/UL (ref 4.31–10.16)
WBC # BLD AUTO: 7.9 X10E3/UL (ref 3.4–10.8)

## 2022-12-02 LAB
HIV1 RNA # SERPL NAA+PROBE: 80 COPIES/ML
HIV1 RNA SERPL NAA+PROBE-LOG#: 1.9 LOG10COPY/ML

## 2022-12-05 ENCOUNTER — OFFICE VISIT (OUTPATIENT)
Dept: SURGERY | Facility: CLINIC | Age: 47
End: 2022-12-05

## 2022-12-05 VITALS
HEART RATE: 78 BPM | DIASTOLIC BLOOD PRESSURE: 90 MMHG | HEIGHT: 67 IN | SYSTOLIC BLOOD PRESSURE: 140 MMHG | BODY MASS INDEX: 28.44 KG/M2 | TEMPERATURE: 98.5 F | OXYGEN SATURATION: 98 % | WEIGHT: 181.2 LBS

## 2022-12-05 DIAGNOSIS — E78.5 DYSLIPIDEMIA: ICD-10-CM

## 2022-12-05 DIAGNOSIS — B20 HIV INFECTION, UNSPECIFIED SYMPTOM STATUS (HCC): ICD-10-CM

## 2022-12-05 DIAGNOSIS — L85.3 DRY SKIN: ICD-10-CM

## 2022-12-05 DIAGNOSIS — J30.2 SEASONAL ALLERGIES: ICD-10-CM

## 2022-12-05 DIAGNOSIS — B20 SYMPTOMATIC HIV INFECTION (HCC): Primary | ICD-10-CM

## 2022-12-05 DIAGNOSIS — I10 ESSENTIAL HYPERTENSION: ICD-10-CM

## 2022-12-05 RX ORDER — ATORVASTATIN CALCIUM 40 MG/1
40 TABLET, FILM COATED ORAL DAILY
Qty: 30 TABLET | Refills: 2 | Status: SHIPPED | OUTPATIENT
Start: 2022-12-05

## 2022-12-05 RX ORDER — LISINOPRIL AND HYDROCHLOROTHIAZIDE 12.5; 1 MG/1; MG/1
1 TABLET ORAL DAILY
Qty: 30 TABLET | Refills: 2 | Status: SHIPPED | OUTPATIENT
Start: 2022-12-05

## 2022-12-05 RX ORDER — FLUTICASONE PROPIONATE 50 MCG
1 SPRAY, SUSPENSION (ML) NASAL DAILY
Qty: 16 G | Refills: 2 | Status: SHIPPED | OUTPATIENT
Start: 2022-12-05

## 2022-12-05 NOTE — ASSESSMENT & PLAN NOTE
Body mass index is 28 38 kg/m²  Wt Readings from Last 3 Encounters:   12/05/22 82 2 kg (181 lb 3 2 oz)   10/25/22 82 6 kg (182 lb 3 2 oz)   10/24/22 81 6 kg (180 lb)     Height: 5' 7" (170 2 cm)     Weight remains stable  Lab Results   Component Value Date    HGBA1C 5 3 04/30/2022     No results found for: GLUCOSE;    Educated on the health risks of obesity  Advised to lose weight  Encouraged eating a healthy diet and daily cardiac exercise  Recommended increasing fruits and vegetables and limiting processed foods  Refer to dietitian for additional education

## 2022-12-05 NOTE — PROGRESS NOTES
Assessment/Plan:    Essential hypertension  Blood pressure: Above goal    BP Readings from Last 3 Encounters:   12/05/22 140/90   10/25/22 136/90   10/24/22 117/79       Continue current antihypertensive  Did not take antihypertensives today  Educated on the importance of taking medication daily  Take blood pressure at home and call clinic blood pressure remains greater than  130/90  Educated on the following lifestyle modifications to lower BP and decrease cardiovascular risk factors  limit alcohol intake, reduce salt in diet, maintain a healthy weight, engage in 30 minutes of cardiovascular exercise daily, and not smoke  Dyslipidemia  Continue atorvastatin  Eat a low fat/ low cholesterol diet  BMI 28 0-28 9,adult  Body mass index is 28 38 kg/m²  Wt Readings from Last 3 Encounters:   12/05/22 82 2 kg (181 lb 3 2 oz)   10/25/22 82 6 kg (182 lb 3 2 oz)   10/24/22 81 6 kg (180 lb)     Height: 5' 7" (170 2 cm)     Weight remains stable  Lab Results   Component Value Date    HGBA1C 5 3 04/30/2022     No results found for: GLUCOSE;    Educated on the health risks of obesity  Advised to lose weight  Encouraged eating a healthy diet and daily cardiac exercise  Recommended increasing fruits and vegetables and limiting processed foods  Refer to dietitian for additional education  [facility]  No results found for: [id number]  CD4 ABS   Date/Time Value Ref Range Status   11/30/2022 11:05  359 - 1519 /uL Final     HIV-1 RNA by PCR, Qn   Date/Time Value Ref Range Status   11/30/2022 11:05 AM 80 copies/mL Final     Comment:     The reportable range for this assay is 20 to 10,000,000  copies HIV-1 RNA/mL  HIV-1 RNA Viral Load Log   Date/Time Value Ref Range Status   11/30/2022 11:05 AM 1 903 [id number]/mL Final         ART: Dovato  VL is 80  Benjie Booth has been taking supplements with medication    Educated to space medication from supplements by at least 4 hours Denies side effects  Stressed the importance of adherence  Continue follow up with ID clinic  Reviewed most recent labs, including Cd4 and viral load  Discussed the risks and benefits of treatment options, instructions for management, importance of treatment adherence, and reduction of risk factor  Educated on possible  medication side effects  Counseled on routes of HIV transmission, including the risk of  infection  Emphasized that viral suppression is the best method to prevent HIV transmission  At this time pt denies the need for HIV testing of anyone in their life  Total encounter time was 45 minutes  Greater then 20 minutes were spent on counseling and patient education  Pt voices understanding and agreement with treatment plan  Diagnoses and all orders for this visit:    Symptomatic HIV infection ([facility] [facility] )    Essential hypertension  -     lisinopril-hydrochlorothiazide (PRINZIDE,ZESTORETIC) 10-12 5 MG per tablet; Take 1 tablet by mouth daily    BMI 28 0-28 9,adult    Dry skin  -     Emollient (Aquaphor Advanced Therapy) OINT; Apply topically in the morning    Dyslipidemia  -     atorvastatin (LIPITOR) 40 mg tablet; Take 1 tablet (40 mg total) by mouth daily    HIV infection, unspecified symptom status (HCC)  -     Dolutegravir-lamiVUDine  MG TABS; Take 30 caplets by mouth in the morning    Seasonal allergies  -     fluticasone (FLONASE) 50 mcg/act nasal spray; 1 spray into each nostril daily          Subjective:      Patient ID: Parker Meigs is a 49 y o  male  Fredyphyllis Titog presents to the clinic today for three-month follow-up for elevated blood pressure  PMHx significant for HIV,HTN, hyperlipidemia, and seasonal allergies  Abdifatah Michaelcristobal is doing well and has no acute complaints         The following portions of the patient's history were reviewed and updated as appropriate: allergies, current medications, past family history, past medical history, past social history, past surgical history and problem list     Review of Systems   Constitutional: Negative for chills and fever  HENT: Negative for ear pain and sore throat  Eyes: Negative for pain and visual disturbance  Respiratory: Negative for cough and shortness of breath  Cardiovascular: Negative for chest pain and palpitations  Gastrointestinal: Negative for abdominal pain and vomiting  Genitourinary: Negative for dysuria and hematuria  Musculoskeletal: Negative for arthralgias and back pain  Skin: Negative for color change and rash  Neurological: Negative for seizures and syncope  All other systems reviewed and are negative  Objective:      /90   Pulse 78   Temp 98 5 °F (36 9 °C)   Ht 5' 7" (1 702 m)   Wt 82 2 kg (181 lb 3 2 oz)   SpO2 98%   BMI 28 38 kg/m²     Lab Results   Component Value Date    K 3 9 11/30/2022     11/30/2022    CO2 25 11/30/2022    BUN 17 11/30/2022    CREATININE 1 09 11/30/2022    GLUF 92 11/30/2022    CALCIUM 9 5 11/30/2022    AST 25 11/30/2022    ALT 21 11/30/2022    ALKPHOS 47 11/30/2022    EGFR 79 11/30/2022     Lab Results   Component Value Date    WBC 6 93 12/01/2022    HGB 15 3 12/01/2022    HCT 45 4 12/01/2022    MCV 92 12/01/2022     12/01/2022     Lab Results   Component Value Date    HEPCAB Non-reactive 04/30/2022     Lab Results   Component Value Date    HAV Reactive (A) 04/07/2020    HEPCAB Non-reactive 04/30/2022     Lab Results   Component Value Date    RPR Non-Reactive 04/30/2022              Physical Exam  Constitutional:       General: He is not in acute distress  Appearance: He is well-developed and well-nourished  HENT:      Head: Normocephalic  Right Ear: External ear normal       Left Ear: External ear normal       Nose: Nose normal       Mouth/Throat:      Mouth: Oropharynx is clear and moist       Pharynx: No oropharyngeal exudate  Eyes:      General:         Right eye: No discharge           Left eye: No discharge  Conjunctiva/sclera: Conjunctivae normal       Pupils: Pupils are equal, round, and reactive to light  Neck:      Thyroid: No thyromegaly  Cardiovascular:      Rate and Rhythm: Normal rate and regular rhythm  Pulses: Intact distal pulses  Heart sounds: Normal heart sounds  No murmur heard  Pulmonary:      Effort: Pulmonary effort is normal       Breath sounds: Normal breath sounds  No wheezing  Abdominal:      General: Bowel sounds are normal       Palpations: Abdomen is soft  There is no mass  Tenderness: There is no abdominal tenderness  Musculoskeletal:         General: No tenderness or edema  Normal range of motion  Cervical back: Normal range of motion  Lymphadenopathy:      Cervical: No cervical adenopathy  Skin:     General: Skin is warm and dry  Findings: No rash  Neurological:      Mental Status: He is alert and oriented to person, place, and time     Psychiatric:         Mood and Affect: Mood and affect normal          Behavior: Behavior normal

## 2022-12-05 NOTE — ASSESSMENT & PLAN NOTE
Blood pressure: Above goal    BP Readings from Last 3 Encounters:   12/05/22 140/90   10/25/22 136/90   10/24/22 117/79       Continue current antihypertensive  Did not take antihypertensives today  Educated on the importance of taking medication daily  Take blood pressure at home and call clinic blood pressure remains greater than  130/90  Educated on the following lifestyle modifications to lower BP and decrease cardiovascular risk factors  limit alcohol intake, reduce salt in diet, maintain a healthy weight, engage in 30 minutes of cardiovascular exercise daily, and not smoke

## 2022-12-05 NOTE — ASSESSMENT & PLAN NOTE
No results found for: BS1UEQT  CD4 ABS   Date/Time Value Ref Range Status   2022 11:05  359 - 1519 /uL Final     HIV-1 RNA by PCR, Qn   Date/Time Value Ref Range Status   2022 11:05 AM 80 copies/mL Final     Comment:     The reportable range for this assay is 20 to 10,000,000  copies HIV-1 RNA/mL  HIV-1 RNA Viral Load Log   Date/Time Value Ref Range Status   2022 11:05 AM 1 903 [id number]/mL Final         ART: Dovato  VL is 80  Alena Monsivais has been taking supplements with medication  Educated to space medication from supplements by at least 4 hours         Denies side effects  Stressed the importance of adherence  Continue follow up with ID clinic  Reviewed most recent labs, including Cd4 and viral load  Discussed the risks and benefits of treatment options, instructions for management, importance of treatment adherence, and reduction of risk factor  Educated on possible  medication side effects  Counseled on routes of HIV transmission, including the risk of  infection  Emphasized that viral suppression is the best method to prevent HIV transmission  At this time pt denies the need for HIV testing of anyone in their life  Total encounter time was 45 minutes  Greater then 20 minutes were spent on counseling and patient education  Pt voices understanding and agreement with treatment plan

## 2022-12-21 ENCOUNTER — PATIENT OUTREACH (OUTPATIENT)
Dept: SURGERY | Facility: CLINIC | Age: 47
End: 2022-12-21

## 2023-02-17 ENCOUNTER — PATIENT OUTREACH (OUTPATIENT)
Dept: SURGERY | Facility: CLINIC | Age: 48
End: 2023-02-17

## 2023-02-27 ENCOUNTER — PATIENT OUTREACH (OUTPATIENT)
Dept: SURGERY | Facility: CLINIC | Age: 48
End: 2023-02-27

## 2023-02-27 NOTE — PROGRESS NOTES
Cm called ct but there was no answer so cm left a vm reminding him of his appt on 3/6 and to try to schedule a recert on 3/6 as well because ct's recert is due on 6/43  Cm will try to call again later this week if ct does not call back

## 2023-03-01 ENCOUNTER — VBI (OUTPATIENT)
Dept: ADMINISTRATIVE | Facility: OTHER | Age: 48
End: 2023-03-01

## 2023-03-02 ENCOUNTER — PATIENT OUTREACH (OUTPATIENT)
Dept: SURGERY | Facility: CLINIC | Age: 48
End: 2023-03-02

## 2023-03-02 NOTE — PROGRESS NOTES
CT called CM back to figure out a date for his recert  CT reported that he will complete his lab work on Saturday so will most likely have to reschedule his clinic appt on 3/6  Ct reports he will come in for his recert on the day he also has his clinic appointment

## 2023-03-03 ENCOUNTER — PATIENT OUTREACH (OUTPATIENT)
Dept: SURGERY | Facility: CLINIC | Age: 48
End: 2023-03-03

## 2023-03-03 NOTE — PROGRESS NOTES
CM informed ct that he didn't need to complete his labs at this time  CT confirmed his recert for after his appt on 3/6  CM informed ct of what documents to bring to recert

## 2023-03-06 ENCOUNTER — OFFICE VISIT (OUTPATIENT)
Dept: SURGERY | Facility: CLINIC | Age: 48
End: 2023-03-06

## 2023-03-06 ENCOUNTER — DOCUMENTATION (OUTPATIENT)
Dept: SURGERY | Facility: CLINIC | Age: 48
End: 2023-03-06

## 2023-03-06 ENCOUNTER — VBI (OUTPATIENT)
Dept: ADMINISTRATIVE | Facility: OTHER | Age: 48
End: 2023-03-06

## 2023-03-06 ENCOUNTER — PATIENT OUTREACH (OUTPATIENT)
Dept: SURGERY | Facility: CLINIC | Age: 48
End: 2023-03-06

## 2023-03-06 VITALS
SYSTOLIC BLOOD PRESSURE: 129 MMHG | OXYGEN SATURATION: 96 % | DIASTOLIC BLOOD PRESSURE: 92 MMHG | TEMPERATURE: 99 F | WEIGHT: 185.6 LBS | BODY MASS INDEX: 29.13 KG/M2 | HEART RATE: 67 BPM | HEIGHT: 67 IN

## 2023-03-06 DIAGNOSIS — B20 HIV INFECTION, UNSPECIFIED SYMPTOM STATUS (HCC): ICD-10-CM

## 2023-03-06 DIAGNOSIS — N52.9 ERECTILE DYSFUNCTION, UNSPECIFIED ERECTILE DYSFUNCTION TYPE: ICD-10-CM

## 2023-03-06 DIAGNOSIS — Z72.51 HIGH RISK SEXUAL BEHAVIOR, UNSPECIFIED TYPE: ICD-10-CM

## 2023-03-06 DIAGNOSIS — J30.2 SEASONAL ALLERGIES: ICD-10-CM

## 2023-03-06 DIAGNOSIS — B20 SYMPTOMATIC HIV INFECTION (HCC): Primary | ICD-10-CM

## 2023-03-06 DIAGNOSIS — E78.5 DYSLIPIDEMIA: ICD-10-CM

## 2023-03-06 DIAGNOSIS — I10 ESSENTIAL HYPERTENSION: ICD-10-CM

## 2023-03-06 RX ORDER — LISINOPRIL AND HYDROCHLOROTHIAZIDE 12.5; 1 MG/1; MG/1
1 TABLET ORAL DAILY
Qty: 30 TABLET | Refills: 2 | Status: SHIPPED | OUTPATIENT
Start: 2023-03-06

## 2023-03-06 RX ORDER — FLUTICASONE PROPIONATE 50 MCG
1 SPRAY, SUSPENSION (ML) NASAL DAILY
Qty: 16 G | Refills: 2 | Status: SHIPPED | OUTPATIENT
Start: 2023-03-06

## 2023-03-06 RX ORDER — ATORVASTATIN CALCIUM 40 MG/1
40 TABLET, FILM COATED ORAL DAILY
Qty: 30 TABLET | Refills: 2 | Status: SHIPPED | OUTPATIENT
Start: 2023-03-06

## 2023-03-06 RX ORDER — TADALAFIL 10 MG/1
10 TABLET ORAL DAILY PRN
Qty: 30 TABLET | Refills: 0 | Status: SHIPPED | OUTPATIENT
Start: 2023-03-06

## 2023-03-06 NOTE — PROGRESS NOTES
Ct presented to [facility] in office  Ct's current housing is stable and living with his   Ct reports he has not been sexually active but has used protection and disclosed in the past  Ct is currently in good health  CM and Ct discussed at risk behaviors and importance of using protection and maintaining medication adherence  Ct is currently medically adherent and attending all of his appointments  Ct reports no mental health issues and declined a referral for mental health services  Ct has [place] for insurance  Ct states he needs to bring in his paystubs  Ct reports he currently does not have any issues with transportation and drives himself  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

## 2023-03-06 NOTE — PROGRESS NOTES
Assessment    Elvin seen by RD in conjunction with PCP f/u   Pt is established patient (last annual was 03/22/2022)     Clinical Data/Client History    CD4 count:  977  Viral load:  30  ART:   Dovato      , Patient Navigator: [address] assistance: [employer]    Living situation: House or apartment     Psychosocial factors: Depression     Mobility:  self ambulates    Physical activity: started working with a , goes to the gym twice a week       Oral problems: denied difficulties chewing and swallowing       Typical food/beverage intake:    · Breakfast Smoothie or muffin   · Lunch sandwich and chips   · Dinner pasta or pizza with a salad   · Snacks crackers & PB   · Beverages water, tea, coffee     Appetite: Excellent    Vitamin, mineral, herbal supplements: MVI    GI problems: denied n/v/d/c    Food allergies/intolerances:  Dairy     Weight history:   Wt Readings from Last 3 Encounters:   03/06/23 84 2 kg (185 lb 9 6 oz)   12/05/22 82 2 kg (181 lb 3 2 oz)   10/25/22 82 6 kg (182 lb 3 2 oz)     Current body weight:  185(3/06)  Height:  67"  BMI:  29(overweight)  IBW:  148  %IBW  122    Weight change: N/A    Nutrition-related labs:    Lab Results   Component Value Date    SODIUM 137 11/30/2022    K 3 9 11/30/2022     11/30/2022    CO2 25 11/30/2022    BUN 17 11/30/2022    CREATININE 1 09 11/30/2022    GLUC 78 09/29/2022    CALCIUM 9 5 11/30/2022       Nutrition-related medications: lipitor, lisinopril       Nutrition Diagnosis    Problem: overweight/obesity     Related to: lack of prior nutrition education     As Evidenced By: BMI  patient interview       Estimated Nutritional Needs    Lawrence Petersen REE: (84kg)    · ~1969(-500kcal for weight loss) kcal (based on: [place])  · ~67g protein (based on:  08)  · ~2100 fluid (based on: 25ml)      Current intake estimation: exceeds needs       Nutrition Intervention/Recommendations    Nutrition education intervention: provided    Nutrition recommendations: Start eating Myplate at each meal     Teaching Method: verbal     Person Educated: PT    Comprehension: excellent    Receptivity: excellent    Expected compliance: excellent    Collaboration/referral of nutrition care: Has mobile market coupon       Goals:  Gradual weight loss to healthy BMI  Include more produce in diet  Continue to exercise twice a week  Visit Summary  Pt expressed desire to lose weight in the past  Pt is feeling better now that he is exercising at the gym twice weekly  He does still feel like he needs to make diet changes  In the past he was vegan but has started eating animal products again  RD provided verbal & written education on Myplate & provided a calendar of events for the clinic   Will continue to monitor weight trend, labs, appetite & need for nutrition education    -Roxanne Rdz RDN,LDN

## 2023-03-06 NOTE — ASSESSMENT & PLAN NOTE
No results found for: [id number]  CD4 ABS   Date/Time Value Ref Range Status   2022 11:05  359 - 1519 /uL Final     HIV-1 RNA by PCR, Qn   Date/Time Value Ref Range Status   2022 11:05 AM 80 copies/mL Final     Comment:     The reportable range for this assay is 20 to 10,000,000  copies HIV-1 RNA/mL  HIV-1 RNA Viral Load Log   Date/Time Value Ref Range Status   2022 11:05 AM 1 903 [id number]/mL Final         ART: Jil Moreland        Denies side effects  Stressed the importance of adherence  Continue follow up with ID clinic  Reviewed most recent labs, including Cd4 and viral load  Discussed the risks and benefits of treatment options, instructions for management, importance of treatment adherence, and reduction of risk factor  Educated on possible  medication side effects  Counseled on routes of HIV transmission, including the risk of  infection  Emphasized that viral suppression is the best method to prevent HIV transmission  At this time pt denies the need for HIV testing of anyone in their life  Total encounter time was 45 minutes  Greater then 20 minutes were spent on counseling and patient education  Pt voices understanding and agreement with treatment plan

## 2023-03-06 NOTE — PROGRESS NOTES
Assessment/Plan:    Symptomatic HIV infection ([facility] [address] )  No results found for: [id number]  CD4 ABS   Date/Time Value Ref Range Status   2022 11:05  359 - 1519 /uL Final     HIV-1 RNA by PCR, Qn   Date/Time Value Ref Range Status   2022 11:05 AM 80 copies/mL Final     Comment:     The reportable range for this assay is 20 to 10,000,000  copies HIV-1 RNA/mL  HIV-1 RNA Viral Load Log   Date/Time Value Ref Range Status   2022 11:05 AM 1 903 [id number]/mL Final         ART: Allen Villanueva        Denies side effects  Stressed the importance of adherence  Continue follow up with ID clinic  Reviewed most recent labs, including Cd4 and viral load  Discussed the risks and benefits of treatment options, instructions for management, importance of treatment adherence, and reduction of risk factor  Educated on possible  medication side effects  Counseled on routes of HIV transmission, including the risk of  infection  Emphasized that viral suppression is the best method to prevent HIV transmission  At this time pt denies the need for HIV testing of anyone in their life  Total encounter time was 45 minutes  Greater then 20 minutes were spent on counseling and patient education  Pt voices understanding and agreement with treatment plan  Essential hypertension  Blood pressure:   BP Readings from Last 3 Encounters:   23 129/92   22 140/90   10/25/22 136/90       Continue current antihypertensive  Educated on the following lifestyle modifications to lower BP and decrease cardiovascular risk factors  limit alcohol intake, reduce salt in diet, maintain a healthy weight, engage in 30 minutes of cardiovascular exercise daily, and not smoke  BMI 28 0-28 9,adult  Body mass index is 29 07 kg/m²    Wt Readings from Last 3 Encounters:   23 84 2 kg (185 lb 9 6 oz)   22 82 2 kg (181 lb 3 2 oz)   10/25/22 82 6 kg (182 lb 3 2 oz)     Height: 5' 7" (170 2 cm)           Lab Results   Component Value Date    HGBA1C 5 3 04/30/2022     No results found for: GLUCOSE;    Educated on the health risks of obesity  Advised to lose weight  Encouraged eating a healthy diet and daily cardiac exercise  Recommended increasing fruits and vegetables and limiting processed foods  Refer to dietitian for additional education  Diagnoses and all orders for this visit:    Symptomatic HIV infection ([facility] [address] )  -     HIV-1 RNA, quantitative, PCR; Future  -     T-helper cells (CD4) count; Future  -     Comprehensive metabolic panel; Future  -     CBC and differential; Future  -     Quantiferon TB Gold Plus; Future    Essential hypertension  -     lisinopril-hydrochlorothiazide (PRINZIDE,ZESTORETIC) 10-12 5 MG per tablet; Take 1 tablet by mouth daily  -     Comprehensive metabolic panel; Future  -     CBC and differential; Future  -     HEMOGLOBIN A1C W/ EAG ESTIMATION; Future  -     UA w Reflex to Microscopic w Reflex to Culture; Future  -     Cancel: UA w Reflex to Microscopic w Reflex to Culture; Future    Dyslipidemia  -     atorvastatin (LIPITOR) 40 mg tablet; Take 1 tablet (40 mg total) by mouth daily    HIV infection, unspecified symptom status (HCC)  -     Dolutegravir-lamiVUDine  MG TABS; Take 30 caplets by mouth in the morning    Seasonal allergies  -     fluticasone (FLONASE) 50 mcg/act nasal spray; 1 spray into each nostril daily    High risk sexual behavior, unspecified type  -     RPR-Syphilis Screening (Total Syphilis IGG/IGM); Future  -     RPR-Syphilis Screening (Total Syphilis IGG/IGM); Future  -     Chlamydia/GC amplified DNA by PCR; Future    Erectile dysfunction, unspecified erectile dysfunction type  -     tadalafil (CIALIS) 10 MG tablet; Take 1 tablet (10 mg total) by mouth daily as needed for erectile dysfunction    BMI 28 0-28 9,adult          Subjective:      Patient ID: Margaret Sanchez is a 49 y o  male      Marissa Chawla presents to the clinic today for three-month follow-up for elevated blood pressure  PMHx significant for HIV,HTN, hyperlipidemia, and seasonal allergies  Emaline Dec is c/o erectile dysfunction  Erectile Dysfunction  Patient complains of erectile dysfunction  Onset of dysfunction was several days ago and was unknown in onset  Patient states the nature of difficulty is both attaining and maintaining erection  Full erections occur never  Partial erections occur with masturbation and spontaneously  Libido is not affected  Risk factors for ED include antihypertensive medications  Patient denies history of cardiovascular disease, diabetes mellitus and beta blocker use  Patient's expectations as to sexual function are to achieve an erection  Patient's description of relationship with partner is in a long-term relationship with current partner  Previous treatment of ED includes viagra, which caused headache  The following portions of the patient's history were reviewed and updated as appropriate: allergies, current medications, past family history, past medical history, past social history, past surgical history and problem list     Review of Systems   Constitutional: Negative for chills and fever  HENT: Negative for ear pain and sore throat  Eyes: Negative for pain and visual disturbance  Respiratory: Negative for cough and shortness of breath  Cardiovascular: Negative for chest pain and palpitations  Gastrointestinal: Negative for abdominal pain and vomiting  Genitourinary: Negative for dysuria and hematuria  Erectile dysfunction   Musculoskeletal: Negative for arthralgias and back pain  Skin: Negative for color change and rash  Neurological: Negative for seizures and syncope  All other systems reviewed and are negative          Objective:      /92   Pulse 67   Temp 99 °F (37 2 °C)   Ht 5' 7" (1 702 m)   Wt 84 2 kg (185 lb 9 6 oz)   SpO2 96%   BMI 29 07 kg/m²       Lab Results   Component Value Date    K 3 9 11/30/2022  11/30/2022    CO2 25 11/30/2022    BUN 17 11/30/2022    CREATININE 1 09 11/30/2022    GLUF 92 11/30/2022    CALCIUM 9 5 11/30/2022    AST 25 11/30/2022    ALT 21 11/30/2022    ALKPHOS 47 11/30/2022    EGFR 79 11/30/2022     Lab Results   Component Value Date    WBC 6 93 12/01/2022    HGB 15 3 12/01/2022    HCT 45 4 12/01/2022    MCV 92 12/01/2022     12/01/2022     Lab Results   Component Value Date    HEPCAB Non-reactive 04/30/2022     Lab Results   Component Value Date    HAV Reactive (A) 04/07/2020    HEPCAB Non-reactive 04/30/2022     Lab Results   Component Value Date    RPR Non-Reactive 04/30/2022          Physical Exam  Constitutional:       General: He is not in acute distress  Appearance: He is well-developed  HENT:      Head: Normocephalic  Right Ear: External ear normal       Left Ear: External ear normal       Nose: Nose normal       Mouth/Throat:      Pharynx: No oropharyngeal exudate  Eyes:      General:         Right eye: No discharge  Left eye: No discharge  Conjunctiva/sclera: Conjunctivae normal       Pupils: Pupils are equal, round, and reactive to light  Neck:      Thyroid: No thyromegaly  Cardiovascular:      Rate and Rhythm: Normal rate and regular rhythm  Heart sounds: Normal heart sounds  No murmur heard  Pulmonary:      Effort: Pulmonary effort is normal       Breath sounds: Normal breath sounds  No wheezing  Abdominal:      General: Bowel sounds are normal       Palpations: Abdomen is soft  There is no mass  Tenderness: There is no abdominal tenderness  Musculoskeletal:         General: No tenderness  Normal range of motion  Cervical back: Normal range of motion  Lymphadenopathy:      Cervical: No cervical adenopathy  Skin:     General: Skin is warm and dry  Findings: No rash  Neurological:      Mental Status: He is alert and oriented to person, place, and time     Psychiatric:         Behavior: Behavior normal

## 2023-03-06 NOTE — ASSESSMENT & PLAN NOTE
Body mass index is 29 07 kg/m²  Wt Readings from Last 3 Encounters:   03/06/23 84 2 kg (185 lb 9 6 oz)   12/05/22 82 2 kg (181 lb 3 2 oz)   10/25/22 82 6 kg (182 lb 3 2 oz)     Height: 5' 7" (170 2 cm)           Lab Results   Component Value Date    HGBA1C 5 3 04/30/2022     No results found for: GLUCOSE;    Educated on the health risks of obesity  Advised to lose weight  Encouraged eating a healthy diet and daily cardiac exercise  Recommended increasing fruits and vegetables and limiting processed foods  Refer to dietitian for additional education

## 2023-03-06 NOTE — ASSESSMENT & PLAN NOTE
Blood pressure:   BP Readings from Last 3 Encounters:   03/06/23 129/92   12/05/22 140/90   10/25/22 136/90       Continue current antihypertensive  Educated on the following lifestyle modifications to lower BP and decrease cardiovascular risk factors  limit alcohol intake, reduce salt in diet, maintain a healthy weight, engage in 30 minutes of cardiovascular exercise daily, and not smoke

## 2023-03-08 ENCOUNTER — PATIENT OUTREACH (OUTPATIENT)
Dept: SURGERY | Facility: CLINIC | Age: 48
End: 2023-03-08

## 2023-03-09 ENCOUNTER — PATIENT OUTREACH (OUTPATIENT)
Dept: SURGERY | Facility: CLINIC | Age: 48
End: 2023-03-09

## 2023-03-14 ENCOUNTER — PATIENT OUTREACH (OUTPATIENT)
Dept: SURGERY | Facility: CLINIC | Age: 48
End: 2023-03-14

## 2023-03-20 ENCOUNTER — PATIENT OUTREACH (OUTPATIENT)
Dept: SURGERY | Facility: CLINIC | Age: 48
End: 2023-03-20

## 2023-03-20 NOTE — PROGRESS NOTES
Ct dropped off his final paystubs  CM scanned in SF scale and sent complete SF scale docs to financial counselor

## 2023-03-27 ENCOUNTER — APPOINTMENT (OUTPATIENT)
Dept: LAB | Facility: CLINIC | Age: 48
End: 2023-03-27

## 2023-03-27 DIAGNOSIS — B20 SYMPTOMATIC HIV INFECTION (HCC): ICD-10-CM

## 2023-03-27 DIAGNOSIS — I10 ESSENTIAL HYPERTENSION: ICD-10-CM

## 2023-03-27 DIAGNOSIS — Z72.51 HIGH RISK SEXUAL BEHAVIOR, UNSPECIFIED TYPE: ICD-10-CM

## 2023-03-27 LAB
ALBUMIN SERPL BCP-MCNC: 4.2 G/DL (ref 3.5–5)
ALP SERPL-CCNC: 46 U/L (ref 34–104)
ALT SERPL W P-5'-P-CCNC: 35 U/L (ref 7–52)
ANION GAP SERPL CALCULATED.3IONS-SCNC: 8 MMOL/L (ref 4–13)
AST SERPL W P-5'-P-CCNC: 41 U/L (ref 13–39)
BASOPHILS # BLD AUTO: 0.04 THOUSANDS/ÂΜL (ref 0–0.1)
BASOPHILS NFR BLD AUTO: 1 % (ref 0–1)
BILIRUB SERPL-MCNC: 0.53 MG/DL (ref 0.2–1)
BILIRUB UR QL STRIP: NEGATIVE
BUN SERPL-MCNC: 27 MG/DL (ref 5–25)
CALCIUM SERPL-MCNC: 9.3 MG/DL (ref 8.4–10.2)
CHLORIDE SERPL-SCNC: 105 MMOL/L (ref 96–108)
CLARITY UR: CLEAR
CO2 SERPL-SCNC: 23 MMOL/L (ref 21–32)
COLOR UR: NORMAL
CREAT SERPL-MCNC: 1.04 MG/DL (ref 0.6–1.3)
EOSINOPHIL # BLD AUTO: 0.05 THOUSAND/ÂΜL (ref 0–0.61)
EOSINOPHIL NFR BLD AUTO: 1 % (ref 0–6)
ERYTHROCYTE [DISTWIDTH] IN BLOOD BY AUTOMATED COUNT: 11.9 % (ref 11.6–15.1)
EST. AVERAGE GLUCOSE BLD GHB EST-MCNC: 100 MG/DL
GFR SERPL CREATININE-BSD FRML MDRD: 84 ML/MIN/1.73SQ M
GLUCOSE SERPL-MCNC: 81 MG/DL (ref 65–140)
GLUCOSE UR STRIP-MCNC: NEGATIVE MG/DL
HBA1C MFR BLD: 5.1 %
HCT VFR BLD AUTO: 47.8 % (ref 36.5–49.3)
HGB BLD-MCNC: 15.9 G/DL (ref 12–17)
HGB UR QL STRIP.AUTO: NEGATIVE
IMM GRANULOCYTES # BLD AUTO: 0.02 THOUSAND/UL (ref 0–0.2)
IMM GRANULOCYTES NFR BLD AUTO: 0 % (ref 0–2)
KETONES UR STRIP-MCNC: NEGATIVE MG/DL
LEUKOCYTE ESTERASE UR QL STRIP: NEGATIVE
LYMPHOCYTES # BLD AUTO: 2.17 THOUSANDS/ÂΜL (ref 0.6–4.47)
LYMPHOCYTES NFR BLD AUTO: 28 % (ref 14–44)
MCH RBC QN AUTO: 30.6 PG (ref 26.8–34.3)
MCHC RBC AUTO-ENTMCNC: 33.3 G/DL (ref 31.4–37.4)
MCV RBC AUTO: 92 FL (ref 82–98)
MONOCYTES # BLD AUTO: 0.67 THOUSAND/ÂΜL (ref 0.17–1.22)
MONOCYTES NFR BLD AUTO: 9 % (ref 4–12)
NEUTROPHILS # BLD AUTO: 4.68 THOUSANDS/ÂΜL (ref 1.85–7.62)
NEUTS SEG NFR BLD AUTO: 61 % (ref 43–75)
NITRITE UR QL STRIP: NEGATIVE
NRBC BLD AUTO-RTO: 0 /100 WBCS
PH UR STRIP.AUTO: 7 [PH]
PLATELET # BLD AUTO: 278 THOUSANDS/UL (ref 149–390)
PMV BLD AUTO: 10.8 FL (ref 8.9–12.7)
POTASSIUM SERPL-SCNC: 4.2 MMOL/L (ref 3.5–5.3)
PROT SERPL-MCNC: 7.3 G/DL (ref 6.4–8.4)
PROT UR STRIP-MCNC: NEGATIVE MG/DL
RBC # BLD AUTO: 5.19 MILLION/UL (ref 3.88–5.62)
SODIUM SERPL-SCNC: 136 MMOL/L (ref 135–147)
SP GR UR STRIP.AUTO: 1.01 (ref 1–1.03)
UROBILINOGEN UR STRIP-ACNC: <2 MG/DL
WBC # BLD AUTO: 7.63 THOUSAND/UL (ref 4.31–10.16)

## 2023-03-28 LAB
BASOPHILS # BLD AUTO: 0.1 X10E3/UL (ref 0–0.2)
BASOPHILS NFR BLD AUTO: 1 %
C TRACH DNA SPEC QL NAA+PROBE: NEGATIVE
CD3+CD4+ CELLS # BLD: 838 /UL (ref 359–1519)
CD3+CD4+ CELLS NFR BLD: 39.9 % (ref 30.8–58.5)
EOSINOPHIL # BLD AUTO: 0.1 X10E3/UL (ref 0–0.4)
EOSINOPHIL NFR BLD AUTO: 1 %
ERYTHROCYTE [DISTWIDTH] IN BLOOD BY AUTOMATED COUNT: 12.4 % (ref 11.6–15.4)
HCT VFR BLD AUTO: 43.8 % (ref 37.5–51)
HGB BLD-MCNC: 14.2 G/DL (ref 13–17.7)
IMM GRANULOCYTES # BLD: 0 X10E3/UL (ref 0–0.1)
IMM GRANULOCYTES NFR BLD: 0 %
LYMPHOCYTES # BLD AUTO: 2.1 X10E3/UL (ref 0.7–3.1)
LYMPHOCYTES NFR BLD AUTO: 27 %
MCH RBC QN AUTO: 30.7 PG (ref 26.6–33)
MCHC RBC AUTO-ENTMCNC: 32.4 G/DL (ref 31.5–35.7)
MCV RBC AUTO: 95 FL (ref 79–97)
MONOCYTES # BLD AUTO: 0.7 X10E3/UL (ref 0.1–0.9)
MONOCYTES NFR BLD AUTO: 9 %
N GONORRHOEA DNA SPEC QL NAA+PROBE: NEGATIVE
NEUTROPHILS # BLD AUTO: 4.9 X10E3/UL (ref 1.4–7)
NEUTROPHILS NFR BLD AUTO: 62 %
PLATELET # BLD AUTO: 312 X10E3/UL (ref 150–450)
RBC # BLD AUTO: 4.62 X10E6/UL (ref 4.14–5.8)
RPR SER QL: NORMAL
TREPONEMA PALLIDUM IGG+IGM AB [PRESENCE] IN SERUM OR PLASMA BY IMMUNOASSAY: REACTIVE
WBC # BLD AUTO: 7.8 X10E3/UL (ref 3.4–10.8)

## 2023-03-29 LAB
GAMMA INTERFERON BACKGROUND BLD IA-ACNC: 0.04 IU/ML
HIV1 RNA # SERPL NAA+PROBE: 30 COPIES/ML
HIV1 RNA SERPL NAA+PROBE-LOG#: 1.48 LOG10COPY/ML
M TB IFN-G BLD-IMP: NEGATIVE
M TB IFN-G CD4+ BCKGRND COR BLD-ACNC: -0.01 IU/ML
M TB IFN-G CD4+ BCKGRND COR BLD-ACNC: 0 IU/ML
MITOGEN IGNF BCKGRD COR BLD-ACNC: >10 IU/ML
T PALLIDUM AB SER QL IF: NON REACTIVE

## 2023-04-03 ENCOUNTER — PATIENT OUTREACH (OUTPATIENT)
Dept: SURGERY | Facility: CLINIC | Age: 48
End: 2023-04-03

## 2023-04-12 ENCOUNTER — PATIENT OUTREACH (OUTPATIENT)
Dept: SURGERY | Facility: CLINIC | Age: 48
End: 2023-04-12

## 2023-04-24 ENCOUNTER — PATIENT OUTREACH (OUTPATIENT)
Dept: SURGERY | Facility: CLINIC | Age: 48
End: 2023-04-24

## 2023-05-02 ENCOUNTER — OFFICE VISIT (OUTPATIENT)
Dept: SURGERY | Facility: CLINIC | Age: 48
End: 2023-05-02

## 2023-05-02 VITALS
WEIGHT: 186.2 LBS | HEIGHT: 67 IN | DIASTOLIC BLOOD PRESSURE: 82 MMHG | SYSTOLIC BLOOD PRESSURE: 125 MMHG | BODY MASS INDEX: 29.22 KG/M2 | HEART RATE: 74 BPM | OXYGEN SATURATION: 96 % | TEMPERATURE: 98.4 F

## 2023-05-02 DIAGNOSIS — Z13.220 ENCOUNTER FOR LIPID SCREENING FOR CARDIOVASCULAR DISEASE: ICD-10-CM

## 2023-05-02 DIAGNOSIS — I10 ESSENTIAL HYPERTENSION: ICD-10-CM

## 2023-05-02 DIAGNOSIS — F33.1 MODERATE EPISODE OF RECURRENT MAJOR DEPRESSIVE DISORDER (HCC): ICD-10-CM

## 2023-05-02 DIAGNOSIS — E78.5 DYSLIPIDEMIA: ICD-10-CM

## 2023-05-02 DIAGNOSIS — R74.01 TRANSAMINITIS: ICD-10-CM

## 2023-05-02 DIAGNOSIS — Z86.010 HX OF COLONIC POLYPS: ICD-10-CM

## 2023-05-02 DIAGNOSIS — B20 SYMPTOMATIC HIV INFECTION (HCC): Primary | ICD-10-CM

## 2023-05-02 DIAGNOSIS — Z13.6 ENCOUNTER FOR LIPID SCREENING FOR CARDIOVASCULAR DISEASE: ICD-10-CM

## 2023-05-02 DIAGNOSIS — F41.9 ANXIETY: ICD-10-CM

## 2023-05-02 NOTE — PROGRESS NOTES
"Progress Note - Infectious Disease   Jennifer Schmidt 51 y o  male MRN: [id number]  Unit/Bed#:  Encounter: [id number]      Impression/Plan:  1  HIV-doing well on ART with a near undetectable viral load and a CD4 count of greater than 800   Continue ART, recheck labs in 5 months, follow-up in 6 months   Stressed adherence     2  Anxiety and depression-treatment as per the primary  Stopped all his medications and he seems to be doing better      3  Dyslipidemia- On Lipitor  Recheck lipids with next labs     4  Obesity-has now lost  additional weight since last visit  Jaime Berger stressed the importance of diet and exercise for weight loss     5  Hypertension-improved  On lisinopril  We will continue to monitor      6  Colonic polyp-patient just had colonoscopy and did well  Will need repeat colonoscopy in 5 years  7   Transaminitis  Mild  Possible alcohol related  Will monitor for now  Encourage patient to decrease alcohol intake  Patient was provided medication, adherence and prevention education    Subjective:  Routine follow-up for HIV  Patient claims 100% adherence with Dovato     Patient denies any notable side effects  Overall the feeling well  The patient denies any fever chills or sweats, denies any nausea vomiting or diarrhea, denies any cough or shortness of breath  ROS:  A complete review of systems is negative other than that noted above in the subjective    Followup portions patient history reviewed and updated as: Allergies, current medications, past medical history, past social history, past surgical history, and the problem list    Objective:  Vitals:  Vitals:    05/02/23 1550   BP: 125/82   Pulse: 74   Temp: 98 4 °F (36 9 °C)   SpO2: 96%   Weight: 84 5 kg (186 lb 3 2 oz)   Height: 5' 7\" (1 702 m)       Physical Exam:   General Appearance:  Alert, interactive, appearing well,  nontoxic, no acute distress     Neck:   Supple without lymphadenopathy, no thyromegaly or masses " Throat: Oropharynx moist without lesions  Lungs:   Clear to auscultation bilaterally; no wheezes, rhonchi or rales; respirations unlabored   Heart:  RRR; no murmur, rub or gallop   Abdomen:   Soft, non-tender, non-distended, positive bowel sounds  Extremities: No clubbing, cyanosis or edema   Skin: No new rashes or lesions  No draining wounds noted  Labs, Imaging, & Other studies:   All pertinent labs and imaging studies were personally reviewed    Lab Results   Component Value Date    K 4 2 03/27/2023     03/27/2023    CO2 23 03/27/2023    BUN 27 (H) 03/27/2023    CREATININE 1 04 03/27/2023    GLUF 92 11/30/2022    CALCIUM 9 3 03/27/2023    AST 41 (H) 03/27/2023    ALT 35 03/27/2023    ALKPHOS 46 03/27/2023    EGFR 84 03/27/2023     Lab Results   Component Value Date    WBC 7 8 03/27/2023    WBC 7 63 03/27/2023    HGB 14 2 03/27/2023    HGB 15 9 03/27/2023    HCT 43 8 03/27/2023    HCT 47 8 03/27/2023    MCV 95 03/27/2023    MCV 92 03/27/2023     03/27/2023     03/27/2023     Lab Results   Component Value Date    HEPCAB Non-reactive 04/30/2022     Lab Results   Component Value Date    HAV Reactive (A) 04/07/2020    HEPCAB Non-reactive 04/30/2022     Lab Results   Component Value Date    RPR Non-Reactive 03/27/2023     CD4 ABS   Date/Time Value Ref Range Status   03/27/2023 01:15  359 - 1519 /uL Final     HIV-1 RNA by PCR, Qn   Date/Time Value Ref Range Status   03/27/2023 01:15 PM 30 copies/mL Final     Comment:     The reportable range for this assay is 20 to 10,000,000  copies HIV-1 RNA/mL             Current Outpatient Medications:     atorvastatin (LIPITOR) 40 mg tablet, Take 1 tablet (40 mg total) by mouth daily, Disp: 30 tablet, Rfl: 2    Dolutegravir-lamiVUDine  MG TABS, Take 30 caplets by mouth in the morning, Disp: 30 tablet, Rfl: 3    Emollient (Aquaphor Advanced Therapy) OINT, Apply topically in the morning, Disp: 396 g, Rfl: 1    fluticasone (FLONASE) 50 mcg/act nasal spray, 1 spray into each nostril daily, Disp: 16 g, Rfl: 2    lisinopril-hydrochlorothiazide (PRINZIDE,ZESTORETIC) 10-12 5 MG per tablet, Take 1 tablet by mouth daily, Disp: 30 tablet, Rfl: 2    methocarbamol (ROBAXIN) 750 mg tablet, Take 1-2 tablets by mouth every 6 hours as needed for muscle pain/spasm, Disp: 20 tablet, Rfl: 0    tadalafil (CIALIS) 10 MG tablet, Take 1 tablet (10 mg total) by mouth daily as needed for erectile dysfunction, Disp: 30 tablet, Rfl: 0

## 2023-05-03 ENCOUNTER — PATIENT OUTREACH (OUTPATIENT)
Dept: SURGERY | Facility: CLINIC | Age: 48
End: 2023-05-03

## 2023-05-16 ENCOUNTER — PATIENT OUTREACH (OUTPATIENT)
Dept: SURGERY | Facility: CLINIC | Age: 48
End: 2023-05-16

## 2023-05-17 ENCOUNTER — PATIENT OUTREACH (OUTPATIENT)
Dept: SURGERY | Facility: CLINIC | Age: 48
End: 2023-05-17

## 2023-05-17 NOTE — PROGRESS NOTES
Ct sent cm paystubs for MA renewal  Cm faxed over all MA renewal and supporting docs to the [facility] and scanned it into Epic

## 2023-06-05 DIAGNOSIS — N52.9 ERECTILE DYSFUNCTION, UNSPECIFIED ERECTILE DYSFUNCTION TYPE: ICD-10-CM

## 2023-06-05 DIAGNOSIS — E78.5 DYSLIPIDEMIA: ICD-10-CM

## 2023-06-05 DIAGNOSIS — I10 ESSENTIAL HYPERTENSION: ICD-10-CM

## 2023-06-05 RX ORDER — LISINOPRIL AND HYDROCHLOROTHIAZIDE 12.5; 1 MG/1; MG/1
1 TABLET ORAL DAILY
Qty: 30 TABLET | Refills: 2 | Status: SHIPPED | OUTPATIENT
Start: 2023-06-05

## 2023-06-05 RX ORDER — TADALAFIL 10 MG/1
TABLET ORAL
Qty: 10 TABLET | Refills: 3 | Status: SHIPPED | OUTPATIENT
Start: 2023-06-05

## 2023-06-05 RX ORDER — ATORVASTATIN CALCIUM 40 MG/1
TABLET, FILM COATED ORAL
Qty: 30 TABLET | Refills: 2 | Status: SHIPPED | OUTPATIENT
Start: 2023-06-05

## 2023-06-06 ENCOUNTER — PATIENT OUTREACH (OUTPATIENT)
Dept: SURGERY | Facility: CLINIC | Age: 48
End: 2023-06-06

## 2023-06-12 ENCOUNTER — OFFICE VISIT (OUTPATIENT)
Dept: SURGERY | Facility: CLINIC | Age: 48
End: 2023-06-12
Payer: COMMERCIAL

## 2023-06-12 VITALS
HEIGHT: 67 IN | BODY MASS INDEX: 28.56 KG/M2 | SYSTOLIC BLOOD PRESSURE: 124 MMHG | DIASTOLIC BLOOD PRESSURE: 83 MMHG | HEART RATE: 81 BPM | OXYGEN SATURATION: 97 % | WEIGHT: 182 LBS

## 2023-06-12 DIAGNOSIS — Z00.00 ANNUAL PHYSICAL EXAM: ICD-10-CM

## 2023-06-12 DIAGNOSIS — I10 ESSENTIAL HYPERTENSION: ICD-10-CM

## 2023-06-12 DIAGNOSIS — B20 SYMPTOMATIC HIV INFECTION (HCC): Primary | ICD-10-CM

## 2023-06-12 PROCEDURE — 99396 PREV VISIT EST AGE 40-64: CPT | Performed by: NURSE PRACTITIONER

## 2023-06-12 NOTE — PROGRESS NOTES
Assessment/Plan:   [facility] met with pt during PCP appointment to complete annual PHQ-9  Pt scored 4, minimal depression noted  Pt stated he has been feeling more stressed lately due to illness in the family and his husbands decision to buy a home  Pt stated his horoscope stated he should not buy property at this time  Pt is a never smoker  [facility]     Today patient present with   Chief Complaint   Patient presents with   • Follow-up     Pt offers no c/o at this time  Patient would likely benefit from annual PHQ-9  Consider/focus/continue monitor pts emotional, cognitive and behavioral displays of stability  Stage of change: Maintenance  Plan/ Behavioral Recommendations: ongoing [address] interventions as per pt's coordinated care and/or pts request of services  Diagnoses and all orders for this visit:    Symptomatic HIV infection ([facility] [address] )    Essential hypertension    BMI 28 0-28 9,adult    Annual physical exam          Subjective:     Patient ID: John Wilson is a 51 y o  male  HPI    History of Present Illness:      Patient is being seen for annual behavioral health assessment  Patient denies current behavioral health concerns      [unfilled]       Review of Systems      Objective:     Physical Exam      [facility]    Orientation     Person: yes    Place: yes    Time: yes    Appearance    Well Developed: yes healthy    Uncomfortable: no    Normal Body Odor: yes    Smells of Feces: no    Smells of Urine: no    Disheveled: no    Well Nourished: yes weight WNL of ideal    Grooming Unkempt: no    Poor Eye Contact: no    Hirsute: no    Looks Tired: no    Acutely Exhausted: noappearance reflects stated age    Mood and Affect:     Appropriate: yes    Euthymicyes    Irritable: no    Angry: no    Anxious: no    Depressed:no    Blunted:no    Labile: no    Restricted: no    Harm to Self or Others: pt denies SI/HI    Substance Abuse:pt denies history

## 2023-06-12 NOTE — ASSESSMENT & PLAN NOTE
"Body mass index is 28 51 kg/m²  Wt Readings from Last 3 Encounters:   06/12/23 82 6 kg (182 lb)   05/02/23 84 5 kg (186 lb 3 2 oz)   03/06/23 84 2 kg (185 lb 9 6 oz)     Height: 5' 7\" (170 2 cm)           Lab Results   Component Value Date    HGBA1C 5 1 03/27/2023     No results found for: \"GLUCOSE\";    Educated on the health risks of obesity  Advised to lose weight  Encouraged eating a healthy diet and daily cardiac exercise  Recommended increasing fruits and vegetables and limiting processed foods  Refer to dietitian for additional education          "

## 2023-06-12 NOTE — ASSESSMENT & PLAN NOTE
Blood pressure:   BP Readings from Last 3 Encounters:   06/12/23 124/83   05/02/23 125/82   03/06/23 129/92       Continue current antihypertensive  Educated on the following lifestyle modifications to lower BP and decrease cardiovascular risk factors  limit alcohol intake, reduce salt in diet, maintain a healthy weight, engage in 30 minutes of cardiovascular exercise daily, and not smoke

## 2023-06-12 NOTE — ASSESSMENT & PLAN NOTE
"No results found for: \"MP2QFWF\"  CD4 ABS   Date/Time Value Ref Range Status   2023 01:15  359 - 1519 /uL Final     HIV-1 RNA by PCR, Qn   Date/Time Value Ref Range Status   2023 01:15 PM 30 copies/mL Final     Comment:     The reportable range for this assay is 20 to 10,000,000  copies HIV-1 RNA/mL  HIV-1 RNA Viral Load Log   Date/Time Value Ref Range Status   2023 01:15 PM 1 477 [id number]/mL Final         ART: Heaven Marino        Denies side effects  Stressed the importance of adherence  Continue follow up with ID clinic  Reviewed most recent labs, including Cd4 and viral load  Discussed the risks and benefits of treatment options, instructions for management, importance of treatment adherence, and reduction of risk factor  Educated on possible  medication side effects  Counseled on routes of HIV transmission, including the risk of  infection  Emphasized that viral suppression is the best method to prevent HIV transmission  At this time pt denies the need for HIV testing of anyone in their life  Total encounter time was 45 minutes  Greater then 20 minutes were spent on counseling and patient education  Pt voices understanding and agreement with treatment plan        "

## 2023-06-23 ENCOUNTER — PATIENT OUTREACH (OUTPATIENT)
Dept: SURGERY | Facility: CLINIC | Age: 48
End: 2023-06-23

## 2023-06-23 NOTE — PROGRESS NOTES
Cm texted ct to see if he has received approval for his insurance application  Cm is awaiting a response

## 2023-06-26 ENCOUNTER — PATIENT OUTREACH (OUTPATIENT)
Dept: SURGERY | Facility: CLINIC | Age: 48
End: 2023-06-26

## 2023-07-03 ENCOUNTER — PATIENT OUTREACH (OUTPATIENT)
Dept: SURGERY | Facility: CLINIC | Age: 48
End: 2023-07-03

## 2023-07-12 ENCOUNTER — PATIENT OUTREACH (OUTPATIENT)
Dept: SURGERY | Facility: CLINIC | Age: 48
End: 2023-07-12

## 2023-07-13 ENCOUNTER — PATIENT OUTREACH (OUTPATIENT)
Dept: SURGERY | Facility: CLINIC | Age: 48
End: 2023-07-13

## 2023-07-14 ENCOUNTER — PATIENT OUTREACH (OUTPATIENT)
Dept: SURGERY | Facility: CLINIC | Age: 48
End: 2023-07-14

## 2023-07-14 NOTE — PROGRESS NOTES
Cm and ct called the [facility] to see if ct can do his SNAP kimber over the phone. [facility] rep provided ct with a number he can call to apply over the phone. Ct's case record number will be [id number]. Ct states that he will call or apply online and let cm know when it's complete. Ct's MA has been approved and his renewal will be May 2024.

## 2023-07-27 ENCOUNTER — PATIENT OUTREACH (OUTPATIENT)
Dept: SURGERY | Facility: CLINIC | Age: 48
End: 2023-07-27

## 2023-07-27 NOTE — PROGRESS NOTES
Ct texted ct to see if he ever went through with the [facility] application over the phone and ct states that he did not because it was too confusing. Cm offered to do the application in person tomorrow and ct agreed.

## 2023-07-28 ENCOUNTER — PATIENT OUTREACH (OUTPATIENT)
Dept: SURGERY | Facility: CLINIC | Age: 48
End: 2023-07-28

## 2023-07-31 DIAGNOSIS — B20 HIV INFECTION, UNSPECIFIED SYMPTOM STATUS (HCC): ICD-10-CM

## 2023-07-31 DIAGNOSIS — J30.2 SEASONAL ALLERGIES: ICD-10-CM

## 2023-08-01 RX ORDER — FLUTICASONE PROPIONATE 50 MCG
1 SPRAY, SUSPENSION (ML) NASAL DAILY
Qty: 16 G | Refills: 2 | Status: SHIPPED | OUTPATIENT
Start: 2023-08-01

## 2023-08-01 RX ORDER — DOLUTEGRAVIR SODIUM AND LAMIVUDINE 50; 300 MG/1; MG/1
1 TABLET, FILM COATED ORAL EVERY MORNING
Qty: 30 TABLET | Refills: 3 | Status: SHIPPED | OUTPATIENT
Start: 2023-08-01

## 2023-08-01 NOTE — PROGRESS NOTES
Ct called [employer] because he received his MA renewal and needed help completing it  Ct came in to the office and cm and ct worked on his [address] will e-mail cm his paystubs tomorrow so that [employer] can send it to the [employer]  Patient Specific Counseling (Will Not Stick From Patient To Patient): -\\nBG notes since Pt non compliment with LN2 pt can do the cream at home and rto when pt is ready to do LN2 tx Detail Level: Detailed

## 2023-08-02 ENCOUNTER — PATIENT OUTREACH (OUTPATIENT)
Dept: SURGERY | Facility: CLINIC | Age: 48
End: 2023-08-02

## 2023-08-02 NOTE — PROGRESS NOTES
Cm mailed calendar to ct. Cm called the [employer] to verify they received and scanned ct's SNAP application for processing. [employer] rep states that they did.

## 2023-08-10 ENCOUNTER — PATIENT OUTREACH (OUTPATIENT)
Dept: SURGERY | Facility: CLINIC | Age: 48
End: 2023-08-10

## 2023-08-11 ENCOUNTER — PATIENT OUTREACH (OUTPATIENT)
Dept: SURGERY | Facility: CLINIC | Age: 48
End: 2023-08-11

## 2023-08-21 ENCOUNTER — TELEPHONE (OUTPATIENT)
Dept: SURGERY | Facility: CLINIC | Age: 48
End: 2023-08-21

## 2023-08-21 NOTE — TELEPHONE ENCOUNTER
Pt called saying he has been experiencing consistent nausea for about 2 weeks, sometimes leading to vomiting. Pt asked to speak with provider prior to scheduling an appointment. Pt also asked for a refill on his cialis at [facility].

## 2023-08-22 ENCOUNTER — TELEPHONE (OUTPATIENT)
Dept: SURGERY | Facility: CLINIC | Age: 48
End: 2023-08-22

## 2023-08-22 NOTE — TELEPHONE ENCOUNTER
Pt called to tell us he received a robocall from [employer] warning him that there is a listeria outbreak linked to ice cream they sold. Pt has already eaten some of the ice cream, and wants to know if he should be concerned.

## 2023-08-23 NOTE — TELEPHONE ENCOUNTER
Please call him and tell him to call us if he develops symptoms. .He has had fever described as fever chills muscle aches or extreme diarrhea or nausea.

## 2023-08-28 DIAGNOSIS — I10 ESSENTIAL HYPERTENSION: ICD-10-CM

## 2023-08-28 DIAGNOSIS — E78.5 DYSLIPIDEMIA: ICD-10-CM

## 2023-08-28 RX ORDER — ATORVASTATIN CALCIUM 40 MG/1
TABLET, FILM COATED ORAL
Qty: 30 TABLET | Refills: 2 | Status: SHIPPED | OUTPATIENT
Start: 2023-08-28 | End: 2023-08-30 | Stop reason: SDUPTHER

## 2023-08-28 RX ORDER — LISINOPRIL AND HYDROCHLOROTHIAZIDE 12.5; 1 MG/1; MG/1
1 TABLET ORAL DAILY
Qty: 30 TABLET | Refills: 2 | Status: SHIPPED | OUTPATIENT
Start: 2023-08-28 | End: 2023-08-30 | Stop reason: SDUPTHER

## 2023-08-30 ENCOUNTER — OFFICE VISIT (OUTPATIENT)
Dept: SURGERY | Facility: CLINIC | Age: 48
End: 2023-08-30
Payer: COMMERCIAL

## 2023-08-30 VITALS
WEIGHT: 182.4 LBS | SYSTOLIC BLOOD PRESSURE: 140 MMHG | BODY MASS INDEX: 28.63 KG/M2 | OXYGEN SATURATION: 95 % | HEIGHT: 67 IN | DIASTOLIC BLOOD PRESSURE: 90 MMHG | TEMPERATURE: 98.3 F | HEART RATE: 75 BPM

## 2023-08-30 DIAGNOSIS — F33.1 MODERATE EPISODE OF RECURRENT MAJOR DEPRESSIVE DISORDER (HCC): ICD-10-CM

## 2023-08-30 DIAGNOSIS — R53.83 OTHER FATIGUE: ICD-10-CM

## 2023-08-30 DIAGNOSIS — B20 SYMPTOMATIC HIV INFECTION (HCC): Primary | ICD-10-CM

## 2023-08-30 DIAGNOSIS — N52.9 ERECTILE DYSFUNCTION, UNSPECIFIED ERECTILE DYSFUNCTION TYPE: ICD-10-CM

## 2023-08-30 DIAGNOSIS — E78.5 DYSLIPIDEMIA: ICD-10-CM

## 2023-08-30 DIAGNOSIS — B20 HIV INFECTION, UNSPECIFIED SYMPTOM STATUS (HCC): ICD-10-CM

## 2023-08-30 DIAGNOSIS — I10 ESSENTIAL HYPERTENSION: ICD-10-CM

## 2023-08-30 DIAGNOSIS — J30.2 SEASONAL ALLERGIES: ICD-10-CM

## 2023-08-30 PROCEDURE — 99214 OFFICE O/P EST MOD 30 MIN: CPT | Performed by: NURSE PRACTITIONER

## 2023-08-30 RX ORDER — FLUTICASONE PROPIONATE 50 MCG
1 SPRAY, SUSPENSION (ML) NASAL DAILY
Qty: 16 G | Refills: 2 | Status: SHIPPED | OUTPATIENT
Start: 2023-08-30

## 2023-08-30 RX ORDER — FLUTICASONE PROPIONATE 50 MCG
1 SPRAY, SUSPENSION (ML) NASAL DAILY
Qty: 16 G | Refills: 2 | Status: SHIPPED | OUTPATIENT
Start: 2023-08-30 | End: 2023-08-30

## 2023-08-30 RX ORDER — ATORVASTATIN CALCIUM 40 MG/1
40 TABLET, FILM COATED ORAL DAILY
Qty: 30 TABLET | Refills: 2 | Status: SHIPPED | OUTPATIENT
Start: 2023-08-30 | End: 2023-08-30

## 2023-08-30 RX ORDER — TADALAFIL 10 MG/1
10 TABLET ORAL DAILY PRN
Qty: 10 TABLET | Refills: 3 | Status: SHIPPED | OUTPATIENT
Start: 2023-08-30

## 2023-08-30 RX ORDER — LISINOPRIL AND HYDROCHLOROTHIAZIDE 12.5; 1 MG/1; MG/1
1 TABLET ORAL DAILY
Qty: 30 TABLET | Refills: 2 | Status: SHIPPED | OUTPATIENT
Start: 2023-08-30 | End: 2023-08-30 | Stop reason: HOSPADM

## 2023-08-30 RX ORDER — DOLUTEGRAVIR SODIUM AND LAMIVUDINE 50; 300 MG/1; MG/1
1 TABLET, FILM COATED ORAL EVERY MORNING
Qty: 30 TABLET | Refills: 3 | Status: SHIPPED | OUTPATIENT
Start: 2023-08-30 | End: 2023-08-30

## 2023-08-30 RX ORDER — DOLUTEGRAVIR SODIUM AND LAMIVUDINE 50; 300 MG/1; MG/1
1 TABLET, FILM COATED ORAL EVERY MORNING
Qty: 30 TABLET | Refills: 3 | Status: SHIPPED | OUTPATIENT
Start: 2023-08-30

## 2023-08-30 RX ORDER — TADALAFIL 10 MG/1
10 TABLET ORAL DAILY PRN
Qty: 10 TABLET | Refills: 3 | Status: SHIPPED | OUTPATIENT
Start: 2023-08-30 | End: 2023-08-30

## 2023-08-30 RX ORDER — LISINOPRIL AND HYDROCHLOROTHIAZIDE 12.5; 1 MG/1; MG/1
1 TABLET ORAL DAILY
Qty: 30 TABLET | Refills: 2 | Status: SHIPPED | OUTPATIENT
Start: 2023-08-30

## 2023-08-30 RX ORDER — ATORVASTATIN CALCIUM 40 MG/1
40 TABLET, FILM COATED ORAL DAILY
Qty: 30 TABLET | Refills: 2 | Status: SHIPPED | OUTPATIENT
Start: 2023-08-30

## 2023-08-30 NOTE — ASSESSMENT & PLAN NOTE
No results found for: "MC4UTAJ"  CD4 ABS   Date/Time Value Ref Range Status   2023 01:15  359 - 1519 /uL Final     HIV-1 RNA by PCR, Qn   Date/Time Value Ref Range Status   2023 01:15 PM 30 copies/mL Final     Comment:     The reportable range for this assay is 20 to 10,000,000  copies HIV-1 RNA/mL. HIV-1 RNA Viral Load Log   Date/Time Value Ref Range Status   2023 01:15 PM 1.477 [id number]/mL Final         ART: Ab Puentes        Denies side effects. Stressed the importance of adherence. Continue follow up with ID clinic. Reviewed most recent labs, including Cd4 and viral load. Discussed the risks and benefits of treatment options, instructions for management, importance of treatment adherence, and reduction of risk factor. Educated on possible  medication side effects. Counseled on routes of HIV transmission, including the risk of  infection. Emphasized that viral suppression is the best method to prevent HIV transmission. At this time pt.denies the need for HIV testing of anyone in their life. Total encounter time was 45 minutes. Greater then 20 minutes were spent on counseling and patient education. Pt voices understanding and agreement with treatment plan.

## 2023-08-30 NOTE — PROGRESS NOTES
Assessment/Plan:    Symptomatic HIV infection ([address] )  No results found for: "WI8EQNC"  CD4 ABS   Date/Time Value Ref Range Status   2023 01:15  359 - 1519 /uL Final     HIV-1 RNA by PCR, Qn   Date/Time Value Ref Range Status   2023 01:15 PM 30 copies/mL Final     Comment:     The reportable range for this assay is 20 to 10,000,000  copies HIV-1 RNA/mL. HIV-1 RNA Viral Load Log   Date/Time Value Ref Range Status   2023 01:15 PM 1.477 [id number]/mL Final         ART: Sabino Sams        Denies side effects. Stressed the importance of adherence. Continue follow up with ID clinic. Reviewed most recent labs, including Cd4 and viral load. Discussed the risks and benefits of treatment options, instructions for management, importance of treatment adherence, and reduction of risk factor. Educated on possible  medication side effects. Counseled on routes of HIV transmission, including the risk of  infection. Emphasized that viral suppression is the best method to prevent HIV transmission. At this time pt.denies the need for HIV testing of anyone in their life. Total encounter time was 45 minutes. Greater then 20 minutes were spent on counseling and patient education. Pt voices understanding and agreement with treatment plan. Moderate episode of recurrent major depressive disorder ([address] [facility])  Declines medical management. Refer to [facility] behavioral health    Essential hypertension  Blood pressure: Above goal  BP Readings from Last 3 Encounters:   23 140/90   23 124/83   23 125/82       Continue current antihypertensive. Upset during exam today and drinking coffee. Did not take blood pressure medication this morning. We will reevaluate at follow-up appointment and adjust medication as needed. Educated on the following lifestyle modifications to lower BP and decrease cardiovascular risk factors.   limit alcohol intake, reduce salt in diet, maintain a healthy weight, engage in 30 minutes of cardiovascular exercise daily, and not smoke. Diagnoses and all orders for this visit:    Symptomatic HIV infection ([address])    Essential hypertension  -     Discontinue: lisinopril-hydrochlorothiazide (PRINZIDE,ZESTORETIC) 10-12.5 MG per tablet; Take 1 tablet by mouth daily  -     lisinopril-hydrochlorothiazide (PRINZIDE,ZESTORETIC) 10-12.5 MG per tablet; Take 1 tablet by mouth daily    Erectile dysfunction, unspecified erectile dysfunction type  -     Discontinue: tadalafil (CIALIS) 10 MG tablet; Take 1 tablet (10 mg total) by mouth daily as needed for erectile dysfunction  -     tadalafil (CIALIS) 10 MG tablet; Take 1 tablet (10 mg total) by mouth daily as needed for erectile dysfunction    HIV infection, unspecified symptom status (HCC)  -     Discontinue: Dolutegravir-lamiVUDine (Dovato)  MG TABS; Take 1 tablet by mouth every morning  -     Dolutegravir-lamiVUDine (Dovato)  MG TABS; Take 1 tablet by mouth every morning    Dyslipidemia  -     Discontinue: atorvastatin (LIPITOR) 40 mg tablet; Take 1 tablet (40 mg total) by mouth daily  -     atorvastatin (LIPITOR) 40 mg tablet; Take 1 tablet (40 mg total) by mouth daily    Seasonal allergies  -     Discontinue: fluticasone (FLONASE) 50 mcg/act nasal spray; 1 spray into each nostril daily  -     fluticasone (FLONASE) 50 mcg/act nasal spray; 1 spray into each nostril daily    Other fatigue  -     TSH, 3rd generation with Free T4 reflex; Future  -     Vitamin D 25 hydroxy; Future    Moderate episode of recurrent major depressive disorder (HCC)          Subjective:      Patient ID: Fritz Alves is a 51 y.o. male. Lanny Almendarez presents to the clinic today for three-month follow-up for elevated blood pressure. PMHx significant for HIV,HTN, hyperlipidemia, and seasonal allergies. Marena Meckel is c/o increased depression over marital issues.           The following portions of the patient's history were reviewed and updated as appropriate: allergies, current medications, past family history, past medical history, past social history, past surgical history and problem list.    Review of Systems   Constitutional: Negative for chills and fever. HENT: Negative for ear pain and sore throat. Eyes: Negative for pain and visual disturbance. Respiratory: Negative for cough and shortness of breath. Cardiovascular: Negative for chest pain and palpitations. Gastrointestinal: Negative for abdominal pain and vomiting. Genitourinary: Negative for dysuria and hematuria. Musculoskeletal: Negative for arthralgias and back pain. Skin: Negative for color change and rash. Neurological: Negative for seizures and syncope. Psychiatric/Behavioral: Positive for sleep disturbance. All other systems reviewed and are negative. Objective:      /90   Pulse 75   Temp 98.3 °F (36.8 °C)   Ht 5' 7" (1.702 m)   Wt 82.7 kg (182 lb 6.4 oz)   SpO2 95%   BMI 28.57 kg/m²       Lab Results   Component Value Date    K 4.2 03/27/2023     03/27/2023    CO2 23 03/27/2023    BUN 27 (H) 03/27/2023    CREATININE 1.04 03/27/2023    GLUF 92 11/30/2022    CALCIUM 9.3 03/27/2023    AST 41 (H) 03/27/2023    ALT 35 03/27/2023    ALKPHOS 46 03/27/2023    EGFR 84 03/27/2023     Lab Results   Component Value Date    WBC 7.8 03/27/2023    WBC 7.63 03/27/2023    HGB 14.2 03/27/2023    HGB 15.9 03/27/2023    HCT 43.8 03/27/2023    HCT 47.8 03/27/2023    MCV 95 03/27/2023    MCV 92 03/27/2023     03/27/2023     03/27/2023     Lab Results   Component Value Date    HEPCAB Non-reactive 04/30/2022     Lab Results   Component Value Date    HAV Reactive (A) 04/07/2020    HEPCAB Non-reactive 04/30/2022     Lab Results   Component Value Date    RPR Non-Reactive 03/27/2023          Physical Exam  Constitutional:       General: He is not in acute distress. Appearance: He is well-developed. HENT:      Head: Normocephalic. Right Ear: External ear normal.      Left Ear: External ear normal.      Nose: Nose normal.      Mouth/Throat:      Pharynx: No oropharyngeal exudate. Eyes:      General:         Right eye: No discharge. Left eye: No discharge. Conjunctiva/sclera: Conjunctivae normal.      Pupils: Pupils are equal, round, and reactive to light. Neck:      Thyroid: No thyromegaly. Cardiovascular:      Rate and Rhythm: Normal rate and regular rhythm. Heart sounds: Normal heart sounds. No murmur heard. Pulmonary:      Effort: Pulmonary effort is normal.      Breath sounds: Normal breath sounds. No wheezing. Abdominal:      General: Bowel sounds are normal.      Palpations: Abdomen is soft. There is no mass. Tenderness: There is no abdominal tenderness. Musculoskeletal:         General: No tenderness. Normal range of motion. Cervical back: Normal range of motion. Lymphadenopathy:      Cervical: No cervical adenopathy. Skin:     General: Skin is warm and dry. Findings: No rash. Neurological:      Mental Status: He is alert and oriented to person, place, and time. Psychiatric:         Mood and Affect: Mood is depressed. Affect is tearful. Behavior: Behavior normal.         Thought Content: Thought content does not include homicidal or suicidal ideation. Thought content does not include homicidal or suicidal plan.

## 2023-08-30 NOTE — ASSESSMENT & PLAN NOTE
Blood pressure: Above goal  BP Readings from Last 3 Encounters:   08/30/23 140/90   06/12/23 124/83   05/02/23 125/82       Continue current antihypertensive. Upset during exam today and drinking coffee. Did not take blood pressure medication this morning. We will reevaluate at follow-up appointment and adjust medication as needed. Educated on the following lifestyle modifications to lower BP and decrease cardiovascular risk factors. limit alcohol intake, reduce salt in diet, maintain a healthy weight, engage in 30 minutes of cardiovascular exercise daily, and not smoke.

## 2023-08-30 NOTE — ASSESSMENT & PLAN NOTE
Declines medical management.   Refer to [facility] behavioral health

## 2023-09-01 ENCOUNTER — PATIENT OUTREACH (OUTPATIENT)
Dept: SURGERY | Facility: CLINIC | Age: 48
End: 2023-09-01

## 2023-09-07 ENCOUNTER — PATIENT OUTREACH (OUTPATIENT)
Dept: SURGERY | Facility: CLINIC | Age: 48
End: 2023-09-07

## 2023-09-07 NOTE — PROGRESS NOTES
Cm reached out to ct to see when he would be available for his 6 month follow up. Ct states that he has time tomorrow morning.

## 2023-09-08 ENCOUNTER — PATIENT OUTREACH (OUTPATIENT)
Dept: SURGERY | Facility: CLINIC | Age: 48
End: 2023-09-08
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
## 2023-09-13 ENCOUNTER — DOCUMENTATION (OUTPATIENT)
Dept: SURGERY | Facility: CLINIC | Age: 48
End: 2023-09-13

## 2023-09-13 NOTE — PROGRESS NOTES
[facility] attempted to contact pt via telephone no answer and unable to leave message. [address] [place] [address] will attempt another contact.

## 2023-09-14 ENCOUNTER — DOCUMENTATION (OUTPATIENT)
Dept: SURGERY | Facility: CLINIC | Age: 48
End: 2023-09-14

## 2023-09-14 ENCOUNTER — PATIENT OUTREACH (OUTPATIENT)
Dept: SURGERY | Facility: CLINIC | Age: 48
End: 2023-09-14

## 2023-09-14 NOTE — PROGRESS NOTES
[facility] reached out to cm to let her know that ct did answer her call. Cm texted ct to let him know that Diana attempted to get in contact with him.

## 2023-09-19 ENCOUNTER — PATIENT OUTREACH (OUTPATIENT)
Dept: SURGERY | Facility: CLINIC | Age: 48
End: 2023-09-19

## 2023-09-20 ENCOUNTER — CLINICAL SUPPORT (OUTPATIENT)
Dept: SURGERY | Facility: CLINIC | Age: 48
End: 2023-09-20

## 2023-09-20 DIAGNOSIS — F41.9 ANXIETY: Primary | ICD-10-CM
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
## 2023-09-27 ENCOUNTER — DOCUMENTATION (OUTPATIENT)
Dept: SURGERY | Facility: CLINIC | Age: 48
End: 2023-09-27

## 2023-09-27 NOTE — PROGRESS NOTES
[facility] received phone message from pt to cancel next session. [facility] attempted to contact pt to reschedule session, message was left for return call.

## 2023-10-03 ENCOUNTER — CLINICAL SUPPORT (OUTPATIENT)
Dept: SURGERY | Facility: CLINIC | Age: 48
End: 2023-10-03

## 2023-10-03 DIAGNOSIS — F41.9 ANXIETY: Primary | ICD-10-CM

## 2023-10-04 NOTE — PROGRESS NOTES
SUBJECTIVE: pt's emotional, cognitive suppression of emotions, depression and anxiety     OBJECTIVE: Pt arrived to session to address struggles in expressing his emotions and depression and anxiety. Pt stated he has recently been to visit his parents on the family farm. Pt stated this was a stressful visit, as usual.  Pt stated his parents are alcoholics and during the visit there is always an argument. Pt stated his father stated when his mother had surgery years ago the surgeon removed her uterus without consent and that is why his mother has had numerous medical and mental health issues over the years. Pt appeared very shocked by this news stating " all these years especially as a young child, I wondered why my mom cried all the time."    Pt stated he struggles to express his emotions fearing the outcome, will the person leave and will there be repercussions for speaking his mind. ASSESSMENT: Pt was fully engaged throughout this session. Pt at times struggled to verbalize his feelings and spoke very softly. Pt appears to struggle with suppression of emotions and standing up for himself. When speaking of his relationship pt stated he feels like they are roommates and there is no romantic connection.      PLAN: explore communication/ utilize interventions for depression and anxiety

## 2023-10-07 ENCOUNTER — APPOINTMENT (OUTPATIENT)
Dept: LAB | Facility: CLINIC | Age: 48
End: 2023-10-07
Payer: COMMERCIAL

## 2023-10-07 DIAGNOSIS — R53.83 OTHER FATIGUE: ICD-10-CM

## 2023-10-07 LAB
25(OH)D3 SERPL-MCNC: 21.4 NG/ML (ref 30–100)
ALBUMIN SERPL BCP-MCNC: 4.1 G/DL (ref 3.5–5)
ALP SERPL-CCNC: 39 U/L (ref 34–104)
ALT SERPL W P-5'-P-CCNC: 21 U/L (ref 7–52)
ANION GAP SERPL CALCULATED.3IONS-SCNC: 5 MMOL/L
AST SERPL W P-5'-P-CCNC: 17 U/L (ref 13–39)
BASOPHILS # BLD AUTO: 0.04 THOUSANDS/ÂΜL (ref 0–0.1)
BASOPHILS NFR BLD AUTO: 1 % (ref 0–1)
BILIRUB SERPL-MCNC: 0.52 MG/DL (ref 0.2–1)
BUN SERPL-MCNC: 13 MG/DL (ref 5–25)
CALCIUM SERPL-MCNC: 9 MG/DL (ref 8.4–10.2)
CHLORIDE SERPL-SCNC: 105 MMOL/L (ref 96–108)
CHOLEST SERPL-MCNC: 172 MG/DL
CO2 SERPL-SCNC: 26 MMOL/L (ref 21–32)
CREAT SERPL-MCNC: 1.1 MG/DL (ref 0.6–1.3)
EOSINOPHIL # BLD AUTO: 0.08 THOUSAND/ÂΜL (ref 0–0.61)
EOSINOPHIL NFR BLD AUTO: 1 % (ref 0–6)
ERYTHROCYTE [DISTWIDTH] IN BLOOD BY AUTOMATED COUNT: 12 % (ref 11.6–15.1)
GFR SERPL CREATININE-BSD FRML MDRD: 78 ML/MIN/1.73SQ M
GLUCOSE P FAST SERPL-MCNC: 84 MG/DL (ref 65–99)
HCT VFR BLD AUTO: 45.4 % (ref 36.5–49.3)
HDLC SERPL-MCNC: 43 MG/DL
HGB BLD-MCNC: 15.7 G/DL (ref 12–17)
IMM GRANULOCYTES # BLD AUTO: 0.03 THOUSAND/UL (ref 0–0.2)
IMM GRANULOCYTES NFR BLD AUTO: 0 % (ref 0–2)
LDLC SERPL CALC-MCNC: 98 MG/DL (ref 0–100)
LYMPHOCYTES # BLD AUTO: 2.56 THOUSANDS/ÂΜL (ref 0.6–4.47)
LYMPHOCYTES NFR BLD AUTO: 33 % (ref 14–44)
MCH RBC QN AUTO: 31.2 PG (ref 26.8–34.3)
MCHC RBC AUTO-ENTMCNC: 34.6 G/DL (ref 31.4–37.4)
MCV RBC AUTO: 90 FL (ref 82–98)
MONOCYTES # BLD AUTO: 0.64 THOUSAND/ÂΜL (ref 0.17–1.22)
MONOCYTES NFR BLD AUTO: 8 % (ref 4–12)
NEUTROPHILS # BLD AUTO: 4.41 THOUSANDS/ÂΜL (ref 1.85–7.62)
NEUTS SEG NFR BLD AUTO: 57 % (ref 43–75)
NRBC BLD AUTO-RTO: 0 /100 WBCS
PLATELET # BLD AUTO: 300 THOUSANDS/UL (ref 149–390)
PMV BLD AUTO: 10.3 FL (ref 8.9–12.7)
POTASSIUM SERPL-SCNC: 4 MMOL/L (ref 3.5–5.3)
PROT SERPL-MCNC: 7.2 G/DL (ref 6.4–8.4)
RBC # BLD AUTO: 5.03 MILLION/UL (ref 3.88–5.62)
SODIUM SERPL-SCNC: 136 MMOL/L (ref 135–147)
TRIGL SERPL-MCNC: 157 MG/DL
TSH SERPL DL<=0.05 MIU/L-ACNC: 0.54 UIU/ML (ref 0.45–4.5)
WBC # BLD AUTO: 7.76 THOUSAND/UL (ref 4.31–10.16)

## 2023-10-07 PROCEDURE — 82306 VITAMIN D 25 HYDROXY: CPT

## 2023-10-07 PROCEDURE — 84443 ASSAY THYROID STIM HORMONE: CPT

## 2023-10-07 PROCEDURE — 36415 COLL VENOUS BLD VENIPUNCTURE: CPT

## 2023-10-08 LAB
BASOPHILS # BLD AUTO: 0 X10E3/UL (ref 0–0.2)
BASOPHILS NFR BLD AUTO: 1 %
CD3+CD4+ CELLS # BLD: 985 /UL (ref 359–1519)
CD3+CD4+ CELLS NFR BLD: 39.4 % (ref 30.8–58.5)
CD3+CD4+ CELLS/CD3+CD8+ CLL BLD: 1.26 % (ref 0.92–3.72)
CD3+CD8+ CELLS # BLD: 783 /UL (ref 109–897)
CD3+CD8+ CELLS NFR BLD: 31.3 % (ref 12–35.5)
EOSINOPHIL # BLD AUTO: 0.1 X10E3/UL (ref 0–0.4)
EOSINOPHIL NFR BLD AUTO: 1 %
ERYTHROCYTE [DISTWIDTH] IN BLOOD BY AUTOMATED COUNT: 12.5 % (ref 11.6–15.4)
HCT VFR BLD AUTO: 39.9 % (ref 37.5–51)
HCV AB SER QL: NORMAL
HGB BLD-MCNC: 13.3 G/DL (ref 13–17.7)
IMM GRANULOCYTES # BLD: 0 X10E3/UL (ref 0–0.1)
IMM GRANULOCYTES NFR BLD: 0 %
LYMPHOCYTES # BLD AUTO: 2.5 X10E3/UL (ref 0.7–3.1)
LYMPHOCYTES NFR BLD AUTO: 31 %
MCH RBC QN AUTO: 30.5 PG (ref 26.6–33)
MCHC RBC AUTO-ENTMCNC: 33.3 G/DL (ref 31.5–35.7)
MCV RBC AUTO: 92 FL (ref 79–97)
MONOCYTES # BLD AUTO: 0.8 X10E3/UL (ref 0.1–0.9)
MONOCYTES NFR BLD AUTO: 10 %
NEUTROPHILS # BLD AUTO: 4.5 X10E3/UL (ref 1.4–7)
NEUTROPHILS NFR BLD AUTO: 57 %
PLATELET # BLD AUTO: 350 X10E3/UL (ref 150–450)
RBC # BLD AUTO: 4.36 X10E6/UL (ref 4.14–5.8)
WBC # BLD AUTO: 7.9 X10E3/UL (ref 3.4–10.8)

## 2023-10-09 LAB — HIV1 RNA # PLAS NAA DL=20: ABNORMAL {COPIES}/ML

## 2023-10-13 ENCOUNTER — CLINICAL SUPPORT (OUTPATIENT)
Dept: SURGERY | Facility: CLINIC | Age: 48
End: 2023-10-13

## 2023-10-13 DIAGNOSIS — F41.9 ANXIETY: Primary | ICD-10-CM

## 2023-10-13 NOTE — PROGRESS NOTES
SUBJECTIVE: Depression, anxiety and PTSD    OBJECTIVE: Pt arrived for session at coordinated time to address depression, anxiety and PTSD. Pt was encouraged to discuss his thoughts and feelings on the past few weeks. Pt stated he is still concerned regarding his relationship, feeling he is not appreciated by his partner. Pt stated his partner continues to be busy outside the home and spends very little time with pt. Pt stated they have an open relationship but it is more his partner than himself. Pt stated he did speak with his partner regarding the lack of romance in the relationship and pt stated his partner agreed. Pt spoke of his loneliness as a child and when his partner goes out for hours he is reminded of those days. Pt stated being alone triggers feelings from his childhood. Pt stated he feels if it wasn't for the financial aspect of the relationship he would not be living with his partner. Pt stated he would prefer to live in a big city. ASSESSMENT: pt appeared guarded when explaining what he would want from his relationship. Pt appeared embarrassed to express his needs and wants. Pt stated he is not use to letting others know what he desires especially in relationships.     No SI/HI was disclosed nor noted during session,     PLAN: explore communication styles/ utilize interventions for depression and anxiety

## 2023-10-17 ENCOUNTER — OFFICE VISIT (OUTPATIENT)
Dept: SURGERY | Facility: CLINIC | Age: 48
End: 2023-10-17

## 2023-10-17 ENCOUNTER — DOCUMENTATION (OUTPATIENT)
Dept: SURGERY | Facility: CLINIC | Age: 48
End: 2023-10-17

## 2023-10-17 VITALS
HEART RATE: 92 BPM | BODY MASS INDEX: 28.82 KG/M2 | WEIGHT: 183.6 LBS | TEMPERATURE: 97.9 F | DIASTOLIC BLOOD PRESSURE: 75 MMHG | HEIGHT: 67 IN | SYSTOLIC BLOOD PRESSURE: 115 MMHG | RESPIRATION RATE: 16 BRPM | OXYGEN SATURATION: 96 %

## 2023-10-17 DIAGNOSIS — B20 SYMPTOMATIC HIV INFECTION (HCC): Primary | ICD-10-CM

## 2023-10-17 DIAGNOSIS — J30.2 SEASONAL ALLERGIES: ICD-10-CM

## 2023-10-17 DIAGNOSIS — Z23 NEED FOR INFLUENZA VACCINATION: ICD-10-CM

## 2023-10-17 DIAGNOSIS — G44.229 CHRONIC TENSION-TYPE HEADACHE, NOT INTRACTABLE: ICD-10-CM

## 2023-10-17 RX ORDER — SENNOSIDES 8.6 MG
1300 CAPSULE ORAL EVERY 8 HOURS PRN
Qty: 30 TABLET | Refills: 2 | Status: SHIPPED | OUTPATIENT
Start: 2023-10-17

## 2023-10-17 RX ORDER — CETIRIZINE HYDROCHLORIDE 10 MG/1
10 TABLET ORAL DAILY
Qty: 90 TABLET | Refills: 1 | Status: SHIPPED | OUTPATIENT
Start: 2023-10-17 | End: 2024-04-14

## 2023-10-17 NOTE — PROGRESS NOTES
Pastoral Care Progress Note    10/17/2023  Patient: Navid Palma : 1975  Encounter Date & Time: 10/17/2023   MRN: [id number]      The  met with Mr. Tom Holcomb for continued care and support. Mr. Tom Holcomb advised he was doing alright and is not into organized Caodaism. He advised he is seeing the S right now and that is working for him. Mr. Tom Holcomb reported not having any spiritual and emotional concerns right now but thanked the  for her support. Mr. Tom Holcomb advised he completed the 5 Wishes form and keeps it with his freezer packet. The  encouraged Mr. Alvarez to bring in the 5 Wishes packet when he comes back to the office. Further support as needed.                 Chaplaincy Interventions Utilized:     Exploration: Explored spiritual needs & resources    Relationship Building: Cultivated a relationship of care and support and Listened empathically    Chaplaincy Outcomes Achieved:  Developed chaplaincy care plan    Went over process for advanced directive

## 2023-10-17 NOTE — PROGRESS NOTES
Subjective     Gena Tomas is a 51 y.o. male who presents for evaluation of headache. Symptoms began about 2 weeks ago. Generally, the headaches last about 5 hours and occur  during the day. . The headaches  get better in the dark. . The headaches are usually throbbing and are located in frontal .  The patient rates his most severe headaches a 5 on a scale from 1 to 10. Recently, the headaches have been increasing in frequency. Work attendance or other daily activities are affected by the headaches. Precipitating factors include: light and stress. The headaches are usually preceded by an aura consisting of blurry vision and photopsias. Associated neurologic symptoms: vision problems. The patient denies decreased physical activity. Home treatment has included ibuprofen, darkening the room, and sleeping with no improvement. Other history includes: allergic rhinitis. Family history includes no known family members with significant headaches. The following portions of the patient's history were reviewed and updated as appropriate: allergies, current medications, past family history, past medical history, past social history, past surgical history, and problem list..    Review of Systems  Constitutional: negative for chills, fatigue, and fevers  Eyes: negative for irritation, redness, and visual disturbance  Ears, nose, mouth, throat, and face: positive for nasal congestion  Respiratory: negative for cough, dyspnea on exertion, and wheezing  Cardiovascular: negative for chest pain, dyspnea, fatigue, and syncope  Gastrointestinal: negative for abdominal pain, constipation, diarrhea, and nausea  Neurological: negative for coordination problems, dizziness, memory problems, paresthesia, vertigo, and weakness  Behavioral/Psych: negative for anxiety and irritability.     Objective     /75 (BP Location: Right arm, Patient Position: Sitting, Cuff Size: Large)   Pulse 92   Temp 97.9 °F (36.6 °C) (Temporal) Resp 16   Ht 5' 7" (1.702 m)   Wt 83.3 kg (183 lb 9.6 oz)   SpO2 96%   BMI 28.76 kg/m²     General Appearance:    Alert, cooperative, no distress, appears stated age   Head:    Normocephalic, without obvious abnormality, atraumatic   Eyes:    PERRL, conjunctiva/corneas clear, EOM's intact, fundi     benign, both eyes        Ears:    Normal TM's and external ear canals, both ears   Nose:   Nares normal, septum midline, mucosa normal, no drainage    or sinus tenderness   Throat:   Lips, mucosa, and tongue normal; teeth and gums normal   Neck:   Supple, symmetrical, trachea midline, no adenopathy;        thyroid:  No enlargement/tenderness/nodules; no carotid    bruit or JVD   Back:     Symmetric, no curvature, ROM normal, no CVA tenderness   Lungs:     Clear to auscultation bilaterally, respirations unlabored   Chest wall:    No tenderness or deformity   Heart:    Regular rate and rhythm, S1 and S2 normal, no murmur, rub   or gallop   Abdomen:     Soft, non-tender, bowel sounds active all four quadrants,     no masses, no organomegaly   Genitalia:    Normal male without lesion, discharge or tenderness   Rectal:    Normal tone, normal prostate, no masses or tenderness;    guaiac negative stool   Extremities:   Extremities normal, atraumatic, no cyanosis or edema   Pulses:   2+ and symmetric all extremities   Skin:   Skin color, texture, turgor normal, no rashes or lesions   Lymph nodes:   Cervical, supraclavicular, and axillary nodes normal   Neurologic:   CNII-XII intact. Normal strength, sensation and reflexes       throughout   . Assessment/Plan     Tension-type headache, chronic  Seasonal allergies . Educated to continue [facility] and start [facility]. Follow up in 2 weeks. Lie in darkened room and apply cold packs as needed for pain. Episodic therapy: acetaminophen. due to low frequency of pain. Side effect profile discussed in detail. Asked to keep headache diary.   Patient reassured that neurodiagnostic workup not indicated from benign H&P. No results found for: "BW4EHFI"  CD4 ABS   Date/Time Value Ref Range Status   10/07/2023 10:50  359 - 1519 /uL Final     HIV-1 RNA by PCR, Qn   Date/Time Value Ref Range Status   2023 01:15 PM 30 copies/mL Final     Comment:     The reportable range for this assay is 20 to 10,000,000  copies HIV-1 RNA/mL. HIV-1 RNA Viral Load Log   Date/Time Value Ref Range Status   2023 01:15 PM 1.477 vpx12soji/mL Final         ART: [facility] [place]        Denies side effects. Stressed the importance of adherence. Continue follow up with ID clinic. Reviewed most recent labs, including Cd4 and viral load. Discussed the risks and benefits of treatment options, instructions for management, importance of treatment adherence, and reduction of risk factor. Educated on possible  medication side effects. Counseled on routes of HIV transmission, including the risk of  infection. Emphasized that viral suppression is the best method to prevent HIV transmission. At this time pt.denies the need for HIV testing of anyone in their life. Total encounter time was 45 minutes. Greater then 20 minutes were spent on counseling and patient education. Pt voices understanding and agreement with treatment plan.

## 2023-10-17 NOTE — PATIENT INSTRUCTIONS
Acute Headache   AMBULATORY CARE:   An acute headache  is pain or discomfort that may start suddenly and get worse quickly. You may have an acute headache only when you feel stress or eat certain foods. Other acute headache pain can happen every day, and sometimes several times a day. The cause of an acute headache may not be known. It may be triggered by stress, fatigue, hormones, food, or trauma. Common types of acute headache:   Tension headache  is the most common type of headache. These headaches typically occur in the late afternoon and go away by evening. The pain is usually mild or moderate. You may have problems tolerating bright light or loud noise. The pain is usually across the forehead or in the back of the head, often only on one side. These headaches may occur every day. Migraine headaches  cause moderate or severe pain. The headache generally lasts from 1 to 3 days and tends to come back. Pain is usually on only one side, but it may change sides. Migraines often occur in the temple, the back of the head, or behind the eye. The pain may throb or be sharp and steady. A migraine with aura  means you see or feel something before a migraine. You may see a small spot surrounded by bright zigzag lines. Other signs or symptoms may follow the aura. Cluster headache  pain is usually only on one side. It often causes severe pain, and can last for 30 minutes to 2 hours. These headaches may occur 1 or 2 times each day, more often at night. The pain may wake you. Seek care immediately if:   You have severe pain. You have numbness or weakness on one side of your face or body. You have a headache that occurs after a blow to the head, a fall, or other trauma. You have a headache, are forgetful or confused, or have trouble speaking. You have a headache, stiff neck, and a fever. Call your doctor if:   You have a constant headache and are vomiting.     You have a headache each day that does not get better, even after treatment. You have changes in your headaches, or new symptoms that occur when you have a headache. You have questions or concerns about your condition or care. Treatment:   Medicine  may be given to decrease pain. The medicine your healthcare provider recommends will depend on the kind of headaches you have. You will need to take prescription headache medicines as directed to prevent a problem called rebound headache. These headaches happen with regular use of pain relievers for headache disorders. NSAIDs or acetaminophen may help some kinds of headaches. Biofeedback  may help you learn how to change stress reactions. For example, you learn to slow your heart rate when you become upset. You may also learn to prevent certain headaches by combining heat with relaxation. Cognitive behavior therapy,  or stress management, may be used with other therapies to prevent headaches. Manage your symptoms:   Apply heat or ice  on the headache area. Use a heat or ice pack. For an ice pack, you can also put crushed ice in a plastic bag. Cover the pack or bag with a towel before you apply it to your skin. Ice and heat both help decrease pain, and heat helps decrease muscle spasms. Apply heat for 20 to 30 minutes every 2 hours. Apply ice for 15 to 20 minutes every hour. Apply heat or ice for as long and for as many days as directed. You may alternate heat and ice. Relax your muscles. Lie down in a comfortable position and close your eyes. Relax your muscles slowly. Start at your toes and work your way up your body. Keep a record of your headaches. Write down when your headaches start and stop. Include your symptoms and what you were doing when the headache began. Record what you ate or drank for 24 hours before the headache started. Describe the pain and where it hurts. Keep track of what you did to treat your headache and if it worked.     Prevent an acute headache:   Avoid anything that triggers an acute headache. Examples include exposure to chemicals, going to high altitude, or not getting enough sleep. Create a regular sleep routine. Go to sleep at the same time and wake up at the same time each day. Do not use electronic devices before bedtime. These may trigger a headache or prevent you from sleeping well. Do not smoke. Nicotine and other chemicals in cigarettes and cigars can trigger an acute headache or make it worse. Ask your healthcare provider for information if you currently smoke and need help to quit. E-cigarettes or smokeless tobacco still contain nicotine. Talk to your healthcare provider before you use these products. Limit alcohol as directed. Alcohol can trigger an acute headache or make it worse. If you have cluster headaches, do not drink alcohol during an episode. For other types of headaches, ask your healthcare provider if it is safe for you to drink alcohol. Ask how much is safe for you to drink, and how often. Exercise as directed. Exercise can reduce tension and help with headache pain. Aim for 30 minutes of physical activity on most days of the week. Your healthcare provider can help you create an exercise plan. Eat a variety of healthy foods. Healthy foods include fruits, vegetables, low-fat dairy products, lean meats, fish, whole grains, and cooked beans. Your healthcare provider or dietitian can help you create meals plans if you need to avoid foods that trigger headaches. Follow up with your healthcare provider as directed:  Bring your headache record with you when you see your healthcare provider. Write down your questions so you remember to ask them during your visits. © Copyright Noe Castro 2023 Information is for End User's use only and may not be sold, redistributed or otherwise used for commercial purposes. The above information is an  only.  It is not intended as medical advice for individual conditions or treatments. Talk to your doctor, nurse or pharmacist before following any medical regimen to see if it is safe and effective for you.

## 2023-10-24 ENCOUNTER — OFFICE VISIT (OUTPATIENT)
Dept: SURGERY | Facility: CLINIC | Age: 48
End: 2023-10-24
Payer: COMMERCIAL

## 2023-10-24 ENCOUNTER — CLINICAL SUPPORT (OUTPATIENT)
Dept: SURGERY | Facility: CLINIC | Age: 48
End: 2023-10-24
Payer: COMMERCIAL

## 2023-10-24 ENCOUNTER — PATIENT OUTREACH (OUTPATIENT)
Dept: SURGERY | Facility: CLINIC | Age: 48
End: 2023-10-24

## 2023-10-24 VITALS
WEIGHT: 180.6 LBS | BODY MASS INDEX: 27.37 KG/M2 | HEART RATE: 69 BPM | OXYGEN SATURATION: 96 % | SYSTOLIC BLOOD PRESSURE: 135 MMHG | DIASTOLIC BLOOD PRESSURE: 86 MMHG | HEIGHT: 68 IN | TEMPERATURE: 96.4 F

## 2023-10-24 DIAGNOSIS — R74.01 TRANSAMINITIS: ICD-10-CM

## 2023-10-24 DIAGNOSIS — Z72.89 OTHER PROBLEMS RELATED TO LIFESTYLE: ICD-10-CM

## 2023-10-24 DIAGNOSIS — F32.A DEPRESSION, UNSPECIFIED DEPRESSION TYPE: Primary | ICD-10-CM

## 2023-10-24 DIAGNOSIS — I10 ESSENTIAL HYPERTENSION: ICD-10-CM

## 2023-10-24 DIAGNOSIS — Z13.1 SCREENING FOR DIABETES MELLITUS: ICD-10-CM

## 2023-10-24 DIAGNOSIS — Z11.3 ENCOUNTER FOR SCREENING FOR BACTERIAL SEXUALLY TRANSMITTED DISEASE: ICD-10-CM

## 2023-10-24 DIAGNOSIS — B20 SYMPTOMATIC HIV INFECTION (HCC): Primary | ICD-10-CM

## 2023-10-24 DIAGNOSIS — Z20.2 CONTACT WITH AND (SUSPECTED) EXPOSURE TO INFECTIONS WITH A PREDOMINANTLY SEXUAL MODE OF TRANSMISSION: ICD-10-CM

## 2023-10-24 DIAGNOSIS — Z11.1 SCREENING-PULMONARY TB: ICD-10-CM

## 2023-10-24 DIAGNOSIS — Z79.899 OTHER LONG TERM (CURRENT) DRUG THERAPY: ICD-10-CM

## 2023-10-24 DIAGNOSIS — F41.9 ANXIETY: ICD-10-CM

## 2023-10-24 DIAGNOSIS — E78.5 DYSLIPIDEMIA: ICD-10-CM

## 2023-10-24 DIAGNOSIS — D72.89 OTHER SPECIFIED DISORDERS OF WHITE BLOOD CELLS: ICD-10-CM

## 2023-10-24 PROCEDURE — 99214 OFFICE O/P EST MOD 30 MIN: CPT | Performed by: INTERNAL MEDICINE

## 2023-10-24 NOTE — PROGRESS NOTES
Progress Note - Infectious Disease   Andrew Gutierrez 51 y.o. male MRN: [id number]  Unit/Bed#:  Encounter: [id number]      Impression/Plan:  1. HIV-doing well on ART with an undetectable viral load and a CD4 count of greater than 900. Continue ART, recheck labs in 5 months, follow-up in 6 months. Stressed adherence. Patient told me he recently started vitamin D and I explained to him that he needs to avoid taking at the same time is to ulike and preferably 12 hours different. 2. Anxiety and depression-treatment as per the primary. Stopped all his medications and he seems to be doing better. 3. Dyslipidemia- On Lipitor. Improved. Recheck lipids with next labs     4. Obesity-has now lost additional weight since last visit. Continue stressed the importance of diet and exercise for weight loss     5. Hypertension-improved. On lisinopril/HCTZ. We will continue to monitor. 6. Colonic polyp-patient just had colonoscopy and did well. Will need repeat colonoscopy in 5 years. 7. Transaminitis. Possibly alcohol-related. Now resolved. Patient was provided medication, adherence and prevention education    Subjective:  Routine follow-up for HIV. Patient claims 100% adherence with Dovato. Patient denies any notable side effects. Overall the feeling well. The patient denies any fever chills or sweats, denies any nausea vomiting or diarrhea, denies any cough or shortness of breath. ROS:  A complete review of systems is negative other than that noted above in the subjective    Followup portions patient history reviewed and updated as:   Allergies, current medications, past medical history, past social history, past surgical history, and the problem list    Objective:  Vitals:  Vitals:    10/24/23 1625   BP: 135/86   Pulse: 69   Temp: (!) 96.4 °F (35.8 °C)   SpO2: 96%   Weight: 81.9 kg (180 lb 9.6 oz)   Height: 5' 8" (1.727 m)       Physical Exam:   General Appearance:  Alert, interactive, appearing well,  nontoxic, no acute distress. Neck:   Supple without lymphadenopathy, no thyromegaly or masses   Throat: Oropharynx moist without lesions. Lungs:   Clear to auscultation bilaterally; no wheezes, rhonchi or rales; respirations unlabored   Heart:  RRR; no murmur, rub or gallop   Abdomen:   Soft, non-tender, non-distended, positive bowel sounds. Extremities: No clubbing, cyanosis or edema   Skin: No new rashes or lesions. No draining wounds noted.        Labs, Imaging, & Other studies:   All pertinent labs and imaging studies were personally reviewed    Lab Results   Component Value Date    K 4.0 10/07/2023     10/07/2023    CO2 26 10/07/2023    BUN 13 10/07/2023    CREATININE 1.10 10/07/2023    GLUF 84 10/07/2023    CALCIUM 9.0 10/07/2023    AST 17 10/07/2023    ALT 21 10/07/2023    ALKPHOS 39 10/07/2023    EGFR 78 10/07/2023     Lab Results   Component Value Date    WBC 7.76 10/07/2023    WBC 7.9 10/07/2023    HGB 15.7 10/07/2023    HGB 13.3 10/07/2023    HCT 45.4 10/07/2023    HCT 39.9 10/07/2023    MCV 90 10/07/2023    MCV 92 10/07/2023     10/07/2023     10/07/2023     Lab Results   Component Value Date    HEPCAB Non-reactive 10/07/2023     Lab Results   Component Value Date    HAV Reactive (A) 04/07/2020    HEPCAB Non-reactive 10/07/2023     Lab Results   Component Value Date    RPR Non-Reactive 03/27/2023     CD4 ABS   Date/Time Value Ref Range Status   10/07/2023 10:50  359 - 1519 /uL Final       HIV-1 T[facility]   Date/Time Value Ref Range Status   10/07/2023 10:50 AM Detected <20 cp/mL (A) Not Detected Final           Current Outpatient Medications:     acetaminophen (TYLENOL) 650 mg CR tablet, Take 2 tablets (1,300 mg total) by mouth every 8 (eight) hours as needed for mild pain or headaches, Disp: 30 tablet, Rfl: 2    atorvastatin (LIPITOR) 40 mg tablet, Take 1 tablet (40 mg total) by mouth daily, Disp: 30 tablet, Rfl: 2    cetirizine (ZyrTEC) 10 mg tablet, Take 1 tablet (10 mg total) by mouth daily, Disp: 90 tablet, Rfl: 1    Dolutegravir-lamiVUDine (Dovato)  MG TABS, Take 1 tablet by mouth every morning, Disp: 30 tablet, Rfl: 3    fluticasone (FLONASE) 50 mcg/act nasal spray, 1 spray into each nostril daily, Disp: 16 g, Rfl: 2    lisinopril-hydrochlorothiazide (PRINZIDE,ZESTORETIC) 10-12.5 MG per tablet, Take 1 tablet by mouth daily, Disp: 30 tablet, Rfl: 2    tadalafil (CIALIS) 10 MG tablet, Take 1 tablet (10 mg total) by mouth daily as needed for erectile dysfunction, Disp: 10 tablet, Rfl: 3

## 2023-10-25 NOTE — PROGRESS NOTES
SUBJECTIVE:  Depression and anxiety    OBJECTIVE: Pt arrived for session at coordinated time to address ongoing depression and anxiety. Pt stated "I'm just waiting for life to pass by."  Pt stated his relationship has not gotten any better and he feels he is not heard by his partner. Pt shared that his partner is not willing to discuss the relationship and communication issues. Pt feels he is invisible most of the time and just goes through the motions of life. ASSESSMENT: pt is struggling with low self esteem and negative core beliefs. Pt appears to struggle with communicating his feelings to his partner. Pt and [facility] reviewed core beliefs and how to change these negative feelings of self. No SI/HI was disclosed nor noted throughout session. PLAN: Pt will work on homework of his negative core beliefs. Pt and [facility] will review homework at next session and begin working on positive self esteem interventions.

## 2023-10-26 ENCOUNTER — PATIENT OUTREACH (OUTPATIENT)
Dept: SURGERY | Facility: CLINIC | Age: 48
End: 2023-10-26

## 2023-11-03 ENCOUNTER — HOSPITAL ENCOUNTER (INPATIENT)
Facility: HOSPITAL | Age: 48
LOS: 2 days | Discharge: HOME | DRG: 247 | End: 2023-11-06
Attending: EMERGENCY MEDICINE | Admitting: SURGERY
Payer: COMMERCIAL

## 2023-11-03 ENCOUNTER — APPOINTMENT (EMERGENCY)
Dept: CT IMAGING | Facility: HOSPITAL | Age: 48
DRG: 247 | End: 2023-11-03
Payer: COMMERCIAL

## 2023-11-03 ENCOUNTER — APPOINTMENT (EMERGENCY)
Dept: RADIOLOGY | Facility: HOSPITAL | Age: 48
DRG: 247 | End: 2023-11-03
Payer: COMMERCIAL

## 2023-11-03 DIAGNOSIS — K56.609 SMALL BOWEL OBSTRUCTION (HCC): Primary | ICD-10-CM

## 2023-11-03 DIAGNOSIS — B20 SYMPTOMATIC HIV INFECTION (HCC): ICD-10-CM

## 2023-11-03 DIAGNOSIS — I10 ESSENTIAL HYPERTENSION: ICD-10-CM

## 2023-11-03 LAB
ALBUMIN SERPL BCP-MCNC: 4.1 G/DL (ref 3.5–5)
ALP SERPL-CCNC: 41 U/L (ref 34–104)
ALT SERPL W P-5'-P-CCNC: 27 U/L (ref 7–52)
ANION GAP SERPL CALCULATED.3IONS-SCNC: 8 MMOL/L
AST SERPL W P-5'-P-CCNC: 39 U/L (ref 13–39)
BACTERIA UR QL AUTO: NORMAL /HPF
BASOPHILS # BLD AUTO: 0.02 THOUSANDS/ÂΜL (ref 0–0.1)
BASOPHILS NFR BLD AUTO: 0 % (ref 0–1)
BILIRUB SERPL-MCNC: 0.74 MG/DL (ref 0.2–1)
BILIRUB UR QL STRIP: NEGATIVE
BUN SERPL-MCNC: 14 MG/DL (ref 5–25)
CALCIUM SERPL-MCNC: 9.2 MG/DL (ref 8.4–10.2)
CHLORIDE SERPL-SCNC: 100 MMOL/L (ref 96–108)
CLARITY UR: CLEAR
CO2 SERPL-SCNC: 25 MMOL/L (ref 21–32)
COLOR UR: ABNORMAL
CREAT SERPL-MCNC: 0.82 MG/DL (ref 0.6–1.3)
EOSINOPHIL # BLD AUTO: 0.13 THOUSAND/ÂΜL (ref 0–0.61)
EOSINOPHIL NFR BLD AUTO: 1 % (ref 0–6)
ERYTHROCYTE [DISTWIDTH] IN BLOOD BY AUTOMATED COUNT: 12.3 % (ref 11.6–15.1)
GFR SERPL CREATININE-BSD FRML MDRD: 103 ML/MIN/1.73SQ M
GLUCOSE SERPL-MCNC: 99 MG/DL (ref 65–140)
GLUCOSE UR STRIP-MCNC: NEGATIVE MG/DL
HCT VFR BLD AUTO: 49 % (ref 36.5–49.3)
HGB BLD-MCNC: 16.8 G/DL (ref 12–17)
HGB UR QL STRIP.AUTO: NEGATIVE
IMM GRANULOCYTES # BLD AUTO: 0.03 THOUSAND/UL (ref 0–0.2)
IMM GRANULOCYTES NFR BLD AUTO: 0 % (ref 0–2)
KETONES UR STRIP-MCNC: NEGATIVE MG/DL
LEUKOCYTE ESTERASE UR QL STRIP: NEGATIVE
LIPASE SERPL-CCNC: 11 U/L (ref 11–82)
LYMPHOCYTES # BLD AUTO: 1.86 THOUSANDS/ÂΜL (ref 0.6–4.47)
LYMPHOCYTES NFR BLD AUTO: 18 % (ref 14–44)
MCH RBC QN AUTO: 30.4 PG (ref 26.8–34.3)
MCHC RBC AUTO-ENTMCNC: 34.3 G/DL (ref 31.4–37.4)
MCV RBC AUTO: 89 FL (ref 82–98)
MONOCYTES # BLD AUTO: 1 THOUSAND/ÂΜL (ref 0.17–1.22)
MONOCYTES NFR BLD AUTO: 10 % (ref 4–12)
NEUTROPHILS # BLD AUTO: 7.22 THOUSANDS/ÂΜL (ref 1.85–7.62)
NEUTS SEG NFR BLD AUTO: 71 % (ref 43–75)
NITRITE UR QL STRIP: NEGATIVE
NON-SQ EPI CELLS URNS QL MICRO: NORMAL /HPF
NRBC BLD AUTO-RTO: 0 /100 WBCS
PH UR STRIP.AUTO: 6 [PH]
PLATELET # BLD AUTO: 248 THOUSANDS/UL (ref 149–390)
PMV BLD AUTO: 10.6 FL (ref 8.9–12.7)
POTASSIUM SERPL-SCNC: 3.7 MMOL/L (ref 3.5–5.3)
PROT SERPL-MCNC: 7.6 G/DL (ref 6.4–8.4)
PROT UR STRIP-MCNC: ABNORMAL MG/DL
RBC # BLD AUTO: 5.53 MILLION/UL (ref 3.88–5.62)
RBC #/AREA URNS AUTO: NORMAL /HPF
SODIUM SERPL-SCNC: 133 MMOL/L (ref 135–147)
SP GR UR STRIP.AUTO: >=1.05 (ref 1–1.03)
UROBILINOGEN UR STRIP-ACNC: <2 MG/DL
WBC # BLD AUTO: 10.26 THOUSAND/UL (ref 4.31–10.16)
WBC #/AREA URNS AUTO: NORMAL /HPF

## 2023-11-03 PROCEDURE — 96374 THER/PROPH/DIAG INJ IV PUSH: CPT

## 2023-11-03 PROCEDURE — 71045 X-RAY EXAM CHEST 1 VIEW: CPT

## 2023-11-03 PROCEDURE — 96375 TX/PRO/DX INJ NEW DRUG ADDON: CPT

## 2023-11-03 PROCEDURE — 96376 TX/PRO/DX INJ SAME DRUG ADON: CPT

## 2023-11-03 PROCEDURE — 74177 CT ABD & PELVIS W/CONTRAST: CPT

## 2023-11-03 PROCEDURE — 96361 HYDRATE IV INFUSION ADD-ON: CPT

## 2023-11-03 PROCEDURE — 36415 COLL VENOUS BLD VENIPUNCTURE: CPT

## 2023-11-03 PROCEDURE — 81001 URINALYSIS AUTO W/SCOPE: CPT

## 2023-11-03 PROCEDURE — 99285 EMERGENCY DEPT VISIT HI MDM: CPT | Performed by: EMERGENCY MEDICINE

## 2023-11-03 PROCEDURE — 85025 COMPLETE CBC W/AUTO DIFF WBC: CPT

## 2023-11-03 PROCEDURE — G1004 CDSM NDSC: HCPCS

## 2023-11-03 PROCEDURE — 83690 ASSAY OF LIPASE: CPT

## 2023-11-03 PROCEDURE — 80053 COMPREHEN METABOLIC PANEL: CPT

## 2023-11-03 PROCEDURE — 99285 EMERGENCY DEPT VISIT HI MDM: CPT

## 2023-11-03 RX ORDER — MORPHINE SULFATE 4 MG/ML
4 INJECTION, SOLUTION INTRAMUSCULAR; INTRAVENOUS ONCE
Status: COMPLETED | OUTPATIENT
Start: 2023-11-03 | End: 2023-11-03

## 2023-11-03 RX ORDER — LIDOCAINE HYDROCHLORIDE 20 MG/ML
1 JELLY TOPICAL ONCE
Status: COMPLETED | OUTPATIENT
Start: 2023-11-03 | End: 2023-11-03

## 2023-11-03 RX ORDER — MIDAZOLAM HYDROCHLORIDE 2 MG/2ML
1 INJECTION, SOLUTION INTRAMUSCULAR; INTRAVENOUS ONCE
Status: COMPLETED | OUTPATIENT
Start: 2023-11-03 | End: 2023-11-03

## 2023-11-03 RX ORDER — MORPHINE SULFATE 4 MG/ML
4 INJECTION, SOLUTION INTRAMUSCULAR; INTRAVENOUS ONCE
Status: COMPLETED | OUTPATIENT
Start: 2023-11-04 | End: 2023-11-04

## 2023-11-03 RX ORDER — ONDANSETRON 2 MG/ML
4 INJECTION INTRAMUSCULAR; INTRAVENOUS ONCE
Status: COMPLETED | OUTPATIENT
Start: 2023-11-03 | End: 2023-11-03

## 2023-11-03 RX ORDER — ONDANSETRON 2 MG/ML
4 INJECTION INTRAMUSCULAR; INTRAVENOUS ONCE AS NEEDED
Status: COMPLETED | OUTPATIENT
Start: 2023-11-03 | End: 2023-11-03

## 2023-11-03 RX ADMIN — SODIUM CHLORIDE 1000 ML: 0.9 INJECTION, SOLUTION INTRAVENOUS at 18:27

## 2023-11-03 RX ADMIN — IOHEXOL 100 ML: 350 INJECTION, SOLUTION INTRAVENOUS at 20:13

## 2023-11-03 RX ADMIN — ONDANSETRON 4 MG: 2 INJECTION INTRAMUSCULAR; INTRAVENOUS at 18:19

## 2023-11-03 RX ADMIN — LIDOCAINE HYDROCHLORIDE 1 APPLICATION: 20 JELLY TOPICAL at 22:33

## 2023-11-03 RX ADMIN — MORPHINE SULFATE 4 MG: 4 INJECTION INTRAVENOUS at 21:39

## 2023-11-03 RX ADMIN — TOPICAL ANESTHETIC 1 SPRAY: 200 SPRAY DENTAL; PERIODONTAL at 22:45

## 2023-11-03 RX ADMIN — MIDAZOLAM 1 MG: 1 INJECTION INTRAMUSCULAR; INTRAVENOUS at 22:22

## 2023-11-03 RX ADMIN — ONDANSETRON 4 MG: 2 INJECTION INTRAMUSCULAR; INTRAVENOUS at 21:37

## 2023-11-03 NOTE — ED ATTENDING ATTESTATION
11/3/2023  I, Vel Oliver DO, saw and evaluated the patient. I have discussed the patient with the resident/non-physician practitioner and agree with the resident's/non-physician practitioner's findings, Plan of Care, and MDM as documented in the resident's/non-physician practitioner's note, except where noted. All available labs and Radiology studies were reviewed. I was present for key portions of any procedure(s) performed by the resident/non-physician practitioner and I was immediately available to provide assistance. At this point I agree with the current assessment done in the Emergency Department. I have conducted an independent evaluation of this patient a history and physical is as follows:        65-year-old male, multiple episodes of vomiting, diffuse generalized abdominal pain, progressing over the past 2 days. ,  No previous history of this. ,  Diffusely tender throughout his abdomen.,  History of appendectomy. Has had 2 bowel movements over the past 2 days. Review of Systems   Constitutional: Negative for fever. Respiratory: Negative for chest tightness and shortness of breath. Cardiovascular: Negative for chest pain. Skin: Negative for rash. Neurological: Negative for dizziness, light-headedness and headaches. Physical Exam  Vitals reviewed. Constitutional:       General: He is not in acute distress. Appearance: He is well-developed. He is not diaphoretic. HENT:      Head: Normocephalic and atraumatic. Right Ear: External ear normal.      Left Ear: External ear normal.      Nose: Nose normal.   Eyes:      General:         Right eye: No discharge. Left eye: No discharge. Pupils: Pupils are equal, round, and reactive to light. Neck:      Trachea: No tracheal deviation. Cardiovascular:      Rate and Rhythm: Normal rate and regular rhythm. Heart sounds: Normal heart sounds. No murmur heard.   Pulmonary:      Effort: Pulmonary effort is normal. No respiratory distress. Breath sounds: Normal breath sounds. No stridor. Abdominal:      General: There is no distension. Palpations: Abdomen is soft. Tenderness: There is generalized abdominal tenderness. There is no guarding or rebound. Musculoskeletal:         General: Normal range of motion. Cervical back: Normal range of motion and neck supple. Skin:     General: Skin is warm and dry. Coloration: Skin is not pale. Findings: No erythema. Neurological:      General: No focal deficit present. Mental Status: He is alert and oriented to person, place, and time.                  Labs Reviewed   CBC AND DIFFERENTIAL - Abnormal       Result Value Ref Range Status    WBC 10.26 (*) 4.31 - 10.16 Thousand/uL Final    RBC 5.53  3.88 - 5.62 Million/uL Final    Hemoglobin 16.8  12.0 - 17.0 g/dL Final    Hematocrit 49.0  36.5 - 49.3 % Final    MCV 89  82 - 98 fL Final    MCH 30.4  26.8 - 34.3 pg Final    MCHC 34.3  31.4 - 37.4 g/dL Final    RDW 12.3  11.6 - 15.1 % Final    MPV 10.6  8.9 - 12.7 fL Final    Platelets 359  194 - 390 Thousands/uL Final    nRBC 0  /100 WBCs Final    Neutrophils Relative 71  43 - 75 % Final    Immat GRANS % 0  0 - 2 % Final    Lymphocytes Relative 18  14 - 44 % Final    Monocytes Relative 10  4 - 12 % Final    Eosinophils Relative 1  0 - 6 % Final    Basophils Relative 0  0 - 1 % Final    Neutrophils Absolute 7.22  1.85 - 7.62 Thousands/µL Final    Immature Grans Absolute 0.03  0.00 - 0.20 Thousand/uL Final    Lymphocytes Absolute 1.86  0.60 - 4.47 Thousands/µL Final    Monocytes Absolute 1.00  0.17 - 1.22 Thousand/µL Final    Eosinophils Absolute 0.13  0.00 - 0.61 Thousand/µL Final    Basophils Absolute 0.02  0.00 - 0.10 Thousands/µL Final   COMPREHENSIVE METABOLIC PANEL - Abnormal    Sodium 133 (*) 135 - 147 mmol/L Final    Potassium 3.7  3.5 - 5.3 mmol/L Final    Chloride 100  96 - 108 mmol/L Final    CO2 25  21 - 32 mmol/L Final    ANION GAP 8 mmol/L Final    BUN 14  5 - 25 mg/dL Final    Creatinine 0.82  0.60 - 1.30 mg/dL Final    Comment: Standardized to IDMS reference method    Glucose 99  65 - 140 mg/dL Final    Comment: If the patient is fasting, the ADA then defines impaired fasting glucose as > 100 mg/dL and diabetes as > or equal to 123 mg/dL. Calcium 9.2  8.4 - 10.2 mg/dL Final    AST 39  13 - 39 U/L Final    ALT 27  7 - 52 U/L Final    Comment: Specimen collection should occur prior to Sulfasalazine administration due to the potential for falsely depressed results. Alkaline Phosphatase 41  34 - 104 U/L Final    Total Protein 7.6  6.4 - 8.4 g/dL Final    Albumin 4.1  3.5 - 5.0 g/dL Final    Total Bilirubin 0.74  0.20 - 1.00 mg/dL Final    Comment: Use of this assay is not recommended for patients undergoing treatment with eltrombopag due to the potential for falsely elevated results. N-acetyl-p-benzoquinone imine (metabolite of Acetaminophen) will generate erroneously low results in samples for patients that have taken an overdose of Acetaminophen.     eGFR 103  ml/min/1.73sq m Final    Narrative:     [place] guidelines for Chronic Kidney Disease (CKD):     Stage 1 with normal or high GFR (GFR > 90 mL/min/1.73 square meters)    Stage 2 Mild CKD (GFR = 60-89 mL/min/1.73 square meters)    Stage 3A Moderate CKD (GFR = 45-59 mL/min/1.73 square meters)    Stage 3B Moderate CKD (GFR = 30-44 mL/min/1.73 square meters)    Stage 4 Severe CKD (GFR = 15-29 mL/min/1.73 square meters)    Stage 5 End Stage CKD (GFR <15 mL/min/1.73 square meters)  Note: GFR calculation is accurate only with a steady state creatinine   UA W REFLEX TO MICROSCOPIC WITH REFLEX TO CULTURE - Abnormal    Color, UA Light Yellow   Final    Clarity, UA Clear   Final    Specific Gravity, UA >=1.050 (*) 1.003 - 1.030 Final    pH, UA 6.0  4.5, 5.0, 5.5, 6.0, 6.5, 7.0, 7.5, 8.0 Final    Leukocytes, UA Negative  Negative Final    Nitrite, UA Negative Negative Final    Protein, UA Trace (*) Negative mg/dl Final    Glucose, UA Negative  Negative mg/dl Final    Ketones, UA Negative  Negative mg/dl Final    Urobilinogen, UA <2.0  <2.0 mg/dl mg/dl Final    Bilirubin, UA Negative  Negative Final    Occult Blood, UA Negative  Negative Final   LIPASE - Normal    Lipase 11  11 - 82 u/L Final   URINE MICROSCOPIC - Normal    RBC, UA None Seen  None Seen, 1-2 /hpf Final    WBC, UA None Seen  None Seen, 1-2 /hpf Final    Epithelial Cells None Seen  None Seen, Occasional /hpf Final    Bacteria, UA Occasional  None Seen, Occasional /hpf Final         CT Abdomen pelvis with contrast   Final Result      Findings compatible with small bowel obstruction. The transitional point appears to be in the right lower quadrant just proximal to the terminal ileum of uncertain etiology. There is mild wall thickening of the distal small bowel loops, which may reflect    a nonspecific enteritis. Small amount of ascites. No pneumoperitoneum seen. Additional findings as above.             Workstation performed: [id number]                               ED Course         Critical Care Time  Procedures      MDM  Number of Diagnoses or Management Options  Small bowel obstruction ([address])  Diagnosis management comments:       Initial ED assessment: 41-year-old male, vomiting, diffuse abdominal pain, tender throughout exam    Initial DDx includes but is not limited to:   Diverticulitis, colitis, mesenteric adenitis, bowel obstruction, early gastroenteritis, gastritis, peptic ulcer disease, pancreatitis    Initial ED plan:   Blood work, CT imaging, ultimate disposition pending ED work-up        Final ED summary/disposition:   After evaluation and workup in the emergency department, found to have small bowel obstruction transition point right lower quadrant likely from previous appendectomy, NG tube placed admitted to surgery            Time reflects when diagnosis was documented in both MDM as applicable and the Disposition within this note       Time User Action Codes Description Comment    11/3/2023  9:21 PM Henry Velazquez Add [Q16.591] Small bowel obstruction [facility])           ED Disposition       ED Disposition   Admit    Condition   Stable    Date/Time   Fri Nov 3, 2023 10:10 PM    Comment   Case was discussed with Surg resident and the patient's admission status was agreed to be Admission Status: inpatient status to the service of Dr. Irina Moser .                Follow-up Information    None

## 2023-11-03 NOTE — LETTER
[address] [place] [address] FLOOR MED SURG UNIT  Elizabeth Bustillos. [address] [facility] [address]  Dept: [phone]    November 6, 2023     Patient: Fritz Alves   YOB: 1975   Date of Visit: 11/3/2023       To Whom it May Concern:    Fritz Alves is under my professional care. He was seen in the hospital from 11/3/2023 to 11/06/23. He may return to work on 11/10 without limitations. Please use this note to also excuse the patient for his absence from work between 11/3 - 11/6. If you have any questions or concerns, please don't hesitate to call.          Sincerely,          Madhu Weinberg MD

## 2023-11-04 LAB
ANION GAP SERPL CALCULATED.3IONS-SCNC: 8 MMOL/L
BASOPHILS # BLD AUTO: 0.02 THOUSANDS/ÂΜL (ref 0–0.1)
BASOPHILS NFR BLD AUTO: 0 % (ref 0–1)
BUN SERPL-MCNC: 13 MG/DL (ref 5–25)
CALCIUM SERPL-MCNC: 8.5 MG/DL (ref 8.4–10.2)
CHLORIDE SERPL-SCNC: 102 MMOL/L (ref 96–108)
CO2 SERPL-SCNC: 26 MMOL/L (ref 21–32)
CREAT SERPL-MCNC: 0.69 MG/DL (ref 0.6–1.3)
EOSINOPHIL # BLD AUTO: 0.18 THOUSAND/ÂΜL (ref 0–0.61)
EOSINOPHIL NFR BLD AUTO: 3 % (ref 0–6)
ERYTHROCYTE [DISTWIDTH] IN BLOOD BY AUTOMATED COUNT: 12.4 % (ref 11.6–15.1)
GFR SERPL CREATININE-BSD FRML MDRD: 111 ML/MIN/1.73SQ M
GLUCOSE SERPL-MCNC: 88 MG/DL (ref 65–140)
HCT VFR BLD AUTO: 43.8 % (ref 36.5–49.3)
HGB BLD-MCNC: 14.8 G/DL (ref 12–17)
IMM GRANULOCYTES # BLD AUTO: 0.01 THOUSAND/UL (ref 0–0.2)
IMM GRANULOCYTES NFR BLD AUTO: 0 % (ref 0–2)
LYMPHOCYTES # BLD AUTO: 1.79 THOUSANDS/ÂΜL (ref 0.6–4.47)
LYMPHOCYTES NFR BLD AUTO: 26 % (ref 14–44)
MAGNESIUM SERPL-MCNC: 1.9 MG/DL (ref 1.9–2.7)
MCH RBC QN AUTO: 30.3 PG (ref 26.8–34.3)
MCHC RBC AUTO-ENTMCNC: 33.8 G/DL (ref 31.4–37.4)
MCV RBC AUTO: 90 FL (ref 82–98)
MONOCYTES # BLD AUTO: 0.93 THOUSAND/ÂΜL (ref 0.17–1.22)
MONOCYTES NFR BLD AUTO: 13 % (ref 4–12)
NEUTROPHILS # BLD AUTO: 4.1 THOUSANDS/ÂΜL (ref 1.85–7.62)
NEUTS SEG NFR BLD AUTO: 58 % (ref 43–75)
NRBC BLD AUTO-RTO: 0 /100 WBCS
PHOSPHATE SERPL-MCNC: 3.9 MG/DL (ref 2.7–4.5)
PLATELET # BLD AUTO: 211 THOUSANDS/UL (ref 149–390)
PLATELET # BLD AUTO: 238 THOUSANDS/UL (ref 149–390)
PMV BLD AUTO: 10.3 FL (ref 8.9–12.7)
PMV BLD AUTO: 10.5 FL (ref 8.9–12.7)
POTASSIUM SERPL-SCNC: 3.4 MMOL/L (ref 3.5–5.3)
RBC # BLD AUTO: 4.88 MILLION/UL (ref 3.88–5.62)
SODIUM SERPL-SCNC: 136 MMOL/L (ref 135–147)
WBC # BLD AUTO: 7.03 THOUSAND/UL (ref 4.31–10.16)

## 2023-11-04 PROCEDURE — 80048 BASIC METABOLIC PNL TOTAL CA: CPT

## 2023-11-04 PROCEDURE — 85049 AUTOMATED PLATELET COUNT: CPT

## 2023-11-04 PROCEDURE — 36415 COLL VENOUS BLD VENIPUNCTURE: CPT

## 2023-11-04 PROCEDURE — 99223 1ST HOSP IP/OBS HIGH 75: CPT | Performed by: INTERNAL MEDICINE

## 2023-11-04 PROCEDURE — 83735 ASSAY OF MAGNESIUM: CPT

## 2023-11-04 PROCEDURE — 96376 TX/PRO/DX INJ SAME DRUG ADON: CPT

## 2023-11-04 PROCEDURE — 85025 COMPLETE CBC W/AUTO DIFF WBC: CPT

## 2023-11-04 PROCEDURE — 99222 1ST HOSP IP/OBS MODERATE 55: CPT | Performed by: SURGERY

## 2023-11-04 PROCEDURE — 84100 ASSAY OF PHOSPHORUS: CPT

## 2023-11-04 RX ORDER — POTASSIUM CHLORIDE 14.9 MG/ML
20 INJECTION INTRAVENOUS
Status: COMPLETED | OUTPATIENT
Start: 2023-11-04 | End: 2023-11-04

## 2023-11-04 RX ORDER — ONDANSETRON 2 MG/ML
4 INJECTION INTRAMUSCULAR; INTRAVENOUS EVERY 6 HOURS PRN
Status: DISCONTINUED | OUTPATIENT
Start: 2023-11-04 | End: 2023-11-06 | Stop reason: HOSPADM

## 2023-11-04 RX ORDER — ACETAMINOPHEN 325 MG/1
650 TABLET ORAL EVERY 6 HOURS PRN
Status: DISCONTINUED | OUTPATIENT
Start: 2023-11-04 | End: 2023-11-06 | Stop reason: HOSPADM

## 2023-11-04 RX ORDER — HYDROMORPHONE HCL IN WATER/PF 6 MG/30 ML
0.2 PATIENT CONTROLLED ANALGESIA SYRINGE INTRAVENOUS EVERY 4 HOURS PRN
Status: DISCONTINUED | OUTPATIENT
Start: 2023-11-04 | End: 2023-11-06 | Stop reason: HOSPADM

## 2023-11-04 RX ORDER — HYDROMORPHONE HCL/PF 1 MG/ML
0.5 SYRINGE (ML) INJECTION EVERY 4 HOURS PRN
Status: DISCONTINUED | OUTPATIENT
Start: 2023-11-04 | End: 2023-11-06 | Stop reason: HOSPADM

## 2023-11-04 RX ORDER — LABETALOL HYDROCHLORIDE 5 MG/ML
10 INJECTION, SOLUTION INTRAVENOUS EVERY 4 HOURS PRN
Status: DISCONTINUED | OUTPATIENT
Start: 2023-11-04 | End: 2023-11-06 | Stop reason: HOSPADM

## 2023-11-04 RX ORDER — SODIUM CHLORIDE, SODIUM LACTATE, POTASSIUM CHLORIDE, CALCIUM CHLORIDE 600; 310; 30; 20 MG/100ML; MG/100ML; MG/100ML; MG/100ML
125 INJECTION, SOLUTION INTRAVENOUS CONTINUOUS
Status: DISCONTINUED | OUTPATIENT
Start: 2023-11-04 | End: 2023-11-05

## 2023-11-04 RX ORDER — LAMIVUDINE 150 MG/1
300 TABLET, FILM COATED ORAL DAILY
Status: DISCONTINUED | OUTPATIENT
Start: 2023-11-04 | End: 2023-11-06 | Stop reason: HOSPADM

## 2023-11-04 RX ORDER — HYDRALAZINE HYDROCHLORIDE 20 MG/ML
10 INJECTION INTRAMUSCULAR; INTRAVENOUS EVERY 4 HOURS PRN
Status: DISCONTINUED | OUTPATIENT
Start: 2023-11-04 | End: 2023-11-06 | Stop reason: HOSPADM

## 2023-11-04 RX ORDER — OXYCODONE HYDROCHLORIDE 5 MG/1
5 TABLET ORAL EVERY 4 HOURS PRN
Status: DISCONTINUED | OUTPATIENT
Start: 2023-11-04 | End: 2023-11-04

## 2023-11-04 RX ORDER — HEPARIN SODIUM 5000 [USP'U]/ML
5000 INJECTION, SOLUTION INTRAVENOUS; SUBCUTANEOUS EVERY 8 HOURS SCHEDULED
Status: DISCONTINUED | OUTPATIENT
Start: 2023-11-04 | End: 2023-11-06 | Stop reason: HOSPADM

## 2023-11-04 RX ORDER — ATORVASTATIN CALCIUM 40 MG/1
40 TABLET, FILM COATED ORAL DAILY
Status: DISCONTINUED | OUTPATIENT
Start: 2023-11-04 | End: 2023-11-06 | Stop reason: HOSPADM

## 2023-11-04 RX ORDER — METHOCARBAMOL 100 MG/ML
500 INJECTION, SOLUTION INTRAMUSCULAR; INTRAVENOUS EVERY 8 HOURS PRN
Status: DISCONTINUED | OUTPATIENT
Start: 2023-11-04 | End: 2023-11-06 | Stop reason: HOSPADM

## 2023-11-04 RX ADMIN — HEPARIN SODIUM 5000 UNITS: 5000 INJECTION INTRAVENOUS; SUBCUTANEOUS at 14:30

## 2023-11-04 RX ADMIN — HYDROMORPHONE HYDROCHLORIDE 0.5 MG: 1 INJECTION, SOLUTION INTRAMUSCULAR; INTRAVENOUS; SUBCUTANEOUS at 18:42

## 2023-11-04 RX ADMIN — POTASSIUM CHLORIDE 20 MEQ: 14.9 INJECTION, SOLUTION INTRAVENOUS at 17:09

## 2023-11-04 RX ADMIN — HEPARIN SODIUM 5000 UNITS: 5000 INJECTION INTRAVENOUS; SUBCUTANEOUS at 05:25

## 2023-11-04 RX ADMIN — SODIUM CHLORIDE, SODIUM LACTATE, POTASSIUM CHLORIDE, AND CALCIUM CHLORIDE 125 ML/HR: .6; .31; .03; .02 INJECTION, SOLUTION INTRAVENOUS at 11:42

## 2023-11-04 RX ADMIN — PHENOL 1 SPRAY: 1.5 LIQUID ORAL at 06:47

## 2023-11-04 RX ADMIN — SODIUM CHLORIDE, SODIUM LACTATE, POTASSIUM CHLORIDE, AND CALCIUM CHLORIDE 125 ML/HR: .6; .31; .03; .02 INJECTION, SOLUTION INTRAVENOUS at 22:35

## 2023-11-04 RX ADMIN — SODIUM CHLORIDE, SODIUM LACTATE, POTASSIUM CHLORIDE, AND CALCIUM CHLORIDE 125 ML/HR: .6; .31; .03; .02 INJECTION, SOLUTION INTRAVENOUS at 02:34

## 2023-11-04 RX ADMIN — HEPARIN SODIUM 5000 UNITS: 5000 INJECTION INTRAVENOUS; SUBCUTANEOUS at 23:10

## 2023-11-04 RX ADMIN — POTASSIUM CHLORIDE 20 MEQ: 14.9 INJECTION, SOLUTION INTRAVENOUS at 14:30

## 2023-11-04 RX ADMIN — MORPHINE SULFATE 4 MG: 4 INJECTION INTRAVENOUS at 00:26

## 2023-11-04 RX ADMIN — ATORVASTATIN CALCIUM 40 MG: 40 TABLET, FILM COATED ORAL at 08:04

## 2023-11-04 NOTE — ED PROVIDER NOTES
History  Chief Complaint   Patient presents with    Abdominal Pain     Patient presents for sharp abdominal pain for the last few days. States pain is centralized in his abdomen. Has been unable to tolerate PO intake, throws up immediately. 58-year-old male with history of HIV and appendectomy several years ago presents to the emergency department with 2 days of abdominal pain and 7 episodes of vomiting over the last 2 days. He reports that his had 2 bowel movements since abdominal pain started but denies any diarrhea or blood in the stool or vomit. He denies fevers at home. He reports a significantly decreased desire to eat, and when he does eat his nausea and belly pain seems to worsen and usually results in vomiting. He denies any chest pain, shortness of breath, headaches, fevers and otherwise feels in his usual state of health. He denies any trauma to the abdomen. He has not had this pain before. Prior to Admission Medications   Prescriptions Last Dose Informant Patient Reported? Taking?    Dolutegravir-lamiVUDine (Dovato)  MG TABS Past Week  No Yes   Sig: Take 1 tablet by mouth every morning   acetaminophen (TYLENOL) 650 mg CR tablet Past Week  No Yes   Sig: Take 2 tablets (1,300 mg total) by mouth every 8 (eight) hours as needed for mild pain or headaches   atorvastatin (LIPITOR) 40 mg tablet Past Week  No Yes   Sig: Take 1 tablet (40 mg total) by mouth daily   cetirizine (ZyrTEC) 10 mg tablet Past Week  No Yes   Sig: Take 1 tablet (10 mg total) by mouth daily   fluticasone (FLONASE) 50 mcg/act nasal spray Not Taking  No No   Si spray into each nostril daily   Patient not taking: Reported on 2023   lisinopril-hydrochlorothiazide (PRINZIDE,ZESTORETIC) 10-12.5 MG per tablet Past Week  No Yes   Sig: Take 1 tablet by mouth daily   tadalafil (CIALIS) 10 MG tablet Past Week  No Yes   Sig: Take 1 tablet (10 mg total) by mouth daily as needed for erectile dysfunction Facility-Administered Medications: None       Past Medical History:   Diagnosis Date    Abscess of left groin     Dental decay     Depression     Elevated BP without diagnosis of hypertension     HIV disease ([address]) 11/25/1998    mode of transmission: MSM    Hx of herpes zoster     Hypogonadism in male     Tear of right biceps muscle 2017       History reviewed. No pertinent surgical history. Family History   Problem Relation Age of Onset    Hypertension Mother     Hypertension Father     Heart attack Father      I have reviewed and agree with the history as documented. E-Cigarette/Vaping    E-Cigarette Use Never User      E-Cigarette/Vaping Substances    Nicotine No     THC No     CBD No     Flavoring No     Other No     Unknown No      Social History     Tobacco Use    Smoking status: Never    Smokeless tobacco: Never   Vaping Use    Vaping Use: Never used   Substance Use Topics    Alcohol use: Yes     Alcohol/week: 1.0 standard drink of alcohol     Types: 1 Glasses of wine per week     Comment: 1 glass wine/day    Drug use: Never        Review of Systems   Constitutional:  Negative for chills and fever. HENT:  Negative for ear pain and sore throat. Eyes:  Negative for pain and visual disturbance. Respiratory:  Negative for cough and shortness of breath. Cardiovascular:  Negative for chest pain and palpitations. Gastrointestinal:  Positive for abdominal pain, nausea and vomiting. Negative for blood in stool, constipation and diarrhea. Genitourinary:  Negative for dysuria and hematuria. Musculoskeletal:  Negative for arthralgias and back pain. Skin:  Negative for color change and rash. Neurological:  Negative for seizures and syncope. All other systems reviewed and are negative.       Physical Exam  ED Triage Vitals   Temperature Pulse Respirations Blood Pressure SpO2   11/03/23 1800 11/03/23 1800 11/03/23 1800 11/03/23 1800 11/03/23 1800   97.8 °F (36.6 °C) 81 18 (!) 168/102 96 % Temp Source Heart Rate Source Patient Position - Orthostatic VS BP Location FiO2 (%)   11/03/23 1800 11/03/23 1800 11/03/23 1800 11/03/23 1800 --   Oral Monitor Lying Right arm       Pain Score       11/04/23 0026       6             Orthostatic Vital Signs  Vitals:    11/03/23 1800 11/03/23 2300   BP: (!) 168/102 151/100   Pulse: 81 86   Patient Position - Orthostatic VS: Lying        Physical Exam  Vitals and nursing note reviewed. Constitutional:       General: He is in acute distress (Mild due to pain). Appearance: He is well-developed. He is not ill-appearing. HENT:      Head: Normocephalic and atraumatic. Eyes:      Extraocular Movements: Extraocular movements intact. Conjunctiva/sclera: Conjunctivae normal.   Cardiovascular:      Rate and Rhythm: Normal rate and regular rhythm. Pulses: Normal pulses. Heart sounds: Normal heart sounds. No murmur heard. Pulmonary:      Effort: Pulmonary effort is normal. No respiratory distress. Breath sounds: Normal breath sounds. No wheezing, rhonchi or rales. Abdominal:      General: Abdomen is flat. Palpations: Abdomen is soft. Tenderness: There is generalized abdominal tenderness. There is no right CVA tenderness, left CVA tenderness, guarding or rebound. Negative signs include Dillon's sign. Musculoskeletal:         General: No swelling, tenderness or deformity. Normal range of motion. Cervical back: Normal range of motion and neck supple. Right lower leg: No edema. Left lower leg: No edema. Skin:     General: Skin is warm and dry. Capillary Refill: Capillary refill takes less than 2 seconds. Neurological:      Mental Status: He is alert and oriented to person, place, and time.          ED Medications  Medications   lactated ringers infusion (has no administration in time range)   heparin (porcine) subcutaneous injection 5,000 Units (has no administration in time range)   acetaminophen (TYLENOL) tablet 650 mg (has no administration in time range)   oxyCODONE (ROXICODONE) IR tablet 5 mg (has no administration in time range)   HYDROmorphone (DILAUDID) injection 0.5 mg (has no administration in time range)   ondansetron (ZOFRAN) injection 4 mg (has no administration in time range)   atorvastatin (LIPITOR) tablet 40 mg (has no administration in time range)   Dolutegravir-lamiVUDine  MG TABS 1 tablet (has no administration in time range)   labetalol (NORMODYNE) injection 10 mg (has no administration in time range)   hydrALAZINE (APRESOLINE) injection 10 mg (has no administration in time range)   phenol (CHLORASEPTIC) 1.4 % mucosal liquid 1 spray (has no administration in time range)   ondansetron (ZOFRAN) injection 4 mg (4 mg Intravenous Given 11/3/23 1819)   sodium chloride 0.9 % bolus 1,000 mL (0 mL Intravenous Stopped 11/3/23 2136)   iohexol (OMNIPAQUE) 350 MG/ML injection (MULTI-DOSE) 100 mL (100 mL Intravenous Given 11/3/23 2013)   morphine injection 4 mg (4 mg Intravenous Given 11/3/23 2139)   ondansetron (ZOFRAN) injection 4 mg (4 mg Intravenous Given 11/3/23 2137)   midazolam (VERSED) injection 1 mg (1 mg Intravenous Given 11/3/23 2222)   benzocaine (HURRICAINE) 20 % mucosal spray 2 spray (1 spray Mucosal Given 11/3/23 2245)   lidocaine (URO-JET) 2 % urethral/mucosal gel 1 Application (1 Application Urethral Given by Other 11/3/23 2233)   morphine injection 4 mg (4 mg Intravenous Given 11/4/23 0026)       Diagnostic Studies  Results Reviewed       Procedure Component Value Units Date/Time    Platelet count [[id number]]  (Normal) Collected: 11/04/23 0135    Lab Status: Final result Specimen: Blood from Arm, Right Updated: 11/04/23 0154     Platelets 724 Thousands/uL      MPV 10.5 fL     Urine Microscopic [[id number]]  (Normal) Collected: 11/03/23 2107    Lab Status: Final result Specimen: Urine, Clean Catch Updated: 11/03/23 2126     RBC, UA None Seen /hpf      WBC, UA None Seen /hpf      Epithelial Cells None Seen /hpf      Bacteria, UA Occasional /hpf     UA w Reflex to Microscopic w Reflex to Culture [[id number]]  (Abnormal) Collected: 11/03/23 2107    Lab Status: Final result Specimen: Urine, Clean Catch Updated: 11/03/23 2122     Color, UA Light Yellow     Clarity, UA Clear     Specific Gravity, UA >=1.050     pH, UA 6.0     Leukocytes, UA Negative     Nitrite, UA Negative     Protein, UA Trace mg/dl      Glucose, UA Negative mg/dl      Ketones, UA Negative mg/dl      Urobilinogen, UA <2.0 mg/dl      Bilirubin, UA Negative     Occult Blood, UA Negative    Lipase [[id number]]  (Normal) Collected: 11/03/23 1809    Lab Status: Final result Specimen: Blood from Arm, Right Updated: 11/03/23 1849     Lipase 11 u/L     CMP [[id number]]  (Abnormal) Collected: 11/03/23 1809    Lab Status: Final result Specimen: Blood from Arm, Right Updated: 11/03/23 1849     Sodium 133 mmol/L      Potassium 3.7 mmol/L      Chloride 100 mmol/L      CO2 25 mmol/L      ANION GAP 8 mmol/L      BUN 14 mg/dL      Creatinine 0.82 mg/dL      Glucose 99 mg/dL      Calcium 9.2 mg/dL      AST 39 U/L      ALT 27 U/L      Alkaline Phosphatase 41 U/L      Total Protein 7.6 g/dL      Albumin 4.1 g/dL      Total Bilirubin 0.74 mg/dL      eGFR 103 ml/min/1.73sq m     Narrative:      [facility] guidelines for Chronic Kidney Disease (CKD):     Stage 1 with normal or high GFR (GFR > 90 mL/min/1.73 square meters)    Stage 2 Mild CKD (GFR = 60-89 mL/min/1.73 square meters)    Stage 3A Moderate CKD (GFR = 45-59 mL/min/1.73 square meters)    Stage 3B Moderate CKD (GFR = 30-44 mL/min/1.73 square meters)    Stage 4 Severe CKD (GFR = 15-29 mL/min/1.73 square meters)    Stage 5 End Stage CKD (GFR <15 mL/min/1.73 square meters)  Note: GFR calculation is accurate only with a steady state creatinine    CBC and differential [[id number]]  (Abnormal) Collected: 11/03/23 1809    Lab Status: Final result Specimen: Blood from Arm, Right Updated: 11/03/23 1835     WBC 10.26 Thousand/uL      RBC 5.53 Million/uL      Hemoglobin 16.8 g/dL      Hematocrit 49.0 %      MCV 89 fL      MCH 30.4 pg      MCHC 34.3 g/dL      RDW 12.3 %      MPV 10.6 fL      Platelets 499 Thousands/uL      nRBC 0 /100 WBCs      Neutrophils Relative 71 %      Immat GRANS % 0 %      Lymphocytes Relative 18 %      Monocytes Relative 10 %      Eosinophils Relative 1 %      Basophils Relative 0 %      Neutrophils Absolute 7.22 Thousands/µL      Immature Grans Absolute 0.03 Thousand/uL      Lymphocytes Absolute 1.86 Thousands/µL      Monocytes Absolute 1.00 Thousand/µL      Eosinophils Absolute 0.13 Thousand/µL      Basophils Absolute 0.02 Thousands/µL                    CT Abdomen pelvis with contrast   Final Result by Wily Flores MD (11/03 2058)      Findings compatible with small bowel obstruction. The transitional point appears to be in the right lower quadrant just proximal to the terminal ileum of uncertain etiology. There is mild wall thickening of the distal small bowel loops, which may reflect    a nonspecific enteritis. Small amount of ascites. No pneumoperitoneum seen. Additional findings as above. Workstation performed: [id number]         XR chest 1 view portable    (Results Pending)         Procedures  Procedures      ED Course                                       Medical Decision Making  48M with history of appendectomy several years ago presents to the emergency department with generalized abdominal pain, frequent vomiting, and nausea starting 2 days ago. On physical exam, patient has generalized abdominal tenderness without rebound or guarding. Patient is exam otherwise within normal limits. Laboratory evaluation including CBC, CMP, lipase, UA all within normal limits other than mild hyponatremia with sodium of 133 and mildly elevated white blood cell count at 10.3.   CT scan of the patient's abdomen and pelvis with contrast reveals findings consistent with small bowel obstruction with transition point in right lower quadrant at the site of his prior appendectomy. With this finding, nasogastric tube placed by me and patient's case is discussed with surgical team on-call. Surgical resident came and evaluated the patient and admitted under the care of surgical team for further evaluation and treatment. Amount and/or Complexity of Data Reviewed  Labs: ordered. Radiology: ordered. Risk  OTC drugs. Prescription drug management. Decision regarding hospitalization. Disposition  Final diagnoses:   Small bowel obstruction ([address])     Time reflects when diagnosis was documented in both MDM as applicable and the Disposition within this note       Time User Action Codes Description Comment    11/3/2023  9:21 PM Mary David Add [C71.578] Small bowel obstruction [facility])           ED Disposition       ED Disposition   Admit    Condition   Stable    Date/Time   Fri Nov 3, 2023 10:10 PM    Comment   Case was discussed with Surg resident and the patient's admission status was agreed to be Admission Status: inpatient status to the service of Dr. Agustina Juarez .                Follow-up Information    None         Current Discharge Medication List        CONTINUE these medications which have NOT CHANGED    Details   acetaminophen (TYLENOL) 650 mg CR tablet Take 2 tablets (1,300 mg total) by mouth every 8 (eight) hours as needed for mild pain or headaches  Qty: 30 tablet, Refills: 2    Associated Diagnoses: Chronic tension-type headache, not intractable      atorvastatin (LIPITOR) 40 mg tablet Take 1 tablet (40 mg total) by mouth daily  Qty: 30 tablet, Refills: 2    Associated Diagnoses: Dyslipidemia      cetirizine (ZyrTEC) 10 mg tablet Take 1 tablet (10 mg total) by mouth daily  Qty: 90 tablet, Refills: 1    Associated Diagnoses: Seasonal allergies      Dolutegravir-lamiVUDine (Dovato)  MG TABS Take 1 tablet by mouth every morning  Qty: 30 tablet, Refills: 3 Associated Diagnoses: HIV infection, unspecified symptom status ([address])      lisinopril-hydrochlorothiazide (PRINZIDE,ZESTORETIC) 10-12.5 MG per tablet Take 1 tablet by mouth daily  Qty: 30 tablet, Refills: 2    Associated Diagnoses: Essential hypertension      tadalafil (CIALIS) 10 MG tablet Take 1 tablet (10 mg total) by mouth daily as needed for erectile dysfunction  Qty: 10 tablet, Refills: 3    Associated Diagnoses: Erectile dysfunction, unspecified erectile dysfunction type      fluticasone (FLONASE) 50 mcg/act nasal spray 1 spray into each nostril daily  Qty: 16 g, Refills: 2    Associated Diagnoses: Seasonal allergies           No discharge procedures on file. PDMP Review       None             ED Provider  Attending physically available and evaluated Gennaro Heredia. I managed the patient along with the ED Attending.     Electronically Signed by           Vickey Cazares DO  11/04/23 023

## 2023-11-04 NOTE — CONSULTS
Consultation - Infectious Disease   Hernan Jurado 51 y.o. male MRN: [id number]  Unit/Bed#: S -01 Encounter: [id number]      IMPRESSION & RECOMMENDATIONS:       Well controlled HIV  -Well controlled on Dovato (Dolutegravir/Lamivudine). Follows with [facility]/Dr. Sauceda, last visit 10/24. CD4 985 on 10/07/23, VL < 20 copies.  -While in hospital, can replace Dovato with the individual components, Dolutegravir + Lamivudine, as we do not carry the combination pill  -If patient cleared by surgery team for oral medications, then we can resume these medications now  -If not cleared for medications then while NPO it would be ok to temporarily hold home ART for several days until cleared for PO meds  -On discharge he can resume his home combination pill (Dovato)  -No need for OI prophylaxis as CD4 well above 200  -Outpatient follow up with Dr. Max Garibay     2. Small bowel obstruction:  -Patient did not appear to have any preceding gastroenteritis symptoms of diarrhea, fever or chills. -Management per Surgery team.    I have discussed the above management plan in detail with the primary service    I have performed an extensive review of the medical records in Epic including review of the notes, radiographs, and laboratory results     HISTORY OF PRESENT ILLNESS:  Reason for Consult: HIV    HPI: Hernan Jurado is a 51y.o. year old male with HIV on ART, History of Zoster, prior appendectomy, depression who presented to ED on 11/03 with acute onset emesis and abdominal pain for 2 days. He was noted to have diffuse tenderness throughout abdomen on exam and CT scan was concerning for small bowel obstruction, mild wall thickening of distal small bowel loops as well as small amount of ascites. He has been admitted to surgical service and currently has NG tube in place and is NPO for bowel rest.     Patient tells me he was fine and then suddenly developed acute abdominal pain and emesis.  Does not think he had any diarrhea. He had no sick contacts. He had eaten a roast beef sandwich from [employer], but no clear undercooked meats. A week ago he had an egg yolk dish at a [employer]. He reports abdomen feels much better today. Is passing gas from below, no bowel movement yet. No fevers. REVIEW OF SYSTEMS:  A complete review of systems is negative other than that noted in the HPI. PAST MEDICAL HISTORY:  Past Medical History:   Diagnosis Date    Abscess of left groin     Dental decay     Depression     Elevated BP without diagnosis of hypertension     HIV disease ([address] [facility]) 1998    mode of transmission: MSM    Hx of herpes zoster     Hypogonadism in male     Tear of right biceps muscle 2017     History reviewed. No pertinent surgical history. FAMILY HISTORY:  Non-contributory    SOCIAL HISTORY:  Social History   Social History     Substance and Sexual Activity   Alcohol Use Yes    Alcohol/week: 1.0 standard drink of alcohol    Types: 1 Glasses of wine per week    Comment: 1 glass wine/day     Social History     Substance and Sexual Activity   Drug Use Never     Social History     Tobacco Use   Smoking Status Never   Smokeless Tobacco Never       ALLERGIES:  No Known Allergies    MEDICATIONS:  All current active medications have been reviewed.       PHYSICAL EXAM:  Temp:  [97.8 °F (36.6 °C)-97.9 °F (36.6 °C)] 97.9 °F (36.6 °C)  HR:  [73-86] 75  Resp:  [17-20] 17  BP: (143-168)/() 151/90  SpO2:  [95 %-97 %] 95 %  Temp (24hrs), Av.8 °F (36.6 °C), Min:97.8 °F (36.6 °C), Max:97.9 °F (36.6 °C)  Current: Temperature: 97.9 °F (36.6 °C)    Intake/Output Summary (Last 24 hours) at 2023 1241  Last data filed at 2023 1142  Gross per 24 hour   Intake 2261.67 ml   Output 600 ml   Net 1661.67 ml       General Appearance:  Appearing well, nontoxic, and in no distress   Head:  Normocephalic, without obvious abnormality, atraumatic   Eyes:  Conjunctiva pink and sclera anicteric, both eyes   Nose: Nares normal, NG tube in place   Throat: Oropharynx moist without lesions   Neck: Supple   Back:   Symmetric   Lungs:   Clear to auscultation bilaterally, respirations unlabored   Chest Wall:  No tenderness or deformity   Heart:  RRR; no murmur, rub or gallop   Abdomen:   Soft, non-tender   Extremities: No cyanosis, clubbing or edema   Skin: No rashes or lesions. No draining wounds noted. Lymph nodes: Cervical, supraclavicular nodes normal   Neurologic: Alert and oriented       LABS, IMAGING, & OTHER STUDIES:  Lab Results:  I have personally reviewed pertinent labs. Results from last 7 days   Lab Units 11/04/23  0522 11/04/23  0135 11/03/23  1809   WBC Thousand/uL 7.03  --  10.26*   HEMOGLOBIN g/dL 14.8  --  16.8   PLATELETS Thousands/uL 211 238 248     Results from last 7 days   Lab Units 11/04/23  0522 11/03/23  1809   SODIUM mmol/L 136 133*   POTASSIUM mmol/L 3.4* 3.7   CHLORIDE mmol/L 102 100   CO2 mmol/L 26 25   BUN mg/dL 13 14   CREATININE mg/dL 0.69 0.82   EGFR ml/min/1.73sq m 111 103   CALCIUM mg/dL 8.5 9.2   AST U/L  --  39   ALT U/L  --  27   ALK PHOS U/L  --  41       Imaging Studies:   I have personally reviewed pertinent imaging study reports and images in PACS. Other Studies:   I have personally reviewed pertinent reports.       Carlos Gonzalez MD  [facility]

## 2023-11-04 NOTE — QUICK NOTE
the patient seen and examined, no cp, sob, wheezing. Discloses some mild discomfort related to NG a headache. No vision changes, dizziness. No N/V. Per pt had bowel movement while passing flatus. Will continue to monitor.     Michael Cherry MD  PGY-1

## 2023-11-04 NOTE — PLAN OF CARE
Problem: PAIN - ADULT  Goal: Verbalizes/displays adequate comfort level or baseline comfort level  Description: Interventions:  - Encourage patient to monitor pain and request assistance  - Assess pain using appropriate pain scale  - Administer analgesics based on type and severity of pain and evaluate response  - Implement non-pharmacological measures as appropriate and evaluate response  - Consider cultural and social influences on pain and pain management  - Notify physician/advanced practitioner if interventions unsuccessful or patient reports new pain  Outcome: Progressing     Problem: INFECTION - ADULT  Goal: Absence or prevention of progression during hospitalization  Description: INTERVENTIONS:  - Assess and monitor for signs and symptoms of infection  - Monitor lab/diagnostic results  - Monitor all insertion sites, i.e. indwelling lines, tubes, and drains  - Monitor endotracheal if appropriate and nasal secretions for changes in amount and color  - [place] appropriate cooling/warming therapies per order  - Administer medications as ordered  - Instruct and encourage patient and family to use good hand hygiene technique  - Identify and instruct in appropriate isolation precautions for identified infection/condition  Outcome: Progressing     Problem: SAFETY ADULT  Goal: Patient will remain free of falls  Description: INTERVENTIONS:  - Educate patient/family on patient safety including physical limitations  - Instruct patient to call for assistance with activity   - Consult OT/PT to assist with strengthening/mobility   - Keep Call bell within reach  - Keep bed low and locked with side rails adjusted as appropriate  - Keep care items and personal belongings within reach  - Initiate and maintain comfort rounds  - Make Fall Risk Sign visible to staff  - Offer Toileting every 2 Hours, in advance of need  - Obtain necessary fall risk management equipment  - Apply yellow socks and bracelet for high fall risk patients  - Consider moving patient to room near nurses station  Outcome: Progressing     Problem: DISCHARGE PLANNING  Goal: Discharge to home or other facility with appropriate resources  Description: INTERVENTIONS:  - Identify barriers to discharge w/patient and caregiver  - Arrange for needed discharge resources and transportation as appropriate  - Identify discharge learning needs (meds, wound care, etc.)  - Arrange for interpretive services to assist at discharge as needed  - Refer to Case Management Department for coordinating discharge planning if the patient needs post-hospital services based on physician/advanced practitioner order or complex needs related to functional status, cognitive ability, or social support system  Outcome: Progressing     Problem: Knowledge Deficit  Goal: Patient/family/caregiver demonstrates understanding of disease process, treatment plan, medications, and discharge instructions  Description: Complete learning assessment and assess knowledge base.   Interventions:  - Provide teaching at level of understanding  - Provide teaching via preferred learning methods  Outcome: Progressing     Problem: GASTROINTESTINAL - ADULT  Goal: Minimal or absence of nausea and/or vomiting  Description: INTERVENTIONS:  - Administer IV fluids if ordered to ensure adequate hydration  - Maintain NPO status until nausea and vomiting are resolved  - Nasogastric tube if ordered  - Administer ordered antiemetic medications as needed  - Provide nonpharmacologic comfort measures as appropriate  - Advance diet as tolerated, if ordered  - Consider nutrition services referral to assist patient with adequate nutrition and appropriate food choices  Outcome: Progressing  Goal: Maintains or returns to baseline bowel function  Description: INTERVENTIONS:  - Assess bowel function  - Encourage oral fluids to ensure adequate hydration  - Administer IV fluids if ordered to ensure adequate hydration  - Administer ordered medications as needed  - Encourage mobilization and activity  - Consider nutritional services referral to assist patient with adequate nutrition and appropriate food choices  Outcome: Progressing  Goal: Maintains adequate nutritional intake  Description: INTERVENTIONS:  - Monitor percentage of each meal consumed  - Identify factors contributing to decreased intake, treat as appropriate  - Assist with meals as needed  - Monitor I&O, weight, and lab values if indicated  - Obtain nutrition services referral as needed  Outcome: Progressing  Goal: Oral mucous membranes remain intact  Description: INTERVENTIONS  - Assess oral mucosa and hygiene practices  - Implement preventative oral hygiene regimen  - Implement oral medicated treatments as ordered  - Initiate Nutrition services referral as needed  Outcome: Progressing     Problem: METABOLIC, FLUID AND ELECTROLYTES - ADULT  Goal: Electrolytes maintained within normal limits  Description: INTERVENTIONS:  - Monitor labs and assess patient for signs and symptoms of electrolyte imbalances  - Administer electrolyte replacement as ordered  - Monitor response to electrolyte replacements, including repeat lab results as appropriate  - Instruct patient on fluid and nutrition as appropriate  Outcome: Progressing  Goal: Fluid balance maintained  Description: INTERVENTIONS:  - Monitor labs   - Monitor I/O and WT  - Instruct patient on fluid and nutrition as appropriate  - Assess for signs & symptoms of volume excess or deficit  Outcome: Progressing

## 2023-11-04 NOTE — UTILIZATION REVIEW
Initial Clinical Review    Admission: Date/Time/Statement:   Admission Orders (From admission, onward)       Ordered        11/04/23 0122  Inpatient Admission  Once                          Orders Placed This Encounter   Procedures    Inpatient Admission     Standing Status:   Standing     Number of Occurrences:   1     Order Specific Question:   Level of Care     Answer:   Med Surg [16]     Order Specific Question:   Estimated length of stay     Answer:   More than 2 Midnights     Order Specific Question:   Certification     Answer:   I certify that inpatient services are medically necessary for this patient for a duration of greater than two midnights. See H&P and MD Progress Notes for additional information about the patient's course of treatment. ED Arrival Information       Expected   -    Arrival   11/3/2023 17:38    Acuity   Urgent              Means of arrival   Walk-In    Escorted by   Self    Service   Surgery-General    Admission type   Emergency              Arrival complaint   Abdominal Pain             Chief Complaint   Patient presents with    Abdominal Pain     Patient presents for sharp abdominal pain for the last few days. States pain is centralized in his abdomen. Has been unable to tolerate PO intake, throws up immediately. Initial Presentation: 51 y.o. male from home to ED 11/3/23 and inpatient order placed 11/4/23 due to SBO. Presented due to abdominal pain and 7 episodes of vomiting starting 2 days prior to arrival.   Poor appetite. On exam mild distress due to pain. Generalized abdominal tenderness. Wbc 10.26.  ct showed SBO. In the ED given Zofran x 2,  1 liter IVF bolus, Morphine x 2, ngt inserted. Plan is continued IVF, NPO, NGT. Pain and nausea control. 11/4/23 per ID - patient has controlled HIV. On Dovato. CD4 985 on 10/7/23, VL <20. Plan is continue Davato with individual components of Dolutegravir + Lamivudine.   Can hold if not cleared for oral intake and restart when able. Can hold up to several days. No OI prophylaxis. Date: 11/5/23    Day 2:  ngt discomfort.   + flatus and multiple BM. Hypertensive. On exam abdomen soft and minimally distended. K 3.3.   plan is ngt clamp trial.   Continue IVF. Resume HIV medications. OOB. Replete lytes. Consult medicine for BP management    ED Triage Vitals   Temperature Pulse Respirations Blood Pressure SpO2   11/03/23 1800 11/03/23 1800 11/03/23 1800 11/03/23 1800 11/03/23 1800   97.8 °F (36.6 °C) 81 18 (!) 168/102 96 %      Temp Source Heart Rate Source Patient Position - Orthostatic VS BP Location FiO2 (%)   11/03/23 1800 11/03/23 1800 11/03/23 1800 11/03/23 1800 --   Oral Monitor Lying Right arm       Pain Score       11/04/23 0026       6          Wt Readings from Last 1 Encounters:   10/24/23 81.9 kg (180 lb 9.6 oz)     Additional Vital Signs:   11/05/23 07:19:26 98.9 °F (37.2 °C) 81 -- 160/102 Abnormal  121 95 % -- --   11/04/23 21:39:08 98.1 °F (36.7 °C) 75 16 152/104 Abnormal  120 96 %       11/04/23 14:24:41 -- 89 18 155/98 117 95 % -- --   11/04/23 0806 -- -- -- -- -- -- None (Room air) --   11/04/23 07:06:55 97.9 °F (36.6 °C) 75 17 151/90 110 95 % None (Room air) Lying   11/04/23 07:06:02 -- 80 17 143/96 112 96 % -- --   11/04/23 02:33:44 97.8 °F (36.6 °C) 73 20 143/98 113 96 % None (Room air) Sitting   11/04/23 02:33:17 -- 81 -- 143/98 113 95 % -- --   11/03/23 2300 -- 86 18 151/100 119 97 % None (Room air)      Pertinent Labs/Diagnostic Test Results:   XR chest 1 view portable   Final Result by Isaura Jett MD (11/04 1440)      NGT in place      Multiple gas distended small bowel loops. Workstation performed: [id number]         CT Abdomen pelvis with contrast   Final Result by Kelsey Brito MD (11/03 2058)      Findings compatible with small bowel obstruction.  The transitional point appears to be in the right lower quadrant just proximal to the terminal ileum of uncertain etiology. There is mild wall thickening of the distal small bowel loops, which may reflect    a nonspecific enteritis. Small amount of ascites. No pneumoperitoneum seen. Additional findings as above.             Workstation performed: [id number]             Results from last 7 days   Lab Units 11/05/23  0718 11/04/23 0522 11/04/23  0135 11/03/23  1809   WBC Thousand/uL 6.46 7.03  --  10.26*   HEMOGLOBIN g/dL 14.0 14.8  --  16.8   HEMATOCRIT % 41.4 43.8  --  49.0   PLATELETS Thousands/uL 223 211 238 248   NEUTROS ABS Thousands/µL 3.88 4.10  --  7.22     Results from last 7 days   Lab Units 11/05/23  0718 11/04/23 0522 11/03/23  1809   SODIUM mmol/L 138 136 133*   POTASSIUM mmol/L 3.3* 3.4* 3.7   CHLORIDE mmol/L 100 102 100   CO2 mmol/L 29 26 25   ANION GAP mmol/L 9 8 8   BUN mg/dL 20 13 14   CREATININE mg/dL 0.81 0.69 0.82   EGFR ml/min/1.73sq m 104 111 103   CALCIUM mg/dL 8.4 8.5 9.2   MAGNESIUM mg/dL 2.0 1.9  --    PHOSPHORUS mg/dL 3.2 3.9  --      Results from last 7 days   Lab Units 11/03/23  1809   AST U/L 39   ALT U/L 27   ALK PHOS U/L 41   TOTAL PROTEIN g/dL 7.6   ALBUMIN g/dL 4.1   TOTAL BILIRUBIN mg/dL 0.74     Results from last 7 days   Lab Units 11/05/23  0718 11/04/23 0522 11/03/23  1809   GLUCOSE RANDOM mg/dL 71 88 99     Results from last 7 days   Lab Units 11/03/23  1809   LIPASE u/L 11     Results from last 7 days   Lab Units 11/03/23  2107   CLARITY UA  Clear   COLOR UA  Light Yellow   SPEC GRAV UA  >=1.050*   PH UA  6.0   GLUCOSE UA mg/dl Negative   KETONES UA mg/dl Negative   BLOOD UA  Negative   PROTEIN UA mg/dl Trace*   NITRITE UA  Negative   BILIRUBIN UA  Negative   UROBILINOGEN UA (BE) mg/dl <2.0   LEUKOCYTES UA  Negative   WBC UA /hpf None Seen   RBC UA /hpf None Seen   BACTERIA UA /hpf Occasional   EPITHELIAL CELLS WET PREP /hpf None Seen         ED Treatment:   Medication Administration from 11/03/2023 1738 to 11/04/2023 0650         Date/Time Order Dose Route Action Comments 11/03/2023 1819 EDT ondansetron (ZOFRAN) injection 4 mg 4 mg Intravenous Given --     11/03/2023 1827 EDT sodium chloride 0.9 % bolus 1,000 mL 1,000 mL Intravenous New Bag --     11/03/2023 2013 EDT iohexol (OMNIPAQUE) 350 MG/ML injection (MULTI-DOSE) 100 mL 100 mL Intravenous Given --     11/03/2023 2139 EDT morphine injection 4 mg 4 mg Intravenous Given --     11/03/2023 2137 EDT ondansetron (ZOFRAN) injection 4 mg 4 mg Intravenous Given --     11/03/2023 2222 EDT midazolam (VERSED) injection 1 mg 1 mg Intravenous Given --     11/03/2023 2245 EDT benzocaine (HURRICAINE) 20 % mucosal spray 2 spray 1 spray Mucosal Given --     11/03/2023 2233 EDT lidocaine (URO-JET) 2 % urethral/mucosal gel 1 Application 1 Application Urethral Given by Other given to R nostril by Gerkeily Every     11/04/2023 0026 EDT morphine injection 4 mg 4 mg Intravenous Given --          Past Medical History:   Diagnosis Date    Abscess of left groin     Dental decay     Depression     Elevated BP without diagnosis of hypertension     HIV disease ([address]) 11/25/1998    mode of transmission: MSM    Hx of herpes zoster     Hypogonadism in male     Tear of right biceps muscle 2017     Present on Admission:  **None**      Admitting Diagnosis: Small bowel obstruction ([address]) [K56.609]  Abdominal pain [R10.9]  Age/Sex: 51 y.o. male  Admission Orders:  11/4/23 0122 inpatient   Scheduled Medications:  atorvastatin, 40 mg, Oral, Daily  dolutegravir, 50 mg, Oral, Daily   And  lamiVUDine, 300 mg, Oral, Daily  heparin (porcine), 5,000 Units, Subcutaneous, Q8H [facility]  potassium chloride, 20 mEq, Intravenous, Q2H 1430 and 1515 on 11/4/23       Continuous IV Infusions:  lactated ringers, 125 mL/hr, Intravenous, Continuous      PRN Meds:  acetaminophen, 650 mg, Oral, Q6H PRN  hydrALAZINE, 10 mg, Intravenous, Q4H PRN  HYDROmorphone, 0.5 mg, Intravenous, Q4H PRN  labetalol, 10 mg, Intravenous, Q4H PRN  ondansetron, 4 mg, Intravenous, Q6H PRN x 1 11/3/23   oxyCODONE, 5 mg, Oral, Q4H PRN  phenol, 1 spray, Mouth/Throat, Q2H PRN x 1 11/4. X 1 11/5    HYDROmorphone HCl (DILAUDID) injection 0.2 mg x 1 11/5/23   Dose: 0.2 mg  Freq: Every 4 hours PRN Route: IV  PRN Reason: severe pain  Start: 11/04/23 1908     Ngt to low continuous suction. NPO    IP CONSULT TO INFECTIOUS DISEASES  IP CONSULT TO INTERNAL MEDICINE    Network Utilization Review Department  ATTENTION: Please call with any questions or concerns to [phone] and carefully listen to the prompts so that you are directed to the right person. All voicemails are confidential.   For Discharge needs, contact Care Management DC Support Team at [phone] opt. 2  Send all requests for admission clinical reviews, approved or denied determinations and any other requests to dedicated fax number below belonging to the campus where the patient is receiving treatment.  List of dedicated fax numbers for the Facilities:  [place] DENIALS (Administrative/Medical Necessity) [phone]   DISCHARGE SUPPORT TEAM (NETWORK) [id number] [address] (Maternity/NICU/Pediatrics) [phone]   [address] [facility] [phone] [address] [facility] [address] [address] [facility] [address] [facility] [address] [phone]

## 2023-11-04 NOTE — PROGRESS NOTES
Progress Note - General Surgery   Sally Mccurdy 51 y.o. male MRN: [id number]  Unit/Bed#: S -01 Encounter: [id number]    Assessment:  48M with SBO    Plan:  Pull NGT  MIVF @100  Plan to resume HIV meds, appreciate ID recs  appreciate Medicine Recs  Dvt ppx  Oob and ambulation  Replete lytes per protocol    Subjective/Objective     Subjective: No acute events overnight. Discomfort w/ NG. No N/V. Passing flatus and has had multiple BMs    Objective: Afebrile, HTN SBPs 160s, HD stable on RA    Blood pressure (!) 160/102, pulse 84, temperature 98.9 °F (37.2 °C), resp. rate 16, SpO2 95 %. ,There is no height or weight on file to calculate BMI.    UOP: 1L   BM: x3  N.6L brown contents    Invasive Devices       Peripheral Intravenous Line  Duration             Peripheral IV 23 Right Antecubital 1 day    Peripheral IV 23 Left;Ventral (anterior) Forearm <1 day              Drain  Duration             NG/OG/Enteral Tube Nasogastric Right nare 1 day                    Physical Exam:  General: No acute distress, alert and oriented  CV: RRR  Lungs: Normal work of breathing   Abdomen: soft, non tender, minimally distended  Skin: Warm, dry    Lab, Imaging and other studies:  Recent Labs     23  1809 23  0135 23  0522 23  0718   WBC 10.26*  --  7.03 6.46   HGB 16.8  --  14.8 14.0      < > 211 223   SODIUM 133*  --  136 138   K 3.7  --  3.4* 3.3*     --  102 100   CO2 25  --  26 29   BUN 14  --  13 20   CREATININE 0.82  --  0.69 0.81   GLUC 99  --  88 71   CALCIUM 9.2  --  8.5 8.4   MG  --   --  1.9 2.0   PHOS  --   --  3.9 3.2   AST 39  --   --   --    ALT 27  --   --   --    ALKPHOS 41  --   --   --    TBILI 0.74  --   --   --     < > = values in this interval not displayed.

## 2023-11-04 NOTE — PLAN OF CARE
Problem: PAIN - ADULT  Goal: Verbalizes/displays adequate comfort level or baseline comfort level  Description: Interventions:  - Encourage patient to monitor pain and request assistance  - Assess pain using appropriate pain scale  - Administer analgesics based on type and severity of pain and evaluate response  - Implement non-pharmacological measures as appropriate and evaluate response  - Consider cultural and social influences on pain and pain management  - Notify physician/advanced practitioner if interventions unsuccessful or patient reports new pain  Outcome: Progressing     Problem: INFECTION - ADULT  Goal: Absence or prevention of progression during hospitalization  Description: INTERVENTIONS:  - Assess and monitor for signs and symptoms of infection  - Monitor lab/diagnostic results  - Monitor all insertion sites, i.e. indwelling lines, tubes, and drains  - Monitor endotracheal if appropriate and nasal secretions for changes in amount and color  - [place] appropriate cooling/warming therapies per order  - Administer medications as ordered  - Instruct and encourage patient and family to use good hand hygiene technique  - Identify and instruct in appropriate isolation precautions for identified infection/condition  Outcome: Progressing     Problem: SAFETY ADULT  Goal: Patient will remain free of falls  Description: INTERVENTIONS:  - Educate patient/family on patient safety including physical limitations  - Instruct patient to call for assistance with activity   - Consult OT/PT to assist with strengthening/mobility   - Keep Call bell within reach  - Keep bed low and locked with side rails adjusted as appropriate  - Keep care items and personal belongings within reach  - Initiate and maintain comfort rounds  - Make Fall Risk Sign visible to staff  - Obtain necessary fall risk management equipment  - Apply yellow socks and bracelet for high fall risk patients  - Consider moving patient to room near nurses station  Outcome: Progressing     Problem: DISCHARGE PLANNING  Goal: Discharge to home or other facility with appropriate resources  Description: INTERVENTIONS:  - Identify barriers to discharge w/patient and caregiver  - Arrange for needed discharge resources and transportation as appropriate  - Identify discharge learning needs (meds, wound care, etc.)  - Arrange for interpretive services to assist at discharge as needed  - Refer to Case Management Department for coordinating discharge planning if the patient needs post-hospital services based on physician/advanced practitioner order or complex needs related to functional status, cognitive ability, or social support system  Outcome: Progressing     Problem: Knowledge Deficit  Goal: Patient/family/caregiver demonstrates understanding of disease process, treatment plan, medications, and discharge instructions  Description: Complete learning assessment and assess knowledge base.   Interventions:  - Provide teaching at level of understanding  - Provide teaching via preferred learning methods  Outcome: Progressing     Problem: GASTROINTESTINAL - ADULT  Goal: Minimal or absence of nausea and/or vomiting  Description: INTERVENTIONS:  - Administer IV fluids if ordered to ensure adequate hydration  - Maintain NPO status until nausea and vomiting are resolved  - Nasogastric tube if ordered  - Administer ordered antiemetic medications as needed  - Provide nonpharmacologic comfort measures as appropriate  - Advance diet as tolerated, if ordered  - Consider nutrition services referral to assist patient with adequate nutrition and appropriate food choices  Outcome: Progressing  Goal: Maintains or returns to baseline bowel function  Description: INTERVENTIONS:  - Assess bowel function  - Encourage oral fluids to ensure adequate hydration  - Administer IV fluids if ordered to ensure adequate hydration  - Administer ordered medications as needed  - Encourage mobilization and activity  - Consider nutritional services referral to assist patient with adequate nutrition and appropriate food choices  Outcome: Progressing  Goal: Maintains adequate nutritional intake  Description: INTERVENTIONS:  - Monitor percentage of each meal consumed  - Identify factors contributing to decreased intake, treat as appropriate  - Assist with meals as needed  - Monitor I&O, weight, and lab values if indicated  - Obtain nutrition services referral as needed  Outcome: Progressing  Goal: Oral mucous membranes remain intact  Description: INTERVENTIONS  - Assess oral mucosa and hygiene practices  - Implement preventative oral hygiene regimen  - Implement oral medicated treatments as ordered  - Initiate Nutrition services referral as needed  Outcome: Progressing     Problem: METABOLIC, FLUID AND ELECTROLYTES - ADULT  Goal: Electrolytes maintained within normal limits  Description: INTERVENTIONS:  - Monitor labs and assess patient for signs and symptoms of electrolyte imbalances  - Administer electrolyte replacement as ordered  - Monitor response to electrolyte replacements, including repeat lab results as appropriate  - Instruct patient on fluid and nutrition as appropriate  Outcome: Progressing  Goal: Fluid balance maintained  Description: INTERVENTIONS:  - Monitor labs   - Monitor I/O and WT  - Instruct patient on fluid and nutrition as appropriate  - Assess for signs & symptoms of volume excess or deficit  Outcome: Progressing

## 2023-11-04 NOTE — ED NOTES
suction to NG tube stopped at [facility], [address] [place] [address]  11/04/23 [address] David Beach RN  11/04/23 0008

## 2023-11-04 NOTE — H&P
H&P - General Surgery  Owen France 51 y.o. male MRN: [id number]  Unit/Bed#: ED-41 Encounter: [id number]      Assessment:  48M with SBO. Plan:  - admit to gen surg  - NPO/NGT  - [email or web address]  - monitor lytes  - pain/nausea control prn  - DVT ppx    HPI:  Owen France is a 51 y.o. male with PMHx of HIV, herpes, L groin abscess, HTN, open appendectomy 25y ago, who presents with severe abdominal pain, n/v x7 (last at noon today). Started 2d ago, 1d after eating a lot of fast food. Describes pain as bloated, sharp, and constant. Better with laying flat. Never occurred before. Denies fever/chills, SOB. Last ate yogurt at 1pm, hasn't been eating much. Last BM today at 3pm, small soft brown. Currently passing gas. Hypertensive to 168/102, hasn't taken HTN meds due to n/v. Other VSS on RA. Meds (PTA): atorvastatin, lisinopril-HCTZ, doluetgravir-lamivudine  Results:  UA negative  Recent Labs     11/03/23  1809   WBC 10.26*   HGB 16.8      SODIUM 133*   K 3.7      CO2 25   BUN 14   CREATININE 0.82   GLUC 99   CALCIUM 9.2   AST 39   ALT 27   ALKPHOS 41   TBILI 0.74   LIPASE 11     11/03/23 CT ap: Findings compatible with SBO. Transitional point in RLQ just proximal to terminal ileum of uncertain etiology. Mild wall thickening of the distal small bowel loops, which may reflect a nonspecific enteritis. Small amount of ascites. No pneumoperitoneum seen. Physical Exam  Constitutional:       General: He is not in acute distress. HENT:      Head: Normocephalic and atraumatic. Eyes:      Extraocular Movements: Extraocular movements intact. Conjunctiva/sclera: Conjunctivae normal.   Cardiovascular:      Rate and Rhythm: Normal rate. Pulses: Normal pulses. Pulmonary:      Effort: Pulmonary effort is normal. No respiratory distress. Abdominal:      General: There is distension. Palpations: Abdomen is soft. Tenderness: There is abdominal tenderness (mild diffuse). Musculoskeletal:         General: Normal range of motion. Cervical back: Normal range of motion. Skin:     General: Skin is warm and dry. Neurological:      General: No focal deficit present. Mental Status: He is alert and oriented to person, place, and time. Psychiatric:         Mood and Affect: Mood normal.         Review of Systems   Constitutional:  Positive for appetite change (eating less in last 2d). Negative for chills and fever. HENT:  Negative for ear pain and sore throat. Eyes:  Negative for pain and visual disturbance. Respiratory:  Negative for cough and shortness of breath. Cardiovascular:  Negative for chest pain and palpitations. Gastrointestinal:  Positive for abdominal distention, abdominal pain, nausea and vomiting. Negative for constipation and diarrhea. Genitourinary:  Negative for dysuria and hematuria. Musculoskeletal:  Negative for arthralgias and back pain. Skin:  Negative for color change and rash. Neurological:  Negative for seizures and syncope. All other systems reviewed and are negative. Historical Information   Past Medical History:   Diagnosis Date    Abscess of left groin     Dental decay     Depression     Elevated BP without diagnosis of hypertension     HIV disease ([address] [facility]) 11/25/1998    mode of transmission: MSM    Hx of herpes zoster     Hypogonadism in male     Tear of right biceps muscle 2017     History reviewed. No pertinent surgical history.   Social History   Social History     Substance and Sexual Activity   Alcohol Use Yes    Alcohol/week: 1.0 standard drink of alcohol    Types: 1 Glasses of wine per week    Comment: 1 glass wine/day     Social History     Substance and Sexual Activity   Drug Use Never     Social History     Tobacco Use   Smoking Status Never   Smokeless Tobacco Never       Meds/Allergies   all current active meds have been reviewed  No Known Allergies    Objective   First Vitals:   Blood Pressure: (!) 168/102 (11/03/23 1800)  Pulse: 81 (11/03/23 1800)  Temperature: 97.8 °F (36.6 °C) (11/03/23 1800)  Temp Source: Oral (11/03/23 1800)  Respirations: 18 (11/03/23 1800)  SpO2: 96 % (11/03/23 1800)    Current Vitals:   Blood Pressure: (!) 168/102 (11/03/23 1800)  Pulse: 81 (11/03/23 1800)  Temperature: 97.8 °F (36.6 °C) (11/03/23 1800)  Temp Source: Oral (11/03/23 1800)  Respirations: 18 (11/03/23 1800)  SpO2: 96 % (11/03/23 1800)      Intake/Output Summary (Last 24 hours) at 11/3/2023 2137  Last data filed at 11/3/2023 2136  Gross per 24 hour   Intake 1000 ml   Output --   Net 1000 ml       Invasive Devices       Peripheral Intravenous Line  Duration             Peripheral IV 11/03/23 Right Antecubital <1 day                    Lab Results: I have personally reviewed pertinent lab results. Imaging: I have personally reviewed pertinent reports. EKG, Pathology, and Other Studies: I have personally reviewed pertinent reports. Counseling / Coordination of Care  Total floor / unit time spent today 30 minutes. Greater than 50% of total time was spent with the patient and / or family counseling and / or coordination of care.

## 2023-11-05 PROBLEM — K56.609 SBO (SMALL BOWEL OBSTRUCTION) (HCC): Status: ACTIVE | Noted: 2023-11-05

## 2023-11-05 LAB
ANION GAP SERPL CALCULATED.3IONS-SCNC: 9 MMOL/L
BASOPHILS # BLD AUTO: 0.03 THOUSANDS/ÂΜL (ref 0–0.1)
BASOPHILS NFR BLD AUTO: 1 % (ref 0–1)
BUN SERPL-MCNC: 20 MG/DL (ref 5–25)
CALCIUM SERPL-MCNC: 8.4 MG/DL (ref 8.4–10.2)
CHLORIDE SERPL-SCNC: 100 MMOL/L (ref 96–108)
CO2 SERPL-SCNC: 29 MMOL/L (ref 21–32)
CREAT SERPL-MCNC: 0.81 MG/DL (ref 0.6–1.3)
EOSINOPHIL # BLD AUTO: 0.12 THOUSAND/ÂΜL (ref 0–0.61)
EOSINOPHIL NFR BLD AUTO: 2 % (ref 0–6)
ERYTHROCYTE [DISTWIDTH] IN BLOOD BY AUTOMATED COUNT: 12.2 % (ref 11.6–15.1)
GFR SERPL CREATININE-BSD FRML MDRD: 104 ML/MIN/1.73SQ M
GLUCOSE SERPL-MCNC: 71 MG/DL (ref 65–140)
HCT VFR BLD AUTO: 41.4 % (ref 36.5–49.3)
HGB BLD-MCNC: 14 G/DL (ref 12–17)
IMM GRANULOCYTES # BLD AUTO: 0.02 THOUSAND/UL (ref 0–0.2)
IMM GRANULOCYTES NFR BLD AUTO: 0 % (ref 0–2)
LYMPHOCYTES # BLD AUTO: 1.65 THOUSANDS/ÂΜL (ref 0.6–4.47)
LYMPHOCYTES NFR BLD AUTO: 26 % (ref 14–44)
MAGNESIUM SERPL-MCNC: 2 MG/DL (ref 1.9–2.7)
MCH RBC QN AUTO: 30.4 PG (ref 26.8–34.3)
MCHC RBC AUTO-ENTMCNC: 33.8 G/DL (ref 31.4–37.4)
MCV RBC AUTO: 90 FL (ref 82–98)
MONOCYTES # BLD AUTO: 0.76 THOUSAND/ÂΜL (ref 0.17–1.22)
MONOCYTES NFR BLD AUTO: 12 % (ref 4–12)
NEUTROPHILS # BLD AUTO: 3.88 THOUSANDS/ÂΜL (ref 1.85–7.62)
NEUTS SEG NFR BLD AUTO: 59 % (ref 43–75)
NRBC BLD AUTO-RTO: 0 /100 WBCS
PHOSPHATE SERPL-MCNC: 3.2 MG/DL (ref 2.7–4.5)
PLATELET # BLD AUTO: 223 THOUSANDS/UL (ref 149–390)
PMV BLD AUTO: 10.2 FL (ref 8.9–12.7)
POTASSIUM SERPL-SCNC: 3.3 MMOL/L (ref 3.5–5.3)
RBC # BLD AUTO: 4.6 MILLION/UL (ref 3.88–5.62)
SODIUM SERPL-SCNC: 138 MMOL/L (ref 135–147)
WBC # BLD AUTO: 6.46 THOUSAND/UL (ref 4.31–10.16)

## 2023-11-05 PROCEDURE — 80048 BASIC METABOLIC PNL TOTAL CA: CPT

## 2023-11-05 PROCEDURE — 99253 IP/OBS CNSLTJ NEW/EST LOW 45: CPT | Performed by: PHYSICIAN ASSISTANT

## 2023-11-05 PROCEDURE — 84100 ASSAY OF PHOSPHORUS: CPT

## 2023-11-05 PROCEDURE — 83735 ASSAY OF MAGNESIUM: CPT

## 2023-11-05 PROCEDURE — 99232 SBSQ HOSP IP/OBS MODERATE 35: CPT | Performed by: SURGERY

## 2023-11-05 PROCEDURE — 85025 COMPLETE CBC W/AUTO DIFF WBC: CPT

## 2023-11-05 RX ORDER — POTASSIUM CHLORIDE 20 MEQ/1
40 TABLET, EXTENDED RELEASE ORAL ONCE
Status: DISCONTINUED | OUTPATIENT
Start: 2023-11-05 | End: 2023-11-06 | Stop reason: HOSPADM

## 2023-11-05 RX ORDER — DEXTROSE MONOHYDRATE, SODIUM CHLORIDE, AND POTASSIUM CHLORIDE 50; 1.49; 4.5 G/1000ML; G/1000ML; G/1000ML
50 INJECTION, SOLUTION INTRAVENOUS CONTINUOUS
Status: DISCONTINUED | OUTPATIENT
Start: 2023-11-05 | End: 2023-11-06 | Stop reason: HOSPADM

## 2023-11-05 RX ORDER — POTASSIUM CHLORIDE 20 MEQ/1
40 TABLET, EXTENDED RELEASE ORAL ONCE
Status: COMPLETED | OUTPATIENT
Start: 2023-11-05 | End: 2023-11-05

## 2023-11-05 RX ADMIN — HEPARIN SODIUM 5000 UNITS: 5000 INJECTION INTRAVENOUS; SUBCUTANEOUS at 21:22

## 2023-11-05 RX ADMIN — ATORVASTATIN CALCIUM 40 MG: 40 TABLET, FILM COATED ORAL at 15:10

## 2023-11-05 RX ADMIN — HEPARIN SODIUM 5000 UNITS: 5000 INJECTION INTRAVENOUS; SUBCUTANEOUS at 15:08

## 2023-11-05 RX ADMIN — HEPARIN SODIUM 5000 UNITS: 5000 INJECTION INTRAVENOUS; SUBCUTANEOUS at 05:34

## 2023-11-05 RX ADMIN — SODIUM CHLORIDE, SODIUM LACTATE, POTASSIUM CHLORIDE, AND CALCIUM CHLORIDE 125 ML/HR: .6; .31; .03; .02 INJECTION, SOLUTION INTRAVENOUS at 05:29

## 2023-11-05 RX ADMIN — HYDROMORPHONE HYDROCHLORIDE 0.2 MG: 0.2 INJECTION, SOLUTION INTRAMUSCULAR; INTRAVENOUS; SUBCUTANEOUS at 01:10

## 2023-11-05 RX ADMIN — DOLUTEGRAVIR SODIUM 50 MG: 50 TABLET, FILM COATED ORAL at 15:10

## 2023-11-05 RX ADMIN — SODIUM CHLORIDE, SODIUM LACTATE, POTASSIUM CHLORIDE, AND CALCIUM CHLORIDE 125 ML/HR: .6; .31; .03; .02 INJECTION, SOLUTION INTRAVENOUS at 14:30

## 2023-11-05 RX ADMIN — POTASSIUM CHLORIDE 40 MEQ: 1500 TABLET, EXTENDED RELEASE ORAL at 15:09

## 2023-11-05 RX ADMIN — LAMIVUDINE 300 MG: 150 TABLET, FILM COATED ORAL at 15:11

## 2023-11-05 RX ADMIN — PHENOL 1 SPRAY: 1.5 LIQUID ORAL at 05:32

## 2023-11-05 RX ADMIN — DEXTROSE, SODIUM CHLORIDE, AND POTASSIUM CHLORIDE 125 ML/HR: 5; .45; .15 INJECTION INTRAVENOUS at 17:28

## 2023-11-05 NOTE — ASSESSMENT & PLAN NOTE
Home regimen: lisinopril-HCTZ 10-12.5, on hold currently as pt has NGT  NGT likely to be pulled today, can resume oral regimen on discharge  IV hydral prn SBP>170, DBP>100, if ineffective can consider IV labetalol prn

## 2023-11-05 NOTE — ASSESSMENT & PLAN NOTE
HIV well-controlled with last CD4 985 and last VL<20  ID following  Pt home regimen is Dovato, replace with formulary substitute when able to take PO again

## 2023-11-05 NOTE — PLAN OF CARE
Problem: PAIN - ADULT  Goal: Verbalizes/displays adequate comfort level or baseline comfort level  Description: Interventions:  - Encourage patient to monitor pain and request assistance  - Assess pain using appropriate pain scale  - Administer analgesics based on type and severity of pain and evaluate response  - Implement non-pharmacological measures as appropriate and evaluate response  - Consider cultural and social influences on pain and pain management  - Notify physician/advanced practitioner if interventions unsuccessful or patient reports new pain  Outcome: Progressing     Problem: INFECTION - ADULT  Goal: Absence or prevention of progression during hospitalization  Description: INTERVENTIONS:  - Assess and monitor for signs and symptoms of infection  - Monitor lab/diagnostic results  - Monitor all insertion sites, i.e. indwelling lines, tubes, and drains  - Monitor endotracheal if appropriate and nasal secretions for changes in amount and color  - [place] appropriate cooling/warming therapies per order  - Administer medications as ordered  - Instruct and encourage patient and family to use good hand hygiene technique  - Identify and instruct in appropriate isolation precautions for identified infection/condition  Outcome: Progressing     Problem: SAFETY ADULT  Goal: Patient will remain free of falls  Description: INTERVENTIONS:  - Educate patient/family on patient safety including physical limitations  - Instruct patient to call for assistance with activity   - Consult OT/PT to assist with strengthening/mobility   - Keep Call bell within reach  - Keep bed low and locked with side rails adjusted as appropriate  - Keep care items and personal belongings within reach  - Initiate and maintain comfort rounds  - Make Fall Risk Sign visible to staff  - Offer Toileting every 2 Hours, in advance of need  - Initiate/Maintain bed alarm  - Obtain necessary fall risk management equipment  - Apply yellow socks and bracelet for high fall risk patients  - Consider moving patient to room near nurses station  Outcome: Progressing     Problem: DISCHARGE PLANNING  Goal: Discharge to home or other facility with appropriate resources  Description: INTERVENTIONS:  - Identify barriers to discharge w/patient and caregiver  - Arrange for needed discharge resources and transportation as appropriate  - Identify discharge learning needs (meds, wound care, etc.)  - Arrange for interpretive services to assist at discharge as needed  - Refer to Case Management Department for coordinating discharge planning if the patient needs post-hospital services based on physician/advanced practitioner order or complex needs related to functional status, cognitive ability, or social support system  Outcome: Progressing     Problem: Knowledge Deficit  Goal: Patient/family/caregiver demonstrates understanding of disease process, treatment plan, medications, and discharge instructions  Description: Complete learning assessment and assess knowledge base.   Interventions:  - Provide teaching at level of understanding  - Provide teaching via preferred learning methods  Outcome: Progressing     Problem: GASTROINTESTINAL - ADULT  Goal: Minimal or absence of nausea and/or vomiting  Description: INTERVENTIONS:  - Administer IV fluids if ordered to ensure adequate hydration  - Maintain NPO status until nausea and vomiting are resolved  - Nasogastric tube if ordered  - Administer ordered antiemetic medications as needed  - Provide nonpharmacologic comfort measures as appropriate  - Advance diet as tolerated, if ordered  - Consider nutrition services referral to assist patient with adequate nutrition and appropriate food choices  Outcome: Progressing  Goal: Maintains or returns to baseline bowel function  Description: INTERVENTIONS:  - Assess bowel function  - Encourage oral fluids to ensure adequate hydration  - Administer IV fluids if ordered to ensure adequate hydration  - Administer ordered medications as needed  - Encourage mobilization and activity  - Consider nutritional services referral to assist patient with adequate nutrition and appropriate food choices  Outcome: Progressing  Goal: Maintains adequate nutritional intake  Description: INTERVENTIONS:  - Monitor percentage of each meal consumed  - Identify factors contributing to decreased intake, treat as appropriate  - Assist with meals as needed  - Monitor I&O, weight, and lab values if indicated  - Obtain nutrition services referral as needed  Outcome: Progressing  Goal: Oral mucous membranes remain intact  Description: INTERVENTIONS  - Assess oral mucosa and hygiene practices  - Implement preventative oral hygiene regimen  - Implement oral medicated treatments as ordered  - Initiate Nutrition services referral as needed  Outcome: Progressing     Problem: METABOLIC, FLUID AND ELECTROLYTES - ADULT  Goal: Electrolytes maintained within normal limits  Description: INTERVENTIONS:  - Monitor labs and assess patient for signs and symptoms of electrolyte imbalances  - Administer electrolyte replacement as ordered  - Monitor response to electrolyte replacements, including repeat lab results as appropriate  - Instruct patient on fluid and nutrition as appropriate  Outcome: Progressing  Goal: Fluid balance maintained  Description: INTERVENTIONS:  - Monitor labs   - Monitor I/O and WT  - Instruct patient on fluid and nutrition as appropriate  - Assess for signs & symptoms of volume excess or deficit  Outcome: Progressing

## 2023-11-05 NOTE — ASSESSMENT & PLAN NOTE
Presents with severe abd pain  CTAP compatible with SBP  Management per surgery , NGT placed and IVF started  Clamp trial today with possibel NGT removal decreased weight-shifting ability

## 2023-11-05 NOTE — CONSULTS
[address] [facility]  Consult  Name: Fazal Monsivais 51 y.o. male I MRN: [id number]  Unit/Bed#: S -01 I Date of Admission: 11/3/2023   Date of Service: 11/5/2023 I Hospital Day: 1    Inpatient consult to Internal Medicine  Consult performed by: Meenakshi Lovett PA-C  Consult ordered by: Michael Cherry MD        Assessment/Plan   Dyslipidemia  Assessment & Plan  Can resume statin once tolerating PO    Essential hypertension  Assessment & Plan  Home regimen: lisinopril-HCTZ 10-12.5, on hold currently as pt has NGT  NGT likely to be pulled today, can resume oral regimen on discharge  IV hydral prn SBP>170, DBP>100, if ineffective can consider IV labetalol prn    Symptomatic HIV infection ([address])  Assessment & Plan  HIV well-controlled with last CD4 985 and last VL<20  ID following  Pt home regimen is Dovato, replace with formulary substitute when able to take PO again     * SBO (small bowel obstruction) ([address])  Assessment & Plan  Presents with severe abd pain  CTAP compatible with SBP  Management per surgery , NGT placed and IVF started  Clamp trial today with possibel NGT removal          VTE Prophylaxis:   Moderate Risk (Score 3-4) - Pharmacological DVT Prophylaxis Ordered: heparin. Recommendations for Discharge:  Resume home anti-hypertensive regimen and statin upon discharge assuming pt is tolerating a diet with adequate fluid intake. Total Time Spent on Date of Encounter in care of patient: 45 mins. This time was spent on one or more of the following: performing physical exam; counseling and coordination of care; obtaining or reviewing history; documenting in the medical record; reviewing/ordering tests, medications or procedures; communicating with other healthcare professionals and discussing with patient's family/caregivers. Collaboration of Care:  Were Recommendations Directly Discussed with Primary Treatment Team? No    History of Present Illness:  Fazal Monsivais is a 51 y.o. male with PMHx of HTN, dyslipidemia, HIV who is originally admitted to the GenSurgery service due to SBO. We are consulted for treatment of hypertension and other medical comorbidities. Patient reports he presented to the hospital after an episode of severe abdominal pain, nausea and vomiting. Patient notes his blood pressure is higher than usual but is normally well controlled on his oral regimen. He denies any symptoms of hypertensive urgency, denies headache, dizziness, visual disturbance, chest pain, shortness of breath. Denies lower extremity edema. He reports overall improvement of his symptoms since placement of the NG tube. Only complaint is some fatigue. Review of Systems:  Review of Systems   Constitutional:  Positive for fatigue. Negative for activity change, appetite change, chills, diaphoresis and fever. HENT: Negative. Eyes: Negative. Respiratory: Negative. Cardiovascular: Negative. Gastrointestinal:  Positive for abdominal distention, nausea and vomiting. Endocrine: Negative. Genitourinary: Negative. Musculoskeletal: Negative. Skin: Negative. Allergic/Immunologic: Negative. Neurological: Negative. Hematological: Negative. Psychiatric/Behavioral: Negative. Past Medical and Surgical History:   Past Medical History:   Diagnosis Date    Abscess of left groin     Dental decay     Depression     Elevated BP without diagnosis of hypertension     HIV disease ([address]) 11/25/1998    mode of transmission: MSM    Hx of herpes zoster     Hypogonadism in male     Tear of right biceps muscle 2017       History reviewed. No pertinent surgical history.     Meds/Allergies:  all medications and allergies reviewed    Allergies: No Known Allergies    Social History:  Marital Status: /Civil Union  Substance Use History:   Social History     Substance and Sexual Activity   Alcohol Use Yes    Alcohol/week: 1.0 standard drink of alcohol    Types: 1 Glasses of wine per week    Comment: 1 glass wine/day     Social History     Tobacco Use   Smoking Status Never   Smokeless Tobacco Never     Social History     Substance and Sexual Activity   Drug Use Never       Family History:  Family History   Problem Relation Age of Onset    Hypertension Mother     Hypertension Father     Heart attack Father        Physical Exam:   Vitals:   Blood Pressure: (!) 160/102 (11/05/23 0719)  Pulse: 84 (11/05/23 0719)  Temperature: 98.9 °F (37.2 °C) (11/05/23 0719)  Temp Source: Oral (11/04/23 0706)  Respirations: 16 (11/04/23 2139)  SpO2: 95 % (11/05/23 0719)    Physical Exam  Vitals and nursing note reviewed. Constitutional:       General: He is not in acute distress. Appearance: Normal appearance. He is normal weight. He is not ill-appearing or toxic-appearing. HENT:      Head: Normocephalic and atraumatic. Right Ear: External ear normal.      Left Ear: External ear normal.      Nose: Nose normal.      Mouth/Throat:      Mouth: Mucous membranes are moist.      Pharynx: Oropharynx is clear. Eyes:      Conjunctiva/sclera: Conjunctivae normal.   Cardiovascular:      Rate and Rhythm: Normal rate and regular rhythm. Pulses: Normal pulses. Heart sounds: Normal heart sounds. No murmur heard. No friction rub. No gallop. Pulmonary:      Effort: Pulmonary effort is normal. No respiratory distress. Breath sounds: Normal breath sounds. No stridor. No wheezing, rhonchi or rales. Abdominal:      General: There is distension. Palpations: Abdomen is soft. There is no mass. Tenderness: There is no abdominal tenderness. There is no guarding. Hernia: No hernia is present. Comments: Mild distention, no tenderness   Musculoskeletal:      Cervical back: Normal range of motion. Right lower leg: No edema. Left lower leg: No edema. Skin:     General: Skin is warm and dry. Capillary Refill: Capillary refill takes less than 2 seconds. Neurological:      Mental Status: He is alert. Mental status is at baseline. Psychiatric:         Mood and Affect: Mood normal.         Thought Content: Thought content normal.          Additional Data:   Lab Results:    Results from last 7 days   Lab Units 11/05/23  0718   WBC Thousand/uL 6.46   HEMOGLOBIN g/dL 14.0   HEMATOCRIT % 41.4   PLATELETS Thousands/uL 223   NEUTROS PCT % 59   LYMPHS PCT % 26   MONOS PCT % 12   EOS PCT % 2     Results from last 7 days   Lab Units 11/05/23  0718 11/04/23  0522 11/03/23  1809   SODIUM mmol/L 138   < > 133*   POTASSIUM mmol/L 3.3*   < > 3.7   CHLORIDE mmol/L 100   < > 100   CO2 mmol/L 29   < > 25   BUN mg/dL 20   < > 14   CREATININE mg/dL 0.81   < > 0.82   ANION GAP mmol/L 9   < > 8   CALCIUM mg/dL 8.4   < > 9.2   ALBUMIN g/dL  --   --  4.1   TOTAL BILIRUBIN mg/dL  --   --  0.74   ALK PHOS U/L  --   --  41   ALT U/L  --   --  27   AST U/L  --   --  39   GLUCOSE RANDOM mg/dL 71   < > 99    < > = values in this interval not displayed. Lab Results   Component Value Date/Time    HGBA1C 5.1 03/27/2023 01:15 PM    HGBA1C 5.3 04/30/2022 10:34 AM    HGBA1C 5.2 05/12/2021 11:26 AM               Imaging: Reviewed radiology reports from this admission including: abdominal/pelvic CT  XR chest 1 view portable   Final Result by Laurel Mojica MD (11/04 1440)      NGT in place      Multiple gas distended small bowel loops. Workstation performed: [id number]         CT Abdomen pelvis with contrast   Final Result by Elva Whyte MD (11/03 2058)      Findings compatible with small bowel obstruction. The transitional point appears to be in the right lower quadrant just proximal to the terminal ileum of uncertain etiology. There is mild wall thickening of the distal small bowel loops, which may reflect    a nonspecific enteritis. Small amount of ascites. No pneumoperitoneum seen. Additional findings as above.             Workstation performed: [id number] EKG, Pathology, and Other Studies Reviewed on Admission:   EKG: No EKG obtained. ** Please Note: This note may have been constructed using a voice recognition system.  **

## 2023-11-05 NOTE — PROGRESS NOTES
Progress Note  Owen France 51 y.o. male MRN: [id number]  Unit/Bed#: S -01 Encounter: [id number]    Assessment  48M with SBO. Plan  - advance diet to softs  - d/c home later if tolerating softs  - mIVF to 50  - dolutegravir/lamivudine  - will f/u outpatient with Dr. Goldie Lemons for HIV  - DVT ppx    Subjective/Objective   No acute overnight events. Pain controlled. Tolerating CLD well. No n/v. Having BMs, flatus. Voiding, walking. VSS on RA. /93   Pulse 68   Temp 97.9 °F (36.6 °C)   Resp 16   SpO2 91%      Physical Exam:  General: NAD  HENT: NCAT  CV: nl rate  Lungs: nl wob. No resp distress. ABD: Soft, ND, NT  Extrem: No CCE  Neuro: alert & oriented    UOP: unrecorded    Recent Labs     11/03/23  1809 11/04/23  0135 11/04/23  0522 11/05/23  0718 11/06/23  0454   WBC 10.26*  --  7.03 6.46  --    HGB 16.8  --  14.8 14.0  --       < > 211 223  --    SODIUM 133*  --  136 138 136   K 3.7  --  3.4* 3.3* 3.8     --  102 100 104   CO2 25  --  26 29 28   BUN 14  --  13 20 9   CREATININE 0.82  --  0.69 0.81 0.83   GLUC 99  --  88 71 124   CALCIUM 9.2  --  8.5 8.4 8.2*   MG  --   --  1.9 2.0  --    PHOS  --   --  3.9 3.2  --    AST 39  --   --   --   --    ALT 27  --   --   --   --    ALKPHOS 41  --   --   --   --    TBILI 0.74  --   --   --   --     < > = values in this interval not displayed.

## 2023-11-06 VITALS
TEMPERATURE: 97.9 F | DIASTOLIC BLOOD PRESSURE: 93 MMHG | SYSTOLIC BLOOD PRESSURE: 138 MMHG | HEART RATE: 68 BPM | RESPIRATION RATE: 16 BRPM | OXYGEN SATURATION: 91 %

## 2023-11-06 LAB
ANION GAP SERPL CALCULATED.3IONS-SCNC: 4 MMOL/L
BUN SERPL-MCNC: 9 MG/DL (ref 5–25)
CALCIUM SERPL-MCNC: 8.2 MG/DL (ref 8.4–10.2)
CHLORIDE SERPL-SCNC: 104 MMOL/L (ref 96–108)
CO2 SERPL-SCNC: 28 MMOL/L (ref 21–32)
CREAT SERPL-MCNC: 0.83 MG/DL (ref 0.6–1.3)
GFR SERPL CREATININE-BSD FRML MDRD: 103 ML/MIN/1.73SQ M
GLUCOSE SERPL-MCNC: 124 MG/DL (ref 65–140)
POTASSIUM SERPL-SCNC: 3.8 MMOL/L (ref 3.5–5.3)
SODIUM SERPL-SCNC: 136 MMOL/L (ref 135–147)

## 2023-11-06 PROCEDURE — 99232 SBSQ HOSP IP/OBS MODERATE 35: CPT | Performed by: SURGERY

## 2023-11-06 PROCEDURE — 80048 BASIC METABOLIC PNL TOTAL CA: CPT

## 2023-11-06 PROCEDURE — 99232 SBSQ HOSP IP/OBS MODERATE 35: CPT | Performed by: PHYSICIAN ASSISTANT

## 2023-11-06 PROCEDURE — 99238 HOSP IP/OBS DSCHRG MGMT 30/<: CPT | Performed by: PHYSICIAN ASSISTANT

## 2023-11-06 PROCEDURE — 99232 SBSQ HOSP IP/OBS MODERATE 35: CPT | Performed by: STUDENT IN AN ORGANIZED HEALTH CARE EDUCATION/TRAINING PROGRAM

## 2023-11-06 RX ADMIN — ATORVASTATIN CALCIUM 40 MG: 40 TABLET, FILM COATED ORAL at 09:31

## 2023-11-06 RX ADMIN — LAMIVUDINE 300 MG: 150 TABLET, FILM COATED ORAL at 09:31

## 2023-11-06 RX ADMIN — DOLUTEGRAVIR SODIUM 50 MG: 50 TABLET, FILM COATED ORAL at 09:31

## 2023-11-06 RX ADMIN — HEPARIN SODIUM 5000 UNITS: 5000 INJECTION INTRAVENOUS; SUBCUTANEOUS at 07:30

## 2023-11-06 RX ADMIN — DEXTROSE, SODIUM CHLORIDE, AND POTASSIUM CHLORIDE 125 ML/HR: 5; .45; .15 INJECTION INTRAVENOUS at 02:14

## 2023-11-06 NOTE — PROGRESS NOTES
[facility]  Progress Note  Name: Bonilla Roy  MRN: [id number]  Unit/Bed#: S -01 I Date of Admission: 11/3/2023   Date of Service: 11/6/2023 I Hospital Day: 2    Assessment/Plan   Dyslipidemia  Assessment & Plan  Can resume statin     Essential hypertension  Assessment & Plan  Home regimen: lisinopril-HCTZ 10-12.5  Pt tolerating diet with adequate fluid intake, can resume home regimen on discharge  IV hydral prn while inpatient, BP controlled presently    Symptomatic HIV infection ([address])  Assessment & Plan  HIV well-controlled with last CD4 985 and last VL<20  ID following  Pt home regimen is Dovato, replace with formulary substitute when able to take PO again     * SBO (small bowel obstruction) ([address])  Assessment & Plan  Presents with severe abd pain  CTAP compatible with SBP  Management per surgery , NGT removed and pt tolerating diet  Likely for discharge today per surgery       VTE Pharmacologic Prophylaxis:   Heparin SQ    Patient Centered Rounds: I performed bedside rounds with nursing staff today. Discussions with Specialists or Other Care Team Provider: nursing    Education and Discussions with Family / Patient: Patient declined call to . Total Time Spent on Date of Encounter in care of patient: 45 mins. This time was spent on one or more of the following: performing physical exam; counseling and coordination of care; obtaining or reviewing history; documenting in the medical record; reviewing/ordering tests, medications or procedures; communicating with other healthcare professionals and discussing with patient's family/caregivers. Current Length of Stay: 2 day(s)  Current Patient Status: Inpatient   Discharge Plan: [facility] is following this patient on consult. They are medically stable for discharge when deemed appropriate by primary service. Code Status: Level 1 - Full Code    Subjective:   Pt denies n/v or abd pain today.  He still feels somewhat "bloated" but is passing gas and having BM's. He is tolerating his diet well and able to consume plenty of water. Denies HA, dizziness, CP, SOB, VD. Objective:     Vitals:   Temp (24hrs), Av.1 °F (36.7 °C), Min:97.8 °F (36.6 °C), Max:98.6 °F (37 °C)    Temp:  [97.8 °F (36.6 °C)-98.6 °F (37 °C)] 97.9 °F (36.6 °C)  HR:  [68-83] 68  Resp:  [16-20] 16  BP: (138-158)/() 138/93  SpO2:  [91 %-95 %] 91 %  There is no height or weight on file to calculate BMI. Input and Output Summary (last 24 hours): Intake/Output Summary (Last 24 hours) at 2023 0947  Last data filed at 2023 0745  Gross per 24 hour   Intake 3145.41 ml   Output 1325 ml   Net 1820.41 ml       Physical Exam:   Physical Exam  Vitals and nursing note reviewed. Constitutional:       General: He is not in acute distress. Appearance: Normal appearance. He is normal weight. He is not ill-appearing or toxic-appearing. HENT:      Head: Normocephalic and atraumatic. Right Ear: External ear normal.      Left Ear: External ear normal.      Nose: Nose normal.      Mouth/Throat:      Mouth: Mucous membranes are moist.      Pharynx: Oropharynx is clear. Eyes:      Conjunctiva/sclera: Conjunctivae normal.   Cardiovascular:      Rate and Rhythm: Normal rate and regular rhythm. Pulses: Normal pulses. Heart sounds: Normal heart sounds. No murmur heard. No friction rub. No gallop. Pulmonary:      Effort: Pulmonary effort is normal. No respiratory distress. Breath sounds: Normal breath sounds. No stridor. No wheezing, rhonchi or rales. Abdominal:      General: There is no distension. Palpations: Abdomen is soft. There is no mass. Tenderness: There is no abdominal tenderness. There is no guarding. Hernia: No hernia is present. Comments: Mild distention with hyperactive BS but no tenderness   Musculoskeletal:      Cervical back: Normal range of motion. Right lower leg: No edema. Left lower leg: No edema. Skin:     General: Skin is warm and dry. Capillary Refill: Capillary refill takes less than 2 seconds. Neurological:      Mental Status: He is alert. Mental status is at baseline. Psychiatric:         Mood and Affect: Mood normal.         Thought Content: Thought content normal.          Additional Data:     Labs:  Results from last 7 days   Lab Units 11/05/23  0718   WBC Thousand/uL 6.46   HEMOGLOBIN g/dL 14.0   HEMATOCRIT % 41.4   PLATELETS Thousands/uL 223   NEUTROS PCT % 59   LYMPHS PCT % 26   MONOS PCT % 12   EOS PCT % 2     Results from last 7 days   Lab Units 11/06/23  0454 11/04/23  0522 11/03/23  1809   SODIUM mmol/L 136   < > 133*   POTASSIUM mmol/L 3.8   < > 3.7   CHLORIDE mmol/L 104   < > 100   CO2 mmol/L 28   < > 25   BUN mg/dL 9   < > 14   CREATININE mg/dL 0.83   < > 0.82   ANION GAP mmol/L 4   < > 8   CALCIUM mg/dL 8.2*   < > 9.2   ALBUMIN g/dL  --   --  4.1   TOTAL BILIRUBIN mg/dL  --   --  0.74   ALK PHOS U/L  --   --  41   ALT U/L  --   --  27   AST U/L  --   --  39   GLUCOSE RANDOM mg/dL 124   < > 99    < > = values in this interval not displayed.                        Lines/Drains:  Invasive Devices       Peripheral Intravenous Line  Duration             Peripheral IV 11/05/23 Left;Ventral (anterior) Forearm 1 day                          Imaging: Reviewed radiology reports from this admission including: abdominal/pelvic CT    Recent Cultures (last 7 days):         Last 24 Hours Medication List:   Current Facility-Administered Medications   Medication Dose Route Frequency Provider Last Rate    acetaminophen  650 mg Oral Q6H PRN Kay Gutierrez MD      atorvastatin  40 mg Oral Daily Kay Gutierrez MD      dextrose 5 % and sodium chloride 0.45 % with KCl 20 mEq/L  50 mL/hr Intravenous Continuous Illjayme Kelly MD 50 mL/hr (11/06/23 0730)    dolutegravir  50 mg Oral Daily Kay Gutierrez MD      And    lamiVUDine  300 mg Oral Daily Kay Gutierrez MD      heparin (porcine)  5,000 Units Subcutaneous [facility] Isabella Orellana MD      hydrALAZINE  10 mg Intravenous Q4H PRN Isabella Orellana MD      HYDROmorphone  0.5 mg Intravenous Q4H PRN Isabella Orellana MD      HYDROmorphone  0.2 mg Intravenous Q4H PRN Stephanie Simons MD      labetalol  10 mg Intravenous Q4H PRN Isabella Orellana MD      methocarbamol  500 mg Intravenous Q8H PRN Stephanie Simons MD      ondansetron  4 mg Intravenous Q6H PRN Isabella Orellana MD      phenol  1 spray Mouth/Throat Q2H PRN Isabella Orellana MD      potassium chloride  40 mEq Oral Once Isabella Orellana MD          Today, Patient Was Seen By: Kiko Rosenbaum PA-C    **Please Note: This note may have been constructed using a voice recognition system. **

## 2023-11-06 NOTE — ASSESSMENT & PLAN NOTE
Presents with severe abd pain  CTAP compatible with SBP  Management per surgery , NGT removed and pt tolerating diet  Likely for discharge today per surgery

## 2023-11-06 NOTE — ASSESSMENT & PLAN NOTE
Home regimen: lisinopril-HCTZ 10-12.5  Pt tolerating diet with adequate fluid intake, can resume home regimen on discharge  IV hydral prn while inpatient, BP controlled presently

## 2023-11-06 NOTE — PLAN OF CARE
Problem: PAIN - ADULT  Goal: Verbalizes/displays adequate comfort level or baseline comfort level  Description: Interventions:  - Encourage patient to monitor pain and request assistance  - Assess pain using appropriate pain scale  - Administer analgesics based on type and severity of pain and evaluate response  - Implement non-pharmacological measures as appropriate and evaluate response  - Consider cultural and social influences on pain and pain management  - Notify physician/advanced practitioner if interventions unsuccessful or patient reports new pain  Outcome: Progressing     Problem: INFECTION - ADULT  Goal: Absence or prevention of progression during hospitalization  Description: INTERVENTIONS:  - Assess and monitor for signs and symptoms of infection  - Monitor lab/diagnostic results  - Monitor all insertion sites, i.e. indwelling lines, tubes, and drains  - Monitor endotracheal if appropriate and nasal secretions for changes in amount and color  - [place] appropriate cooling/warming therapies per order  - Administer medications as ordered  - Instruct and encourage patient and family to use good hand hygiene technique  - Identify and instruct in appropriate isolation precautions for identified infection/condition  Outcome: Progressing     Problem: SAFETY ADULT  Goal: Patient will remain free of falls  Description: INTERVENTIONS:  - Educate patient/family on patient safety including physical limitations  - Instruct patient to call for assistance with activity   - Consult OT/PT to assist with strengthening/mobility   - Keep Call bell within reach  - Keep bed low and locked with side rails adjusted as appropriate  - Keep care items and personal belongings within reach  - Initiate and maintain comfort rounds  - Make Fall Risk Sign visible to staff  - Obtain necessary fall risk management equipment  - Apply yellow socks and bracelet for high fall risk patients  - Consider moving patient to room near nurses station  Outcome: Progressing     Problem: DISCHARGE PLANNING  Goal: Discharge to home or other facility with appropriate resources  Description: INTERVENTIONS:  - Identify barriers to discharge w/patient and caregiver  - Arrange for needed discharge resources and transportation as appropriate  - Identify discharge learning needs (meds, wound care, etc.)  - Arrange for interpretive services to assist at discharge as needed  - Refer to Case Management Department for coordinating discharge planning if the patient needs post-hospital services based on physician/advanced practitioner order or complex needs related to functional status, cognitive ability, or social support system  Outcome: Progressing     Problem: Knowledge Deficit  Goal: Patient/family/caregiver demonstrates understanding of disease process, treatment plan, medications, and discharge instructions  Description: Complete learning assessment and assess knowledge base.   Interventions:  - Provide teaching at level of understanding  - Provide teaching via preferred learning methods  Outcome: Progressing     Problem: GASTROINTESTINAL - ADULT  Goal: Minimal or absence of nausea and/or vomiting  Description: INTERVENTIONS:  - Administer IV fluids if ordered to ensure adequate hydration  - Maintain NPO status until nausea and vomiting are resolved  - Nasogastric tube if ordered  - Administer ordered antiemetic medications as needed  - Provide nonpharmacologic comfort measures as appropriate  - Advance diet as tolerated, if ordered  - Consider nutrition services referral to assist patient with adequate nutrition and appropriate food choices  Outcome: Progressing  Goal: Maintains or returns to baseline bowel function  Description: INTERVENTIONS:  - Assess bowel function  - Encourage oral fluids to ensure adequate hydration  - Administer IV fluids if ordered to ensure adequate hydration  - Administer ordered medications as needed  - Encourage mobilization and activity  - Consider nutritional services referral to assist patient with adequate nutrition and appropriate food choices  Outcome: Progressing  Goal: Maintains adequate nutritional intake  Description: INTERVENTIONS:  - Monitor percentage of each meal consumed  - Identify factors contributing to decreased intake, treat as appropriate  - Assist with meals as needed  - Monitor I&O, weight, and lab values if indicated  - Obtain nutrition services referral as needed  Outcome: Progressing  Goal: Oral mucous membranes remain intact  Description: INTERVENTIONS  - Assess oral mucosa and hygiene practices  - Implement preventative oral hygiene regimen  - Implement oral medicated treatments as ordered  - Initiate Nutrition services referral as needed  Outcome: Progressing     Problem: METABOLIC, FLUID AND ELECTROLYTES - ADULT  Goal: Electrolytes maintained within normal limits  Description: INTERVENTIONS:  - Monitor labs and assess patient for signs and symptoms of electrolyte imbalances  - Administer electrolyte replacement as ordered  - Monitor response to electrolyte replacements, including repeat lab results as appropriate  - Instruct patient on fluid and nutrition as appropriate  Outcome: Progressing  Goal: Fluid balance maintained  Description: INTERVENTIONS:  - Monitor labs   - Monitor I/O and WT  - Instruct patient on fluid and nutrition as appropriate  - Assess for signs & symptoms of volume excess or deficit  Outcome: Progressing

## 2023-11-06 NOTE — PLAN OF CARE
Problem: PAIN - ADULT  Goal: Verbalizes/displays adequate comfort level or baseline comfort level  Description: Interventions:  - Encourage patient to monitor pain and request assistance  - Assess pain using appropriate pain scale  - Administer analgesics based on type and severity of pain and evaluate response  - Implement non-pharmacological measures as appropriate and evaluate response  - Consider cultural and social influences on pain and pain management  - Notify physician/advanced practitioner if interventions unsuccessful or patient reports new pain  Outcome: Progressing     Problem: INFECTION - ADULT  Goal: Absence or prevention of progression during hospitalization  Description: INTERVENTIONS:  - Assess and monitor for signs and symptoms of infection  - Monitor lab/diagnostic results  - Monitor all insertion sites, i.e. indwelling lines, tubes, and drains  - Monitor endotracheal if appropriate and nasal secretions for changes in amount and color  - [place] appropriate cooling/warming therapies per order  - Administer medications as ordered  - Instruct and encourage patient and family to use good hand hygiene technique  - Identify and instruct in appropriate isolation precautions for identified infection/condition  Outcome: Progressing     Problem: SAFETY ADULT  Goal: Patient will remain free of falls  Description: INTERVENTIONS:  - Educate patient/family on patient safety including physical limitations  - Instruct patient to call for assistance with activity   - Consult OT/PT to assist with strengthening/mobility   - Keep Call bell within reach  - Keep bed low and locked with side rails adjusted as appropriate  - Keep care items and personal belongings within reach  - Initiate and maintain comfort rounds  - Make Fall Risk Sign visible to staff  - Offer Toileting every 2 Hours, in advance of need  - Initiate/Maintain bed/chair alarm  - Obtain necessary fall risk management equipment  - Apply yellow socks and bracelet for high fall risk patients  - Consider moving patient to room near nurses station  Outcome: Progressing     Problem: DISCHARGE PLANNING  Goal: Discharge to home or other facility with appropriate resources  Description: INTERVENTIONS:  - Identify barriers to discharge w/patient and caregiver  - Arrange for needed discharge resources and transportation as appropriate  - Identify discharge learning needs (meds, wound care, etc.)  - Arrange for interpretive services to assist at discharge as needed  - Refer to Case Management Department for coordinating discharge planning if the patient needs post-hospital services based on physician/advanced practitioner order or complex needs related to functional status, cognitive ability, or social support system  Outcome: Progressing     Problem: Knowledge Deficit  Goal: Patient/family/caregiver demonstrates understanding of disease process, treatment plan, medications, and discharge instructions  Description: Complete learning assessment and assess knowledge base.   Interventions:  - Provide teaching at level of understanding  - Provide teaching via preferred learning methods  Outcome: Progressing     Problem: GASTROINTESTINAL - ADULT  Goal: Minimal or absence of nausea and/or vomiting  Description: INTERVENTIONS:  - Administer IV fluids if ordered to ensure adequate hydration  - Maintain NPO status until nausea and vomiting are resolved  - Nasogastric tube if ordered  - Administer ordered antiemetic medications as needed  - Provide nonpharmacologic comfort measures as appropriate  - Advance diet as tolerated, if ordered  - Consider nutrition services referral to assist patient with adequate nutrition and appropriate food choices  Outcome: Progressing  Goal: Maintains or returns to baseline bowel function  Description: INTERVENTIONS:  - Assess bowel function  - Encourage oral fluids to ensure adequate hydration  - Administer IV fluids if ordered to ensure adequate hydration  - Administer ordered medications as needed  - Encourage mobilization and activity  - Consider nutritional services referral to assist patient with adequate nutrition and appropriate food choices  Outcome: Progressing  Goal: Maintains adequate nutritional intake  Description: INTERVENTIONS:  - Monitor percentage of each meal consumed  - Identify factors contributing to decreased intake, treat as appropriate  - Assist with meals as needed  - Monitor I&O, weight, and lab values if indicated  - Obtain nutrition services referral as needed  Outcome: Progressing  Goal: Oral mucous membranes remain intact  Description: INTERVENTIONS  - Assess oral mucosa and hygiene practices  - Implement preventative oral hygiene regimen  - Implement oral medicated treatments as ordered  - Initiate Nutrition services referral as needed  Outcome: Progressing     Problem: METABOLIC, FLUID AND ELECTROLYTES - ADULT  Goal: Electrolytes maintained within normal limits  Description: INTERVENTIONS:  - Monitor labs and assess patient for signs and symptoms of electrolyte imbalances  - Administer electrolyte replacement as ordered  - Monitor response to electrolyte replacements, including repeat lab results as appropriate  - Instruct patient on fluid and nutrition as appropriate  Outcome: Progressing  Goal: Fluid balance maintained  Description: INTERVENTIONS:  - Monitor labs   - Monitor I/O and WT  - Instruct patient on fluid and nutrition as appropriate  - Assess for signs & symptoms of volume excess or deficit  Outcome: Progressing

## 2023-11-06 NOTE — UTILIZATION REVIEW
NOTIFICATION OF INPATIENT ADMISSION   AUTHORIZATION REQUEST   SERVICING FACILITY:   [address] [place] [address]  Tax ID: [id number]  NPI: [id number]   ATTENDING PROVIDER:  Attending Name and NPI#: Chelsie Galarza [[id number]]  Address: [address] [place] [address]  Phone: [phone]     ADMISSION INFORMATION:  Place of Service: Inpatient [address]  Place of Service Code: 21  Inpatient Admission Date/Time: 11/4/23  1:22 AM  Discharge Date/Time: No discharge date for patient encounter. Admitting Diagnosis Code/Description:  Small bowel obstruction ([address]) [K56.609]  Abdominal pain [R10.9]     UTILIZATION REVIEW CONTACT:  Rick Kunz Utilization   Network Utilization Review Department  Phone: [phone]  Fax: [phone]  Email: [email or web address]  Contact for approvals/pending authorizations, clinical reviews, and discharge. PHYSICIAN ADVISORY SERVICES:  Medical Necessity Denial & Htnh-pe-Tkes Review  Phone: [phone]  Fax: [phone]  Email: [email or web address]     DISCHARGE SUPPORT TEAM:  For Patients Discharge Needs & Updates  Phone: [phone] opt. 2 Fax: [phone]  Email: [email or web address]

## 2023-11-06 NOTE — DISCHARGE SUMMARY
Discharge Summary - General Surgery   Buddy Stephenson 51 y.o. male MRN: [id number]  Unit/Bed#: S -01 Encounter: [id number]    Admission Date: 11/3/2023     Discharge Date: 11/6/2023    Admitting Diagnosis: Small bowel obstruction ([address]) [K56.609]  Abdominal pain [R10.9]    Discharge Diagnosis: same    Attending and Service: Dr. Maksim Starr, General Surgery. Consulting Physician(s): Infectious Disease    Imaging and Procedures Performed: No orders of the defined types were placed in this encounter. XR chest 1 view portable    Result Date: 11/4/2023  Impression: NGT in place Multiple gas distended small bowel loops. Workstation performed: [id number]     CT Abdomen pelvis with contrast    Result Date: 11/3/2023  Impression: Findings compatible with small bowel obstruction. The transitional point appears to be in the right lower quadrant just proximal to the terminal ileum of uncertain etiology. There is mild wall thickening of the distal small bowel loops, which may reflect  a nonspecific enteritis. Small amount of ascites. No pneumoperitoneum seen. Additional findings as above. Workstation performed: [id number]     Hospital Course: Buddy Stephenson is a 50 yo male who presented acutely w/ severe abdominal pain, multiple bouts of emesis. A nasogastric tube was placed and placed onto low continuous suction to decompress. The patients bowel function returned, his NG tube was removed and the patients diet was ultimately advanced. The patient was deemed appropriate for discharge on 11/6. On discharge, the patient is instructed to follow-up with the patient's primary care provider within the next 2 weeks to review the events of the recent hospitalization. The patient is instructed to follow-up with General Surgery as needed. Condition at Discharge: good     Discharge instructions/Information to patient and family:   See after visit summary for information provided to patient and family. Provisions for Follow-Up Care:  See after visit summary for information related to follow-up care and any pertinent home health orders. Disposition: Home    Planned Readmission: No    Discharge Statement   I spent 25 minutes discharging the patient. This time was spent on the day of discharge. I had direct contact with the patient on the day of discharge. Additional documentation is required if more than 30 minutes were spent on discharge. Discharge Medications:  See after visit summary for reconciled discharge medications provided to patient and family.     Liu Stubbs MD  11/6/2023  10:01 AM

## 2023-11-06 NOTE — DISCHARGE INSTR - AVS FIRST PAGE
Bowel Obstruction   WHAT YOU NEED TO KNOW:   A bowel obstruction is a partial or complete blockage of your intestine. Your small or large intestine may be affected. The blockage prevents food and waste from passing through normally. DISCHARGE INSTRUCTIONS:   Seek care immediately if:   You have severe abdominal pain that does not get better. Your heart is beating faster than normal for you. You have a fever. Call your doctor if:   You have nausea and are vomiting. Your abdomen is enlarged. You cannot pass a bowel movement or gas  You lose weight without trying. You have blood in your bowel movement. You have questions or concerns about your condition or care. Follow up with your doctor as directed:  Write down your questions so you remember to ask them during your visits. © Copyright [facility] 2023 Information is for End User's use only and may not be sold, redistributed or otherwise used for commercial purposes. The above information is an  only. It is not intended as medical advice for individual conditions or treatments. Talk to your doctor, nurse or pharmacist before following any medical regimen to see if it is safe and effective for you.

## 2023-11-06 NOTE — PROGRESS NOTES
Progress Note - Infectious Disease   [facility] 51 y.o. male MRN: [id number]  Unit/Bed#: S -01 Encounter: [id number]      Impression/Plan:    HIV. Well controlled on Dovato (Dolutegravir/Lamivudine). [facility]/Dr. Sauceda, last visit 10/24. CD4 985 on 10/07/23, VL < 20 copies.  -While in hospital, can replace Dovato with the individual components, Dolutegravir + Lamivudine, as we do not carry the combination pill  -On discharge he can resume his home combination pill (Dovato)  -No need for OI prophylaxis as CD4 well above 200  -Outpatient follow up with Dr. Sherif Adame      2. Small bowel obstruction. Patient did not appear to have any preceding gastroenteritis symptoms of diarrhea, fever or chills. Improved with NGT, bowel rest. Now tolerating a diet  -Management per Surgery team.     I have discussed the above management plan in detail with the primary service and patient. Okay for discharge from ID perspective. ID will sign off, please call with questions. Antibiotics:  ART    Subjective: The patient denies any abdominal pain, nausea, vomiting, but appetite is poor and he does not feel that hungry. Restarted on ART. No fever or chills. Objective:  Vitals:  Temp:  [97.8 °F (36.6 °C)-98.6 °F (37 °C)] 97.9 °F (36.6 °C)  HR:  [68-83] 68  Resp:  [16-20] 16  BP: (138-158)/() 138/93  SpO2:  [91 %-95 %] 91 %  Temp (24hrs), Av.1 °F (36.7 °C), Min:97.8 °F (36.6 °C), Max:98.6 °F (37 °C)  Current: Temperature: 97.9 °F (36.6 °C)    Physical Exam:   General Appearance:  Alert, interactive, nontoxic, no acute distress. Throat: Oropharynx moist without lesions. Lungs:   Clear to auscultation bilaterally; no wheezes, rhonchi or rales; respirations unlabored   Heart:  RRR; no murmur, rub or gallop   Abdomen:   Soft, non-tender, non-distended, positive bowel sounds. Extremities: No clubbing, cyanosis or edema   Skin: No new rashes or lesions. No draining wounds noted.        Labs: All pertinent labs and imaging studies were personally reviewed  Results from last 7 days   Lab Units 11/05/23  0718 11/04/23  0522 11/04/23  0135 11/03/23  1809   WBC Thousand/uL 6.46 7.03  --  10.26*   HEMOGLOBIN g/dL 14.0 14.8  --  16.8   PLATELETS Thousands/uL 223 211 238 248     Results from last 7 days   Lab Units 11/06/23  0454 11/05/23 0718 11/04/23 0522 11/03/23  1809   SODIUM mmol/L 136 138 136 133*   POTASSIUM mmol/L 3.8 3.3* 3.4* 3.7   CHLORIDE mmol/L 104 100 102 100   CO2 mmol/L 28 29 26 25   BUN mg/dL 9 20 13 14   CREATININE mg/dL 0.83 0.81 0.69 0.82   EGFR ml/min/1.73sq m 103 104 111 103   CALCIUM mg/dL 8.2* 8.4 8.5 9.2   AST U/L  --   --   --  39   ALT U/L  --   --   --  27   ALK PHOS U/L  --   --   --  41       Imaging:  Imaging in PACS reviewed

## 2023-11-07 ENCOUNTER — TRANSITIONAL CARE MANAGEMENT (OUTPATIENT)
Dept: SURGERY | Facility: CLINIC | Age: 48
End: 2023-11-07

## 2023-11-07 NOTE — UTILIZATION REVIEW
NOTIFICATION OF ADMISSION DISCHARGE   This is a Notification of Discharge from [facility]. Please be advised that this patient has been discharge from our facility. Below you will find the admission and discharge date and time including the patient’s disposition. UTILIZATION REVIEW CONTACT:  Anup Box  Utilization   Network Utilization Review Department  Phone: [phone] x carefully listen to the prompts. All voicemails are confidential.  Email: [email or web address]     ADMISSION INFORMATION  PRESENTATION DATE: 11/3/2023  5:56 PM  OBERVATION ADMISSION DATE:   INPATIENT ADMISSION DATE: 11/4/23  1:22 AM   DISCHARGE DATE: 11/6/2023  2:24 PM   DISPOSITION:Home/Self Care    Network Utilization Review Department  ATTENTION: Please call with any questions or concerns to [phone] and carefully listen to the prompts so that you are directed to the right person. All voicemails are confidential.   For Discharge needs, contact Care Management DC Support Team at [phone] opt. 2  Send all requests for admission clinical reviews, approved or denied determinations and any other requests to dedicated fax number below belonging to the campus where the patient is receiving treatment.  List of dedicated fax numbers for the Facilities:  [place] DENIALS (Administrative/Medical Necessity) [phone]   DISCHARGE SUPPORT TEAM (Network) [phone] [facility] [facility] (M[facility]/N[facility]/Pediatrics) [phone]   [address] [facility] [address] [place] [address] [phone]   [facility] [address] [facility] [phone]   [address] [place] [address]  [address]  [phone]

## 2023-11-12 NOTE — DISCHARGE SUMMARY
Discharge Summary   Sergio Dahl 51 y.o. male MRN: [id number]  Unit/Bed#: S -01 Encounter: [id number]    Admission Date: 11/3/2023     Discharge Date: 11/6/23    Attending: Kirill Alva DO      Consultants: ID    Admitting Diagnosis: Small bowel obstruction ([address]) [T53.569]  Abdominal pain [R10.9]    Principle/ Secondary Diagnosis:  Past Medical History:   Diagnosis Date    Abscess of left groin     Dental decay     Depression     Elevated BP without diagnosis of hypertension     HIV disease ([address]) 11/25/1998    mode of transmission: MSM    Hx of herpes zoster     Hypogonadism in male     Tear of right biceps muscle 2017     History reviewed. No pertinent surgical history. Procedures Performed: No orders of the defined types were placed in this encounter. No admission procedures for hospital encounter. Procedure name not found. Laboratory data at discharge:       Results from last 7 days   Lab Units 11/06/23  0454   POTASSIUM mmol/L 3.8   CHLORIDE mmol/L 104   CO2 mmol/L 28   BUN mg/dL 9   CREATININE mg/dL 0.83   CALCIUM mg/dL 8.2*               Discharge instructions/Information to patient and family:   See after visit summary for information provided to patient and family. Discharge Medications:  See after visit summary for reconciled discharge medications provided to patient and family. HPI per Sandra Deal MD "Sergio Dahl is a 51 y.o. male with PMHx of HIV, herpes, L groin abscess, HTN, open appendectomy 25y ago, who presents with severe abdominal pain, n/v x7 (last at noon today). Started 2d ago, 1d after eating a lot of fast food. Describes pain as bloated, sharp, and constant. Better with laying flat. Never occurred before. Denies fever/chills, SOB. Last ate yogurt at 1pm, hasn't been eating much. Last BM today at 3pm, small soft brown. Currently passing gas. Hypertensive to 168/102, hasn't taken HTN meds due to n/v.  Other VSS on RA."    Hospital Course: Patient was admitted to the surgical service, and NG tube was placed for bowel decompression, IV fluids were started. Hospital day 0 patient reported bowel movement and passing flatus. Hospital day 1 patient was passing gas, and feeling much better, and NG tube clamp trial was performed. Patient passed clamp trial, NG tube was removed, a clear liquid diet was started and his home medications were resumed. His diet was slowly advanced and on day of discharge he was tolerating a soft diet. Prior to discharge, the patient was given instructions for outpatient care and follow-up. The patient has been instructed to call w/ any questions, changes, or concerns for the medical condition. For further details of the hospitalization, please refer to the medical record. Condition at Discharge: good     Provisions for Follow-Up Care:  See after visit summary for information related to follow-up care and any pertinent home health orders. Disposition: Home    Planned Readmission: No    Discharge Statement   I spent 30 minutes discharging the patient. This time was spent on the day of discharge. I had direct contact with the patient on the day of discharge. Additional documentation is required if more than 30 minutes were spent on discharge. Dwayne Beal PA-C  11/12/2023      This text is generated with voice recognition software. There may be translation, syntax,  or grammatical errors. If you have any questions, please contact the dictating provider.

## 2023-11-21 ENCOUNTER — DOCUMENTATION (OUTPATIENT)
Dept: SURGERY | Facility: CLINIC | Age: 48
End: 2023-11-21

## 2023-11-21 ENCOUNTER — OFFICE VISIT (OUTPATIENT)
Dept: SURGERY | Facility: CLINIC | Age: 48
End: 2023-11-21
Payer: COMMERCIAL

## 2023-11-21 ENCOUNTER — CLINICAL SUPPORT (OUTPATIENT)
Dept: SURGERY | Facility: CLINIC | Age: 48
End: 2023-11-21
Payer: COMMERCIAL

## 2023-11-21 VITALS
HEIGHT: 68 IN | TEMPERATURE: 98.1 F | BODY MASS INDEX: 27.1 KG/M2 | DIASTOLIC BLOOD PRESSURE: 88 MMHG | WEIGHT: 178.8 LBS | OXYGEN SATURATION: 97 % | HEART RATE: 68 BPM | SYSTOLIC BLOOD PRESSURE: 123 MMHG

## 2023-11-21 DIAGNOSIS — F41.9 ANXIETY: Primary | ICD-10-CM

## 2023-11-21 DIAGNOSIS — B20 SYMPTOMATIC HIV INFECTION (HCC): Primary | ICD-10-CM

## 2023-11-21 PROCEDURE — 99496 TRANSJ CARE MGMT HIGH F2F 7D: CPT | Performed by: NURSE PRACTITIONER

## 2023-11-21 NOTE — ASSESSMENT & PLAN NOTE
No results found for: "[id number]"  CD4 ABS   Date/Time Value Ref Range Status   10/07/2023 10:50  359 - 1519 /uL Final     HIV-1 RNA by PCR, Qn   Date/Time Value Ref Range Status   2023 01:15 PM 30 copies/mL Final     Comment:     The reportable range for this assay is 20 to 10,000,000  copies HIV-1 RNA/mL. HIV-1 RNA Viral Load Log   Date/Time Value Ref Range Status   2023 01:15 PM 1.477 ymg60yzzi/mL Final         ART: Almaritiffani Roberts        Denies side effects. Stressed the importance of adherence. Continue follow up with ID clinic. Reviewed most recent labs, including Cd4 and viral load. Discussed the risks and benefits of treatment options, instructions for management, importance of treatment adherence, and reduction of risk factor. Educated on possible  medication side effects. Counseled on routes of HIV transmission, including the risk of  infection. Emphasized that viral suppression is the best method to prevent HIV transmission. At this time pt.denies the need for HIV testing of anyone in their life. Total encounter time was 45 minutes. Greater then 20 minutes were spent on counseling and patient education. Pt voices understanding and agreement with treatment plan.

## 2023-11-21 NOTE — PROGRESS NOTES
TCM Visit:        Assessment/Plan:      Ander Miranda is doing well and has no residual symptoms. Discussed the importance of eating a healthy diet and getting regular exercise with adequate fluid intake. Educated on symptoms of small bowel obstruction. Advised to seek emergent help if symptoms recur. No additional follow-up for now. Continue to monitor. Problem List Items Addressed This Visit       Symptomatic HIV infection ([address]) - Primary     No results found for: "ED8WPFD"  CD4 ABS   Date/Time Value Ref Range Status   10/07/2023 10:50  359 - 1519 /uL Final     HIV-1 RNA by PCR, Qn   Date/Time Value Ref Range Status   2023 01:15 PM 30 copies/mL Final     Comment:     The reportable range for this assay is 20 to 10,000,000  copies HIV-1 RNA/mL. HIV-1 RNA Viral Load Log   Date/Time Value Ref Range Status   2023 01:15 PM 1.477 [id number]/mL Final       ART: Luis Mcdaniels        Denies side effects. Stressed the importance of adherence. Continue follow up with ID clinic. Reviewed most recent labs, including Cd4 and viral load. Discussed the risks and benefits of treatment options, instructions for management, importance of treatment adherence, and reduction of risk factor. Educated on possible  medication side effects. Counseled on routes of HIV transmission, including the risk of  infection. Emphasized that viral suppression is the best method to prevent HIV transmission. At this time pt.denies the need for HIV testing of anyone in their life. Total encounter time was 45 minutes. Greater then 20 minutes were spent on counseling and patient education. Pt voices understanding and agreement with treatment plan. Encounter provider [facility] CHERRI Beltrán     Provider located at [address]  [phone]    Recent Visits  No visits were found meeting these conditions.   Showing recent visits within past 7 days and meeting all other requirements  Today's Visits  Date Type Provider Dept   11/21/23 Office Visit [facility] [place]   Showing today's visits and meeting all other requirements  Future Appointments  No visits were found meeting these conditions. Showing future appointments within next 150 days and meeting all other requirements         Transitional Care Management Review:  Steven Stewart is a 51 y.o. male here for TCM follow up. During the TCM phone call patient stated:    TCM Call       Date and time call was made  11/7/2023  9:59 [facility] reviewed  Records reviewed    Patient was hospitialized at  [address] [place] [address]    Date of Admission  11/03/23    Date of discharge  11/06/23    Diagnosis  SBO (small bowel obstruction) ([address] )    Disposition  Home    Current Symptoms  None          TCM Call       Post hospital issues  None    Should patient be enrolled in anticoag monitoring? No    Scheduled for follow up? No    Did you obtain your prescribed medications  Yes    Do you need help managing your prescriptions or medications  No    Is transportation to your appointment needed  No    I have advised the patient to call PCP with any new or worsening symptoms  roxanne xie)    Living Arrangements  Spouse or Significiant other    Support System  Partner    The type of support provided  Emotional; Financial; Physical    Do you have social support  Yes, as much as I need    Are you recieving any outpatient services  No    Are you recieving home care services  No    Are you using any community resources  No    Current waiver services  No    Have you fallen in the last 12 months  No    Interperter language line needed  No    Counseling  Patient    Counseling topics  Prognosis          Subjective:     Patient ID: Steven Stewart is a 51 y.o. male. Shary Schwab presents to the clinic today for TCM after IP admission for SBO.  PMHx significant for HIV,HTN, hyperlipidemia, and seasonal allergies. Chantelle Buckner was admitted to [facility] for 3 days. Bowel was decompressed via NG and diet was advanced. He is doing well and has no residual symptoms. He is able to tolerate his diet and denies nausea, vomiting, or bloating. Review of Systems   Constitutional:  Negative for chills and fever. HENT:  Negative for ear pain and sore throat. Eyes:  Negative for pain and visual disturbance. Respiratory:  Negative for cough and shortness of breath. Cardiovascular:  Negative for chest pain and palpitations. Gastrointestinal:  Negative for abdominal pain and vomiting. Genitourinary:  Negative for dysuria and hematuria. Musculoskeletal:  Negative for arthralgias and back pain. Skin:  Negative for color change and rash. Neurological:  Negative for seizures and syncope. All other systems reviewed and are negative. Objective:    Vitals:    11/21/23 1104   BP: 123/88   Pulse: 68   Temp: 98.1 °F (36.7 °C)   SpO2: 97%   Weight: 81.1 kg (178 lb 12.8 oz)   Height: 5' 8" (1.727 m)       Physical Exam  Vitals and nursing note reviewed. Constitutional:       Appearance: Normal appearance. HENT:      Head: Normocephalic. Nose: Nose normal. No congestion or rhinorrhea. Mouth/Throat:      Mouth: Mucous membranes are moist.      Pharynx: Oropharynx is clear. Eyes:      Pupils: Pupils are equal, round, and reactive to light. Cardiovascular:      Rate and Rhythm: Normal rate and regular rhythm. Pulses: Normal pulses. Heart sounds: Normal heart sounds. Pulmonary:      Effort: Pulmonary effort is normal.      Breath sounds: Normal breath sounds. Abdominal:      General: Abdomen is flat. Bowel sounds are normal.      Palpations: Abdomen is soft. Musculoskeletal:         General: No tenderness or signs of injury. Normal range of motion. Skin:     General: Skin is warm and dry.    Neurological:      Mental Status: He is alert and oriented to person, place, and time.          Medications have been reviewed by provider in current encounter        CHERRI Velazquez

## 2023-11-21 NOTE — PROGRESS NOTES
SUBJECTIVE: emotional and cognitive suppression of emotions, anxiety and depression. OBJECTIVE: Pt arrived for session at coordinated time to address [id number] [facility] concerns. Pt stated he has been addressing concerns with his relationship with his partner and he feels he is opening up and expressing his thoughts. Pt stated his partner has been more willing to listen and discuss their relationship,  Pt shared he had been hospitalized due to medical issue and he feels this incident has actually improved his ability to be honest with his feeling towards his . Pt shared his relationship with his parents growing up and how he did not feel valued for who he was. Pt stated he has always been worried about being accepted by others and goes out of his way to please others. ASSESSMENT: Pt was fully engaged through out session , able to voice his thoughts and opinions on his progress. Pt was actively involved with discussing his thoughts on the future of his relationships. No SI/HI was disclosed nor noted during session. PLAN: continue to utilize coping skills to increase his ability to express his thoughts. Pt is scheduled for next session in 2 weeks.

## 2023-11-21 NOTE — PROGRESS NOTES
Pt recently had small bowel obstruction. RD provided nutrition education on slowly adding fiber back into diet.  Pt demonstrated understanding.   -Raysa Avila RDN,LDN

## 2023-11-27 DIAGNOSIS — N52.9 ERECTILE DYSFUNCTION, UNSPECIFIED ERECTILE DYSFUNCTION TYPE: ICD-10-CM

## 2023-11-27 DIAGNOSIS — E78.5 DYSLIPIDEMIA: ICD-10-CM

## 2023-11-27 DIAGNOSIS — I10 ESSENTIAL HYPERTENSION: ICD-10-CM

## 2023-11-28 RX ORDER — LISINOPRIL AND HYDROCHLOROTHIAZIDE 12.5; 1 MG/1; MG/1
1 TABLET ORAL DAILY
Qty: 30 TABLET | Refills: 2 | Status: SHIPPED | OUTPATIENT
Start: 2023-11-28

## 2023-11-28 RX ORDER — TADALAFIL 10 MG/1
TABLET ORAL
Qty: 10 TABLET | Refills: 3 | Status: SHIPPED | OUTPATIENT
Start: 2023-11-28

## 2023-11-28 RX ORDER — ATORVASTATIN CALCIUM 40 MG/1
40 TABLET, FILM COATED ORAL DAILY
Qty: 30 TABLET | Refills: 2 | Status: SHIPPED | OUTPATIENT
Start: 2023-11-28

## 2023-12-05 ENCOUNTER — DOCUMENTATION (OUTPATIENT)
Dept: SURGERY | Facility: CLINIC | Age: 48
End: 2023-12-05

## 2023-12-05 ENCOUNTER — CLINICAL SUPPORT (OUTPATIENT)
Dept: SURGERY | Facility: CLINIC | Age: 48
End: 2023-12-05

## 2023-12-05 DIAGNOSIS — F41.9 ANXIETY: Primary | ICD-10-CM

## 2023-12-05 NOTE — PROGRESS NOTES
SUBECTIVE: depression and anxiety      OBJECTIVE: Pt arrived for session a coordinated time to address his depression and anxiety. Pt stated he has been working a lot and finds himself sleeping more. Pt stated his relationship is status quo, has not changed and he is feeling unsure how to correct this. Pt stated he feels like he is not important to his partner and his views are not regarded. Pt stated he feels he used humor to avoid hard conversations and that this is how his family has handled difficult conversations. Pt spoke of growing up and feeling being chatman was a negative and something you had to keep quiet about. He stated he remembers how individuals in his hometown would talk about individuals that were chatman, they were looked at as "strange". Pt stated he does have certain individuals where he feels comfortable being himself. Pt shared most of his closet friends are women as he feels accepted. ASSESSMENT: Pt was fully engaged through out session, able to share his thoughts and feelings openly. Pt was encouraged to look into why he uses humor to cover up his feelings and to find ways to express himself in a more positive manner. No SI/HI was disclosed nor noted through out session. PLAN: explore feelings of inadequacy and negative self image. Pt is scheduled for follow up session in 2 weeks.

## 2023-12-05 NOTE — PROGRESS NOTES
Pt requested appointment with CARMELO. Session set up for 12/19 @ noon.    Chandan Thurston RDN,SRIRAM

## 2023-12-14 ENCOUNTER — PATIENT OUTREACH (OUTPATIENT)
Dept: SURGERY | Facility: CLINIC | Age: 48
End: 2023-12-14

## 2023-12-14 NOTE — PROGRESS NOTES
Cm received ct's dental auth request from [facility]. Pavel began working on dental auth for treatment plan.

## 2023-12-15 DIAGNOSIS — B20 HIV INFECTION, UNSPECIFIED SYMPTOM STATUS (HCC): ICD-10-CM

## 2023-12-18 ENCOUNTER — VBI (OUTPATIENT)
Dept: ADMINISTRATIVE | Facility: OTHER | Age: 48
End: 2023-12-18

## 2023-12-18 ENCOUNTER — PATIENT OUTREACH (OUTPATIENT)
Dept: SURGERY | Facility: CLINIC | Age: 48
End: 2023-12-18

## 2023-12-18 RX ORDER — DOLUTEGRAVIR SODIUM AND LAMIVUDINE 50; 300 MG/1; MG/1
1 TABLET, FILM COATED ORAL EVERY MORNING
Qty: 30 TABLET | Refills: 3 | Status: SHIPPED | OUTPATIENT
Start: 2023-12-18

## 2023-12-20 ENCOUNTER — PATIENT OUTREACH (OUTPATIENT)
Dept: SURGERY | Facility: CLINIC | Age: 48
End: 2023-12-20

## 2023-12-20 NOTE — PROGRESS NOTES
Cm received a call from ct to see if ct's dental auth was approved and cm informed ct that it was but that ct was waiting on manager approval.

## 2023-12-26 ENCOUNTER — PATIENT OUTREACH (OUTPATIENT)
Dept: SURGERY | Facility: CLINIC | Age: 48
End: 2023-12-26

## 2023-12-26 NOTE — PROGRESS NOTES
Cm texted ct to see if he was scheduled in for his dental procedure since cm already sent [facility] the auth #. Ct states that they have not reached out to him yet. Cm informed ct that she will reach out to them this week to confirm receipt of auth #.

## 2024-01-02 ENCOUNTER — PATIENT OUTREACH (OUTPATIENT)
Dept: SURGERY | Facility: CLINIC | Age: 49
End: 2024-01-02

## 2024-01-02 NOTE — PROGRESS NOTES
Cm called [facility] to see if they received ct's auth number for dental crown procedure. Pavel spoke with Yumiko and she states that the auth # has already been noted and that the office will reach out to ct to schedule his appt. Cm texted ct this update and ct acknowledged.

## 2024-01-09 ENCOUNTER — DOCUMENTATION (OUTPATIENT)
Dept: SURGERY | Facility: CLINIC | Age: 49
End: 2024-01-09

## 2024-01-09 ENCOUNTER — CLINICAL SUPPORT (OUTPATIENT)
Dept: SURGERY | Facility: CLINIC | Age: 49
End: 2024-01-09

## 2024-01-09 DIAGNOSIS — F41.9 ANXIETY: Primary | ICD-10-CM

## 2024-01-09 NOTE — PROGRESS NOTES
"SUBJECTIVE: depression and anxiety    OBJECTIVE: Pt arrived to session at coordinated time to address depression, anxiety and relational issues.   Pt stated he feels as if he is emotionally distancing himself from his .  Pt shared he feels he is the giver in the relationship and his  is the taker.  Pt questions whether he will be able to sustain this relationship the way it is at this time.  Pt shared that he does not see the relationship improving and actually has noticed more distancing.   Pt shared he does not trust his  due to husbands addiction.  Pt shared his  \"disappears\" and does not advise pt of his whereabouts.     ASSESSMENT: Pt appeared to be nervous and unsure of himself today.  Pt shared that he feels as if he is distancing himself emotionally and that will lead to ending the relationship.  Pt stated he is fearful of living on his own due to financial struggles.   Pt appears to becoming more introspective, realizing his limitations in helping his  change his lifestyle.     No SI/HI was noted nor disclosed.     PLAN: pt will continue to explore internal struggles regarding self love .  Pt will continue to utilize  interventions to manage depression and anxiety.    "

## 2024-01-09 NOTE — PROGRESS NOTES
[facility] Nutrition Encounter     Elvin seen by RD in conjunction with  [facility] apt.  To discuss diet changes.     PMHx: Bowel blockage     Typical food/beverage intake:    Breakfast coffee, toast(white bread), egg, marcelino, dannon yogurt   Lunch leftovers   Dinner pasta with sauce, vegan meatballs   Snacks candy, nuts     Appetite: Unchanged from normal    Oral/enteral nutrition supplements:  N/A    Weight history:   Wt Readings from Last 3 Encounters:   11/21/23 81.1 kg (178 lb 12.8 oz)   10/24/23 81.9 kg (180 lb 9.6 oz)   10/17/23 83.3 kg (183 lb 9.6 oz)     -190 per pt     Nutrition Diagnosis    Problem: no nutrition diagnosis at this time      Nutrition Intervention/Recommendations    Nutrition education intervention: provided    Nutrition recommendations: increase fiber, label read     Teaching Method: verbal     Person Educated: patient    Comprehension: excellent    Receptivity: excellent    Expected compliance: excellent    Collaboration/referral of nutrition care:    Has mobile market coupon     Goals:  Increase fiber rich foods  Label read to avoid high sodium, fat & sugar foods  Follow Myplate     Visit Summary  Pt feels like he has been eating poorly since the Holiday's. He requested to see RD to discuss recommendations for healthy eating. RD conducted diet recall & provided education on Myplate, label reading and increasing fiber intake. All nutrition questions and concerns addressed.   -Ann Mckeon RDN,LDN

## 2024-01-15 ENCOUNTER — CLINICAL SUPPORT (OUTPATIENT)
Dept: SURGERY | Facility: CLINIC | Age: 49
End: 2024-01-15

## 2024-01-15 DIAGNOSIS — F41.9 ANXIETY: Primary | ICD-10-CM

## 2024-01-16 ENCOUNTER — PATIENT OUTREACH (OUTPATIENT)
Dept: SURGERY | Facility: CLINIC | Age: 49
End: 2024-01-16

## 2024-01-16 NOTE — PROGRESS NOTES
SUBJECTIVE: anxiety, depression    OBJECTIVE: Pt arrived to session at coordinated time to address anxiety and depression.   Pt stated his anxiety and depression have at a lesser level this past week.  Pt contributes this to his partner being home more.  Pt stated he feels better when his partner is present in the home but did stated he still feels emotionally disconnected.  Pt stated he is unsure how to do what is best for himself fearing being alone and financially responsible for himself. '  Pt was encouraged to discuss his feelings with his partner.     ASSESSMENT: pt appeared unsure how to express himself with his partner stating he feels his partner will be come upset.  Pt appears to struggle with self esteem and putting himself first.   Pt was fully engaged throughout session able to express his thoughts and feelings appropriately.     No Si/HI was noted nor disclosed.     PLAN: explore ways to increase self esteem, utilize interventions to manage anxiety.

## 2024-01-18 DIAGNOSIS — J30.2 SEASONAL ALLERGIES: ICD-10-CM

## 2024-01-19 ENCOUNTER — PATIENT OUTREACH (OUTPATIENT)
Dept: SURGERY | Facility: CLINIC | Age: 49
End: 2024-01-19

## 2024-01-22 RX ORDER — FLUTICASONE PROPIONATE 50 MCG
1 SPRAY, SUSPENSION (ML) NASAL DAILY
Qty: 16 G | Refills: 2 | Status: SHIPPED | OUTPATIENT
Start: 2024-01-22

## 2024-01-29 ENCOUNTER — CLINICAL SUPPORT (OUTPATIENT)
Dept: SURGERY | Facility: CLINIC | Age: 49
End: 2024-01-29

## 2024-01-29 DIAGNOSIS — F41.9 ANXIETY: Primary | ICD-10-CM

## 2024-01-29 NOTE — PROGRESS NOTES
SUBJECTIVE: depression and anxiety    OBJECTIVE: Pt arrived for session at coordinated time to address depression and anxiety.     Pt stated he has been able to voice his opinions easier and feels he is being heard.  Pt stated he is working on his self esteem at home and at work.     Pt is looking forward to traveling to visit his parents next month, this time without his . Pt stated it is easier to visit with his parents without his  due to his  becoming bored.     ASSESSMENT:  Pt was fully engaged throughout session evidenced by his interaction with this writer.  Pt is showing signs of increased self esteem and awareness.  Pt appears to be able to voice his thoughts and feelings without questioning himself.     No signs or symptoms of SI/HI noted nor disclosed.     PLAN: pt will continue to utilize interventions to increase his self esteem and to manage his anxiety and depression.

## 2024-02-15 DIAGNOSIS — E78.5 DYSLIPIDEMIA: ICD-10-CM

## 2024-02-15 DIAGNOSIS — I10 ESSENTIAL HYPERTENSION: ICD-10-CM

## 2024-02-20 RX ORDER — ATORVASTATIN CALCIUM 40 MG/1
40 TABLET, FILM COATED ORAL DAILY
Qty: 30 TABLET | Refills: 2 | Status: SHIPPED | OUTPATIENT
Start: 2024-02-20

## 2024-02-20 RX ORDER — LISINOPRIL AND HYDROCHLOROTHIAZIDE 12.5; 1 MG/1; MG/1
1 TABLET ORAL DAILY
Qty: 30 TABLET | Refills: 2 | Status: SHIPPED | OUTPATIENT
Start: 2024-02-20

## 2024-02-22 ENCOUNTER — PATIENT OUTREACH (OUTPATIENT)
Dept: SURGERY | Facility: CLINIC | Age: 49
End: 2024-02-22

## 2024-02-29 ENCOUNTER — PATIENT OUTREACH (OUTPATIENT)
Dept: SURGERY | Facility: CLINIC | Age: 49
End: 2024-02-29

## 2024-03-05 ENCOUNTER — DOCUMENTATION (OUTPATIENT)
Dept: SURGERY | Facility: CLINIC | Age: 49
End: 2024-03-05

## 2024-03-05 NOTE — PROGRESS NOTES
"[facility] Annual Nutrition Assessment  Annual nutrition assessment completed via phone.     Pt is established patient (last annual was 03/06/2023)    PMHx:  HIV, SBO, HTN, dry skin, HLD, anxiety, seasonal allergies, depression       Clinical Data/Client History    CD4 count:  985  Viral load:  <20  ART:   Dovato      , Patient Navigator: Bill     Food assistance:  Has mobile market    Living situation: House or apartment     Psychosocial factors: Depression  anxiety     Mobility:  self ambulates    Physical activity: Walks a lot at work       Oral health concerns: denied oral health concerns       Typical food/beverage intake:    Breakfast marcelino & eggs   Lunch pizza with corn starch crust   Dinner Rice & beans   Snacks Roasted beets   Beverages water, coffee    Appetite: Unchanged from normal    OTC vitamin, mineral, herbal supplements: MVI      GI problems: denied n/v/d/c    Food allergies/intolerances:  Dairy    Weight history:  Wt Readings from Last 3 Encounters:   11/21/23 81.1 kg (178 lb 12.8 oz)   10/24/23 81.9 kg (180 lb 9.6 oz)   10/17/23 83.3 kg (183 lb 9.6 oz)       Current body weight:  178  Height:  67\"  BMI:  27  IBW:  148  %IBW  120    Weight change: No significant weight change in the last year       Nutrition-related labs:    Lab Results   Component Value Date    HGBA1C 5.1 03/27/2023      Lab Results   Component Value Date    SODIUM 136 11/06/2023    K 3.8 11/06/2023     11/06/2023    CO2 28 11/06/2023    BUN 9 11/06/2023    CREATININE 0.83 11/06/2023    GLUC 124 11/06/2023    CALCIUM 8.2 (L) 11/06/2023      Lab Results   Component Value Date    CHOLESTEROL 172 10/07/2023    CHOLESTEROL 197 11/30/2022    CHOLESTEROL 189 11/02/2021     Lab Results   Component Value Date    HDL 43 10/07/2023    HDL 59 11/30/2022    HDL 55 11/02/2021     Lab Results   Component Value Date    TRIG 157 (H) 10/07/2023    TRIG 116 11/30/2022    TRIG 154 (H) 11/02/2021     No results found for: \"NONHDLC\" "     Current medications: Lipitor, cialis, Lisinopril-HCTZ      Physical findings/skin integrity:  Skin intact       Nutrition Diagnosis    Problem: altered nutrition-related lab values (Lipids)    Related to: energy intake > energy output over time     As Evidenced By: diet recall       Estimated Nutritional Needs    Lawrence Petersen REE: CBW    ~1969 kcal (based on: 1.2)  ~65 protein (based on: .08)  ~2022 fluid (based on: 25ml)      Current intake estimation: meeting needs       Nutrition Intervention/Recommendations    Nutrition education intervention: provided    Nutrition recommendations: Myplate & continue to increase vegetable intake     Teaching Method: verbal     Person Educated: patient      Comprehension: excellent    Receptivity: excellent    Expected compliance: excellent      Collaboration/referral of nutrition care:    Pt has Mobile market coupon & is using it       Goals:  No significant weight changes  Increase vegetable intake   Stable nutrition related labs    Monitoring/Evaluation:  Will continue to monitor appetite, labs, weight trend and need for nutrition education.     Visit Summary  Education provided in the past on Myplate & label reading. Pt denies any weight changes & is trying to consume more vegetables. He has no nutrition questions or concerns at this time.   -Ann Mckeon RDN,LDN

## 2024-03-11 ENCOUNTER — PATIENT OUTREACH (OUTPATIENT)
Dept: SURGERY | Facility: CLINIC | Age: 49
End: 2024-03-11

## 2024-03-12 ENCOUNTER — PATIENT OUTREACH (OUTPATIENT)
Dept: SURGERY | Facility: CLINIC | Age: 49
End: 2024-03-12

## 2024-03-13 ENCOUNTER — PATIENT OUTREACH (OUTPATIENT)
Dept: SURGERY | Facility: CLINIC | Age: 49
End: 2024-03-13

## 2024-03-13 NOTE — PROGRESS NOTES
CM met with ct for recertification and ct brought in supporting documents for recert: pay stubs,  license, proof of residency, insurance card. Ct is living with his  and ct reports to be in a monogamous relationship for the past 11 years. 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 He is in a stable living situation, has reliable transportation and is independent with caring for his basic needs. Ct reports to have an understanding of HIV transmission and being medically adherent. Ct is independent in scheduling his own medical appointments does not request the assistance of CM. Ct reports he does have an occasional alcoholic drink but does not report any substance use. Ct goes to [facility] for his dental needs and is able to make his own appointments. He sees the [facility] therapist as needed  to discuss any challenges he may be experiencing. See media for recert documentation. Ct has an upcoming [facility] clinic appointment in April and plans to get labs done prior to that appointment.

## 2024-03-15 DIAGNOSIS — N52.9 ERECTILE DYSFUNCTION, UNSPECIFIED ERECTILE DYSFUNCTION TYPE: ICD-10-CM

## 2024-03-18 ENCOUNTER — DOCUMENTATION (OUTPATIENT)
Dept: SURGERY | Facility: CLINIC | Age: 49
End: 2024-03-18

## 2024-03-18 RX ORDER — TADALAFIL 10 MG/1
TABLET ORAL
Qty: 10 TABLET | Refills: 3 | Status: SHIPPED | OUTPATIENT
Start: 2024-03-18

## 2024-03-19 ENCOUNTER — DOCUMENTATION (OUTPATIENT)
Dept: SURGERY | Facility: CLINIC | Age: 49
End: 2024-03-19

## 2024-03-19 NOTE — PROGRESS NOTES
Pt called to schedule appt.     [facility] returned call to pt and scheduled appt for next week.  
Xray Chest 1 View-PORTABLE IMMEDIATE

## 2024-03-26 ENCOUNTER — CLINICAL SUPPORT (OUTPATIENT)
Dept: SURGERY | Facility: CLINIC | Age: 49
End: 2024-03-26

## 2024-03-26 DIAGNOSIS — F41.9 ANXIETY: Primary | ICD-10-CM

## 2024-03-26 NOTE — PROGRESS NOTES
SUBJECTIVE: depression and anxiety     OBJECTIVE: Pt arrived at agreed upon time to address depression and anxiety.  Pt stated he was in his [place] [place] to visit with his family.  His mother had an medical emergency and was admitted to the hospital for most of his visit.  Pt stated his mother did not want him to see her in the hospital, pt shared he thought this was due to her being a very private person all her life.  Pt stated he has not been able to process the thought of her dying which troubles him.   Pt stated he had a meeting with an individual that spoke of energy people put out and he feels he has learned from this meeting how much he has been putting into his relationship without receiving anything in return.   ASSESSEMENT:  Pt was fully engaged throughout session able to express his thoughts and feelings in an appropriate manner.   Pt appeared unsettled and nervou throughout session, evidenced by fidgeting in his seat and little eye contact.  Pt appears to be struggling with making decisions on participating in activities outside his relationship, worrying if it will harm the relationship.  Pt struggles with worrying about his spouses reactions to changes in his life.     No SI/HI noted nor disclosed throughout session.     PLAN: pt will see [facility] bi-weekly to continue to work on building self esteem and continue to utilze interventions to manage depression and anxiety.

## 2024-03-28 ENCOUNTER — DOCUMENTATION (OUTPATIENT)
Dept: SURGERY | Facility: CLINIC | Age: 49
End: 2024-03-28

## 2024-04-08 DIAGNOSIS — B20 HIV INFECTION, UNSPECIFIED SYMPTOM STATUS (HCC): ICD-10-CM

## 2024-04-08 RX ORDER — DOLUTEGRAVIR SODIUM AND LAMIVUDINE 50; 300 MG/1; MG/1
1 TABLET, FILM COATED ORAL EVERY MORNING
Qty: 30 TABLET | Refills: 3 | Status: SHIPPED | OUTPATIENT
Start: 2024-04-08

## 2024-04-09 ENCOUNTER — TELEPHONE (OUTPATIENT)
Dept: SURGERY | Facility: CLINIC | Age: 49
End: 2024-04-09

## 2024-04-09 NOTE — TELEPHONE ENCOUNTER
Spoke to pt and he will be having labs done in a few days for his appt with Dr. Sauceda on 04/23/24.

## 2024-04-11 ENCOUNTER — PATIENT OUTREACH (OUTPATIENT)
Dept: SURGERY | Facility: CLINIC | Age: 49
End: 2024-04-11

## 2024-04-12 ENCOUNTER — APPOINTMENT (OUTPATIENT)
Dept: LAB | Facility: CLINIC | Age: 49
End: 2024-04-12
Payer: COMMERCIAL

## 2024-04-12 LAB
ALBUMIN SERPL BCP-MCNC: 4.1 G/DL (ref 3.5–5)
ALP SERPL-CCNC: 46 U/L (ref 34–104)
ALT SERPL W P-5'-P-CCNC: 19 U/L (ref 7–52)
ANION GAP SERPL CALCULATED.3IONS-SCNC: 7 MMOL/L (ref 4–13)
AST SERPL W P-5'-P-CCNC: 19 U/L (ref 13–39)
BASOPHILS # BLD AUTO: 0.05 THOUSANDS/ÂΜL (ref 0–0.1)
BASOPHILS NFR BLD AUTO: 1 % (ref 0–1)
BILIRUB SERPL-MCNC: 0.69 MG/DL (ref 0.2–1)
BILIRUB UR QL STRIP: NEGATIVE
BUN SERPL-MCNC: 19 MG/DL (ref 5–25)
CALCIUM SERPL-MCNC: 9.2 MG/DL (ref 8.4–10.2)
CHLORIDE SERPL-SCNC: 106 MMOL/L (ref 96–108)
CLARITY UR: CLEAR
CO2 SERPL-SCNC: 26 MMOL/L (ref 21–32)
COLOR UR: NORMAL
CREAT SERPL-MCNC: 1 MG/DL (ref 0.6–1.3)
EOSINOPHIL # BLD AUTO: 0.13 THOUSAND/ÂΜL (ref 0–0.61)
EOSINOPHIL NFR BLD AUTO: 2 % (ref 0–6)
ERYTHROCYTE [DISTWIDTH] IN BLOOD BY AUTOMATED COUNT: 12.5 % (ref 11.6–15.1)
EST. AVERAGE GLUCOSE BLD GHB EST-MCNC: 111 MG/DL
GFR SERPL CREATININE-BSD FRML MDRD: 87 ML/MIN/1.73SQ M
GLUCOSE P FAST SERPL-MCNC: 120 MG/DL (ref 65–99)
GLUCOSE UR STRIP-MCNC: NEGATIVE MG/DL
HBA1C MFR BLD: 5.5 %
HCT VFR BLD AUTO: 46.5 % (ref 36.5–49.3)
HGB BLD-MCNC: 15.6 G/DL (ref 12–17)
HGB UR QL STRIP.AUTO: NEGATIVE
IMM GRANULOCYTES # BLD AUTO: 0.02 THOUSAND/UL (ref 0–0.2)
IMM GRANULOCYTES NFR BLD AUTO: 0 % (ref 0–2)
KETONES UR STRIP-MCNC: NEGATIVE MG/DL
LEUKOCYTE ESTERASE UR QL STRIP: NEGATIVE
LYMPHOCYTES # BLD AUTO: 2.41 THOUSANDS/ÂΜL (ref 0.6–4.47)
LYMPHOCYTES NFR BLD AUTO: 30 % (ref 14–44)
MCH RBC QN AUTO: 30.1 PG (ref 26.8–34.3)
MCHC RBC AUTO-ENTMCNC: 33.5 G/DL (ref 31.4–37.4)
MCV RBC AUTO: 90 FL (ref 82–98)
MONOCYTES # BLD AUTO: 0.56 THOUSAND/ÂΜL (ref 0.17–1.22)
MONOCYTES NFR BLD AUTO: 7 % (ref 4–12)
NEUTROPHILS # BLD AUTO: 4.92 THOUSANDS/ÂΜL (ref 1.85–7.62)
NEUTS SEG NFR BLD AUTO: 60 % (ref 43–75)
NITRITE UR QL STRIP: NEGATIVE
NRBC BLD AUTO-RTO: 0 /100 WBCS
PH UR STRIP.AUTO: 5 [PH]
PLATELET # BLD AUTO: 253 THOUSANDS/UL (ref 149–390)
PMV BLD AUTO: 10.9 FL (ref 8.9–12.7)
POTASSIUM SERPL-SCNC: 3.9 MMOL/L (ref 3.5–5.3)
PROT SERPL-MCNC: 7.2 G/DL (ref 6.4–8.4)
PROT UR STRIP-MCNC: NEGATIVE MG/DL
RBC # BLD AUTO: 5.19 MILLION/UL (ref 3.88–5.62)
SODIUM SERPL-SCNC: 139 MMOL/L (ref 135–147)
SP GR UR STRIP.AUTO: 1.02 (ref 1–1.03)
UROBILINOGEN UR STRIP-ACNC: <2 MG/DL
WBC # BLD AUTO: 8.09 THOUSAND/UL (ref 4.31–10.16)

## 2024-04-13 LAB
BASOPHILS # BLD AUTO: 0 X10E3/UL (ref 0–0.2)
BASOPHILS NFR BLD AUTO: 1 %
C TRACH DNA SPEC QL NAA+PROBE: NEGATIVE
CD3+CD4+ CELLS # BLD: 879 /UL (ref 359–1519)
CD3+CD4+ CELLS NFR BLD: 38.2 % (ref 30.8–58.5)
CD3+CD4+ CELLS/CD3+CD8+ CLL BLD: 1.19 % (ref 0.92–3.72)
CD3+CD8+ CELLS # BLD: 736 /UL (ref 109–897)
CD3+CD8+ CELLS NFR BLD: 32 % (ref 12–35.5)
EOSINOPHIL # BLD AUTO: 0.1 X10E3/UL (ref 0–0.4)
EOSINOPHIL NFR BLD AUTO: 1 %
ERYTHROCYTE [DISTWIDTH] IN BLOOD BY AUTOMATED COUNT: 13 % (ref 11.6–15.4)
GAMMA INTERFERON BACKGROUND BLD IA-ACNC: 0.14 IU/ML
HCT VFR BLD AUTO: 40 % (ref 37.5–51)
HGB BLD-MCNC: 13.3 G/DL (ref 13–17.7)
IMM GRANULOCYTES # BLD: 0 X10E3/UL (ref 0–0.1)
IMM GRANULOCYTES NFR BLD: 0 %
LYMPHOCYTES # BLD AUTO: 2.3 X10E3/UL (ref 0.7–3.1)
LYMPHOCYTES NFR BLD AUTO: 30 %
M TB IFN-G BLD-IMP: NEGATIVE
M TB IFN-G CD4+ BCKGRND COR BLD-ACNC: 0.04 IU/ML
M TB IFN-G CD4+ BCKGRND COR BLD-ACNC: 0.05 IU/ML
MCH RBC QN AUTO: 30 PG (ref 26.6–33)
MCHC RBC AUTO-ENTMCNC: 33.3 G/DL (ref 31.5–35.7)
MCV RBC AUTO: 90 FL (ref 79–97)
MITOGEN IGNF BCKGRD COR BLD-ACNC: 9.86 IU/ML
MONOCYTES # BLD AUTO: 0.5 X10E3/UL (ref 0.1–0.9)
MONOCYTES NFR BLD AUTO: 7 %
N GONORRHOEA DNA SPEC QL NAA+PROBE: NEGATIVE
NEUTROPHILS # BLD AUTO: 4.7 X10E3/UL (ref 1.4–7)
NEUTROPHILS NFR BLD AUTO: 61 %
PLATELET # BLD AUTO: 300 X10E3/UL (ref 150–450)
RBC # BLD AUTO: 4.43 X10E6/UL (ref 4.14–5.8)
TREPONEMA PALLIDUM IGG+IGM AB [PRESENCE] IN SERUM OR PLASMA BY IMMUNOASSAY: ABNORMAL
WBC # BLD AUTO: 7.7 X10E3/UL (ref 3.4–10.8)

## 2024-04-15 DIAGNOSIS — B20 SYMPTOMATIC HIV INFECTION (HCC): Primary | ICD-10-CM

## 2024-04-15 LAB
HIV1 RNA # PLAS NAA DL=20: 22.7 CP/ML
HIV1 RNA # PLAS NAA DL=20: DETECTED {COPIES}/ML
RPR SER QL: REACTIVE
RPR SER-TITR: ABNORMAL {TITER}

## 2024-04-16 ENCOUNTER — TELEPHONE (OUTPATIENT)
Dept: SURGERY | Facility: CLINIC | Age: 49
End: 2024-04-16

## 2024-04-16 ENCOUNTER — OFFICE VISIT (OUTPATIENT)
Dept: SURGERY | Facility: CLINIC | Age: 49
End: 2024-04-16
Payer: COMMERCIAL

## 2024-04-16 ENCOUNTER — CLINICAL SUPPORT (OUTPATIENT)
Dept: SURGERY | Facility: CLINIC | Age: 49
End: 2024-04-16
Payer: COMMERCIAL

## 2024-04-16 VITALS
HEIGHT: 68 IN | TEMPERATURE: 97.7 F | OXYGEN SATURATION: 97 % | HEART RATE: 73 BPM | BODY MASS INDEX: 28.13 KG/M2 | WEIGHT: 185.6 LBS | DIASTOLIC BLOOD PRESSURE: 90 MMHG | SYSTOLIC BLOOD PRESSURE: 145 MMHG

## 2024-04-16 DIAGNOSIS — B20 HIV INFECTION, UNSPECIFIED SYMPTOM STATUS (HCC): Primary | ICD-10-CM

## 2024-04-16 DIAGNOSIS — A52.8 LATE LATENT SYPHILIS: ICD-10-CM

## 2024-04-16 DIAGNOSIS — F43.21 GRIEF REACTION: ICD-10-CM

## 2024-04-16 DIAGNOSIS — F41.9 ANXIETY: Primary | ICD-10-CM

## 2024-04-16 DIAGNOSIS — I10 ESSENTIAL HYPERTENSION: ICD-10-CM

## 2024-04-16 DIAGNOSIS — B20 SYMPTOMATIC HIV INFECTION (HCC): ICD-10-CM

## 2024-04-16 DIAGNOSIS — A53.9 SYPHILIS: ICD-10-CM

## 2024-04-16 DIAGNOSIS — E78.5 DYSLIPIDEMIA: ICD-10-CM

## 2024-04-16 DIAGNOSIS — N52.9 ERECTILE DYSFUNCTION, UNSPECIFIED ERECTILE DYSFUNCTION TYPE: ICD-10-CM

## 2024-04-16 DIAGNOSIS — J30.2 SEASONAL ALLERGIES: ICD-10-CM

## 2024-04-16 PROBLEM — F43.20 GRIEF REACTION: Status: ACTIVE | Noted: 2024-04-16

## 2024-04-16 LAB — T PALLIDUM IGG+IGM SER QL: NEGATIVE

## 2024-04-16 PROCEDURE — 99214 OFFICE O/P EST MOD 30 MIN: CPT | Performed by: NURSE PRACTITIONER

## 2024-04-16 RX ORDER — CETIRIZINE HYDROCHLORIDE 10 MG/1
10 TABLET ORAL DAILY
Qty: 90 TABLET | Refills: 1 | Status: SHIPPED | OUTPATIENT
Start: 2024-04-16 | End: 2024-10-13

## 2024-04-16 RX ORDER — LISINOPRIL AND HYDROCHLOROTHIAZIDE 12.5; 1 MG/1; MG/1
1 TABLET ORAL DAILY
Qty: 30 TABLET | Refills: 2 | Status: SHIPPED | OUTPATIENT
Start: 2024-04-16

## 2024-04-16 RX ORDER — TADALAFIL 10 MG/1
10 TABLET ORAL DAILY PRN
Qty: 10 TABLET | Refills: 3 | Status: SHIPPED | OUTPATIENT
Start: 2024-04-16

## 2024-04-16 RX ORDER — ATORVASTATIN CALCIUM 40 MG/1
40 TABLET, FILM COATED ORAL DAILY
Qty: 30 TABLET | Refills: 2 | Status: SHIPPED | OUTPATIENT
Start: 2024-04-16

## 2024-04-16 RX ORDER — DOLUTEGRAVIR SODIUM AND LAMIVUDINE 50; 300 MG/1; MG/1
1 TABLET, FILM COATED ORAL EVERY MORNING
Qty: 30 TABLET | Refills: 3 | Status: SHIPPED | OUTPATIENT
Start: 2024-04-16

## 2024-04-16 NOTE — PROGRESS NOTES
Name: Byron Alvarez      : 1975      MRN: [id number]  Encounter Provider: CHERRI Hartman  Encounter Date: 2024   Encounter department: [facility] AT [facility]    Assessment & Plan     1. HIV infection, unspecified symptom status (HCC)  -     Dolutegravir-lamiVUDine (Dovato)  MG TABS; Take 1 tablet by mouth every morning    2. Essential hypertension  Assessment & Plan:  Blood pressure: Above goal.  BP Readings from Last 3 Encounters:   24 145/90   23 123/88   23 138/93       Elvin has been missing doses of his antihypertensive medication.  Continue current antihypertensive.  Dressed the importance of adherence with medication.    Educated on the following lifestyle modifications to lower BP and decrease cardiovascular risk factors.  limit alcohol intake, reduce salt in diet, maintain a healthy weight, engage in 30 minutes of cardiovascular exercise daily, and not smoke.       Orders:  -     lisinopril-hydrochlorothiazide (PRINZIDE,ZESTORETIC) 10-12.5 MG per tablet; Take 1 tablet by mouth daily    3. Seasonal allergies  -     cetirizine (ZyrTEC) 10 mg tablet; Take 1 tablet (10 mg total) by mouth daily    4. Dyslipidemia  -     atorvastatin (LIPITOR) 40 mg tablet; Take 1 tablet (40 mg total) by mouth daily    5. Erectile dysfunction, unspecified erectile dysfunction type  -     tadalafil (CIALIS) 10 MG tablet; Take 1 tablet (10 mg total) by mouth daily as needed for erectile dysfunction    6. Syphilis  -     Penicillin G Benzathine (BICILLIN L-A) intramuscular injection 2.4 Million Units  -     Penicillin G Benzathine (Bicillin L-A) 2,400,000 units/4 mL intramuscular injection; Inject 4 mL (2.4 Million Units total) into a muscle once a week for 3 doses  -     RPR Treatment Monitoring; Future; Expected date: 2024  -     RPR Treatment Monitoring; Future; Expected date: 10/16/2024    7. Late latent syphilis  Assessment & Plan:  History of syphilis infection in the  "past.  Recent titer now positive at 2 Dils.  Requires treatment with Bicillin x 3.  First dose given in office today.  Scheduled for the remaining 2 doses, with each dose being given weekly.  Recheck RPR in 3 months 6 months and 9 months.  Educated on the importance of using condoms to prevent STI infection.  Counseled to have patient tested and treated.  Provided with patient educational handout.      8. Symptomatic HIV infection (HCC)  Assessment & Plan:  No results found for: \"[id number]\"  CD4 ABS   Date/Time Value Ref Range Status   2024 10:14  359 - 1519 /uL Final     HIV-1 RNA by PCR, Qn   Date/Time Value Ref Range Status   2023 01:15 PM 30 copies/mL Final     Comment:     The reportable range for this assay is 20 to 10,000,000  copies HIV-1 RNA/mL.     HIV-1 RNA Viral Load Log   Date/Time Value Ref Range Status   2023 01:15 PM 1.477 [id number]/mL Final         ART: Dovato        Denies side effects. Stressed the importance of adherence.  Continue follow up with ID clinic.       Reviewed most recent labs, including Cd4 and viral load. Discussed the risks and benefits of treatment options, instructions for management, importance of treatment adherence, and reduction of risk factor.Educated on possible  medication side effects.     Counseled on routes of HIV transmission, including the risk of  infection. Emphasized that viral suppression is the best method to prevent HIV transmission.  At this time pt.denies the need for HIV testing of anyone in their life.     Total encounter time was 45 minutes. Greater then 20 minutes were spent on counseling and patient education. Pt voices understanding and agreement with treatment plan.          9. Grief reaction  Assessment & Plan:  Patient's mother  2 weeks ago.  He has been experiencing increased depression.  He has been drinking excessive amounts of alcohol, feeling fatigued, and anxious.  He does follow with [facility].  " He will be meeting with her after this visit.  Educated on the stages of grief.  Advised to continue following up with behavioral health.  Offered support and comfort.             Jessica Matthews presents to the clinic today for TCM after IP admission for SBO. PMHx significant for HIV,HTN, hyperlipidemia, and seasonal allergies.   Elvin is dealing with grief due to the loss of his mother.      Review of Systems   Constitutional:  Negative for chills and fever.   HENT:  Negative for ear pain and sore throat.    Eyes:  Negative for pain and visual disturbance.   Respiratory:  Negative for cough and shortness of breath.    Cardiovascular:  Negative for chest pain and palpitations.   Gastrointestinal:  Negative for abdominal pain and vomiting.   Genitourinary:  Negative for dysuria and hematuria.   Musculoskeletal:  Negative for arthralgias and back pain.   Skin:  Negative for color change and rash.   Neurological:  Negative for seizures and syncope.   All other systems reviewed and are negative.      Past Medical History:   Diagnosis Date    Abscess of left groin     Dental decay     Depression     Elevated BP without diagnosis of hypertension     HIV disease ([facility]) 11/25/1998    mode of transmission: MSM    Hx of herpes zoster     Hypogonadism in male     Tear of right biceps muscle 2017     No past surgical history on file.  Family History   Problem Relation Age of Onset    Hypertension Mother     Hypertension Father     Heart attack Father      Social History     Socioeconomic History    Marital status: /Civil Union     Spouse name: None    Number of children: None    Years of education: None    Highest education level: None   Occupational History    None   Tobacco Use    Smoking status: Never    Smokeless tobacco: Never   Vaping Use    Vaping status: Never Used   Substance and Sexual Activity    Alcohol use: Yes     Alcohol/week: 3.0 standard drinks of alcohol     Types: 3 Glasses of wine per week      Comment: 3 glass wine/day    Drug use: Yes     Types: Marijuana    Sexual activity: Yes     Partners: Male     Birth control/protection: None   Other Topics Concern    None   Social History Narrative    None     Social Determinants of Health     Financial Resource Strain: Not on file   Food Insecurity: Food Insecurity Present (4/16/2024)    Hunger Vital Sign     Worried About Running Out of Food in the Last Year: Sometimes true     Ran Out of Food in the Last Year: Sometimes true   Transportation Needs: Not on file   Physical Activity: Not on file   Stress: Not on file   Social Connections: Not on file   Intimate Partner Violence: Not on file   Housing Stability: Not on file     Current Outpatient Medications on File Prior to Visit   Medication Sig    [DISCONTINUED] atorvastatin (LIPITOR) 40 mg tablet TAKE 1 TABLET BY MOUTH DAILY    [DISCONTINUED] cetirizine (ZyrTEC) 10 mg tablet Take 1 tablet (10 mg total) by mouth daily    [DISCONTINUED] Dovato  MG TABS TAKE 1 TABLET BY MOUTH EVERY MORNING    [DISCONTINUED] lisinopril-hydrochlorothiazide (PRINZIDE,ZESTORETIC) 10-12.5 MG per tablet TAKE 1 TABLET BY MOUTH DAILY    [DISCONTINUED] tadalafil (CIALIS) 10 MG tablet TAKE 1 TABLET BY MOUTH DAILY AS NEEDED FOR ERECTILE DYSFUNCTION    [DISCONTINUED] acetaminophen (TYLENOL) 650 mg CR tablet Take 2 tablets (1,300 mg total) by mouth every 8 (eight) hours as needed for mild pain or headaches (Patient not taking: Reported on 4/16/2024)    [DISCONTINUED] fluticasone (FLONASE) 50 mcg/act nasal spray USE 1 SPRAY IN EACH NOSTRIL DAILY (Patient not taking: Reported on 4/16/2024)     No Known Allergies  Immunization History   Administered Date(s) Administered    COVID-19 PFIZER VACCINE 0.3 ML IM 03/21/2021, 04/11/2021, 11/27/2021    COVID-19 Pfizer vac (Doc-sucrose, gray cap) 12 yr+ IM 05/07/2022    HPV 06/06/2019, 07/11/2019, 12/12/2019    INFLUENZA 10/30/2014, 12/08/2015, 10/13/2016, 11/15/2017, 09/19/2019    Influenza,  "injectable, quadrivalent, preservative free 0.5 mL 10/17/2023    Influenza, recombinant, quadrivalent,injectable, preservative free 10/06/2020, 11/08/2021, 10/25/2022    Meningococcal MCV4P 05/26/2020, 07/29/2020    Pneumococcal Conjugate 13-Valent 08/22/2014    Pneumococcal Polysaccharide PPV23 01/13/2014, 05/26/2020    Smallpox Monkeypox Vaccine, Live Attenuated, Preservative) 09/14/2022, 10/13/2022    Tdap 03/08/2016       Objective     /90   Pulse 73   Temp 97.7 °F (36.5 °C)   Ht 5' 8\" (1.727 m)   Wt 84.2 kg (185 lb 9.6 oz)   SpO2 97%   BMI 28.22 kg/m²     Physical Exam  Vitals and nursing note reviewed.   Constitutional:       Appearance: Normal appearance.   HENT:      Head: Normocephalic.      Nose: Nose normal. No congestion or rhinorrhea.      Mouth/Throat:      Mouth: Mucous membranes are moist.      Pharynx: Oropharynx is clear.   Eyes:      Pupils: Pupils are equal, round, and reactive to light.   Cardiovascular:      Rate and Rhythm: Normal rate and regular rhythm.      Pulses: Normal pulses.      Heart sounds: Normal heart sounds.   Pulmonary:      Effort: Pulmonary effort is normal.      Breath sounds: Normal breath sounds.   Abdominal:      General: Abdomen is flat. Bowel sounds are normal.      Palpations: Abdomen is soft.   Musculoskeletal:         General: No tenderness or signs of injury. Normal range of motion.   Skin:     General: Skin is warm and dry.   Neurological:      Mental Status: He is alert and oriented to person, place, and time.       CHERRI Hartman    "

## 2024-04-16 NOTE — ASSESSMENT & PLAN NOTE
Patient's mother  2 weeks ago.  He has been experiencing increased depression.  He has been drinking excessive amounts of alcohol, feeling fatigued, and anxious.  He does follow with [place].  He will be meeting with her after this visit.  Educated on the stages of grief.  Advised to continue following up with behavioral health.  Offered support and comfort.

## 2024-04-16 NOTE — ASSESSMENT & PLAN NOTE
Blood pressure: Above goal.  BP Readings from Last 3 Encounters:   04/16/24 145/90   11/21/23 123/88   11/06/23 138/93       Elvin has been missing doses of his antihypertensive medication.  Continue current antihypertensive.  Dressed the importance of adherence with medication.    Educated on the following lifestyle modifications to lower BP and decrease cardiovascular risk factors.  limit alcohol intake, reduce salt in diet, maintain a healthy weight, engage in 30 minutes of cardiovascular exercise daily, and not smoke.

## 2024-04-16 NOTE — ASSESSMENT & PLAN NOTE
History of syphilis infection in the past.  Recent titer now positive at 2 Dils.  Requires treatment with Bicillin x 3.  First dose given in office today.  Scheduled for the remaining 2 doses, with each dose being given weekly.  Recheck RPR in 3 months 6 months and 9 months.  Educated on the importance of using condoms to prevent STI infection.  Counseled to have patient tested and treated.  Provided with patient educational handout.

## 2024-04-16 NOTE — TELEPHONE ENCOUNTER
Elvin was seen in clinic today for follow up visit. Met with Elvin and introduced myself as the Prevention Nurse and that I would be following up with him at least once a year. Elvin reports he is  but in an open relationship which he has recently begun exploring. He feels comfortable and confident in disclosing his status to potential partners. Reports that when he was getting care in [place] he felt like there was a strong focus on prevention and he is happy to hear [facility] has a prevention program. Elvin reports no specific sexual concerns today. We did discuss using condoms with any new partners.   Told him if he wanted to speak with me he could reach out to the clinic and I would call him back. Pt appreciative of outreach.

## 2024-04-16 NOTE — PATIENT INSTRUCTIONS
Syphilis   WHAT YOU NEED TO KNOW:   Syphilis is a sexually transmitted infection (STI) caused by bacteria. Syphilis is spread through direct contact with the sores of an infected person. This usually happens through sexual activity, especially unprotected sex. It can also spread through sharing needles or contact with the blood of an infected person. If you are pregnant, you can give syphilis to your baby.  DISCHARGE INSTRUCTIONS:   Call 911 for any of the following:   You have chest pain and pain on your left arm, jaw, or back.    You have shortness of breath.    Return to the emergency department if:   You have a headache and a stiff neck.    You are confused.    Contact your healthcare provider if:   You are pregnant and think you have syphilis.    You have a new rash, sore throat, or swollen joint.     Your symptoms do not go away after treatment, or they come back.    You have questions or concerns about your condition or care.    Medicines:  You may need any of the following:  Acetaminophen  decreases pain and fever. It is available without a doctor's order. Ask how much to take and how often to take it. Follow directions. Read the labels of all other medicines you are using to see if they also contain acetaminophen, or ask your doctor or pharmacist. Acetaminophen can cause liver damage if not taken correctly. Do not use more than 4 grams (4,000 milligrams) total of acetaminophen in one day.     NSAIDs , such as ibuprofen, help decrease swelling, pain, and fever. This medicine is available with or without a doctor's order. NSAIDs can cause stomach bleeding or kidney problems in certain people. If you take blood thinner medicine, always ask your healthcare provider if NSAIDs are safe for you. Always read the medicine label and follow directions.    Antibiotics  help treat the bacteria that caused your syphilis.    Take your medicine as directed.  Contact your healthcare provider if you think your medicine is  not helping or if you have side effects. Tell him of her if you are allergic to any medicine. Keep a list of the medicines, vitamins, and herbs you take. Include the amounts, and when and why you take them. Bring the list or the pill bottles to follow-up visits. Carry your medicine list with you in case of an emergency.    Treatment reaction:  You may have a reaction within the first day after treatment. Symptoms include a fever, chills, nausea, rash, and a headache. This happens as the antibiotic starts to kill the bacteria that caused your syphilis. These symptoms usually get better in 1 or 2 days. Drink plenty of liquids and rest during this time.   Prevent the spread of syphilis:   Do not have sex until treatment is complete.  This usually takes at least 2 months. Your healthcare provider will tell you when you can resume sexual activity.     Tell your sexual partners about your syphilis.  Tell everyone you have had sex with in the past 3 months. They need to be checked for infection and may need treatment.    Use condoms and barrier methods for all types of sexual contact.  Use a new condom or latex barrier each time you have sex. This includes oral, vaginal, and anal sex. Make sure that the condom fits and is put on correctly. Rubber latex sheets or dental dams can be used for oral sex. Ask your healthcare provider how to use these items and where to purchase them. If you are allergic to latex, use a nonlatex product such as polyurethane.     Get tested for HIV.  You are at increased risk of HIV if you have syphilis.    Follow up with your healthcare provider as directed:  You may need to return regularly for tests. Write down your questions so you remember to ask them during your visits.  © Copyright [employer] 2022 Information is for End User's use only and may not be sold, redistributed or otherwise used for commercial purposes. All illustrations and images included in CareNotes® are the copyrighted  property of [employer] or [employer]  The above information is an  only. It is not intended as medical advice for individual conditions or treatments. Talk to your doctor, nurse or pharmacist before following any medical regimen to see if it is safe and effective for you.

## 2024-04-16 NOTE — ASSESSMENT & PLAN NOTE
"No results found for: \"[id number]\"  CD4 ABS   Date/Time Value Ref Range Status   2024 10:14  359 - 1519 /uL Final     HIV-1 RNA by PCR, Qn   Date/Time Value Ref Range Status   2023 01:15 PM 30 copies/mL Final     Comment:     The reportable range for this assay is 20 to 10,000,000  copies HIV-1 RNA/mL.     HIV-1 RNA Viral Load Log   Date/Time Value Ref Range Status   2023 01:15 PM 1.477 [id number]/mL Final         ART: Dovato        Denies side effects. Stressed the importance of adherence.  Continue follow up with ID clinic.       Reviewed most recent labs, including Cd4 and viral load. Discussed the risks and benefits of treatment options, instructions for management, importance of treatment adherence, and reduction of risk factor.Educated on possible  medication side effects.     Counseled on routes of HIV transmission, including the risk of  infection. Emphasized that viral suppression is the best method to prevent HIV transmission.  At this time pt.denies the need for HIV testing of anyone in their life.     Total encounter time was 45 minutes. Greater then 20 minutes were spent on counseling and patient education. Pt voices understanding and agreement with treatment plan.      "

## 2024-04-17 ENCOUNTER — TELEPHONE (OUTPATIENT)
Dept: SURGERY | Facility: CLINIC | Age: 49
End: 2024-04-17

## 2024-04-17 NOTE — PROGRESS NOTES
"SUBJECTIVE: depression,anxiety    OBJECTIVE: pt arrived for session at coordinated time to address depression and anxiety.   Pt stated his mother passed away 2 weeks ago and he recently was diagnosed with an SDI.  Pt stated he has been disassociating from his mothers death in order to deal with life without her.  Pt stated he realizes this is how he manages all his negative emotions. Pt stated his father has been acting \"strange\" since his mothers death and he is not handling that situation either.  Pt stated a few months ago he decided to step outside his relationship and now has a STI. As pt was explaining this he was smiling and laughing.  Pt explained he and his  have an open marriage but he does not normally seek outside relationships.     ASSESSMENT: Pt was fully engaged throughout session able to express his thoughts and feelings appropriately.  Pt appears to cover his emotions with laughter and nervousness.  Pt has a history of dissociation in order to manage his feelings.  Pt appears to struggle to define his relationship and exactly what he would like from this relationship.      PLAN:  continue utilizing interventions to manage depression and anxiety  "

## 2024-04-17 NOTE — TELEPHONE ENCOUNTER
Juno from [facility] called and stated that bicillin will arrive tm at the clinic.

## 2024-04-23 ENCOUNTER — OFFICE VISIT (OUTPATIENT)
Dept: SURGERY | Facility: CLINIC | Age: 49
End: 2024-04-23
Payer: COMMERCIAL

## 2024-04-23 VITALS
DIASTOLIC BLOOD PRESSURE: 89 MMHG | WEIGHT: 184.6 LBS | HEART RATE: 72 BPM | TEMPERATURE: 98.1 F | SYSTOLIC BLOOD PRESSURE: 147 MMHG | BODY MASS INDEX: 27.98 KG/M2 | OXYGEN SATURATION: 94 % | HEIGHT: 68 IN

## 2024-04-23 DIAGNOSIS — E78.5 DYSLIPIDEMIA: ICD-10-CM

## 2024-04-23 DIAGNOSIS — A52.8 LATE LATENT SYPHILIS: ICD-10-CM

## 2024-04-23 DIAGNOSIS — I10 ESSENTIAL HYPERTENSION: ICD-10-CM

## 2024-04-23 DIAGNOSIS — B20 SYMPTOMATIC HIV INFECTION (HCC): Primary | ICD-10-CM

## 2024-04-23 DIAGNOSIS — A53.9 SYPHILIS: ICD-10-CM

## 2024-04-23 PROCEDURE — 96372 THER/PROPH/DIAG INJ SC/IM: CPT

## 2024-04-23 PROCEDURE — 99215 OFFICE O/P EST HI 40 MIN: CPT | Performed by: INTERNAL MEDICINE

## 2024-04-23 NOTE — ASSESSMENT & PLAN NOTE
Patient with new infection as he remains sexually active.  Treat with benzathine penicillin 2,400,000 units IM weekly x 3.  Recheck RPR 3, 6, 9, 12 months

## 2024-04-23 NOTE — ASSESSMENT & PLAN NOTE
HIV-doing well on ART with an near undetectable viral load and a CD4 count of greater than 800.  Continue ART, recheck CBC with differential, CMP, HIV RNA, and CD4 in 5 months to make sure no developing toxicities or treatment failure, follow-up in 6 months.  Stressed adherence.
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
## 2024-04-26 ENCOUNTER — PATIENT OUTREACH (OUTPATIENT)
Dept: SURGERY | Facility: CLINIC | Age: 49
End: 2024-04-26

## 2024-04-30 ENCOUNTER — CLINICAL SUPPORT (OUTPATIENT)
Dept: SURGERY | Facility: CLINIC | Age: 49
End: 2024-04-30
Payer: COMMERCIAL

## 2024-04-30 ENCOUNTER — PATIENT OUTREACH (OUTPATIENT)
Dept: SURGERY | Facility: CLINIC | Age: 49
End: 2024-04-30

## 2024-05-01 NOTE — PROGRESS NOTES
CM read casenotes from Behavioral therapist and clinic in prep for clinic meeting today. CM learned ct's mom passed away and CM sent condolences to ct.

## 2024-05-03 ENCOUNTER — PATIENT OUTREACH (OUTPATIENT)
Dept: SURGERY | Facility: CLINIC | Age: 49
End: 2024-05-03

## 2024-05-15 ENCOUNTER — PATIENT OUTREACH (OUTPATIENT)
Dept: SURGERY | Facility: CLINIC | Age: 49
End: 2024-05-15

## 2024-05-15 NOTE — PROGRESS NOTES
Ct sent text message to CM regarding if CM knew of insurance carriers offering CM support. CM said there are carriers who offer this support but ct should still be mindful of scams since the text message that ct forwarded to CM appeared to be a scam.

## 2024-05-30 ENCOUNTER — PATIENT OUTREACH (OUTPATIENT)
Dept: SURGERY | Facility: CLINIC | Age: 49
End: 2024-05-30

## 2024-06-05 ENCOUNTER — TELEPHONE (OUTPATIENT)
Dept: SURGERY | Facility: CLINIC | Age: 49
End: 2024-06-05

## 2024-06-05 DIAGNOSIS — R42 VERTIGO: Primary | ICD-10-CM

## 2024-06-05 RX ORDER — MECLIZINE HYDROCHLORIDE 25 MG/1
25 TABLET ORAL EVERY 8 HOURS PRN
Qty: 30 TABLET | Refills: 0 | Status: SHIPPED | OUTPATIENT
Start: 2024-06-05

## 2024-06-05 NOTE — TELEPHONE ENCOUNTER
Pt called and stated that he was feeling dizzy for a few days now. I spoke with the provider and she stated that she would send medication to help treat the dizziness. Pt would like meds sent to [facility] on [address]. I instructed pt to call on Friday if symptoms haven't improved. We are to schedule an appt if he isnt feeling better with this medication. Pt stated understanding.

## 2024-06-20 ENCOUNTER — APPOINTMENT (OUTPATIENT)
Dept: LAB | Facility: CLINIC | Age: 49
End: 2024-06-20
Payer: COMMERCIAL

## 2024-06-20 LAB
ALBUMIN SERPL BCP-MCNC: 4.2 G/DL (ref 3.5–5)
ALP SERPL-CCNC: 42 U/L (ref 34–104)
ALT SERPL W P-5'-P-CCNC: 16 U/L (ref 7–52)
ANION GAP SERPL CALCULATED.3IONS-SCNC: 6 MMOL/L (ref 4–13)
AST SERPL W P-5'-P-CCNC: 20 U/L (ref 13–39)
BASOPHILS # BLD AUTO: 0.04 THOUSANDS/ÂΜL (ref 0–0.1)
BASOPHILS NFR BLD AUTO: 1 % (ref 0–1)
BILIRUB SERPL-MCNC: 0.61 MG/DL (ref 0.2–1)
BUN SERPL-MCNC: 15 MG/DL (ref 5–25)
CALCIUM SERPL-MCNC: 9.1 MG/DL (ref 8.4–10.2)
CHLORIDE SERPL-SCNC: 106 MMOL/L (ref 96–108)
CO2 SERPL-SCNC: 26 MMOL/L (ref 21–32)
CREAT SERPL-MCNC: 1.05 MG/DL (ref 0.6–1.3)
EOSINOPHIL # BLD AUTO: 0.09 THOUSAND/ÂΜL (ref 0–0.61)
EOSINOPHIL NFR BLD AUTO: 1 % (ref 0–6)
ERYTHROCYTE [DISTWIDTH] IN BLOOD BY AUTOMATED COUNT: 12.7 % (ref 11.6–15.1)
GFR SERPL CREATININE-BSD FRML MDRD: 82 ML/MIN/1.73SQ M
GLUCOSE SERPL-MCNC: 81 MG/DL (ref 65–140)
HCT VFR BLD AUTO: 42.8 % (ref 36.5–49.3)
HGB BLD-MCNC: 14.5 G/DL (ref 12–17)
IMM GRANULOCYTES # BLD AUTO: 0.01 THOUSAND/UL (ref 0–0.2)
IMM GRANULOCYTES NFR BLD AUTO: 0 % (ref 0–2)
LYMPHOCYTES # BLD AUTO: 3.24 THOUSANDS/ÂΜL (ref 0.6–4.47)
LYMPHOCYTES NFR BLD AUTO: 47 % (ref 14–44)
MCH RBC QN AUTO: 30.6 PG (ref 26.8–34.3)
MCHC RBC AUTO-ENTMCNC: 33.9 G/DL (ref 31.4–37.4)
MCV RBC AUTO: 90 FL (ref 82–98)
MONOCYTES # BLD AUTO: 0.45 THOUSAND/ÂΜL (ref 0.17–1.22)
MONOCYTES NFR BLD AUTO: 7 % (ref 4–12)
NEUTROPHILS # BLD AUTO: 3.05 THOUSANDS/ÂΜL (ref 1.85–7.62)
NEUTS SEG NFR BLD AUTO: 44 % (ref 43–75)
NRBC BLD AUTO-RTO: 0 /100 WBCS
PLATELET # BLD AUTO: 249 THOUSANDS/UL (ref 149–390)
PMV BLD AUTO: 11 FL (ref 8.9–12.7)
POTASSIUM SERPL-SCNC: 4.1 MMOL/L (ref 3.5–5.3)
PROT SERPL-MCNC: 7.1 G/DL (ref 6.4–8.4)
RBC # BLD AUTO: 4.74 MILLION/UL (ref 3.88–5.62)
SODIUM SERPL-SCNC: 138 MMOL/L (ref 135–147)
WBC # BLD AUTO: 6.88 THOUSAND/UL (ref 4.31–10.16)

## 2024-06-21 LAB
BASOPHILS # BLD AUTO: 0.1 X10E3/UL (ref 0–0.2)
BASOPHILS NFR BLD AUTO: 1 %
CD3+CD4+ CELLS # BLD: 1274 /UL (ref 359–1519)
CD3+CD4+ CELLS NFR BLD: 41.1 % (ref 30.8–58.5)
CD3+CD4+ CELLS/CD3+CD8+ CLL BLD: 1.23 % (ref 0.92–3.72)
CD3+CD8+ CELLS # BLD: 1032 /UL (ref 109–897)
CD3+CD8+ CELLS NFR BLD: 33.3 % (ref 12–35.5)
EOSINOPHIL # BLD AUTO: 0.1 X10E3/UL (ref 0–0.4)
EOSINOPHIL NFR BLD AUTO: 1 %
ERYTHROCYTE [DISTWIDTH] IN BLOOD BY AUTOMATED COUNT: 13 % (ref 11.6–15.4)
HCT VFR BLD AUTO: 32.8 % (ref 37.5–51)
HGB BLD-MCNC: 10.7 G/DL (ref 13–17.7)
HIV1 RNA # PLAS NAA DL=20: NOT DETECTED {COPIES}/ML
IMM GRANULOCYTES # BLD: 0 X10E3/UL (ref 0–0.1)
IMM GRANULOCYTES NFR BLD: 0 %
LYMPHOCYTES # BLD AUTO: 3.1 X10E3/UL (ref 0.7–3.1)
LYMPHOCYTES NFR BLD AUTO: 42 %
MCH RBC QN AUTO: 30.2 PG (ref 26.6–33)
MCHC RBC AUTO-ENTMCNC: 32.6 G/DL (ref 31.5–35.7)
MCV RBC AUTO: 93 FL (ref 79–97)
MONOCYTES # BLD AUTO: 0.6 X10E3/UL (ref 0.1–0.9)
MONOCYTES NFR BLD AUTO: 8 %
NEUTROPHILS # BLD AUTO: 3.6 X10E3/UL (ref 1.4–7)
NEUTROPHILS NFR BLD AUTO: 48 %
PLATELET # BLD AUTO: 339 X10E3/UL (ref 150–450)
RBC # BLD AUTO: 3.54 X10E6/UL (ref 4.14–5.8)
WBC # BLD AUTO: 7.5 X10E3/UL (ref 3.4–10.8)

## 2024-06-25 ENCOUNTER — PATIENT OUTREACH (OUTPATIENT)
Dept: SURGERY | Facility: CLINIC | Age: 49
End: 2024-06-25

## 2024-06-26 ENCOUNTER — OFFICE VISIT (OUTPATIENT)
Dept: SURGERY | Facility: CLINIC | Age: 49
End: 2024-06-26
Payer: COMMERCIAL

## 2024-06-26 VITALS
WEIGHT: 185 LBS | HEIGHT: 68 IN | SYSTOLIC BLOOD PRESSURE: 130 MMHG | OXYGEN SATURATION: 96 % | TEMPERATURE: 97.8 F | DIASTOLIC BLOOD PRESSURE: 80 MMHG | BODY MASS INDEX: 28.04 KG/M2 | HEART RATE: 75 BPM

## 2024-06-26 DIAGNOSIS — J30.2 SEASONAL ALLERGIES: ICD-10-CM

## 2024-06-26 DIAGNOSIS — B20 HIV INFECTION, UNSPECIFIED SYMPTOM STATUS (HCC): Primary | ICD-10-CM

## 2024-06-26 DIAGNOSIS — B20 SYMPTOMATIC HIV INFECTION (HCC): ICD-10-CM

## 2024-06-26 DIAGNOSIS — E78.5 DYSLIPIDEMIA: ICD-10-CM

## 2024-06-26 DIAGNOSIS — Z00.00 ANNUAL PHYSICAL EXAM: ICD-10-CM

## 2024-06-26 DIAGNOSIS — I10 ESSENTIAL HYPERTENSION: ICD-10-CM

## 2024-06-26 DIAGNOSIS — R53.83 OTHER FATIGUE: ICD-10-CM

## 2024-06-26 DIAGNOSIS — N52.9 ERECTILE DYSFUNCTION, UNSPECIFIED ERECTILE DYSFUNCTION TYPE: ICD-10-CM

## 2024-06-26 PROCEDURE — 99396 PREV VISIT EST AGE 40-64: CPT | Performed by: NURSE PRACTITIONER

## 2024-06-26 RX ORDER — LISINOPRIL AND HYDROCHLOROTHIAZIDE 12.5; 1 MG/1; MG/1
1 TABLET ORAL DAILY
Qty: 30 TABLET | Refills: 2 | Status: SHIPPED | OUTPATIENT
Start: 2024-06-26

## 2024-06-26 RX ORDER — CETIRIZINE HYDROCHLORIDE 10 MG/1
10 TABLET ORAL DAILY
Qty: 90 TABLET | Refills: 1 | Status: SHIPPED | OUTPATIENT
Start: 2024-06-26 | End: 2024-12-23

## 2024-06-26 RX ORDER — DOLUTEGRAVIR SODIUM AND LAMIVUDINE 50; 300 MG/1; MG/1
1 TABLET, FILM COATED ORAL EVERY MORNING
Qty: 30 TABLET | Refills: 3 | Status: SHIPPED | OUTPATIENT
Start: 2024-06-26

## 2024-06-26 RX ORDER — ATORVASTATIN CALCIUM 40 MG/1
40 TABLET, FILM COATED ORAL DAILY
Qty: 30 TABLET | Refills: 2 | Status: SHIPPED | OUTPATIENT
Start: 2024-06-26

## 2024-06-26 NOTE — PROGRESS NOTES
"Adult Annual Physical  Name: Byron Alvarez      : 1975      MRN: [id number]  Encounter Provider: CHERRI Hartman  Encounter Date: 2024   Encounter department: ASC AT [facility]    Assessment & Plan   1. HIV infection, unspecified symptom status (HCC)  -     Dolutegravir-lamiVUDine (Dovato)  MG TABS; Take 1 tablet by mouth every morning  2. Essential hypertension  -     lisinopril-hydrochlorothiazide (PRINZIDE,ZESTORETIC) 10-12.5 MG per tablet; Take 1 tablet by mouth daily  3. Seasonal allergies  -     cetirizine (ZyrTEC) 10 mg tablet; Take 1 tablet (10 mg total) by mouth daily  4. Dyslipidemia  -     atorvastatin (LIPITOR) 40 mg tablet; Take 1 tablet (40 mg total) by mouth daily  5. Other fatigue  -     Testosterone, free, total; Future  -     Vitamin B12; Future  -     Vitamin D 25 hydroxy; Future  -     TSH, 3rd generation with Free T4 reflex; Future  -     PSA, total and free; Future  6. Erectile dysfunction, unspecified erectile dysfunction type  -     PSA, total and free; Future  7. Annual physical exam  8. Symptomatic HIV infection (HCC)  Assessment & Plan:  No results found for: \"TU8GJAZ\"  CD4 ABS   Date/Time Value Ref Range Status   2024 02:17 PM 1274 359 - 1519 /uL Final     HIV-1 RNA by PCR, Qn   Date/Time Value Ref Range Status   2023 01:15 PM 30 copies/mL Final     Comment:     The reportable range for this assay is 20 to 10,000,000  copies HIV-1 RNA/mL.     HIV-1 RNA Viral Load Log   Date/Time Value Ref Range Status   2023 01:15 PM 1.477 [id number]/mL Final         ART: Dovato        Denies side effects. Stressed the importance of adherence.  Continue follow up with ID clinic.       Reviewed most recent labs, including Cd4 and viral load. Discussed the risks and benefits of treatment options, instructions for management, importance of treatment adherence, and reduction of risk factor.Educated on possible  medication side effects.  Reviewed last ID " visit.  Collaborated with ID to optimize medical treatment.    Counseled on routes of HIV transmission, including the risk of  infection. Emphasized that viral suppression is the best method to prevent HIV transmission.  At this time pt.denies the need for HIV testing of anyone in their life.     Total encounter time was 45 minutes. Greater then 20 minutes were spent on counseling and patient education. Pt voices understanding and agreement with treatment plan.        Immunizations and preventive care screenings were discussed with patient today. Appropriate education was printed on patient's after visit summary.    Discussed risks and benefits of prostate cancer screening. We discussed the controversial history of PSA screening for prostate cancer in the [place] as well as the risk of over detection and over treatment of prostate cancer by way of PSA screening.  The patient understands that PSA blood testing is an imperfect way to screen for prostate cancer and that elevated PSA levels in the blood may also be caused by infection, inflammation, prostatic trauma or manipulation, urological procedures, or by benign prostatic enlargement.    The role of the digital rectal examination in prostate cancer screening was also discussed and I discussed with him that there is large interobserver variability in the findings of digital rectal examination.    Counseling:  Alcohol/drug use: discussed moderation in alcohol intake, the recommendations for healthy alcohol use, and avoidance of illicit drug use.  Injury prevention: discussed safety/seat belts, safety helmets, smoke detectors, carbon dioxide detectors, and smoking near bedding or upholstery.  Exercise: the importance of regular exercise/physical activity was discussed. Recommend exercise 3-5 times per week for at least 30 minutes.          History of Present Illness     Adult Annual Physical:  Patient presents for annual physical.     Diet and Physical  Activity:  - Diet/Nutrition: well balanced diet.  - Exercise: walking and moderate cardiovascular exercise.    General Health:  - Sleep: sleeps well and 4-6 hours of sleep on average.  - Hearing: normal hearing bilateral ears.  - Vision: no vision problems.  - Dental: regular dental visits and brushes teeth twice daily.     Health:  - History of STDs: no.   - Urinary symptoms: erectile dysfunction.     Advanced Care Planning:  - Has an advanced directive?: yes    - Has a durable medical POA?: yes    - ACP document given to patient?: no      Review of Systems   Constitutional:  Positive for fatigue. Negative for chills and fever.   HENT:  Negative for ear pain and sore throat.    Eyes:  Negative for pain and visual disturbance.   Respiratory:  Negative for cough and shortness of breath.    Cardiovascular:  Negative for chest pain and palpitations.   Gastrointestinal:  Negative for abdominal pain and vomiting.   Genitourinary:  Negative for dysuria and hematuria.   Musculoskeletal:  Negative for arthralgias and back pain.   Skin:  Negative for color change and rash.   Neurological:  Negative for seizures and syncope.   All other systems reviewed and are negative.    Medical History Reviewed by provider this encounter:  Tobacco  Allergies  Meds  Problems  Med Hx  Surg Hx  Fam Hx       Past Medical History   Past Medical History:   Diagnosis Date    Abscess of left groin     Dental decay     Depression     Elevated BP without diagnosis of hypertension     HIV disease ([facility]) 11/25/1998    mode of transmission: MSM    Hx of herpes zoster     Hypogonadism in male     Tear of right biceps muscle 2017     History reviewed. No pertinent surgical history.  Family History   Problem Relation Age of Onset    Hypertension Mother     Hypertension Father     Heart attack Father      Current Outpatient Medications on File Prior to Visit   Medication Sig Dispense Refill    [DISCONTINUED] atorvastatin (LIPITOR) 40 mg tablet  Take 1 tablet (40 mg total) by mouth daily 30 tablet 2    [DISCONTINUED] cetirizine (ZyrTEC) 10 mg tablet Take 1 tablet (10 mg total) by mouth daily 90 tablet 1    [DISCONTINUED] Dolutegravir-lamiVUDine (Dovato)  MG TABS Take 1 tablet by mouth every morning 30 tablet 3    [DISCONTINUED] lisinopril-hydrochlorothiazide (PRINZIDE,ZESTORETIC) 10-12.5 MG per tablet Take 1 tablet by mouth daily 30 tablet 2    [DISCONTINUED] meclizine (ANTIVERT) 25 mg tablet Take 1 tablet (25 mg total) by mouth every 8 (eight) hours as needed for dizziness 30 tablet 0    [DISCONTINUED] tadalafil (CIALIS) 10 MG tablet Take 1 tablet (10 mg total) by mouth daily as needed for erectile dysfunction 10 tablet 3     Current Facility-Administered Medications on File Prior to Visit   Medication Dose Route Frequency Provider Last Rate Last Admin    Penicillin G Benzathine (BICILLIN L-A) intramuscular injection 2.4 Million Units  2.4 Million Units Intramuscular Once        No Known Allergies   Current Outpatient Medications on File Prior to Visit   Medication Sig Dispense Refill    [DISCONTINUED] atorvastatin (LIPITOR) 40 mg tablet Take 1 tablet (40 mg total) by mouth daily 30 tablet 2    [DISCONTINUED] cetirizine (ZyrTEC) 10 mg tablet Take 1 tablet (10 mg total) by mouth daily 90 tablet 1    [DISCONTINUED] Dolutegravir-lamiVUDine (Dovato)  MG TABS Take 1 tablet by mouth every morning 30 tablet 3    [DISCONTINUED] lisinopril-hydrochlorothiazide (PRINZIDE,ZESTORETIC) 10-12.5 MG per tablet Take 1 tablet by mouth daily 30 tablet 2    [DISCONTINUED] meclizine (ANTIVERT) 25 mg tablet Take 1 tablet (25 mg total) by mouth every 8 (eight) hours as needed for dizziness 30 tablet 0    [DISCONTINUED] tadalafil (CIALIS) 10 MG tablet Take 1 tablet (10 mg total) by mouth daily as needed for erectile dysfunction 10 tablet 3     Current Facility-Administered Medications on File Prior to Visit   Medication Dose Route Frequency Provider Last Rate Last  "Admin    Penicillin G Benzathine (BICILLIN L-A) intramuscular injection 2.4 Million Units  2.4 Million Units Intramuscular Once           Social History     Tobacco Use    Smoking status: Never    Smokeless tobacco: Never   Vaping Use    Vaping status: Never Used   Substance and Sexual Activity    Alcohol use: Yes     Alcohol/week: 3.0 standard drinks of alcohol     Types: 3 Glasses of wine per week     Comment: 3 glass wine/day    Drug use: Yes     Types: Marijuana    Sexual activity: Yes     Partners: Male     Birth control/protection: None       Objective     /80   Pulse 75   Temp 97.8 °F (36.6 °C)   Ht 5' 8\" (1.727 m)   Wt 83.9 kg (185 lb)   SpO2 96%   BMI 28.13 kg/m²     Physical Exam  Constitutional:       General: He is not in acute distress.     Appearance: He is well-developed.   HENT:      Head: Normocephalic.      Right Ear: External ear normal.      Left Ear: External ear normal.      Nose: Nose normal.      Mouth/Throat:      Pharynx: No oropharyngeal exudate.   Eyes:      General:         Right eye: No discharge.         Left eye: No discharge.      Conjunctiva/sclera: Conjunctivae normal.      Pupils: Pupils are equal, round, and reactive to light.   Neck:      Thyroid: No thyromegaly.   Cardiovascular:      Rate and Rhythm: Normal rate and regular rhythm.      Heart sounds: Normal heart sounds. No murmur heard.  Pulmonary:      Effort: Pulmonary effort is normal.      Breath sounds: Normal breath sounds. No wheezing.   Abdominal:      General: Bowel sounds are normal.      Palpations: Abdomen is soft. There is no mass.      Tenderness: There is no abdominal tenderness.   Musculoskeletal:         General: No tenderness. Normal range of motion.      Cervical back: Normal range of motion.   Lymphadenopathy:      Cervical: No cervical adenopathy.   Skin:     General: Skin is warm and dry.      Findings: No rash.   Neurological:      Mental Status: He is alert and oriented to person, place, " and time.   Psychiatric:         Behavior: Behavior normal.       Administrative Statements   I have spent a total time of 45   minutes on 06/26/24 In caring for this patient including Diagnostic results, Risks and benefits of tx options, Instructions for management, Patient and family education, Impressions, Counseling / Coordination of care, and Documenting in the medical record.

## 2024-06-26 NOTE — ASSESSMENT & PLAN NOTE
"No results found for: \"HM9KDZN\"  CD4 ABS   Date/Time Value Ref Range Status   2024 02:17 PM 1274 359 - 1519 /uL Final     HIV-1 RNA by PCR, Qn   Date/Time Value Ref Range Status   2023 01:15 PM 30 copies/mL Final     Comment:     The reportable range for this assay is 20 to 10,000,000  copies HIV-1 RNA/mL.     HIV-1 RNA Viral Load Log   Date/Time Value Ref Range Status   2023 01:15 PM 1.477 [id number]/mL Final         ART: Dovato        Denies side effects. Stressed the importance of adherence.  Continue follow up with ID clinic.       Reviewed most recent labs, including Cd4 and viral load. Discussed the risks and benefits of treatment options, instructions for management, importance of treatment adherence, and reduction of risk factor.Educated on possible  medication side effects.  Reviewed last ID visit.  Collaborated with ID to optimize medical treatment.    Counseled on routes of HIV transmission, including the risk of  infection. Emphasized that viral suppression is the best method to prevent HIV transmission.  At this time pt.denies the need for HIV testing of anyone in their life.     Total encounter time was 45 minutes. Greater then 20 minutes were spent on counseling and patient education. Pt voices understanding and agreement with treatment plan.      "

## 2024-06-26 NOTE — ASSESSMENT & PLAN NOTE
Blood pressure: Slightly elevated.  Patient reports drinking multiple caffeinated beverages taking caffeinated supplements to help burn fat.  BP Readings from Last 3 Encounters:   06/26/24 130/80   04/23/24 147/89   04/16/24 145/90       Continue current antihypertensive.  Take blood pressure at home and keep a log.  Educated to avoid caffeinated beverages.    Educated on the following lifestyle modifications to lower BP and decrease cardiovascular risk factors.  limit alcohol intake, reduce salt in diet, maintain a healthy weight, engage in 30 minutes of cardiovascular exercise daily, and not smoke.

## 2024-06-27 ENCOUNTER — PATIENT OUTREACH (OUTPATIENT)
Dept: SURGERY | Facility: CLINIC | Age: 49
End: 2024-06-27

## 2024-07-01 ENCOUNTER — DOCUMENTATION (OUTPATIENT)
Dept: SURGERY | Facility: CLINIC | Age: 49
End: 2024-07-01

## 2024-07-01 NOTE — PROGRESS NOTES
Pt requested new mobile market coupon. Pt is up to date with annual nutrition assessment. New coupon mailed to pt.  -Ann Mckeon RDN,LDN

## 2024-07-02 ENCOUNTER — PATIENT OUTREACH (OUTPATIENT)
Dept: SURGERY | Facility: CLINIC | Age: 49
End: 2024-07-02

## 2024-07-02 NOTE — PROGRESS NOTES
Ct sent correspondence to LUIZA regarding if he would be eligible for [place] rent rebate due to his HIV status. CM reviewed eligibility criteria and explained to him that he missed the deadline to submit an application because the deadline was 6/30/24 for the 2023 rent rebate. LUIZA explained that he could attempt to do one when the 2024 application is available later this year.

## 2024-07-03 ENCOUNTER — PATIENT OUTREACH (OUTPATIENT)
Dept: SURGERY | Facility: CLINIC | Age: 49
End: 2024-07-03

## 2024-07-03 NOTE — PROGRESS NOTES
LUIZA was informed by other CM (Laxmi) that she needed ct's paystubs in order to complete his partner's renewal for services. LUIZA sent correspondence to ct regarding getting legible paystubs over to [facility].

## 2024-07-08 ENCOUNTER — PATIENT OUTREACH (OUTPATIENT)
Dept: SURGERY | Facility: CLINIC | Age: 49
End: 2024-07-08

## 2024-07-08 NOTE — PROGRESS NOTES
CM corresponded with ct regarding needing his paystubs in order to allow his partner's CM to complete verification for partner since they reside in the same household. Partner CM reports she has not received any paystubs yet. Paystubs received and sent to partner's CM.

## 2024-07-10 ENCOUNTER — TELEPHONE (OUTPATIENT)
Dept: SURGERY | Facility: CLINIC | Age: 49
End: 2024-07-10

## 2024-07-10 NOTE — TELEPHONE ENCOUNTER
"[facility] Nutrition Encounter   Consult done via phone regarding meal planning & nutrition for energy.     PMHx:  SBO, HTN, dry skin, HLD, anxiety, seasonal allergies, depression      CD4 count:  1274  Viral load:  <20  ART:   Dovato    Nutrition Diagnosis    Problem: inadequate protein/energy intake  Related to: lack of food planning, purchasing, and preparation skillsAs Evidenced By: patient interview     Weight history:   Wt Readings from Last 3 Encounters:   06/26/24 83.9 kg (185 lb)   04/23/24 83.7 kg (184 lb 9.6 oz)   04/16/24 84.2 kg (185 lb 9.6 oz)     Nutrition-related labs:    Lab Results   Component Value Date    HGBA1C 5.5 04/12/2024     Lab Results   Component Value Date    CHOLESTEROL 172 10/07/2023    CHOLESTEROL 197 11/30/2022    CHOLESTEROL 189 11/02/2021     Lab Results   Component Value Date    HDL 43 10/07/2023    HDL 59 11/30/2022    HDL 55 11/02/2021     Lab Results   Component Value Date    TRIG 157 (H) 10/07/2023    TRIG 116 11/30/2022    TRIG 154 (H) 11/02/2021     No results found for: \"NONHDLC\"      Nutrition Intervention/Recommendations    Nutrition education intervention: provided    Nutrition recommendations: avoid skipping meals, meal prep for the week     Person Educated: patient    Comprehension: excellent    Receptivity: excellent    Expected compliance: excellent    Collaboration/referral of nutrition care:   [employer] voucher       Goals:  Avoid skipping meals  Start meal prepping for the week   Eat small snacks during the day       Visit Summary  Client reported working more & is running out of energy during the day. RD provided ideas for fast healthy meals/snacks. Will follow up with email including recipes & ideas.  -Ann Mckeon RDN,LDN     "

## 2024-07-15 ENCOUNTER — DOCUMENTATION (OUTPATIENT)
Dept: SURGERY | Facility: CLINIC | Age: 49
End: 2024-07-15

## 2024-07-15 ENCOUNTER — TELEPHONE (OUTPATIENT)
Dept: SURGERY | Facility: CLINIC | Age: 49
End: 2024-07-15

## 2024-07-15 NOTE — PROGRESS NOTES
C was advised pt called earlier this morning looking to schedule a session     [facility] contacted pt and scheduled session for this week,

## 2024-07-17 ENCOUNTER — PATIENT OUTREACH (OUTPATIENT)
Dept: SURGERY | Facility: CLINIC | Age: 49
End: 2024-07-17

## 2024-07-17 ENCOUNTER — CLINICAL SUPPORT (OUTPATIENT)
Dept: SURGERY | Facility: CLINIC | Age: 49
End: 2024-07-17

## 2024-07-17 DIAGNOSIS — F32.A DEPRESSION, UNSPECIFIED DEPRESSION TYPE: Primary | ICD-10-CM

## 2024-07-17 NOTE — PROGRESS NOTES
CM spoke with [facility] regarding concerns with ct. Ct reported to [facility] that he and his partner have no money and ct is uncertain what to do to help himself. Ct reported that he feels depressed as well. CM will make a point to see him when he comes in next week to speak with [facility] again.

## 2024-07-17 NOTE — PROGRESS NOTES
SUBJECTIVE: depression and anxiety    OBJECTIVe: Pt arrived for session at coordinated time to address depression and anxiety.   Pt stated he has been feeling life would be better if he was not here.  Pt stated he has had thoughts of self harm but does not have a plan.  Pt agreed to sign a safety contract with this counselor.   Pt stated he is struggling financially and his partner is not working or willing to take responsibility for financial issues.   Pt stated he feels he has never been accepted for who he is and he is tired of playing the games of trying to be who he is not.     ASSESSMENT:  [facility] provided active listening and positive reinforcement to pt.  [facility] requested pt sign a safety contract and pt agreed.   Pt was advised to speak to his CM for options.    Pt was provided positive coping skills to utilize for depression and anxiety.   Pt did state he has thoughts of self harm but does not have a plan.     PLAN:  pt was provided contact information if he feels he will self harm.  Pt will be seen next week to address depression.

## 2024-07-22 ENCOUNTER — PATIENT OUTREACH (OUTPATIENT)
Dept: SURGERY | Facility: CLINIC | Age: 49
End: 2024-07-22

## 2024-07-22 DIAGNOSIS — B20 SYMPTOMATIC HIV INFECTION (HCC): Primary | ICD-10-CM

## 2024-07-24 ENCOUNTER — CLINICAL SUPPORT (OUTPATIENT)
Dept: SURGERY | Facility: CLINIC | Age: 49
End: 2024-07-24

## 2024-07-24 ENCOUNTER — APPOINTMENT (OUTPATIENT)
Dept: LAB | Facility: CLINIC | Age: 49
End: 2024-07-24
Payer: COMMERCIAL

## 2024-07-24 DIAGNOSIS — F41.9 ANXIETY: Primary | ICD-10-CM

## 2024-07-24 DIAGNOSIS — N52.9 ERECTILE DYSFUNCTION, UNSPECIFIED ERECTILE DYSFUNCTION TYPE: ICD-10-CM

## 2024-07-24 DIAGNOSIS — R53.83 OTHER FATIGUE: ICD-10-CM

## 2024-07-24 DIAGNOSIS — A53.9 SYPHILIS: ICD-10-CM

## 2024-07-24 LAB
25(OH)D3 SERPL-MCNC: 28 NG/ML (ref 30–100)
ALBUMIN SERPL BCG-MCNC: 4.2 G/DL (ref 3.5–5)
ALP SERPL-CCNC: 42 U/L (ref 34–104)
ALT SERPL W P-5'-P-CCNC: 14 U/L (ref 7–52)
ANION GAP SERPL CALCULATED.3IONS-SCNC: 6 MMOL/L (ref 4–13)
AST SERPL W P-5'-P-CCNC: 16 U/L (ref 13–39)
BASOPHILS # BLD AUTO: 0.03 THOUSANDS/ÂΜL (ref 0–0.1)
BASOPHILS NFR BLD AUTO: 1 % (ref 0–1)
BILIRUB SERPL-MCNC: 0.71 MG/DL (ref 0.2–1)
BUN SERPL-MCNC: 16 MG/DL (ref 5–25)
CALCIUM SERPL-MCNC: 9.3 MG/DL (ref 8.4–10.2)
CHLORIDE SERPL-SCNC: 105 MMOL/L (ref 96–108)
CO2 SERPL-SCNC: 26 MMOL/L (ref 21–32)
CREAT SERPL-MCNC: 1 MG/DL (ref 0.6–1.3)
EOSINOPHIL # BLD AUTO: 0.08 THOUSAND/ÂΜL (ref 0–0.61)
EOSINOPHIL NFR BLD AUTO: 1 % (ref 0–6)
ERYTHROCYTE [DISTWIDTH] IN BLOOD BY AUTOMATED COUNT: 12.1 % (ref 11.6–15.1)
GFR SERPL CREATININE-BSD FRML MDRD: 87 ML/MIN/1.73SQ M
GLUCOSE P FAST SERPL-MCNC: 97 MG/DL (ref 65–99)
HCT VFR BLD AUTO: 48.3 % (ref 36.5–49.3)
HGB BLD-MCNC: 16.3 G/DL (ref 12–17)
IMM GRANULOCYTES # BLD AUTO: 0.01 THOUSAND/UL (ref 0–0.2)
IMM GRANULOCYTES NFR BLD AUTO: 0 % (ref 0–2)
LYMPHOCYTES # BLD AUTO: 2.74 THOUSANDS/ÂΜL (ref 0.6–4.47)
LYMPHOCYTES NFR BLD AUTO: 44 % (ref 14–44)
MCH RBC QN AUTO: 30.2 PG (ref 26.8–34.3)
MCHC RBC AUTO-ENTMCNC: 33.7 G/DL (ref 31.4–37.4)
MCV RBC AUTO: 90 FL (ref 82–98)
MONOCYTES # BLD AUTO: 0.47 THOUSAND/ÂΜL (ref 0.17–1.22)
MONOCYTES NFR BLD AUTO: 8 % (ref 4–12)
NEUTROPHILS # BLD AUTO: 2.85 THOUSANDS/ÂΜL (ref 1.85–7.62)
NEUTS SEG NFR BLD AUTO: 46 % (ref 43–75)
NRBC BLD AUTO-RTO: 0 /100 WBCS
PLATELET # BLD AUTO: 253 THOUSANDS/UL (ref 149–390)
PMV BLD AUTO: 11 FL (ref 8.9–12.7)
POTASSIUM SERPL-SCNC: 4 MMOL/L (ref 3.5–5.3)
PROT SERPL-MCNC: 7.4 G/DL (ref 6.4–8.4)
PSA FREE MFR SERPL: 16.37 %
PSA FREE SERPL-MCNC: 0.14 NG/ML
PSA SERPL-MCNC: 0.89 NG/ML (ref 0–4)
RBC # BLD AUTO: 5.39 MILLION/UL (ref 3.88–5.62)
SODIUM SERPL-SCNC: 137 MMOL/L (ref 135–147)
TSH SERPL DL<=0.05 MIU/L-ACNC: 1.33 UIU/ML (ref 0.45–4.5)
VIT B12 SERPL-MCNC: 235 PG/ML (ref 180–914)
WBC # BLD AUTO: 6.18 THOUSAND/UL (ref 4.31–10.16)

## 2024-07-24 PROCEDURE — 86360 T CELL ABSOLUTE COUNT/RATIO: CPT

## 2024-07-24 PROCEDURE — 84403 ASSAY OF TOTAL TESTOSTERONE: CPT

## 2024-07-24 PROCEDURE — 86592 SYPHILIS TEST NON-TREP QUAL: CPT

## 2024-07-24 PROCEDURE — 84153 ASSAY OF PSA TOTAL: CPT

## 2024-07-24 PROCEDURE — 80053 COMPREHEN METABOLIC PANEL: CPT

## 2024-07-24 PROCEDURE — 82306 VITAMIN D 25 HYDROXY: CPT

## 2024-07-24 PROCEDURE — 87536 HIV-1 QUANT&REVRSE TRNSCRPJ: CPT

## 2024-07-24 PROCEDURE — 36415 COLL VENOUS BLD VENIPUNCTURE: CPT

## 2024-07-24 PROCEDURE — 82607 VITAMIN B-12: CPT

## 2024-07-24 PROCEDURE — 84402 ASSAY OF FREE TESTOSTERONE: CPT

## 2024-07-24 PROCEDURE — 84443 ASSAY THYROID STIM HORMONE: CPT

## 2024-07-24 PROCEDURE — 85025 COMPLETE CBC W/AUTO DIFF WBC: CPT

## 2024-07-24 PROCEDURE — 84154 ASSAY OF PSA FREE: CPT

## 2024-07-24 NOTE — PROGRESS NOTES
SUBJECTIVE:  anxiety and depression    OBJECTIVE:  Pt arrived for session at coordinated time to address anxiety and depression.   Pt stated he has been feeling less anxious and depressed since last session and contributes this to looking at his situation in a different light.  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    Pt shared he has been able to see where his life was so entangled with caring for his spouse he lost himself.  Pt stated he has begun to set boundaries with his spouse and has changed the way he expresses his care of spouse.  Pt stated he is attempting to stop enabling his spouses actions that are self destructive.   Pt shared he no longer has thoughts of self harm and utilizing his art to express his feelings.     ASSESSMENT: Pt appeared calm and relaxed evidenced by his body language and his speech.   Pt was able to verbalize this thoughts and feelings appropriately.   Pt has been utilizing positive coping skills to manage his anxiety and depression.  [facility] called CM to set up appt, pt will call CM to set appt.     PLAN: pt will continue to utilze positive coping skills to manage anxiety and depression.  Pt has been scheduled for session next week to address anxiety and depression.

## 2024-07-25 ENCOUNTER — PATIENT OUTREACH (OUTPATIENT)
Dept: SURGERY | Facility: CLINIC | Age: 49
End: 2024-07-25

## 2024-07-25 LAB
BASOPHILS # BLD AUTO: 0.1 X10E3/UL (ref 0–0.2)
BASOPHILS NFR BLD AUTO: 1 %
CD3+CD4+ CELLS # BLD: 969 /UL (ref 359–1519)
CD3+CD4+ CELLS NFR BLD: 35.9 % (ref 30.8–58.5)
CD3+CD4+ CELLS/CD3+CD8+ CLL BLD: 1.09 % (ref 0.92–3.72)
CD3+CD8+ CELLS # BLD: 891 /UL (ref 109–897)
CD3+CD8+ CELLS NFR BLD: 33 % (ref 12–35.5)
EOSINOPHIL # BLD AUTO: 0.1 X10E3/UL (ref 0–0.4)
EOSINOPHIL NFR BLD AUTO: 1 %
ERYTHROCYTE [DISTWIDTH] IN BLOOD BY AUTOMATED COUNT: 12.4 % (ref 11.6–15.4)
HCT VFR BLD AUTO: 44.1 % (ref 37.5–51)
HGB BLD-MCNC: 15 G/DL (ref 13–17.7)
IMM GRANULOCYTES # BLD: 0 X10E3/UL (ref 0–0.1)
IMM GRANULOCYTES NFR BLD: 0 %
LYMPHOCYTES # BLD AUTO: 2.7 X10E3/UL (ref 0.7–3.1)
LYMPHOCYTES NFR BLD AUTO: 44 %
MCH RBC QN AUTO: 30.9 PG (ref 26.6–33)
MCHC RBC AUTO-ENTMCNC: 34 G/DL (ref 31.5–35.7)
MCV RBC AUTO: 91 FL (ref 79–97)
MONOCYTES # BLD AUTO: 0.4 X10E3/UL (ref 0.1–0.9)
MONOCYTES NFR BLD AUTO: 7 %
NEUTROPHILS # BLD AUTO: 2.9 X10E3/UL (ref 1.4–7)
NEUTROPHILS NFR BLD AUTO: 47 %
PLATELET # BLD AUTO: 279 X10E3/UL (ref 150–450)
RBC # BLD AUTO: 4.86 X10E6/UL (ref 4.14–5.8)
RPR SER QL: NORMAL
RPR SER QL: NORMAL
WBC # BLD AUTO: 6.1 X10E3/UL (ref 3.4–10.8)

## 2024-07-26 LAB
HIV1 RNA # PLAS NAA DL=20: 27.3 CP/ML
HIV1 RNA # PLAS NAA DL=20: DETECTED {COPIES}/ML
TESTOST FREE SERPL-MCNC: 10.3 PG/ML (ref 7.2–24)
TESTOST SERPL-MCNC: 582 NG/DL (ref 264–916)

## 2024-07-30 ENCOUNTER — CLINICAL SUPPORT (OUTPATIENT)
Dept: SURGERY | Facility: CLINIC | Age: 49
End: 2024-07-30

## 2024-07-30 ENCOUNTER — OFFICE VISIT (OUTPATIENT)
Dept: SURGERY | Facility: CLINIC | Age: 49
End: 2024-07-30

## 2024-07-30 ENCOUNTER — PATIENT OUTREACH (OUTPATIENT)
Dept: SURGERY | Facility: CLINIC | Age: 49
End: 2024-07-30

## 2024-07-30 VITALS
HEART RATE: 67 BPM | DIASTOLIC BLOOD PRESSURE: 87 MMHG | TEMPERATURE: 98.7 F | SYSTOLIC BLOOD PRESSURE: 107 MMHG | BODY MASS INDEX: 27.28 KG/M2 | OXYGEN SATURATION: 97 % | HEIGHT: 68 IN | WEIGHT: 180 LBS

## 2024-07-30 DIAGNOSIS — F33.1 MODERATE EPISODE OF RECURRENT MAJOR DEPRESSIVE DISORDER (HCC): Primary | ICD-10-CM

## 2024-07-30 DIAGNOSIS — I95.1 ORTHOSTATIC HYPOTENSION: Primary | ICD-10-CM

## 2024-07-30 DIAGNOSIS — F41.9 ANXIETY: ICD-10-CM

## 2024-07-30 DIAGNOSIS — B20 SYMPTOMATIC HIV INFECTION (HCC): ICD-10-CM

## 2024-07-30 DIAGNOSIS — I10 ESSENTIAL HYPERTENSION: ICD-10-CM

## 2024-07-30 NOTE — ASSESSMENT & PLAN NOTE
Blood pressure: Well controlled.   BP Readings from Last 3 Encounters:   07/30/24 107/87   06/26/24 130/80   04/23/24 147/89       Continue current antihypertensive.    Educated on the following lifestyle modifications to lower BP and decrease cardiovascular risk factors.  limit alcohol intake, reduce salt in diet, maintain a healthy weight, engage in 30 minutes of cardiovascular exercise daily, and not smoke.

## 2024-07-30 NOTE — ASSESSMENT & PLAN NOTE
"No results found for: \"YB3HYVP\"  CD4 ABS   Date/Time Value Ref Range Status   2024 08:49  359 - 1519 /uL Final     HIV-1 RNA by PCR, Qn   Date/Time Value Ref Range Status   2023 01:15 PM 30 copies/mL Final     Comment:     The reportable range for this assay is 20 to 10,000,000  copies HIV-1 RNA/mL.     HIV-1 RNA Viral Load Log   Date/Time Value Ref Range Status   2023 01:15 PM 1.477 [id number]/mL Final         ART: Dovato        Denies side effects. Stressed the importance of adherence.  Continue follow up with ID clinic.       Reviewed most recent labs, including Cd4 and viral load. Discussed the risks and benefits of treatment options, instructions for management, importance of treatment adherence, and reduction of risk factor.Educated on possible  medication side effects.  Reviewed last ID visit.  Collaborated with ID to optimize medical treatment.    Counseled on routes of HIV transmission, including the risk of  infection. Emphasized that viral suppression is the best method to prevent HIV transmission.  At this time pt.denies the need for HIV testing of anyone in their life.     Total encounter time was 45 minutes. Greater then 20 minutes were spent on counseling and patient education. Pt voices understanding and agreement with treatment plan.      "

## 2024-07-30 NOTE — PATIENT INSTRUCTIONS
"Patient Education     Orthostatic hypotension   The Basics   Written by the doctors and editors at [facility]   What is orthostatic hypotension? -- Orthostatic hypotension is a drop in blood pressure that can happen to some people when they stand up. This drop in blood pressure can make you feel dizzy or lightheaded. It can even make you pass out. Another term for orthostatic hypotension is \"postural hypotension.\"  What are the symptoms of orthostatic hypotension? -- The symptoms all happen when you stand up after sitting or lying down. They can include:   Feeling lightheaded or dizzy   Blurry or dim vision   Weakness   Fainting or passing out  What can cause orthostatic hypotension? -- There are many causes of orthostatic hypotension. It can happen if:   There is not enough fluid in your blood vessels - This can happen if you lose a lot of blood or if you are dehydrated. You can get dehydrated if:   You do not drink enough fluids.   You have severe diarrhea or vomiting.   You sweat a lot (for example, during exercise).   Your heart doesn't pump out enough blood.   The nerves and hormones in your body that control the blood vessels are not working properly.   You take certain medicines that can cause orthostatic hypotension - These include those used for:   High blood pressure   Chest pain caused by heart disease (called \"angina\")   Depression  In some people, orthostatic hypotension is tied to another condition, such as diabetes, nerve damage, or Parkinson disease. But people who are otherwise healthy can have orthostatic hypotension, too. Older people are more likely than younger people to have it.  Should I see a doctor or nurse? -- Yes. If you often feel dizzy or like you might pass out when you stand up, see your doctor or nurse. If you do pass out (faint), you should let your doctor know.  Is there a test for orthostatic hypotension? -- Yes. There are a few tests that can help your doctor or nurse find out if " "orthostatic hypotension is causing your symptoms. The simplest test is to take your blood pressure and pulse while you are sitting or lying down and then again after you stand up. Other tests could include:   Blood tests to see if you have a condition called \"anemia,\" which is when the body has too few red blood cells   Blood tests to check that your blood has the right chemical balance and that your fluid levels are in the right range   Tests to make sure that your heart is pumping correctly  How is orthostatic hypotension treated? -- They will start by treating the cause of your orthostatic hypotension, if possible. For example:   If you are dehydrated, your doctor or nurse will have you drink water to replace your fluids.   If your symptoms are not caused by dehydration, your doctor or nurse will find out if it is caused by any medicines you take. If so, they might switch you to another medicine or lower your dose.  Other treatments can help with your symptoms. These include:   Changes to your diet - For example, eating more salt and drinking more water might help some people.   Lifestyle changes - These can include things like changing how you sit, learning to stand up slowly, or avoiding hot and humid weather.   Wearing compression stockings with or without an abdominal binder - These devices apply pressure to the body and can help with certain types of orthostatic hypotension. Compression stockings are worn on the legs. The ones that go to your waist are the most helpful, but they can be hard to use. Abdominal binders are worn around the belly.   Medicines to treat orthostatic hypotension directly - If the other treatment options do not help, your doctor can prescribe a medicine to help with your symptoms. You might need to try more than 1 medicine.  In some cases, your doctor or nurse might ask you to track your blood pressure at home. They will show you how you can do this.  Is there anything I can do on my " own to feel better? -- Yes. There are a few things that you can do to reduce the problems caused by orthostatic hypotension. They are listed below. But you should try these things only after talking to your doctor or nurse.   Stand up slowly and give your body time to adapt. This is especially important when you get out of bed in the morning. Start by sitting up and waiting a moment. Then, swing your legs over the side of the bed and wait some more. When you do stand up, make sure you have something to hold onto in case you start to feel dizzy.   If you have symptoms after eating, it might help to make some changes to your eating habits. For example, you can try to avoid large meals, eat meals that are low in carbohydrates, and drink water with your food. Stand up slowly when you get up from the table.   Limit activities that could make you overheat or sweat a lot. Examples include taking hot showers, running, or hiking. These things can make orthostatic hypotension worse.   Make sure that you drink enough fluids, especially in hot weather.   Use extra pillows or an adjustable bed to make the head of your bed higher. This will raise your head above your heart slightly.   Avoid drinking a lot of alcohol.  What problems should I watch for? -- Orthostatic hypotension can lead to falls and injuries. People with orthostatic hypotension might also be at a higher risk for other health problems, including heart problems.  Call your doctor or nurse for advice if you fall and hit your head or injure yourself.  Call for emergency help right away (in the [place] and [place], call 9-1-1) if you have signs of a heart attack. These might include:   Chest pain, pressure, or discomfort   Breathing trouble, sweating, upset stomach, or cold and clammy skin   Pain in your arms, back, or jaw   Pain that gets worse with activity, like walking up stairs  All topics are updated as new evidence becomes available and our peer review process is  complete.  This topic retrieved from [employer] on: Feb 26, 2024.  Topic 73934 Version 15.0  Release: 32.2.4 - C32.56  © 2024 UpToDate, Inc. and/or its affiliates. All rights reserved.  Consumer Information Use and Disclaimer   Disclaimer: This generalized information is a limited summary of diagnosis, treatment, and/or medication information. It is not meant to be comprehensive and should be used as a tool to help the user understand and/or assess potential diagnostic and treatment options. It does NOT include all information about conditions, treatments, medications, side effects, or risks that may apply to a specific patient. It is not intended to be medical advice or a substitute for the medical advice, diagnosis, or treatment of a health care provider based on the health care provider's examination and assessment of a patient's specific and unique circumstances. Patients must speak with a health care provider for complete information about their health, medical questions, and treatment options, including any risks or benefits regarding use of medications. This information does not endorse any treatments or medications as safe, effective, or approved for treating a specific patient. UpToDate, Inc. and its affiliates disclaim any warranty or liability relating to this information or the use thereof.The use of this information is governed by the Terms of Use, available at https://www.woltersSolidagexuwer.com/en/know/clinical-effectiveness-terms. 2024© UpToDate, Inc. and its affiliates and/or licensors. All rights reserved.  Copyright   © 2024 UpToDate, Inc. and/or its affiliates. All rights reserved.

## 2024-07-30 NOTE — ASSESSMENT & PLAN NOTE
Blood pressure:   BP Readings from Last 3 Encounters:   07/30/24 107/87   06/26/24 130/80   04/23/24 147/89     Checked standing sitting recumbent blood pressures.  Slight evidence of orthostatic hypotension.  Most likely due to inadequate fluid intake, working in a hot environment while doing physical activity, and rising from sitting to standing quickly.  Educated patient to increase fluids and not change positions quickly.  Provided with educational handout.  Continue to monitor for now.

## 2024-07-30 NOTE — PROGRESS NOTES
CM met with ct and discussed some life challenges he is experiencing. He mentioned that he and his partner are about 3-4 months behind of their gas bill and 2 months behind on their electric bill. CM discussed doing a LIHEAP application but ct reports those bills are in his partners name. CM will follow up with partner's CM to encourage partner to complete a LIHEAP application. Ct reports that they just paid the rent but ct is unsure if he will be able to afford rent moving forward. Ct's partner is unemployed. CM discussed completing a SNAP application and ct was in agreement. SNAP application was completed and CM is waiting on additional paystubs from ct in order to submit the application.

## 2024-07-30 NOTE — PROGRESS NOTES
"Ambulatory Visit  Name: Byron Alvarez      : 1975      MRN: [id number]  Encounter Provider: CHERRI Hartman  Encounter Date: 2024   Encounter department: [facility] AT [facility]    Assessment & Plan   1. Orthostatic hypotension  Assessment & Plan:  Blood pressure:   BP Readings from Last 3 Encounters:   24 107/87   24 130/80   24 147/89     Checked standing sitting recumbent blood pressures.  Slight evidence of orthostatic hypotension.  Most likely due to inadequate fluid intake, working in a hot environment while doing physical activity, and rising from sitting to standing quickly.  Educated patient to increase fluids and not change positions quickly.  Provided with educational handout.  Continue to monitor for now.    2. Anxiety  Assessment & Plan:  Following with [facility].  3. Symptomatic HIV infection (HCC)  Assessment & Plan:  No results found for: \"GS9NAPV\"  CD4 ABS   Date/Time Value Ref Range Status   2024 08:49  359 - 1519 /uL Final     HIV-1 RNA by PCR, Qn   Date/Time Value Ref Range Status   2023 01:15 PM 30 copies/mL Final     Comment:     The reportable range for this assay is 20 to 10,000,000  copies HIV-1 RNA/mL.     HIV-1 RNA Viral Load Log   Date/Time Value Ref Range Status   2023 01:15 PM 1.477 zcx20lwmd/mL Final         ART: Dovato        Denies side effects. Stressed the importance of adherence.  Continue follow up with ID clinic.       Reviewed most recent labs, including Cd4 and viral load. Discussed the risks and benefits of treatment options, instructions for management, importance of treatment adherence, and reduction of risk factor.Educated on possible  medication side effects.  Reviewed last ID visit.  Collaborated with ID to optimize medical treatment.    Counseled on routes of HIV transmission, including the risk of  infection. Emphasized that viral suppression is the best method to prevent HIV " transmission.  At this time pt.denies the need for HIV testing of anyone in their life.     Total encounter time was 45 minutes. Greater then 20 minutes were spent on counseling and patient education. Pt voices understanding and agreement with treatment plan.      4. Essential hypertension  Assessment & Plan:  Blood pressure: Well controlled.   BP Readings from Last 3 Encounters:   07/30/24 107/87   06/26/24 130/80   04/23/24 147/89       Continue current antihypertensive.    Educated on the following lifestyle modifications to lower BP and decrease cardiovascular risk factors.  limit alcohol intake, reduce salt in diet, maintain a healthy weight, engage in 30 minutes of cardiovascular exercise daily, and not smoke.         History of Present Illness     Byron Alvarez is a 49 y.o. male who presents to the clinic for one month f/u. He is struggling with increased     Review of Systems   Constitutional:  Negative for chills and fever.        Dizziness with change in posistion   HENT:  Negative for ear pain and sore throat.    Eyes:  Negative for pain and visual disturbance.   Respiratory:  Negative for cough and shortness of breath.    Cardiovascular:  Negative for chest pain and palpitations.   Gastrointestinal:  Negative for abdominal pain and vomiting.   Genitourinary:  Negative for dysuria and hematuria.   Musculoskeletal:  Negative for arthralgias and back pain.   Skin:  Negative for color change and rash.   Neurological:  Negative for seizures and syncope.   All other systems reviewed and are negative.    Medical History Reviewed by provider this encounter:  Tobacco  Allergies  Meds  Problems  Med Hx  Surg Hx  Fam Hx       Past Medical History   Past Medical History:   Diagnosis Date    Abscess of left groin     Dental decay     Depression     Elevated BP without diagnosis of hypertension     HIV disease ([facility]) 11/25/1998    mode of transmission: MSM    Hx of herpes zoster     Hypogonadism in male      Tear of right biceps muscle 2017     History reviewed. No pertinent surgical history.  Family History   Problem Relation Age of Onset    Hypertension Mother     Hypertension Father     Heart attack Father      Current Outpatient Medications on File Prior to Visit   Medication Sig Dispense Refill    atorvastatin (LIPITOR) 40 mg tablet Take 1 tablet (40 mg total) by mouth daily 30 tablet 2    cetirizine (ZyrTEC) 10 mg tablet Take 1 tablet (10 mg total) by mouth daily 90 tablet 1    Dolutegravir-lamiVUDine (Dovato)  MG TABS Take 1 tablet by mouth every morning 30 tablet 3    lisinopril-hydrochlorothiazide (PRINZIDE,ZESTORETIC) 10-12.5 MG per tablet Take 1 tablet by mouth daily 30 tablet 2     Current Facility-Administered Medications on File Prior to Visit   Medication Dose Route Frequency Provider Last Rate Last Admin    Penicillin G Benzathine (BICILLIN L-A) intramuscular injection 2.4 Million Units  2.4 Million Units Intramuscular Once        No Known Allergies   Current Outpatient Medications on File Prior to Visit   Medication Sig Dispense Refill    atorvastatin (LIPITOR) 40 mg tablet Take 1 tablet (40 mg total) by mouth daily 30 tablet 2    cetirizine (ZyrTEC) 10 mg tablet Take 1 tablet (10 mg total) by mouth daily 90 tablet 1    Dolutegravir-lamiVUDine (Dovato)  MG TABS Take 1 tablet by mouth every morning 30 tablet 3    lisinopril-hydrochlorothiazide (PRINZIDE,ZESTORETIC) 10-12.5 MG per tablet Take 1 tablet by mouth daily 30 tablet 2     Current Facility-Administered Medications on File Prior to Visit   Medication Dose Route Frequency Provider Last Rate Last Admin    Penicillin G Benzathine (BICILLIN L-A) intramuscular injection 2.4 Million Units  2.4 Million Units Intramuscular Once           Social History     Tobacco Use    Smoking status: Never    Smokeless tobacco: Never   Vaping Use    Vaping status: Never Used   Substance and Sexual Activity    Alcohol use: Yes     Alcohol/week: 3.0  "standard drinks of alcohol     Types: 3 Glasses of wine per week     Comment: 3 glass wine/day    Drug use: Not Currently     Types: Marijuana    Sexual activity: Not Currently     Partners: Male     Birth control/protection: None     Objective     /87 (Patient Position: Standing)   Pulse 67   Temp 98.7 °F (37.1 °C)   Ht 5' 8\" (1.727 m)   Wt 81.6 kg (180 lb)   SpO2 97%   BMI 27.37 kg/m²     Physical Exam  Constitutional:       General: He is not in acute distress.     Appearance: He is well-developed.   HENT:      Head: Normocephalic.      Right Ear: External ear normal.      Left Ear: External ear normal.      Nose: Nose normal.      Mouth/Throat:      Pharynx: No oropharyngeal exudate.   Eyes:      General:         Right eye: No discharge.         Left eye: No discharge.      Conjunctiva/sclera: Conjunctivae normal.      Pupils: Pupils are equal, round, and reactive to light.   Neck:      Thyroid: No thyromegaly.   Cardiovascular:      Rate and Rhythm: Normal rate and regular rhythm.      Heart sounds: Normal heart sounds. No murmur heard.  Pulmonary:      Effort: Pulmonary effort is normal.      Breath sounds: Normal breath sounds. No wheezing.   Abdominal:      General: Bowel sounds are normal.      Palpations: Abdomen is soft. There is no mass.      Tenderness: There is no abdominal tenderness.   Musculoskeletal:         General: No tenderness. Normal range of motion.      Cervical back: Normal range of motion.   Lymphadenopathy:      Cervical: No cervical adenopathy.   Skin:     General: Skin is warm and dry.      Findings: No rash.   Neurological:      Mental Status: He is alert and oriented to person, place, and time.   Psychiatric:         Behavior: Behavior normal.       Administrative Statements     "

## 2024-07-30 NOTE — PROGRESS NOTES
SUBJECTIVE:  Anxiety and depression    OBJECTIVE:  Pt arrived for scheduled session to address anxiety and depression.   Pt stated he has been working on his self esteem by engaging in activities he enjoys and reconnecting with friends.  Pt shared he has been doing things without his spouse, which is something he struggled with in the past.  Pt stated if his spouse would not want to participate in an activity he would not proceed with the plans.   Pt shared he struggles with others opinions of himself including others opinions of his art.  Pt shared his past experiences as a young man with his families opinion of his art, that were not positive.     ASSESSMENT:  Pt was activity involved in session, able to voice his opinions and ideas.    Pt appears to struggle with self esteem issues since childhood. Pt appears to need outside validation in order to feel worthy of others affections.   Counselor encouraged pt to view his art as an expression of self and not to look for others validation.     PLAN:  pt will continue to utilze positive coping skills to increase his self esteem.

## 2024-08-02 ENCOUNTER — PATIENT OUTREACH (OUTPATIENT)
Dept: SURGERY | Facility: CLINIC | Age: 49
End: 2024-08-02

## 2024-08-02 NOTE — PROGRESS NOTES
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

## 2024-08-09 ENCOUNTER — PATIENT OUTREACH (OUTPATIENT)
Dept: SURGERY | Facility: CLINIC | Age: 49
End: 2024-08-09

## 2024-08-12 ENCOUNTER — PATIENT OUTREACH (OUTPATIENT)
Dept: SURGERY | Facility: CLINIC | Age: 49
End: 2024-08-12

## 2024-08-12 NOTE — PROGRESS NOTES
Sent Luis Jeff to [employer] manager for [employer] auth request. Manager will review once back from being out of office.

## 2024-08-12 NOTE — PROGRESS NOTES
CM received paystubs from ct and sent SNAP application to [employer]. CM and ct corresponded regarding any update on the utilities concern being shut off. CM and ct's partner's CM discussed the concern since the utilities are in partner's name. Partner's CM to offer support if partner desires. This CM suggested to ct to also work with partner to complete the application for [employer] once the [employer] funding re-opens for November 2024.

## 2024-08-13 ENCOUNTER — PATIENT OUTREACH (OUTPATIENT)
Dept: SURGERY | Facility: CLINIC | Age: 49
End: 2024-08-13

## 2024-08-13 NOTE — PROGRESS NOTES
Ct sent correspondence to CM asking if CM received the dental auth and CM did. CM informed ct that it was received and the auth request was sent to [facility] for review and providing SLRW #.

## 2024-08-20 ENCOUNTER — PATIENT OUTREACH (OUTPATIENT)
Dept: SURGERY | Facility: CLINIC | Age: 49
End: 2024-08-20

## 2024-08-20 NOTE — PROGRESS NOTES
CM spoke with ct partner and partner's CM regarding SNAP application needing additional supporting documents. CM spoke with ct encouraging him to go to the [employer] in person with the requested documents since the deadline for the additional documents is 8/22/24. Ct said he would go this Thursday.

## 2024-08-22 ENCOUNTER — PATIENT OUTREACH (OUTPATIENT)
Dept: SURGERY | Facility: CLINIC | Age: 49
End: 2024-08-22

## 2024-08-27 ENCOUNTER — CLINICAL SUPPORT (OUTPATIENT)
Dept: SURGERY | Facility: CLINIC | Age: 49
End: 2024-08-27

## 2024-08-27 ENCOUNTER — PATIENT OUTREACH (OUTPATIENT)
Dept: SURGERY | Facility: CLINIC | Age: 49
End: 2024-08-27

## 2024-08-27 ENCOUNTER — TELEPHONE (OUTPATIENT)
Dept: SURGERY | Facility: CLINIC | Age: 49
End: 2024-08-27

## 2024-08-27 DIAGNOSIS — F41.9 ANXIETY: Primary | ICD-10-CM

## 2024-08-27 NOTE — PROGRESS NOTES
SUBJECTIVE:  depression and anxiety    OBJECTIVE: Pt arrived for session to address depression and anxiety.  Pt stated he has been experiencing more depression and anxiety recently due to financial difficulties.  Pt shared his  has not been contributing to the financial aspect of the relationship and pt feels used and unappreciated in this relationship.  Pt stated his husbands family had be assisting with financial assistance but has recently stated they will no longer help.  Pt. Stated he has requested to work full time at his job in order to increase finances.  Pt shared his frustration with the fact that his  does not take any responsibility in the relationship and expects pt to take care of him.  Pt shared he struggles with setting boundaries and feels he is fearful of becoming a dictator.  Pt shared he watched his father dictate to his mother and he does not want to become like his father.      ASSESSMENT:  Pt appears to struggle with being assertive and letting others know his thoughts and feelings.  He struggles to set boundaries in relationships and allows others to dictate to him.  Pt stated he has found himself being more angry with his partner but has not shared his feelings. [facility] educated pt on how not setting boundaries can be self destructive.   [facility] attempted to assist pt in how to set boundaries with others to avoid self harm.  Pt was provided with interventions to increase self esteem and self confidence.     PLAN: Pt will meet with this [facility] every other week to increase knowledge of interventions to learn to build self esteem and how to set boundaries.

## 2024-08-27 NOTE — TELEPHONE ENCOUNTER
I called pt and left a message telling him that the 9/3 @ 11 time slot was unavailable, and that he is now on the schedule for 9/17 @ 11. I told pt to call the clinic if that day and time does not work out for him.

## 2024-08-28 ENCOUNTER — PATIENT OUTREACH (OUTPATIENT)
Dept: SURGERY | Facility: CLINIC | Age: 49
End: 2024-08-28

## 2024-08-30 ENCOUNTER — PATIENT OUTREACH (OUTPATIENT)
Dept: SURGERY | Facility: CLINIC | Age: 49
End: 2024-08-30

## 2024-09-05 ENCOUNTER — PATIENT OUTREACH (OUTPATIENT)
Dept: SURGERY | Facility: CLINIC | Age: 49
End: 2024-09-05

## 2024-09-05 NOTE — PROGRESS NOTES
CM sent correspondence to ct regarding 6 month follow up. Originally, ct and CM agreed to do the follow up on 9/10 but when CM checked the clinic schedule, ct is coming in on 9/9. CM sent correspondence to ct asking to do 6 month on 9/9. CM waiting on response from ct.

## 2024-09-09 ENCOUNTER — CLINICAL SUPPORT (OUTPATIENT)
Dept: SURGERY | Facility: CLINIC | Age: 49
End: 2024-09-09

## 2024-09-09 ENCOUNTER — PATIENT OUTREACH (OUTPATIENT)
Dept: SURGERY | Facility: CLINIC | Age: 49
End: 2024-09-09

## 2024-09-09 DIAGNOSIS — F33.1 MODERATE EPISODE OF RECURRENT MAJOR DEPRESSIVE DISORDER (HCC): Primary | ICD-10-CM

## 2024-09-10 ENCOUNTER — PATIENT OUTREACH (OUTPATIENT)
Dept: SURGERY | Facility: CLINIC | Age: 49
End: 2024-09-10

## 2024-09-10 NOTE — PROGRESS NOTES
CM met with ct for 6 month follow up. Acuity Score is 19, level 2. Ct discussed with CM that he was removed from his MA due to income being too high. He was awarded SNAP benefits. Ct decided to appeal the decision and CM assisted him in completing the appeal form. CM and ct also completed an [employer] application along with a [employer]WD application while awaiting the response for his fair hearing. Ct informed CM that he was hired full time with [employer] and will begin that full time position mid September. He did express continued frustration with his current partner and partner's parents. He feels that he has to do everything and his partner does nothing. Ct does speak with [facility] weekly regarding these concerns. Ct is knowledgeable of HIV transmission and medical adherence. As well, he has reliable transportation and is still living with his partner in their apartment. Ct did report to CM that he does have a dental appointment coming up as well. There are no current difficulties with substance use, he reports he is not sexually active with his partner or anyone else. He reports he takes his HIV med daily and has not missed a dose.

## 2024-09-10 NOTE — PROGRESS NOTES
CM reviewed and submitted the MAWD and SPBP applications. Ct came to get the fair hearing for MA form that he signed so he could personally deliver it to the [employer] rather than it being mailed. Ct was discussed during [facility] meeting and CM informed clinic team of his insurance situation and how CM along with ct are working to correct the issue. See media.

## 2024-09-12 ENCOUNTER — PATIENT OUTREACH (OUTPATIENT)
Dept: SURGERY | Facility: CLINIC | Age: 49
End: 2024-09-12

## 2024-09-12 NOTE — PROGRESS NOTES
CM scanned dental treatment plan to media. CM also spoke with clinic staff regarding ct's request to cancel his 9/17/24 appt with the clinic. CM and clinic staff discussed his insurance becoming inactive and CM confirmed that MAWD and SPBP applications were submitted. Clinic staff and CM agreed that CM would correspond with ct and encourage ct to keep his appt and explain his sliding fee scale process to him. CM sent correspondence to ct regarding sliding fee scale and encouraging him to keep his appt on 9/17/24.

## 2024-09-16 ENCOUNTER — PATIENT OUTREACH (OUTPATIENT)
Dept: SURGERY | Facility: CLINIC | Age: 49
End: 2024-09-16

## 2024-09-16 NOTE — PROGRESS NOTES
CM called [employer] and they said they need an additional paystub from both jobs. CM sent correspondence to ct with this update of what is needed. CM received the paystubs and sent them to [employer]. CM informed ct the paystubs were sent.

## 2024-09-17 ENCOUNTER — PATIENT OUTREACH (OUTPATIENT)
Dept: SURGERY | Facility: CLINIC | Age: 49
End: 2024-09-17

## 2024-09-17 NOTE — PROGRESS NOTES
Ct BP is approved from 9/16/24-10/31/25. ID# is [id number]. In basket sent to clinic staff and CM informed ct of this information as well. CM asked ct to let CM know when he gets any information regarding his fair hearing appeal and/or his MAWD application status.

## 2024-09-18 ENCOUNTER — PATIENT OUTREACH (OUTPATIENT)
Dept: SURGERY | Facility: CLINIC | Age: 49
End: 2024-09-18

## 2024-09-20 DIAGNOSIS — E78.5 DYSLIPIDEMIA: ICD-10-CM

## 2024-09-20 DIAGNOSIS — I10 ESSENTIAL HYPERTENSION: ICD-10-CM

## 2024-09-20 RX ORDER — LISINOPRIL/HYDROCHLOROTHIAZIDE 10-12.5 MG
1 TABLET ORAL DAILY
Qty: 30 TABLET | Refills: 2 | Status: SHIPPED | OUTPATIENT
Start: 2024-09-20

## 2024-09-20 RX ORDER — ATORVASTATIN CALCIUM 40 MG/1
40 TABLET, FILM COATED ORAL DAILY
Qty: 30 TABLET | Refills: 2 | Status: SHIPPED | OUTPATIENT
Start: 2024-09-20

## 2024-09-26 ENCOUNTER — PATIENT OUTREACH (OUTPATIENT)
Dept: SURGERY | Facility: CLINIC | Age: 49
End: 2024-09-26

## 2024-10-03 ENCOUNTER — TELEPHONE (OUTPATIENT)
Dept: SURGERY | Facility: CLINIC | Age: 49
End: 2024-10-03

## 2024-10-03 ENCOUNTER — PATIENT OUTREACH (OUTPATIENT)
Dept: SURGERY | Facility: CLINIC | Age: 49
End: 2024-10-03

## 2024-10-03 NOTE — PROGRESS NOTES
Mailed out vaccine reminder letter to ct. CM spoke with clinic staff regarding ct asking what insurance he should choose through his employer that [employer] accepts. CM advised clinic staff to tell him he needs to speak with his employer's  to make the best and most informed decision.

## 2024-10-03 NOTE — TELEPHONE ENCOUNTER
Pt called with questions about which insurance to pick now that he is going full time with his employer. As per his CM I directed him to contact his  at work.

## 2024-10-11 ENCOUNTER — OFFICE VISIT (OUTPATIENT)
Dept: SURGERY | Facility: CLINIC | Age: 49
End: 2024-10-11
Payer: COMMERCIAL

## 2024-10-11 ENCOUNTER — PATIENT OUTREACH (OUTPATIENT)
Dept: SURGERY | Facility: CLINIC | Age: 49
End: 2024-10-11

## 2024-10-11 VITALS
HEIGHT: 68 IN | BODY MASS INDEX: 27.13 KG/M2 | SYSTOLIC BLOOD PRESSURE: 134 MMHG | TEMPERATURE: 97.9 F | HEART RATE: 90 BPM | DIASTOLIC BLOOD PRESSURE: 95 MMHG | WEIGHT: 179 LBS

## 2024-10-11 DIAGNOSIS — F41.9 ANXIETY: ICD-10-CM

## 2024-10-11 DIAGNOSIS — Z21 HIV INFECTION, UNSPECIFIED SYMPTOM STATUS (HCC): ICD-10-CM

## 2024-10-11 DIAGNOSIS — F33.1 MODERATE EPISODE OF RECURRENT MAJOR DEPRESSIVE DISORDER (HCC): ICD-10-CM

## 2024-10-11 DIAGNOSIS — Z79.899 OTHER LONG TERM (CURRENT) DRUG THERAPY: Primary | ICD-10-CM

## 2024-10-11 DIAGNOSIS — Z13.220 ENCOUNTER FOR LIPID SCREENING FOR CARDIOVASCULAR DISEASE: ICD-10-CM

## 2024-10-11 DIAGNOSIS — Z13.6 ENCOUNTER FOR LIPID SCREENING FOR CARDIOVASCULAR DISEASE: ICD-10-CM

## 2024-10-11 DIAGNOSIS — I10 ESSENTIAL HYPERTENSION: ICD-10-CM

## 2024-10-11 DIAGNOSIS — D72.89 OTHER SPECIFIED DISORDERS OF WHITE BLOOD CELLS: ICD-10-CM

## 2024-10-11 DIAGNOSIS — B20 SYMPTOMATIC HIV INFECTION (HCC): ICD-10-CM

## 2024-10-11 DIAGNOSIS — Z23 NEED FOR COVID-19 VACCINE: Primary | ICD-10-CM

## 2024-10-11 DIAGNOSIS — J30.2 SEASONAL ALLERGIES: ICD-10-CM

## 2024-10-11 DIAGNOSIS — E78.5 DYSLIPIDEMIA: ICD-10-CM

## 2024-10-11 DIAGNOSIS — Z20.2 CONTACT WITH AND (SUSPECTED) EXPOSURE TO INFECTIONS WITH A PREDOMINANTLY SEXUAL MODE OF TRANSMISSION: ICD-10-CM

## 2024-10-11 DIAGNOSIS — Z13.1 SCREENING FOR DIABETES MELLITUS: ICD-10-CM

## 2024-10-11 DIAGNOSIS — Z11.3 ENCOUNTER FOR SCREENING FOR BACTERIAL SEXUALLY TRANSMITTED DISEASE: ICD-10-CM

## 2024-10-11 DIAGNOSIS — B20 HUMAN IMMUNODEFICIENCY VIRUS (HIV) DISEASE (HCC): ICD-10-CM

## 2024-10-11 DIAGNOSIS — Z72.89 OTHER PROBLEMS RELATED TO LIFESTYLE: ICD-10-CM

## 2024-10-11 DIAGNOSIS — Z11.1 SCREENING-PULMONARY TB: ICD-10-CM

## 2024-10-11 DIAGNOSIS — F51.01 PRIMARY INSOMNIA: ICD-10-CM

## 2024-10-11 PROCEDURE — 99214 OFFICE O/P EST MOD 30 MIN: CPT | Performed by: NURSE PRACTITIONER

## 2024-10-11 PROCEDURE — 90480 ADMN SARSCOV2 VAC 1/ONLY CMP: CPT

## 2024-10-11 PROCEDURE — 91320 SARSCV2 VAC 30MCG TRS-SUC IM: CPT

## 2024-10-11 RX ORDER — LISINOPRIL/HYDROCHLOROTHIAZIDE 10-12.5 MG
1 TABLET ORAL DAILY
Qty: 30 TABLET | Refills: 2 | Status: SHIPPED | OUTPATIENT
Start: 2024-10-11

## 2024-10-11 RX ORDER — FLUOXETINE 10 MG/1
10 CAPSULE ORAL DAILY
Qty: 30 CAPSULE | Refills: 1 | Status: SHIPPED | OUTPATIENT
Start: 2024-10-11

## 2024-10-11 RX ORDER — ATORVASTATIN CALCIUM 40 MG/1
40 TABLET, FILM COATED ORAL DAILY
Qty: 30 TABLET | Refills: 2 | Status: SHIPPED | OUTPATIENT
Start: 2024-10-11

## 2024-10-11 RX ORDER — DOLUTEGRAVIR SODIUM AND LAMIVUDINE 50; 300 MG/1; MG/1
1 TABLET, FILM COATED ORAL EVERY MORNING
Qty: 30 TABLET | Refills: 3 | Status: SHIPPED | OUTPATIENT
Start: 2024-10-11 | End: 2024-10-18 | Stop reason: SDUPTHER

## 2024-10-11 RX ORDER — CETIRIZINE HYDROCHLORIDE 10 MG/1
10 TABLET ORAL DAILY
Qty: 90 TABLET | Refills: 1 | Status: SHIPPED | OUTPATIENT
Start: 2024-10-11 | End: 2025-04-09

## 2024-10-11 NOTE — PROGRESS NOTES
"Ambulatory Visit  Name: Byron Alvarez      : 1975      MRN: [id number]  Encounter Provider: CHERRI Hartman  Encounter Date: 10/11/2024   Encounter department: [facility]    Assessment & Plan  Symptomatic HIV infection (HCC)  No results found for: \"[id number]\"  CD4 ABS   Date/Time Value Ref Range Status   2024 08:49  359 - 1519 /uL Final     HIV-1 RNA by PCR, Qn   Date/Time Value Ref Range Status   2023 01:15 PM 30 copies/mL Final     Comment:     The reportable range for this assay is 20 to 10,000,000  copies HIV-1 RNA/mL.     HIV-1 RNA Viral Load Log   Date/Time Value Ref Range Status   2023 01:15 PM 1.477 [id number]/mL Final         ART: Dovato        Denies side effects. Stressed the importance of adherence.  Continue follow up with ID clinic.       Reviewed most recent labs, including Cd4 and viral load. Discussed the risks and benefits of treatment options, instructions for management, importance of treatment adherence, and reduction of risk factor.Educated on possible  medication side effects.  Reviewed last ID visit.  Collaborated with ID to optimize medical treatment.    Counseled on routes of HIV transmission, including the risk of  infection. Emphasized that viral suppression is the best method to prevent HIV transmission.  At this time pt.denies the need for HIV testing of anyone in their life.     Total encounter time was 45 minutes. Greater then 20 minutes were spent on counseling and patient education. Pt voices understanding and agreement with treatment plan.             Need for COVID-19 vaccine    Orders:    COVID-19 Pfizer mRNA vaccine 12 yr and older (Comirnaty pre-filled syringe)    Anxiety  Continues to feel anxiety, symptoms are worse at night.  Following with [facility].  Reinforced use of CBT to control symptoms of anxiety.  Previously on fluoxetine with good results.  Restart medication today. Educated  on " potential side effects.    Orders:    FLUoxetine (PROzac) 10 mg capsule; Take 1 capsule (10 mg total) by mouth daily    Moderate episode of recurrent major depressive disorder (HCC)      Assessment & Plan     Experiencing the following symptoms of depression most of the day nearly every day for more than two consecutive weeks: change in sleep, depressed mood, loss of energy, loss of interests/pleasure, thoughts of worthlessness or guilt    Depressive Disorder:Moderate Depression    Suicide Risk Assessment: Patient assessed for risk of suicide.    Start fluoxetine 10 mg.  Patient previously on medication and had good resolution of symptoms.  Reevaluate in 30 days.  Adjust medication to achieve therapeutic effect.      Reviewed concept of depression as biochemical imbalance of neurotransmitters and rationale for treatment. Instructed patient to contact office or on-call physician promptly should condition worsen or any new symptoms appear and provided on-call telephone numbers.    Orders:    FLUoxetine (PROzac) 10 mg capsule; Take 1 capsule (10 mg total) by mouth daily    Primary insomnia  ssessment & Plan     Insomnia associated with situational anxiety.    Discussed sleep hygiene measures including regular sleep schedule, optimal sleep environment, and relaxing presleep rituals.  Avoid daytime naps.  Avoid caffeine after noon.  Avoid excess alcohol.  Avoid tobacco.  Recommended daily exercise.  Advised short-term use of over-the-counter doxylamine.  Educated on potential side effects.         Essential hypertension  Hypertension Assessment  See encounter diagnosis  Discussion: stage 1  Cardiovascular risk factors: hypertension and male gender    Hypertension Plan  Continue current treatment regimen.  Continue current medications.  Reduce stress: contiune f/u with [facility] .  Follow up in 3 months.  Patient Education: Reviewed risks of hypertension and principles of   treatment.  Counseling time: not applicable.Blood  pressure:   BP Readings from Last 3 Encounters:   10/11/24 134/95   07/30/24 107/87   06/26/24 130/80       Continue current antihypertensive.  Hesitant to change medications due to previous experiences with hypotension.  Stressed the importance of lifestyle modification    Educated on the following lifestyle modifications to lower BP and decrease cardiovascular risk factors.  limit alcohol intake, reduce salt in diet, maintain a healthy weight, engage in 30 minutes of cardiovascular exercise daily, and not smoke.       Orders:    lisinopril-hydrochlorothiazide (PRINZIDE,ZESTORETIC) 10-12.5 MG per tablet; Take 1 tablet by mouth daily    HIV infection, unspecified symptom status (HCC)    Orders:    Dolutegravir-lamiVUDine (Dovato)  MG TABS; Take 1 tablet by mouth every morning    Dyslipidemia    Orders:    atorvastatin (LIPITOR) 40 mg tablet; Take 1 tablet (40 mg total) by mouth daily    Seasonal allergies    Orders:    cetirizine (ZyrTEC) 10 mg tablet; Take 1 tablet (10 mg total) by mouth daily      History of Present Illness     Byron Alvarez is a 49 y.o. male who presents for primary care follow-up.  He was previously seen and expressed feelings of depression and anxiety.  He continues to feel depressed anxious and is now experiencing insomnia.  Following with behavioral health.    History obtained from : patient  Review of Systems   Constitutional:  Negative for chills and fever.   HENT:  Negative for ear pain and sore throat.    Eyes:  Negative for pain and visual disturbance.   Respiratory:  Negative for cough and shortness of breath.    Cardiovascular:  Negative for chest pain and palpitations.   Gastrointestinal:  Negative for abdominal pain and vomiting.   Genitourinary:  Negative for dysuria and hematuria.   Musculoskeletal:  Negative for arthralgias and back pain.   Skin:  Negative for color change and rash.   Neurological:  Negative for seizures and syncope.   All other systems reviewed and  are negative.    Medical History Reviewed by provider this encounter:  Tobacco  Allergies  Meds  Problems  Med Hx  Surg Hx  Fam Hx       Past Medical History   Past Medical History:   Diagnosis Date    Abscess of left groin     Dental decay     Depression     Elevated BP without diagnosis of hypertension     HIV disease ([facility]) 11/25/1998    mode of transmission: MSM    Hx of herpes zoster     Hypogonadism in male     Tear of right biceps muscle 2017     History reviewed. No pertinent surgical history.  Family History   Problem Relation Age of Onset    Hypertension Mother     Hypertension Father     Heart attack Father      Current Outpatient Medications on File Prior to Visit   Medication Sig Dispense Refill    atorvastatin (LIPITOR) 40 mg tablet TAKE 1 TABLET BY MOUTH DAILY 30 tablet 2    cetirizine (ZyrTEC) 10 mg tablet Take 1 tablet (10 mg total) by mouth daily 90 tablet 1    Dolutegravir-lamiVUDine (Dovato)  MG TABS Take 1 tablet by mouth every morning 30 tablet 3    lisinopril-hydrochlorothiazide (PRINZIDE,ZESTORETIC) 10-12.5 MG per tablet TAKE 1 TABLET BY MOUTH DAILY 30 tablet 2     Current Facility-Administered Medications on File Prior to Visit   Medication Dose Route Frequency Provider Last Rate Last Admin    Penicillin G Benzathine (BICILLIN L-A) intramuscular injection 2.4 Million Units  2.4 Million Units Intramuscular Once        No Known Allergies   Current Outpatient Medications on File Prior to Visit   Medication Sig Dispense Refill    atorvastatin (LIPITOR) 40 mg tablet TAKE 1 TABLET BY MOUTH DAILY 30 tablet 2    cetirizine (ZyrTEC) 10 mg tablet Take 1 tablet (10 mg total) by mouth daily 90 tablet 1    Dolutegravir-lamiVUDine (Dovato)  MG TABS Take 1 tablet by mouth every morning 30 tablet 3    lisinopril-hydrochlorothiazide (PRINZIDE,ZESTORETIC) 10-12.5 MG per tablet TAKE 1 TABLET BY MOUTH DAILY 30 tablet 2     Current Facility-Administered Medications on File Prior to Visit  "  Medication Dose Route Frequency Provider Last Rate Last Admin    Penicillin G Benzathine (BICILLIN L-A) intramuscular injection 2.4 Million Units  2.4 Million Units Intramuscular Once           Social History     Tobacco Use    Smoking status: Never    Smokeless tobacco: Never   Vaping Use    Vaping status: Never Used   Substance and Sexual Activity    Alcohol use: Yes     Alcohol/week: 3.0 standard drinks of alcohol     Types: 3 Glasses of wine per week     Comment: 3 glass wine/day    Drug use: Not Currently     Types: Marijuana    Sexual activity: Not Currently     Partners: Male     Birth control/protection: None         Objective     /95   Pulse 90   Temp 97.9 °F (36.6 °C)   Ht 5' 8\" (1.727 m)   Wt 81.2 kg (179 lb)   BMI 27.22 kg/m²     Physical Exam  Constitutional:       General: He is not in acute distress.     Appearance: He is well-developed.   HENT:      Head: Normocephalic.      Right Ear: External ear normal.      Left Ear: External ear normal.      Nose: Nose normal.      Mouth/Throat:      Pharynx: No oropharyngeal exudate.   Eyes:      General:         Right eye: No discharge.         Left eye: No discharge.      Conjunctiva/sclera: Conjunctivae normal.      Pupils: Pupils are equal, round, and reactive to light.   Neck:      Thyroid: No thyromegaly.   Cardiovascular:      Rate and Rhythm: Normal rate and regular rhythm.      Heart sounds: Normal heart sounds. No murmur heard.  Pulmonary:      Effort: Pulmonary effort is normal.      Breath sounds: Normal breath sounds. No wheezing.   Abdominal:      General: Bowel sounds are normal.      Palpations: Abdomen is soft. There is no mass.      Tenderness: There is no abdominal tenderness.   Musculoskeletal:         General: No tenderness. Normal range of motion.      Cervical back: Normal range of motion.   Lymphadenopathy:      Cervical: No cervical adenopathy.   Skin:     General: Skin is warm and dry.      Findings: No rash. "   Neurological:      Mental Status: He is alert and oriented to person, place, and time.   Psychiatric:         Mood and Affect: Mood is depressed.         Behavior: Behavior normal.

## 2024-10-11 NOTE — PATIENT INSTRUCTIONS
Patient Education     Heart Healthy Diet   General   With a heart healthy food plan, you will learn to make better food choices. This diet may help you lower your blood cholesterol level, manage your blood pressure, and lower your risk for heart problems. Smaller portions may also be helpful.  Sodium is a type of mineral found in many foods. It helps keep the balance of fluids in your body. Too much sodium can raise your blood pressure. It can also make you take on extra water. This is called edema. Pay careful attention to how much salt or sodium is in your food. You may need to avoid salt or eat foods with less sodium.  Cholesterol is a fat-like, waxy substance in your blood. It is normal to have some cholesterol in your blood because your body makes it. You also get extra cholesterol from all animal products. These are foods like meats, eggs, and dairy products. Too much cholesterol in your blood can block or damage your blood vessels. This can lead to a heart attack or stroke.  Fats in your food have calories which give energy. Not all fats are bad. Some fats are healthy, like the fat found in fish, nuts, and olive oil. These are called unsaturated fats. They help manage body functions and lower cholesterol levels. Learn about the best fats to use in your diet and where to use them. Eating too much fat may make you more likely to weigh more than is healthy. This raises your risk of many heart problems.  Fiber is found in plants. Meat and dairy products do not have fiber in them. Fiber can help you lower your unhealthy cholesterol level. You may need more water as you eat more fiber so you do not get hard stools.  What lifestyle changes are needed?   Eat a healthy diet and workout often. Try to use as many calories as you take in each day.  What changes to diet are needed?   Eat oily fish at least 2 times a week. These are fish like tuna, salmon, and mackerel.  Limit sodium to no more than 2,300 mg of sodium per  day. This is about 1 teaspoon (5 grams) of table salt. Use little or no salt when making food. Try other spices or seasoning instead.  Limit how much cholesterol you eat to less than 300 mg per day. You can do this by having lean meats. Also eat lots of fruits, vegetables, and fat-free and low-fat dairy products.  Limit how much trans fats you eat. Trans fats are found in many processed foods like stick margarine, shortening, and some fried foods. Also, lower how much hydrogenated fats you eat. They are used to make pastries, biscuits, cookies, crackers, chips, and many snack foods.  Have no more than 1 drink per day of beer, wine, and mixed drinks (alcohol).  Who should use this diet?   A heart healthy diet is good for everyone.  What foods are good to eat?   Grains: Try to eat 6 to 8 servings of whole grain, high fiber foods each day. These are whole grain bread, cereals, brown rice, or pasta.  Fruits and vegetables: Eat 4 to 5 servings each day. Try to pick many kinds and colors. Try to eat more that are fresh or frozen. Look for low sodium or salt-free if you choose canned. Rinse canned items before cooking or eating. Dried peas, beans, and lentils are also good.  Dairy: Choose low fat (1%) or fat-free milk. Eat nonfat or low-fat products.  Protein: Try to eat more low fat or lean meats like chicken and turkey. Eat less red meat and eat more fish, eggs, egg whites, and beans instead.  Fats: Use good fats found in fish, nuts, and avocados. Try using olive oil, canola oil, and low-sodium and low-fat salad dressing and mayonnaise. Use corn, safflower, sunflower, and soybean oils.  Condiments: Use low-sodium or salt-free broths, soups, soy sauce, and condiments. Pepper, herbs, spices, vinegar, lemon or lime juices are great for seasoning. Sugar, cocoa powder, honey, syrup, and jams may be eaten in small amounts.  Sweets: Low-fat, trans fat-free cookies, cakes, and pies; mabel crackers; animal crackers; low-fat fig  bars; and dallas snaps.     What foods should be limited or avoided?   Grains: Salted breads, rolls, crackers, quick breads, self-rising flours, biscuit mixes, regular bread crumbs, instant hot cereals, commercially-prepared rice, pasta, stuffing mixes  Fruits and vegetables: Commercially-prepared potatoes and vegetable mixes, regular canned vegetables and juices, vegetables frozen with sauce or pickled vegetables, processed fruits with added sugar or salt  Dairy: Whole milk, malted milk, chocolate milk, buttermilk  Protein: Smoked, cured, salted, or canned meat, fish, or poultry such as marcelino and sausages  Fats: Cut back on solid fats like butter, lard, and margarine.  Condiments and snacks: Salted and canned peas, beans, and olives; salted snack foods; fried foods; soda, juices, or other sweetened drinks; commercially-softened water. Miso, salsa, ketchup, barbecue sauce, Worcestershire sauce, soy sauce, and teriyaki sauce are also high in salt.  Sweets: High-fat baked goods such as muffins, donuts, pastries, commercial baked goods  Helpful tips   When you go to a grocery store, have a list or a meal plan. Do not shop when you are hungry to avoid cravings for foods.  You need to know about the sodium and fat content of the food you eat. Read food labels with care. They will show you how much of each is in a serving. This amount is given as a percentage of the total amount you need each day. Reading the labels will help you make healthy food choices.     Avoid fast foods.  Watch your portions when eating out. Split an order or bring home half for another meal.     Talk to a dietitian for help.  Last Reviewed Date   2020-03-27  Consumer Information Use and Disclaimer   This generalized information is a limited summary of diagnosis, treatment, and/or medication information. It is not meant to be comprehensive and should be used as a tool to help the user understand and/or assess potential diagnostic and treatment  options. It does NOT include all information about conditions, treatments, medications, side effects, or risks that may apply to a specific patient. It is not intended to be medical advice or a substitute for the medical advice, diagnosis, or treatment of a health care provider based on the health care provider's examination and assessment of a patient’s specific and unique circumstances. Patients must speak with a health care provider for complete information about their health, medical questions, and treatment options, including any risks or benefits regarding use of medications. This information does not endorse any treatments or medications as safe, effective, or approved for treating a specific patient. UpToDate, Inc. and its affiliates disclaim any warranty or liability relating to this information or the use thereof. The use of this information is governed by the Terms of Use, available at https://www.woltersCatapulteruwer.com/en/know/clinical-effectiveness-terms   Copyright   Copyright © 2024 UpToDate, Inc. and its affiliates and/or licensors. All rights reserved.

## 2024-10-11 NOTE — ASSESSMENT & PLAN NOTE
ssessment & Plan     Insomnia associated with situational anxiety.    Discussed sleep hygiene measures including regular sleep schedule, optimal sleep environment, and relaxing presleep rituals.  Avoid daytime naps.  Avoid caffeine after noon.  Avoid excess alcohol.  Avoid tobacco.  Recommended daily exercise.  Advised short-term use of over-the-counter doxylamine.  Educated on potential side effects.

## 2024-10-11 NOTE — ASSESSMENT & PLAN NOTE
Assessment & Plan     Experiencing the following symptoms of depression most of the day nearly every day for more than two consecutive weeks: change in sleep, depressed mood, loss of energy, loss of interests/pleasure, thoughts of worthlessness or guilt    Depressive Disorder:Moderate Depression    Suicide Risk Assessment: Patient assessed for risk of suicide.    Start fluoxetine 10 mg.  Patient previously on medication and had good resolution of symptoms.  Reevaluate in 30 days.  Adjust medication to achieve therapeutic effect.      Reviewed concept of depression as biochemical imbalance of neurotransmitters and rationale for treatment. Instructed patient to contact office or on-call physician promptly should condition worsen or any new symptoms appear and provided on-call telephone numbers.    Orders:    FLUoxetine (PROzac) 10 mg capsule; Take 1 capsule (10 mg total) by mouth daily

## 2024-10-11 NOTE — ASSESSMENT & PLAN NOTE
"No results found for: \"TP2NWYQ\"  CD4 ABS   Date/Time Value Ref Range Status   2024 08:49  359 - 1519 /uL Final     HIV-1 RNA by PCR, Qn   Date/Time Value Ref Range Status   2023 01:15 PM 30 copies/mL Final     Comment:     The reportable range for this assay is 20 to 10,000,000  copies HIV-1 RNA/mL.     HIV-1 RNA Viral Load Log   Date/Time Value Ref Range Status   2023 01:15 PM 1.477 [id number]/mL Final         ART: Dovato        Denies side effects. Stressed the importance of adherence.  Continue follow up with ID clinic.       Reviewed most recent labs, including Cd4 and viral load. Discussed the risks and benefits of treatment options, instructions for management, importance of treatment adherence, and reduction of risk factor.Educated on possible  medication side effects.  Reviewed last ID visit.  Collaborated with ID to optimize medical treatment.    Counseled on routes of HIV transmission, including the risk of  infection. Emphasized that viral suppression is the best method to prevent HIV transmission.  At this time pt.denies the need for HIV testing of anyone in their life.     Total encounter time was 45 minutes. Greater then 20 minutes were spent on counseling and patient education. Pt voices understanding and agreement with treatment plan.             "

## 2024-10-11 NOTE — ASSESSMENT & PLAN NOTE
Hypertension Assessment  See encounter diagnosis  Discussion: stage 1  Cardiovascular risk factors: hypertension and male gender    Hypertension Plan  Continue current treatment regimen.  Continue current medications.  Reduce stress: contiune f/u with [facility] .  Follow up in 3 months.  Patient Education: Reviewed risks of hypertension and principles of   treatment.  Counseling time: not applicable.Blood pressure:   BP Readings from Last 3 Encounters:   10/11/24 134/95   07/30/24 107/87   06/26/24 130/80       Continue current antihypertensive.  Hesitant to change medications due to previous experiences with hypotension.  Stressed the importance of lifestyle modification    Educated on the following lifestyle modifications to lower BP and decrease cardiovascular risk factors.  limit alcohol intake, reduce salt in diet, maintain a healthy weight, engage in 30 minutes of cardiovascular exercise daily, and not smoke.       Orders:    lisinopril-hydrochlorothiazide (PRINZIDE,ZESTORETIC) 10-12.5 MG per tablet; Take 1 tablet by mouth daily

## 2024-10-11 NOTE — PROGRESS NOTES
LUIZA met with ct to discuss the need for assessment form for health sustaining medication. This form is needed to go along with his MA appeal since he was previously denied due to high income. Ct signed form and it was faxed to [employer]. Ct informed CM that he has begun working full time at [employer] and is still exploring what health insurance plan to choose through his employer. He said he is working on catching up on rent and other bills now that he is working full time. See media.  
Yes

## 2024-10-11 NOTE — ASSESSMENT & PLAN NOTE
Continues to feel anxiety, symptoms are worse at night.  Following with [facility].  Reinforced use of CBT to control symptoms of anxiety.  Previously on fluoxetine with good results.  Restart medication today. Educated  on potential side effects.    Orders:    FLUoxetine (PROzac) 10 mg capsule; Take 1 capsule (10 mg total) by mouth daily

## 2024-10-15 ENCOUNTER — TELEPHONE (OUTPATIENT)
Dept: SURGERY | Facility: CLINIC | Age: 49
End: 2024-10-15

## 2024-10-18 DIAGNOSIS — Z21 HIV INFECTION, UNSPECIFIED SYMPTOM STATUS (HCC): ICD-10-CM

## 2024-10-20 RX ORDER — DOLUTEGRAVIR SODIUM AND LAMIVUDINE 50; 300 MG/1; MG/1
1 TABLET, FILM COATED ORAL EVERY MORNING
Qty: 30 TABLET | Refills: 3 | Status: SHIPPED | OUTPATIENT
Start: 2024-10-20

## 2024-10-25 ENCOUNTER — PATIENT OUTREACH (OUTPATIENT)
Dept: SURGERY | Facility: CLINIC | Age: 49
End: 2024-10-25

## 2024-10-28 ENCOUNTER — DOCUMENTATION (OUTPATIENT)
Dept: SURGERY | Facility: CLINIC | Age: 49
End: 2024-10-28

## 2024-10-28 NOTE — PROGRESS NOTES
C called pt to reschedule appt, no answer.  [facility] left message requesting a call back.

## 2024-10-29 ENCOUNTER — PATIENT OUTREACH (OUTPATIENT)
Dept: SURGERY | Facility: CLINIC | Age: 49
End: 2024-10-29

## 2024-11-01 ENCOUNTER — PATIENT OUTREACH (OUTPATIENT)
Dept: SURGERY | Facility: CLINIC | Age: 49
End: 2024-11-01

## 2024-11-04 ENCOUNTER — PATIENT OUTREACH (OUTPATIENT)
Dept: SURGERY | Facility: CLINIC | Age: 49
End: 2024-11-04

## 2024-11-05 NOTE — PROGRESS NOTES
CM asked if ct was switched to MAWD because he was denied MA and he said yes. He said he will try to come to see CM on Thursday to go over paperwork.

## 2024-11-06 ENCOUNTER — PATIENT OUTREACH (OUTPATIENT)
Dept: SURGERY | Facility: CLINIC | Age: 49
End: 2024-11-06

## 2024-11-06 ENCOUNTER — APPOINTMENT (OUTPATIENT)
Dept: LAB | Facility: CLINIC | Age: 49
End: 2024-11-06
Payer: COMMERCIAL

## 2024-11-06 DIAGNOSIS — A53.9 SYPHILIS: ICD-10-CM

## 2024-11-06 LAB
ALBUMIN SERPL BCG-MCNC: 4.2 G/DL (ref 3.5–5)
ALP SERPL-CCNC: 45 U/L (ref 34–104)
ALT SERPL W P-5'-P-CCNC: 19 U/L (ref 7–52)
ANION GAP SERPL CALCULATED.3IONS-SCNC: 7 MMOL/L (ref 4–13)
AST SERPL W P-5'-P-CCNC: 23 U/L (ref 13–39)
BASOPHILS # BLD AUTO: 0.05 THOUSANDS/ΜL (ref 0–0.1)
BASOPHILS NFR BLD AUTO: 1 % (ref 0–1)
BILIRUB SERPL-MCNC: 0.92 MG/DL (ref 0.2–1)
BILIRUB UR QL STRIP: NEGATIVE
BUN SERPL-MCNC: 17 MG/DL (ref 5–25)
CALCIUM SERPL-MCNC: 9.3 MG/DL (ref 8.4–10.2)
CHLORIDE SERPL-SCNC: 104 MMOL/L (ref 96–108)
CHOLEST SERPL-MCNC: 184 MG/DL
CLARITY UR: CLEAR
CO2 SERPL-SCNC: 25 MMOL/L (ref 21–32)
COLOR UR: NORMAL
CREAT SERPL-MCNC: 1.09 MG/DL (ref 0.6–1.3)
EOSINOPHIL # BLD AUTO: 0.09 THOUSAND/ΜL (ref 0–0.61)
EOSINOPHIL NFR BLD AUTO: 1 % (ref 0–6)
ERYTHROCYTE [DISTWIDTH] IN BLOOD BY AUTOMATED COUNT: 12.5 % (ref 11.6–15.1)
EST. AVERAGE GLUCOSE BLD GHB EST-MCNC: 111 MG/DL
GFR SERPL CREATININE-BSD FRML MDRD: 78 ML/MIN/1.73SQ M
GLUCOSE P FAST SERPL-MCNC: 92 MG/DL (ref 65–99)
GLUCOSE UR STRIP-MCNC: NEGATIVE MG/DL
HBA1C MFR BLD: 5.5 %
HCT VFR BLD AUTO: 48.5 % (ref 36.5–49.3)
HCV AB SER QL: NORMAL
HDLC SERPL-MCNC: 69 MG/DL
HGB BLD-MCNC: 15.9 G/DL (ref 12–17)
HGB UR QL STRIP.AUTO: NEGATIVE
IMM GRANULOCYTES # BLD AUTO: 0.01 THOUSAND/UL (ref 0–0.2)
IMM GRANULOCYTES NFR BLD AUTO: 0 % (ref 0–2)
KETONES UR STRIP-MCNC: NEGATIVE MG/DL
LDLC SERPL CALC-MCNC: 98 MG/DL (ref 0–100)
LEUKOCYTE ESTERASE UR QL STRIP: NEGATIVE
LYMPHOCYTES # BLD AUTO: 2.8 THOUSANDS/ΜL (ref 0.6–4.47)
LYMPHOCYTES NFR BLD AUTO: 39 % (ref 14–44)
MCH RBC QN AUTO: 29.8 PG (ref 26.8–34.3)
MCHC RBC AUTO-ENTMCNC: 32.8 G/DL (ref 31.4–37.4)
MCV RBC AUTO: 91 FL (ref 82–98)
MONOCYTES # BLD AUTO: 0.59 THOUSAND/ΜL (ref 0.17–1.22)
MONOCYTES NFR BLD AUTO: 8 % (ref 4–12)
NEUTROPHILS # BLD AUTO: 3.57 THOUSANDS/ΜL (ref 1.85–7.62)
NEUTS SEG NFR BLD AUTO: 51 % (ref 43–75)
NITRITE UR QL STRIP: NEGATIVE
NRBC BLD AUTO-RTO: 0 /100 WBCS
PH UR STRIP.AUTO: 6.5 [PH]
PLATELET # BLD AUTO: 292 THOUSANDS/UL (ref 149–390)
PMV BLD AUTO: 11.1 FL (ref 8.9–12.7)
POTASSIUM SERPL-SCNC: 4.2 MMOL/L (ref 3.5–5.3)
PROT SERPL-MCNC: 7.4 G/DL (ref 6.4–8.4)
PROT UR STRIP-MCNC: NEGATIVE MG/DL
RBC # BLD AUTO: 5.34 MILLION/UL (ref 3.88–5.62)
SODIUM SERPL-SCNC: 136 MMOL/L (ref 135–147)
SP GR UR STRIP.AUTO: 1.02 (ref 1–1.03)
TRIGL SERPL-MCNC: 84 MG/DL
UROBILINOGEN UR STRIP-ACNC: <2 MG/DL
WBC # BLD AUTO: 7.11 THOUSAND/UL (ref 4.31–10.16)

## 2024-11-06 PROCEDURE — 86592 SYPHILIS TEST NON-TREP QUAL: CPT

## 2024-11-06 PROCEDURE — 36415 COLL VENOUS BLD VENIPUNCTURE: CPT

## 2024-11-06 NOTE — PROGRESS NOTES
CM corresponded with ct regarding him receiving his insurance card from his employer. Ct said he does not need MAWD and got his labs done. CM asked what the start date is of the employer insurance and are his meds and labs covered through this insurance.

## 2024-11-07 ENCOUNTER — PATIENT OUTREACH (OUTPATIENT)
Dept: SURGERY | Facility: CLINIC | Age: 49
End: 2024-11-07

## 2024-11-07 LAB
BASOPHILS # BLD AUTO: 0.1 X10E3/UL (ref 0–0.2)
BASOPHILS NFR BLD AUTO: 1 %
C TRACH DNA SPEC QL NAA+PROBE: NEGATIVE
CD3+CD4+ CELLS # BLD: 1075 /UL (ref 359–1519)
CD3+CD4+ CELLS NFR BLD: 38.4 % (ref 30.8–58.5)
CD3+CD4+ CELLS/CD3+CD8+ CLL BLD: 1.2 % (ref 0.92–3.72)
CD3+CD8+ CELLS # BLD: 896 /UL (ref 109–897)
CD3+CD8+ CELLS NFR BLD: 32 % (ref 12–35.5)
EOSINOPHIL # BLD AUTO: 0.1 X10E3/UL (ref 0–0.4)
EOSINOPHIL NFR BLD AUTO: 1 %
ERYTHROCYTE [DISTWIDTH] IN BLOOD BY AUTOMATED COUNT: 13 % (ref 11.6–15.4)
GAMMA INTERFERON BACKGROUND BLD IA-ACNC: 0.04 IU/ML
HCT VFR BLD AUTO: 44.5 % (ref 37.5–51)
HGB BLD-MCNC: 14.4 G/DL (ref 13–17.7)
IMM GRANULOCYTES # BLD: 0 X10E3/UL (ref 0–0.1)
IMM GRANULOCYTES NFR BLD: 0 %
LYMPHOCYTES # BLD AUTO: 2.8 X10E3/UL (ref 0.7–3.1)
LYMPHOCYTES NFR BLD AUTO: 38 %
M TB IFN-G BLD-IMP: NEGATIVE
M TB IFN-G CD4+ BCKGRND COR BLD-ACNC: 0.03 IU/ML
M TB IFN-G CD4+ BCKGRND COR BLD-ACNC: 0.03 IU/ML
MCH RBC QN AUTO: 30.6 PG (ref 26.6–33)
MCHC RBC AUTO-ENTMCNC: 32.4 G/DL (ref 31.5–35.7)
MCV RBC AUTO: 95 FL (ref 79–97)
MITOGEN IGNF BCKGRD COR BLD-ACNC: 9.96 IU/ML
MONOCYTES # BLD AUTO: 0.6 X10E3/UL (ref 0.1–0.9)
MONOCYTES NFR BLD AUTO: 8 %
N GONORRHOEA DNA SPEC QL NAA+PROBE: NEGATIVE
NEUTROPHILS # BLD AUTO: 3.8 X10E3/UL (ref 1.4–7)
NEUTROPHILS NFR BLD AUTO: 52 %
PLATELET # BLD AUTO: 313 X10E3/UL (ref 150–450)
RBC # BLD AUTO: 4.71 X10E6/UL (ref 4.14–5.8)
RPR SER QL: NORMAL
WBC # BLD AUTO: 7.3 X10E3/UL (ref 3.4–10.8)

## 2024-11-07 NOTE — PROGRESS NOTES
CM sent correspondence to ct asking if he still planned to come to see CM today to discuss his health insurance. He said he is now unable to.

## 2024-11-08 LAB
HIV1 RNA # PLAS NAA DL=20: 25.5 CP/ML
HIV1 RNA # PLAS NAA DL=20: DETECTED {COPIES}/ML

## 2024-11-12 ENCOUNTER — PATIENT OUTREACH (OUTPATIENT)
Dept: SURGERY | Facility: CLINIC | Age: 49
End: 2024-11-12

## 2024-11-15 ENCOUNTER — PATIENT OUTREACH (OUTPATIENT)
Dept: SURGERY | Facility: CLINIC | Age: 49
End: 2024-11-15

## 2024-11-19 ENCOUNTER — PATIENT OUTREACH (OUTPATIENT)
Dept: SURGERY | Facility: CLINIC | Age: 49
End: 2024-11-19

## 2024-11-22 ENCOUNTER — PATIENT OUTREACH (OUTPATIENT)
Dept: SURGERY | Facility: CLINIC | Age: 49
End: 2024-11-22

## 2024-12-09 ENCOUNTER — PATIENT OUTREACH (OUTPATIENT)
Dept: SURGERY | Facility: CLINIC | Age: 49
End: 2024-12-09

## 2024-12-09 NOTE — PROGRESS NOTES
CM received ct correspondence asking to see CM next Tuesday before or after his evening appt with PCP. CM responded asking why he wanted to see CM.

## 2024-12-12 ENCOUNTER — DOCUMENTATION (OUTPATIENT)
Dept: SURGERY | Facility: CLINIC | Age: 49
End: 2024-12-12

## 2024-12-16 ENCOUNTER — PATIENT OUTREACH (OUTPATIENT)
Dept: SURGERY | Facility: CLINIC | Age: 49
End: 2024-12-16

## 2024-12-17 ENCOUNTER — PATIENT OUTREACH (OUTPATIENT)
Dept: SURGERY | Facility: CLINIC | Age: 49
End: 2024-12-17

## 2024-12-17 DIAGNOSIS — F41.9 ANXIETY: ICD-10-CM

## 2024-12-17 DIAGNOSIS — F33.1 MODERATE EPISODE OF RECURRENT MAJOR DEPRESSIVE DISORDER (HCC): ICD-10-CM

## 2024-12-17 RX ORDER — FLUOXETINE 10 MG/1
10 CAPSULE ORAL DAILY
Qty: 30 CAPSULE | Refills: 1 | Status: SHIPPED | OUTPATIENT
Start: 2024-12-17

## 2024-12-17 NOTE — PROGRESS NOTES
CM spoke with this ct's spouse's CM about this ct asking about rental assistance through [facility] program. This CM was unaware and spoke with other CM because the spouse told this ct that [facility] could assist. Other CM stated they told spouse that the funding for that program is currently closed. CM sent correspondence to this ct regarding this information.

## 2024-12-17 NOTE — PROGRESS NOTES
CM corresponded with ct about him wanting to come in this week to see CM about rental assistance. CM will speak with ct's spouse about this because this CM is unaware of this need.

## 2024-12-18 ENCOUNTER — PATIENT OUTREACH (OUTPATIENT)
Dept: SURGERY | Facility: CLINIC | Age: 49
End: 2024-12-18

## 2024-12-19 NOTE — PROGRESS NOTES
"SUBJECTIVE:  depression and anxiety        OBJECTIVE:  Pt arrived for session at agreed time to address depression and anxiety.  Pt stated he is starting a full time position and is looking forward to being able to pay his bills.   Pt continues to struggle with low self esteem and low self worth due to his upbringing.  Pt stated he was not given praise as a child and feels he is not worthy to receive praise.  Pt also stated when others give praise he feels it is not genuine.  Pt stated he looks for the negative in all situations.  Pt shared he is not proud of his sexuality or who he is as a person.   Pt continues to struggle in his marriage stating \"nothing ever changes.  I am the one to do everything.\"     ASSESMENT: pt appeared depressed with negative affect.  He struggled to define how he would like to change his current situation and stated he struggles with seeing himself living alone.  Pt struggles with low self esteem evidenced by his demenor and his ability to hold eye contact.     PLAN: pt was encouraged to find activities he enjoys and to utilize positive affirmations.   Pt will schedule next session after he figures out his insurance.   " 1654  Patient arrived from OR, maintaining own airway. Received report from Dr. Barrera and Michelle BARROS RN, assumed care. VSS Left arm surgical dressing CDI. Bruit and thrill present. Left hand warm, capillary refill less than 3. No s/s of pain or nausea noted. Orders reviewed and implemented.     1700  Patient responds to voice.    1710  Patient wake, oriented x 4. Denies nausea.     1721  Tolerating sips of water. Oxycodone and Fentanyl administered for reported pain.     1751  Son updated.     1815 Weaning off oxygen.      1830  Patient resting comfortably, reports pain improved and tolerable. VSS. Surgical dressings remain CDI.      1845  IS implemented. Patient unable to maintain oxygen level on room air, desaturating into the 70s. Patient reports that he does not have oxygen or CPAP at home. Dr. Ellison notified. Orders received.      1849  Breathing treatment administered.     1915  IS to 2000. Patient continues to desaturate on room air.     1920  Dr. Degroot bedside to evaluate patient for admit. Order received. Son updated.    2010  Patient discharged to room with oxygen and all belongings.

## 2024-12-20 NOTE — PROGRESS NOTES
CM sent correspondence cancelling meeting for today due to rental assistance funding being closed and there being no need to meet.

## 2024-12-23 ENCOUNTER — PATIENT OUTREACH (OUTPATIENT)
Dept: SURGERY | Facility: CLINIC | Age: 49
End: 2024-12-23

## 2024-12-27 ENCOUNTER — PATIENT OUTREACH (OUTPATIENT)
Dept: SURGERY | Facility: CLINIC | Age: 49
End: 2024-12-27

## 2024-12-27 NOTE — PROGRESS NOTES
CM received correspondence from ct regarding needing to list his supplemental insurances for his employer. CM offered assistance if needed.

## 2025-01-02 ENCOUNTER — PATIENT OUTREACH (OUTPATIENT)
Dept: SURGERY | Facility: CLINIC | Age: 50
End: 2025-01-02

## 2025-01-02 ENCOUNTER — TELEPHONE (OUTPATIENT)
Dept: SURGERY | Facility: CLINIC | Age: 50
End: 2025-01-02

## 2025-01-02 NOTE — TELEPHONE ENCOUNTER
Pt called asking to schedule an appointment with Diana. I told him the she will call him when she is back in the office.

## 2025-01-02 NOTE — PROGRESS NOTES
CM corresponded with ct regarding EOB received on behalf of his  who is on ct insurance policy. Ct reports it's a $20,000 bill but CM did not see that amount. In fact, this bill was for his SO and not for ct. However, SO is on ct insurance policy and bills and EOBs come to this ct. CM spoke with SO CM and [employer] manager regarding this situation. CM asked ct if he heard about his MAWD status and he said he thought he received a bill but could not find it. CM asked ct to call MAWD and inquire about the status of his MAWD as well as requesting a new bill notice. CM spoke with HOPE manager about paying past due amount for MAWD if it's active and manager stated it may not be able to be paid due to funding and new MAWD auths being put on hold.

## 2025-01-03 ENCOUNTER — PATIENT OUTREACH (OUTPATIENT)
Dept: SURGERY | Facility: CLINIC | Age: 50
End: 2025-01-03

## 2025-01-03 ENCOUNTER — DOCUMENTATION (OUTPATIENT)
Dept: SURGERY | Facility: CLINIC | Age: 50
End: 2025-01-03

## 2025-01-03 NOTE — PROGRESS NOTES
[facility] returned call to Pt to schedule appt for next session.   [facility] left message for pt to return call to [facility] work cell phone.

## 2025-01-03 NOTE — PROGRESS NOTES
CM discussed ct situation with [facility]. According to ct, he is tired of his SO substance use issues and wants to speak with P. P said she will call him today to follow up.

## 2025-01-06 ENCOUNTER — DOCUMENTATION (OUTPATIENT)
Dept: SURGERY | Facility: CLINIC | Age: 50
End: 2025-01-06

## 2025-01-09 ENCOUNTER — PATIENT OUTREACH (OUTPATIENT)
Dept: SURGERY | Facility: CLINIC | Age: 50
End: 2025-01-09

## 2025-01-09 NOTE — PROGRESS NOTES
Ct reached to discuss a phone call he received regarding insurance, ct explained that he did not know how to explain this too well but that he got a call from someone named Belkis from a company called [employer] who was asking him if our facility takes state insurance and mentioned possibly assisting him with the deductions from his insurance from his current employer. Cm attempted to research the company and the number provided but was unable to find much information. Cm informed ct that it may be best that he does not provide them with any personal information if they call back. Cm explained she will explain the situation to his CM the following day and she will reach out.

## 2025-01-10 ENCOUNTER — PATIENT OUTREACH (OUTPATIENT)
Dept: SURGERY | Facility: CLINIC | Age: 50
End: 2025-01-10

## 2025-01-10 NOTE — PROGRESS NOTES
CM listened to voicemail that ct sent from someone (Belkis) pertaining to [employer] which is a program that pays for insurance premiums monthly. CM discussed this with H[employer] manager and after listening to the voicemail from Belkis, it appears that ct began an application for this and Belkis was calling to assist in completing it. CM sent correspondence to ct stating that he can proceed with completing the application and if he needed assistance during the completion, than CM would assist.

## 2025-01-13 ENCOUNTER — PATIENT OUTREACH (OUTPATIENT)
Dept: SURGERY | Facility: CLINIC | Age: 50
End: 2025-01-13

## 2025-01-14 ENCOUNTER — PATIENT OUTREACH (OUTPATIENT)
Dept: SURGERY | Facility: CLINIC | Age: 50
End: 2025-01-14

## 2025-01-15 NOTE — PROGRESS NOTES
Ct was discussed during clinic mtg and BHP reported that she was able to schedule with him to discuss his concerns with his spouse; however, he ended up cancelling that appt and has yet to reschedule.

## 2025-01-16 DIAGNOSIS — E78.5 DYSLIPIDEMIA: ICD-10-CM

## 2025-01-16 DIAGNOSIS — I10 ESSENTIAL HYPERTENSION: ICD-10-CM

## 2025-01-21 RX ORDER — LISINOPRIL AND HYDROCHLOROTHIAZIDE 10; 12.5 MG/1; MG/1
1 TABLET ORAL DAILY
Qty: 30 TABLET | Refills: 2 | Status: SHIPPED | OUTPATIENT
Start: 2025-01-21

## 2025-01-21 RX ORDER — ATORVASTATIN CALCIUM 40 MG/1
40 TABLET, FILM COATED ORAL DAILY
Qty: 30 TABLET | Refills: 2 | Status: SHIPPED | OUTPATIENT
Start: 2025-01-21

## 2025-01-22 ENCOUNTER — TELEPHONE (OUTPATIENT)
Dept: SURGERY | Facility: CLINIC | Age: 50
End: 2025-01-22

## 2025-01-24 ENCOUNTER — PATIENT OUTREACH (OUTPATIENT)
Dept: SURGERY | Facility: CLINIC | Age: 50
End: 2025-01-24

## 2025-01-24 NOTE — PROGRESS NOTES
CM received [employer] bill from ct and will follow up with [employer] manager to see if this can be paid. CM sent [facility] updated flyer.

## 2025-01-27 ENCOUNTER — PATIENT OUTREACH (OUTPATIENT)
Dept: SURGERY | Facility: CLINIC | Age: 50
End: 2025-01-27

## 2025-01-27 NOTE — PROGRESS NOTES
Completed MAWD auth and submitted to HOPE manager. Ct's MAWD bill was past due by 3 months. See media.

## 2025-01-28 ENCOUNTER — PATIENT OUTREACH (OUTPATIENT)
Dept: SURGERY | Facility: CLINIC | Age: 50
End: 2025-01-28

## 2025-01-30 ENCOUNTER — TELEPHONE (OUTPATIENT)
Dept: SURGERY | Facility: CLINIC | Age: 50
End: 2025-01-30

## 2025-01-30 DIAGNOSIS — B20 HUMAN IMMUNODEFICIENCY VIRUS (HIV) DISEASE (HCC): Primary | ICD-10-CM

## 2025-01-31 ENCOUNTER — OFFICE VISIT (OUTPATIENT)
Dept: SURGERY | Facility: CLINIC | Age: 50
End: 2025-01-31
Payer: COMMERCIAL

## 2025-01-31 VITALS
HEART RATE: 92 BPM | HEIGHT: 68 IN | WEIGHT: 180.4 LBS | BODY MASS INDEX: 27.34 KG/M2 | OXYGEN SATURATION: 97 % | TEMPERATURE: 97.6 F | SYSTOLIC BLOOD PRESSURE: 137 MMHG | DIASTOLIC BLOOD PRESSURE: 86 MMHG

## 2025-01-31 DIAGNOSIS — Z23 NEED FOR SHINGLES VACCINE: Primary | ICD-10-CM

## 2025-01-31 DIAGNOSIS — E78.5 DYSLIPIDEMIA: ICD-10-CM

## 2025-01-31 DIAGNOSIS — F33.1 MODERATE EPISODE OF RECURRENT MAJOR DEPRESSIVE DISORDER (HCC): ICD-10-CM

## 2025-01-31 DIAGNOSIS — F41.9 ANXIETY: ICD-10-CM

## 2025-01-31 DIAGNOSIS — Z21 HIV INFECTION, UNSPECIFIED SYMPTOM STATUS (HCC): ICD-10-CM

## 2025-01-31 DIAGNOSIS — B20 SYMPTOMATIC HIV INFECTION (HCC): ICD-10-CM

## 2025-01-31 DIAGNOSIS — I10 ESSENTIAL HYPERTENSION: ICD-10-CM

## 2025-01-31 PROCEDURE — 99214 OFFICE O/P EST MOD 30 MIN: CPT | Performed by: NURSE PRACTITIONER

## 2025-01-31 RX ORDER — ATORVASTATIN CALCIUM 40 MG/1
40 TABLET, FILM COATED ORAL DAILY
Qty: 30 TABLET | Refills: 2 | Status: SHIPPED | OUTPATIENT
Start: 2025-01-31

## 2025-01-31 RX ORDER — DOLUTEGRAVIR SODIUM AND LAMIVUDINE 50; 300 MG/1; MG/1
1 TABLET, FILM COATED ORAL EVERY MORNING
Qty: 30 TABLET | Refills: 3 | Status: SHIPPED | OUTPATIENT
Start: 2025-01-31

## 2025-01-31 RX ORDER — LISINOPRIL AND HYDROCHLOROTHIAZIDE 10; 12.5 MG/1; MG/1
1 TABLET ORAL DAILY
Qty: 30 TABLET | Refills: 2 | Status: SHIPPED | OUTPATIENT
Start: 2025-01-31

## 2025-01-31 NOTE — ASSESSMENT & PLAN NOTE
"No results found for: \"LQ9EWFU\"  CD4 ABS   Date/Time Value Ref Range Status   2024 11:33 AM 1075 359 - 1519 /uL Final     HIV-1 RNA by PCR, Qn   Date/Time Value Ref Range Status   2023 01:15 PM 30 copies/mL Final     Comment:     The reportable range for this assay is 20 to 10,000,000  copies HIV-1 RNA/mL.     HIV-1 RNA Viral Load Log   Date/Time Value Ref Range Status   2023 01:15 PM 1.477 [id number]/mL Final         ART: Dovato        Denies side effects. Stressed the importance of adherence.  Continue follow up with ID clinic.       Reviewed most recent labs, including Cd4 and viral load. Discussed the risks and benefits of treatment options, instructions for management, importance of treatment adherence, and reduction of risk factor.Educated on possible  medication side effects.  Reviewed last ID visit.  Collaborated with ID to optimize medical treatment.    Counseled on routes of HIV transmission, including the risk of  infection. Emphasized that viral suppression is the best method to prevent HIV transmission.  At this time pt.denies the need for HIV testing of anyone in their life.     Total encounter time was 45 minutes. Greater then 20 minutes were spent on counseling and patient education. Pt voices understanding and agreement with treatment plan.             "

## 2025-01-31 NOTE — ASSESSMENT & PLAN NOTE
Hypertension Assessment  See encounter diagnosis  Discussion: stage 2  Cardiovascular risk factors: dyslipidemia, hypertension, and male gender    Hypertension Plan  Continue current treatment, reduce stress, exercise and eat a low salt diet.   Patient Education: Reviewed risks of hypertension and principles of   treatment.  Blood pressure: Slightly above goal.   BP Readings from Last 3 Encounters:   01/31/25 137/86   10/11/24 134/95   07/30/24 107/87       Continue current antihypertensive.    Educated on the following lifestyle modifications to lower BP and decrease cardiovascular risk factors.  limit alcohol intake, reduce salt in diet, maintain a healthy weight, engage in 30 minutes of cardiovascular exercise daily, and not smoke.       Orders:    lisinopril-hydrochlorothiazide (PRINZIDE,ZESTORETIC) 10-12.5 MG per tablet; Take 1 tablet by mouth daily

## 2025-01-31 NOTE — PROGRESS NOTES
"Name: Byron Alvarez      : 1975      MRN: [id number]  Encounter Provider: CHERRI Hartman  Encounter Date: 2025   Encounter department: ASC AT [facility]  :  Assessment & Plan  Need for shingles vaccine    Orders:    Zoster Vaccine Recombinant IM    Symptomatic HIV infection (HCC)  No results found for: \"[id number]\"  CD4 ABS   Date/Time Value Ref Range Status   2024 11:33 AM 1075 359 - 1519 /uL Final     HIV-1 RNA by PCR, Qn   Date/Time Value Ref Range Status   2023 01:15 PM 30 copies/mL Final     Comment:     The reportable range for this assay is 20 to 10,000,000  copies HIV-1 RNA/mL.     HIV-1 RNA Viral Load Log   Date/Time Value Ref Range Status   2023 01:15 PM 1.477 [id number]/mL Final         ART: Dovato        Denies side effects. Stressed the importance of adherence.  Continue follow up with ID clinic.       Reviewed most recent labs, including Cd4 and viral load. Discussed the risks and benefits of treatment options, instructions for management, importance of treatment adherence, and reduction of risk factor.Educated on possible  medication side effects.  Reviewed last ID visit.  Collaborated with ID to optimize medical treatment.    Counseled on routes of HIV transmission, including the risk of  infection. Emphasized that viral suppression is the best method to prevent HIV transmission.  At this time pt.denies the need for HIV testing of anyone in their life.     Total encounter time was 45 minutes. Greater then 20 minutes were spent on counseling and patient education. Pt voices understanding and agreement with treatment plan.             Essential hypertension  Hypertension Assessment  See encounter diagnosis  Discussion: stage 2  Cardiovascular risk factors:     Hypertension Plan  Continue current treatment, reduce stress, exercise and eat a low salt diet.   Patient Education: Reviewed risks of hypertension and principles of   treatment.  Blood " pressure: Slightly above goal.   BP Readings from Last 3 Encounters:   01/31/25 137/86   10/11/24 134/95   07/30/24 107/87       Continue current antihypertensive.    Educated on the following lifestyle modifications to lower BP and decrease cardiovascular risk factors.  limit alcohol intake, reduce salt in diet, maintain a healthy weight, engage in 30 minutes of cardiovascular exercise daily, and not smoke.       Orders:    lisinopril-hydrochlorothiazide (PRINZIDE,ZESTORETIC) 10-12.5 MG per tablet; Take 1 tablet by mouth daily    Moderate episode of recurrent major depressive disorder (HCC)  Tolerating fluoxetine. Denies side effects. Will increase to 20mg daily to achieve a more effective response. Instructed to contact office if symptoms do not improve, he develops side effects or has any additional questions. Follow up with [facility].    Orders:    FLUoxetine (PROzac) 20 mg capsule; Take 1 capsule (20 mg total) by mouth daily    Anxiety    Orders:    FLUoxetine (PROzac) 20 mg capsule; Take 1 capsule (20 mg total) by mouth daily    Dyslipidemia    Orders:    atorvastatin (LIPITOR) 40 mg tablet; Take 1 tablet (40 mg total) by mouth daily    HIV infection, unspecified symptom status (HCC)    Orders:    Dolutegravir-lamiVUDine (Dovato)  MG TABS; Take 1 tablet by mouth every morning        History of Present Illness   Byron presents to the clinic today for one month rescheduled follow up.  PMHx significant for HIV,HTN, hyperlipidemia, depression/anxiety and seasonal allergies.   Elvin has missed several appointments due to new work schedule. He was prescribed fluoxetine at his last visit to address depression. He is tolerating medication and denies side effects. However he is unsure is medication is therapeutic. He does not c/o increased depression but does not feel any less depressed.       Byron Alvarez is a 49 y.o. male who presents for PCP follow up.  History obtained from: patient    Review of  Systems   Constitutional:  Negative for chills and fever.   HENT:  Negative for ear pain and sore throat.    Eyes:  Negative for pain and visual disturbance.   Respiratory:  Negative for cough and shortness of breath.    Cardiovascular:  Negative for chest pain and palpitations.   Gastrointestinal:  Negative for abdominal pain and vomiting.   Genitourinary:  Negative for dysuria and hematuria.   Musculoskeletal:  Negative for arthralgias and back pain.   Skin:  Negative for color change and rash.   Neurological:  Negative for seizures and syncope.   Psychiatric/Behavioral:  Negative for sleep disturbance and suicidal ideas. The patient is not nervous/anxious.    All other systems reviewed and are negative.    Medical History Reviewed by provider this encounter:  Tobacco  Allergies  Meds  Problems  Med Hx  Surg Hx  Fam Hx     .  Past Medical History   Past Medical History:   Diagnosis Date    Abscess of left groin     Dental decay     Depression     Elevated BP without diagnosis of hypertension     HIV disease ([facility]) 11/25/1998    mode of transmission: MSM    Hx of herpes zoster     Hypogonadism in male     Tear of right biceps muscle 2017     History reviewed. No pertinent surgical history.  Family History   Problem Relation Age of Onset    Hypertension Mother     Hypertension Father     Heart attack Father       reports that he has never smoked. He has never used smokeless tobacco. He reports current alcohol use of about 4.0 standard drinks of alcohol per week. He reports that he does not currently use drugs after having used the following drugs: Marijuana.  Current Outpatient Medications on File Prior to Visit   Medication Sig Dispense Refill    cetirizine (ZyrTEC) 10 mg tablet Take 1 tablet (10 mg total) by mouth daily 90 tablet 1    [DISCONTINUED] atorvastatin (LIPITOR) 40 mg tablet TAKE 1 TABLET BY MOUTH EVERY DAY 30 tablet 2    [DISCONTINUED] Dolutegravir-lamiVUDine (Dovato)  MG TABS Take 1 tablet  by mouth every morning 30 tablet 3    [DISCONTINUED] FLUoxetine (PROzac) 10 mg capsule TAKE 1 CAPSULE BY MOUTH EVERY DAY 30 capsule 1    [DISCONTINUED] lisinopril-hydrochlorothiazide (PRINZIDE,ZESTORETIC) 10-12.5 MG per tablet TAKE 1 TABLET BY MOUTH EVERY DAY 30 tablet 2     Current Facility-Administered Medications on File Prior to Visit   Medication Dose Route Frequency Provider Last Rate Last Admin    Penicillin G Benzathine (BICILLIN L-A) intramuscular injection 2.4 Million Units  2.4 Million Units Intramuscular Once        No Known Allergies   Current Outpatient Medications on File Prior to Visit   Medication Sig Dispense Refill    cetirizine (ZyrTEC) 10 mg tablet Take 1 tablet (10 mg total) by mouth daily 90 tablet 1    [DISCONTINUED] atorvastatin (LIPITOR) 40 mg tablet TAKE 1 TABLET BY MOUTH EVERY DAY 30 tablet 2    [DISCONTINUED] Dolutegravir-lamiVUDine (Dovato)  MG TABS Take 1 tablet by mouth every morning 30 tablet 3    [DISCONTINUED] FLUoxetine (PROzac) 10 mg capsule TAKE 1 CAPSULE BY MOUTH EVERY DAY 30 capsule 1    [DISCONTINUED] lisinopril-hydrochlorothiazide (PRINZIDE,ZESTORETIC) 10-12.5 MG per tablet TAKE 1 TABLET BY MOUTH EVERY DAY 30 tablet 2     Current Facility-Administered Medications on File Prior to Visit   Medication Dose Route Frequency Provider Last Rate Last Admin    Penicillin G Benzathine (BICILLIN L-A) intramuscular injection 2.4 Million Units  2.4 Million Units Intramuscular Once           Social History     Tobacco Use    Smoking status: Never    Smokeless tobacco: Never   Vaping Use    Vaping status: Never Used   Substance and Sexual Activity    Alcohol use: Yes     Alcohol/week: 4.0 standard drinks of alcohol     Types: 4 Glasses of wine per week     Comment: 4 glass of wine a week    Drug use: Not Currently     Types: Marijuana    Sexual activity: Not Currently     Partners: Male     Birth control/protection: None        Objective   /86 (BP Location: Right arm, Patient  "Position: Sitting, Cuff Size: Large)   Pulse 92   Temp 97.6 °F (36.4 °C)   Ht 5' 8\" (1.727 m)   Wt 81.8 kg (180 lb 6.4 oz)   SpO2 97%   BMI 27.43 kg/m²      Physical Exam  Vitals and nursing note reviewed.   Constitutional:       General: He is not in acute distress.     Appearance: He is well-developed.   HENT:      Head: Normocephalic and atraumatic.   Eyes:      Conjunctiva/sclera: Conjunctivae normal.   Cardiovascular:      Rate and Rhythm: Normal rate and regular rhythm.      Heart sounds: No murmur heard.  Pulmonary:      Effort: Pulmonary effort is normal. No respiratory distress.      Breath sounds: Normal breath sounds.   Abdominal:      Palpations: Abdomen is soft.      Tenderness: There is no abdominal tenderness.   Musculoskeletal:         General: No swelling.      Cervical back: Neck supple.   Skin:     General: Skin is warm and dry.      Capillary Refill: Capillary refill takes less than 2 seconds.   Neurological:      Mental Status: He is alert.   Psychiatric:         Mood and Affect: Mood normal.         Administrative Statements   I have spent a total time of 45 minutes in caring for this patient on the day of the visit/encounter including Diagnostic results, Prognosis, Risks and benefits of tx options, Instructions for management, Patient and family education, Importance of tx compliance, Risk factor reductions, Impressions, Counseling / Coordination of care, Documenting in the medical record, and Reviewing / ordering tests, medicine, procedures  . Topics discussed with the patient / family include symptom assessment and management, medication review, and medication adjustment.  "

## 2025-01-31 NOTE — ASSESSMENT & PLAN NOTE
Tolerating fluoxetine. Denies side effects. Will increase to 20mg daily to achieve a more effective response. Instructed to contact office if symptoms do not improve, he develops side effects or has any additional questions. Follow up with [facility].    Orders:    FLUoxetine (PROzac) 20 mg capsule; Take 1 capsule (20 mg total) by mouth daily

## 2025-02-03 ENCOUNTER — PATIENT OUTREACH (OUTPATIENT)
Dept: SURGERY | Facility: CLINIC | Age: 50
End: 2025-02-03

## 2025-02-04 ENCOUNTER — PATIENT OUTREACH (OUTPATIENT)
Dept: SURGERY | Facility: CLINIC | Age: 50
End: 2025-02-04

## 2025-02-04 NOTE — PROGRESS NOTES
CM received letter from ct regarding needing confirmation of medical disability/HIV per [employer] request. CM will discuss with PCP/clinic the proper document to provide that fits [employer] criteria.

## 2025-02-05 ENCOUNTER — APPOINTMENT (OUTPATIENT)
Dept: LAB | Facility: CLINIC | Age: 50
End: 2025-02-05
Payer: COMMERCIAL

## 2025-02-05 ENCOUNTER — PATIENT OUTREACH (OUTPATIENT)
Dept: SURGERY | Facility: CLINIC | Age: 50
End: 2025-02-05

## 2025-02-05 DIAGNOSIS — J30.2 SEASONAL ALLERGIES: ICD-10-CM

## 2025-02-05 LAB
ALBUMIN SERPL BCG-MCNC: 4.2 G/DL (ref 3.5–5)
ALP SERPL-CCNC: 52 U/L (ref 34–104)
ALT SERPL W P-5'-P-CCNC: 23 U/L (ref 7–52)
ANION GAP SERPL CALCULATED.3IONS-SCNC: 8 MMOL/L (ref 4–13)
AST SERPL W P-5'-P-CCNC: 24 U/L (ref 13–39)
BASOPHILS # BLD AUTO: 0.03 THOUSANDS/ΜL (ref 0–0.1)
BASOPHILS NFR BLD AUTO: 0 % (ref 0–1)
BILIRUB SERPL-MCNC: 0.6 MG/DL (ref 0.2–1)
BUN SERPL-MCNC: 23 MG/DL (ref 5–25)
CALCIUM SERPL-MCNC: 9.4 MG/DL (ref 8.4–10.2)
CHLORIDE SERPL-SCNC: 105 MMOL/L (ref 96–108)
CO2 SERPL-SCNC: 27 MMOL/L (ref 21–32)
CREAT SERPL-MCNC: 1.1 MG/DL (ref 0.6–1.3)
EOSINOPHIL # BLD AUTO: 0.09 THOUSAND/ΜL (ref 0–0.61)
EOSINOPHIL NFR BLD AUTO: 1 % (ref 0–6)
ERYTHROCYTE [DISTWIDTH] IN BLOOD BY AUTOMATED COUNT: 12.1 % (ref 11.6–15.1)
GFR SERPL CREATININE-BSD FRML MDRD: 77 ML/MIN/1.73SQ M
GLUCOSE SERPL-MCNC: 114 MG/DL (ref 65–140)
HCT VFR BLD AUTO: 44.5 % (ref 36.5–49.3)
HGB BLD-MCNC: 15.1 G/DL (ref 12–17)
IMM GRANULOCYTES # BLD AUTO: 0.02 THOUSAND/UL (ref 0–0.2)
IMM GRANULOCYTES NFR BLD AUTO: 0 % (ref 0–2)
LYMPHOCYTES # BLD AUTO: 2.88 THOUSANDS/ΜL (ref 0.6–4.47)
LYMPHOCYTES NFR BLD AUTO: 36 % (ref 14–44)
MCH RBC QN AUTO: 30.8 PG (ref 26.8–34.3)
MCHC RBC AUTO-ENTMCNC: 33.9 G/DL (ref 31.4–37.4)
MCV RBC AUTO: 91 FL (ref 82–98)
MONOCYTES # BLD AUTO: 0.56 THOUSAND/ΜL (ref 0.17–1.22)
MONOCYTES NFR BLD AUTO: 7 % (ref 4–12)
NEUTROPHILS # BLD AUTO: 4.36 THOUSANDS/ΜL (ref 1.85–7.62)
NEUTS SEG NFR BLD AUTO: 56 % (ref 43–75)
NRBC BLD AUTO-RTO: 0 /100 WBCS
PLATELET # BLD AUTO: 247 THOUSANDS/UL (ref 149–390)
PMV BLD AUTO: 10.7 FL (ref 8.9–12.7)
POTASSIUM SERPL-SCNC: 4 MMOL/L (ref 3.5–5.3)
PROT SERPL-MCNC: 7.3 G/DL (ref 6.4–8.4)
RBC # BLD AUTO: 4.9 MILLION/UL (ref 3.88–5.62)
SODIUM SERPL-SCNC: 140 MMOL/L (ref 135–147)
WBC # BLD AUTO: 7.94 THOUSAND/UL (ref 4.31–10.16)

## 2025-02-05 PROCEDURE — 36415 COLL VENOUS BLD VENIPUNCTURE: CPT

## 2025-02-05 PROCEDURE — 87536 HIV-1 QUANT&REVRSE TRNSCRPJ: CPT

## 2025-02-05 PROCEDURE — 86360 T CELL ABSOLUTE COUNT/RATIO: CPT

## 2025-02-05 PROCEDURE — 80053 COMPREHEN METABOLIC PANEL: CPT

## 2025-02-05 PROCEDURE — 85025 COMPLETE CBC W/AUTO DIFF WBC: CPT

## 2025-02-05 RX ORDER — CETIRIZINE HYDROCHLORIDE 10 MG/1
10 TABLET ORAL DAILY
Qty: 90 TABLET | Refills: 1 | Status: SHIPPED | OUTPATIENT
Start: 2025-02-05

## 2025-02-06 LAB
BASOPHILS # BLD AUTO: 0 X10E3/UL (ref 0–0.2)
BASOPHILS NFR BLD AUTO: 0 %
CD3+CD4+ CELLS # BLD: 1232 /UL (ref 359–1519)
CD3+CD4+ CELLS NFR BLD: 44 % (ref 30.8–58.5)
CD3+CD4+ CELLS/CD3+CD8+ CLL BLD: 1.4 % (ref 0.92–3.72)
CD3+CD8+ CELLS # BLD: 882 /UL (ref 109–897)
CD3+CD8+ CELLS NFR BLD: 31.5 % (ref 12–35.5)
EOSINOPHIL # BLD AUTO: 0.1 X10E3/UL (ref 0–0.4)
EOSINOPHIL NFR BLD AUTO: 1 %
ERYTHROCYTE [DISTWIDTH] IN BLOOD BY AUTOMATED COUNT: 12.4 % (ref 11.6–15.4)
HCT VFR BLD AUTO: 37.2 % (ref 37.5–51)
HGB BLD-MCNC: 12.3 G/DL (ref 13–17.7)
IMM GRANULOCYTES # BLD: 0 X10E3/UL (ref 0–0.1)
IMM GRANULOCYTES NFR BLD: 0 %
LYMPHOCYTES # BLD AUTO: 2.8 X10E3/UL (ref 0.7–3.1)
LYMPHOCYTES NFR BLD AUTO: 34 %
MCH RBC QN AUTO: 30.4 PG (ref 26.6–33)
MCHC RBC AUTO-ENTMCNC: 33.1 G/DL (ref 31.5–35.7)
MCV RBC AUTO: 92 FL (ref 79–97)
MONOCYTES # BLD AUTO: 0.6 X10E3/UL (ref 0.1–0.9)
MONOCYTES NFR BLD AUTO: 7 %
NEUTROPHILS # BLD AUTO: 4.7 X10E3/UL (ref 1.4–7)
NEUTROPHILS NFR BLD AUTO: 58 %
PLATELET # BLD AUTO: 314 X10E3/UL (ref 150–450)
RBC # BLD AUTO: 4.05 X10E6/UL (ref 4.14–5.8)
WBC # BLD AUTO: 8.2 X10E3/UL (ref 3.4–10.8)

## 2025-02-07 DIAGNOSIS — B20 HUMAN IMMUNODEFICIENCY VIRUS (HIV) DISEASE (HCC): Primary | ICD-10-CM

## 2025-02-07 LAB — HIV1 RNA # PLAS NAA DL=20: NOT DETECTED {COPIES}/ML

## 2025-02-11 ENCOUNTER — OFFICE VISIT (OUTPATIENT)
Dept: SURGERY | Facility: CLINIC | Age: 50
End: 2025-02-11
Payer: COMMERCIAL

## 2025-02-11 VITALS
WEIGHT: 181.4 LBS | BODY MASS INDEX: 27.58 KG/M2 | HEART RATE: 64 BPM | DIASTOLIC BLOOD PRESSURE: 95 MMHG | SYSTOLIC BLOOD PRESSURE: 131 MMHG | OXYGEN SATURATION: 97 %

## 2025-02-11 DIAGNOSIS — B20 SYMPTOMATIC HIV INFECTION (HCC): Primary | ICD-10-CM

## 2025-02-11 DIAGNOSIS — I10 ESSENTIAL HYPERTENSION: ICD-10-CM

## 2025-02-11 DIAGNOSIS — A52.8 LATE LATENT SYPHILIS: ICD-10-CM

## 2025-02-11 DIAGNOSIS — F33.1 MODERATE EPISODE OF RECURRENT MAJOR DEPRESSIVE DISORDER (HCC): ICD-10-CM

## 2025-02-11 DIAGNOSIS — E78.5 DYSLIPIDEMIA: ICD-10-CM

## 2025-02-11 PROCEDURE — 99215 OFFICE O/P EST HI 40 MIN: CPT | Performed by: INTERNAL MEDICINE

## 2025-02-11 NOTE — ASSESSMENT & PLAN NOTE
Status post treatment in the past with good response.  Last RPR nonreactive.  Continue annual follow-up RPR

## 2025-02-11 NOTE — ASSESSMENT & PLAN NOTE
Patient's become more frustrated and anxious about his relationships and situations.  He is drinking more alcohol and broke his TV and anger recently.  Continue Prozac.  Discussed with the primary and will have behaviorist evaluate the patient to address these issues.

## 2025-02-11 NOTE — PROGRESS NOTES
Name: Byron Alvarez      : 1975      MRN: [id number]  Encounter Provider: Damion Sauceda MD  Encounter Date: 2025   Encounter department: [facility] AT [facility]  :  Assessment & Plan  Symptomatic HIV infection (HCC)  Doing well on Dovato with undetectable viral load and a CD4 count of 1232. Continue ART, recheck CBC with differential, CMP, HIV RNA, and CD4 in 5 months to make sure no developing toxicity or treatment failure and follow-up in 6 months.  Stressed adherence.        Dyslipidemia  Remains on atorvastatin without any concerning drug interactions.       Essential hypertension  On lisinopril/hydrochlorothiazide.  No concerning drug interactions.       Late latent syphilis  Status post treatment in the past with good response.  Last RPR nonreactive.  Continue annual follow-up RPR       Moderate episode of recurrent major depressive disorder (HCC)  Patient's become more frustrated and anxious about his relationships and situations.  He is drinking more alcohol and broke his TV and anger recently.  Continue Prozac.  Discussed with the primary and will have behaviorist evaluate the patient to address these issues.           Patient was provided medication, adherence and prevention education.    History of Present Illness   Routine follow-up for HIV.  Patient claims 100% adherence with Dovato. Patient denies any notable side effects.  Overall the feeling well.  The patient denies any fever chills or sweats, denies any nausea vomiting or diarrhea, denies any cough or shortness of breath.    ROS: A complete review of systems are negative other than that noted in the HPI        Objective   /95   Pulse 64   Wt 82.3 kg (181 lb 6.4 oz)   SpO2 97%   BMI 27.58 kg/m²     General: Alert, interactive, appearing well, nontoxic, no acute distress.  Neck: Supple without lymphadenopathy, no thyromegaly or masses  Throat: Oropharynx moist without lesions.   Lungs: Clear to auscultation  bilaterally; no wheezes, rhonchi or rales; respirations unlabored  Heart: RRR; no murmur, rub or gallop  Abdomen: Soft, non-tender, non-distended, positive bowel sounds.    Extremities: No clubbing, cyanosis or edema  Skin: No new rashes or lesions. No draining wounds noted.    Lab Results: I have personally reviewed pertinent labs.  Lab Results   Component Value Date    K 4.0 02/05/2025     02/05/2025    CO2 27 02/05/2025    BUN 23 02/05/2025    CREATININE 1.10 02/05/2025    GLUF 92 11/06/2024    CALCIUM 9.4 02/05/2025    AST 24 02/05/2025    ALT 23 02/05/2025    ALKPHOS 52 02/05/2025    EGFR 77 02/05/2025     Lab Results   Component Value Date    WBC 8.2 02/05/2025    WBC 7.94 02/05/2025    HGB 12.3 (L) 02/05/2025    HGB 15.1 02/05/2025    HCT 37.2 (L) 02/05/2025    HCT 44.5 02/05/2025    MCV 92 02/05/2025    MCV 91 02/05/2025     02/05/2025     02/05/2025     Lab Results   Component Value Date    HEPCAB Non-reactive 11/06/2024     Lab Results   Component Value Date    HAV Reactive (A) 04/07/2020    HEPCAB Non-reactive 11/06/2024     Lab Results   Component Value Date    RPR Non-Reactive 11/06/2024     CD4 ABS   Date/Time Value Ref Range Status   02/05/2025 01:40 PM 1232 359 - 1519 /uL Final     HIV-1 T[facility]   Date/Time Value Ref Range Status   02/05/2025 01:40 PM Not Detected Not Detected Final           Administrative Statements   I have spent a total time of 40 minutes in caring for this patient on the day of the visit/encounter including Diagnostic results, Prognosis, Risks and benefits of tx options, Instructions for management, Risk factor reductions, Impressions, Counseling / Coordination of care, Documenting in the medical record, Reviewing / ordering tests, medicine, procedures  , Obtaining or reviewing history  , and Communicating with other healthcare professionals .

## 2025-02-11 NOTE — ASSESSMENT & PLAN NOTE
Doing well on Dovato with undetectable viral load and a CD4 count of 1232. Continue ART, recheck CBC with differential, CMP, HIV RNA, and CD4 in 5 months to make sure no developing toxicity or treatment failure and follow-up in 6 months.  Stressed adherence.

## 2025-02-12 ENCOUNTER — PATIENT OUTREACH (OUTPATIENT)
Dept: SURGERY | Facility: CLINIC | Age: 50
End: 2025-02-12

## 2025-02-12 ENCOUNTER — DOCUMENTATION (OUTPATIENT)
Dept: SURGERY | Facility: CLINIC | Age: 50
End: 2025-02-12

## 2025-02-12 NOTE — PROGRESS NOTES
C called pt to follow up from message from pts ID appointment.  No answer at pts phone, left message for return call.

## 2025-02-14 ENCOUNTER — DOCUMENTATION (OUTPATIENT)
Dept: SURGERY | Facility: CLINIC | Age: 50
End: 2025-02-14

## 2025-02-14 ENCOUNTER — PATIENT OUTREACH (OUTPATIENT)
Dept: SURGERY | Facility: CLINIC | Age: 50
End: 2025-02-14

## 2025-02-14 NOTE — PROGRESS NOTES
CM received correspondence from ct stating his SNAP benefits discontinued because he no longer qualifies due to being over income.

## 2025-02-17 ENCOUNTER — DOCUMENTATION (OUTPATIENT)
Dept: SURGERY | Facility: CLINIC | Age: 50
End: 2025-02-17

## 2025-02-17 NOTE — PROGRESS NOTES
C attempted 3rd contact with pt, no answer.  [facility] left message for pt and provided work cell phone number.

## 2025-02-21 ENCOUNTER — PATIENT OUTREACH (OUTPATIENT)
Dept: SURGERY | Facility: CLINIC | Age: 50
End: 2025-02-21

## 2025-02-25 ENCOUNTER — DOCUMENTATION (OUTPATIENT)
Dept: SURGERY | Facility: CLINIC | Age: 50
End: 2025-02-25

## 2025-02-28 ENCOUNTER — PATIENT OUTREACH (OUTPATIENT)
Dept: SURGERY | Facility: CLINIC | Age: 50
End: 2025-02-28

## 2025-03-03 ENCOUNTER — PATIENT OUTREACH (OUTPATIENT)
Dept: SURGERY | Facility: CLINIC | Age: 50
End: 2025-03-03

## 2025-03-03 NOTE — PROGRESS NOTES
CM completed February MAWD check req for ct and submitted to [employer] manager. See media. CM sent correspondence to ct stating the MAWD bill was submitted for payment today. CM also asked to schedule his recert before 3/13 as that along with RW sliding fee scale, and [facility] auth are all due.

## 2025-03-10 ENCOUNTER — PATIENT OUTREACH (OUTPATIENT)
Dept: SURGERY | Facility: CLINIC | Age: 50
End: 2025-03-10

## 2025-03-10 NOTE — PROGRESS NOTES
CM completed chart review and ct has not attended his recent [facility] appts, he is a no call, no show. CM sent correspondence to ct again because his recert, sliding fee scale, and MAWD auth are all due by 3/13/25. CM made ct aware that if this all expires than CM will not be able to pay future MAWD premium bills.

## 2025-03-11 ENCOUNTER — PATIENT OUTREACH (OUTPATIENT)
Dept: SURGERY | Facility: CLINIC | Age: 50
End: 2025-03-11

## 2025-03-11 DIAGNOSIS — F41.9 ANXIETY: ICD-10-CM

## 2025-03-11 DIAGNOSIS — F33.1 MODERATE EPISODE OF RECURRENT MAJOR DEPRESSIVE DISORDER (HCC): ICD-10-CM

## 2025-03-11 NOTE — PROGRESS NOTES
CM spoke with ct and he said he could come tomorrow to complete his recert tomorrow. CM confirmed the appt. He also informed CM that his SO got a LIHEAP past due bill of over $600. The bill is in his SO name but ct is the one that pays the bill. CM spoke with SO CM about this and that CM said she assisted ct in completing the application for LIHEAP but that ct took the application  with him and she is unsure if he submitted it or not. CM sent correspondence to ct asking for him to bring the bill in so CM could review it with him.

## 2025-03-12 ENCOUNTER — PATIENT OUTREACH (OUTPATIENT)
Dept: SURGERY | Facility: CLINIC | Age: 50
End: 2025-03-12

## 2025-03-12 RX ORDER — FLUOXETINE 10 MG/1
10 CAPSULE ORAL DAILY
Qty: 30 CAPSULE | Refills: 1 | Status: SHIPPED | OUTPATIENT
Start: 2025-03-12

## 2025-03-12 NOTE — PROGRESS NOTES
CM checked when RW sliding fee scale for ct expires and then corresponded with other CM who is covering his recert. Other CM stated ct called her and confirmed he is still coming today. CM asked for other CM to also ask about the LIHEAP situation since other CM is the CM of this ct's SO.

## 2025-03-12 NOTE — PROGRESS NOTES
Cm met with ct to complete the recert, rw sliding fee scale, and assessment. Ct reports that he continues to work two jobs. Ct reports that he continues to take his medication along with his BP medication. Cm inquired about the met-ed bill, ct stated that his  Gutierrez was able to resolve it by calling in. Ct reports they are back on a payment plan. Ct explained that he does not fully understand but this is what his  reports. Cm encouraged ct to call met-ed with his  to get clarification and to be added into the system so he has authorization to call on his own. Ct stated that he would do that. Cm inquired about substance abuse, ct reported that he knows he has an alcohol problem. Cm encouraged ct to speak with behavioral health specialist Diana about this. Ct agreed, cm stated that she would reach out to Diana. Cm reminded ct to always send his Cm (Braden) his MAWD bill in order to always be up to date and avoid closure of MAWD services. Ct agreed and stated that he sent her the March bill. See media for documents. Cm to work with his actual Cm (Braden) to refer him to [facility] and to complete the financial assistance application.

## 2025-03-14 ENCOUNTER — DOCUMENTATION (OUTPATIENT)
Dept: SURGERY | Facility: CLINIC | Age: 50
End: 2025-03-14

## 2025-03-14 NOTE — PROGRESS NOTES
Received message from pt requesting appt for session.  [facility] sent message to pt with available hours and requested pt return call when available.

## 2025-03-17 ENCOUNTER — PATIENT OUTREACH (OUTPATIENT)
Dept: SURGERY | Facility: CLINIC | Age: 50
End: 2025-03-17

## 2025-03-17 NOTE — PROGRESS NOTES
CM completed chart review from recert of last week. CM sent RW sliding fee scale to financial department. CM sent correspondence to ct asking for March MAWD bill since he only provided February MAWD bill previously. CM also asked ct to send a picture of his SPBP card for chart media.

## 2025-03-18 ENCOUNTER — PATIENT OUTREACH (OUTPATIENT)
Dept: SURGERY | Facility: CLINIC | Age: 50
End: 2025-03-18

## 2025-03-21 ENCOUNTER — DOCUMENTATION (OUTPATIENT)
Dept: SURGERY | Facility: CLINIC | Age: 50
End: 2025-03-21

## 2025-03-21 ENCOUNTER — PATIENT OUTREACH (OUTPATIENT)
Dept: SURGERY | Facility: CLINIC | Age: 50
End: 2025-03-21

## 2025-03-21 NOTE — PROGRESS NOTES
CM sent correspondence to financial department asking for update on RW sliding fee scale application.   Female

## 2025-03-24 ENCOUNTER — TELEPHONE (OUTPATIENT)
Dept: SURGERY | Facility: CLINIC | Age: 50
End: 2025-03-24

## 2025-03-24 NOTE — TELEPHONE ENCOUNTER
Pt left a message on Saturday asking that a script for Tylenol be sent to the [facility] on [address]

## 2025-03-25 ENCOUNTER — PATIENT OUTREACH (OUTPATIENT)
Dept: SURGERY | Facility: CLINIC | Age: 50
End: 2025-03-25

## 2025-03-25 NOTE — PROGRESS NOTES
March MAWD check req and renewed MAWD auth completed and submitted to [facility] manager for review and submission. See media.

## 2025-03-26 ENCOUNTER — DOCUMENTATION (OUTPATIENT)
Dept: SURGERY | Facility: CLINIC | Age: 50
End: 2025-03-26

## 2025-03-26 NOTE — PROGRESS NOTES
Annual Nutrition Screening: RD reviewed patient chart for nutrition risk as part of the annual nutrition assessment.        Has the patient had significant weight change in the last 3-6 months?    -NO  Wt Readings from Last 3 Encounters:   02/11/25 82.3 kg (181 lb 6.4 oz)   01/31/25 81.8 kg (180 lb 6.4 oz)   10/11/24 81.2 kg (179 lb)       Has the patient triggered for recent food insecurities?    -No food insecurities reported      Does the patient have any documented skin alterations or s/s of malnutrition?   -No    Altered nutrition related labs: Increased A1C, elevated renal labs, elevated lipid panel    -No altered labs  Lab Results   Component Value Date    HGBA1C 5.5 11/06/2024     Lab Results   Component Value Date    CHOLESTEROL 184 11/06/2024    CHOLESTEROL 172 10/07/2023    CHOLESTEROL 197 11/30/2022     Lab Results   Component Value Date    HDL 69 11/06/2024    HDL 43 10/07/2023    HDL 59 11/30/2022     Lab Results   Component Value Date    TRIG 84 11/06/2024    TRIG 157 (H) 10/07/2023    TRIG 116 11/30/2022     RD left follow up message for patient. Elvin's nutrition related labs and weight trend is normal. Will continue to monitor and see patient as needed.   -Ann Mckeon RDN,LDN

## 2025-03-27 ENCOUNTER — PATIENT OUTREACH (OUTPATIENT)
Dept: SURGERY | Facility: CLINIC | Age: 50
End: 2025-03-27

## 2025-03-27 ENCOUNTER — CLINICAL SUPPORT (OUTPATIENT)
Dept: SURGERY | Facility: CLINIC | Age: 50
End: 2025-03-27

## 2025-03-27 DIAGNOSIS — F41.9 ANXIETY: Primary | ICD-10-CM

## 2025-03-27 PROCEDURE — PBNCHG PB NO CHARGE PLACEHOLDER

## 2025-03-27 NOTE — PROGRESS NOTES
SUBJECTIVE:  depression, anxiety    OBJECTIVE: Pt agreed to telehealth session.    Pt stated he has been depressed and is struggling with depression.  He stated he is frustrated with his  and is not sure how to address the issue.  Pt shared his  continues to not contribute to the financial stability and he feels all the pressure of financial responsibility is on him.  Pt shared he uses sarcasm to address the issues, he is not confident in confronting his  with issues that are concerning him.  Pt shared he is always working and is becoming resentful towards his  but not confident enough to address the issues.   Pt shared he has been able to discuss painful concerns with his father and his brother. Pt was encouraged to continue to speak up for himself.   Pt was provided with positive encouragement for the strides he has been taking.     ASSESSMENT:  Pt appears to continue to speak up for himself and ask for assistance.   P struggles with positive self esteem and self worth.     PLAN:  pt will meet with this [facility] weekly to address concerns.

## 2025-04-02 DIAGNOSIS — F41.9 ANXIETY: ICD-10-CM

## 2025-04-02 DIAGNOSIS — F33.1 MODERATE EPISODE OF RECURRENT MAJOR DEPRESSIVE DISORDER (HCC): ICD-10-CM

## 2025-04-03 ENCOUNTER — CLINICAL SUPPORT (OUTPATIENT)
Dept: SURGERY | Facility: CLINIC | Age: 50
End: 2025-04-03

## 2025-04-03 DIAGNOSIS — F32.A DEPRESSION, UNSPECIFIED DEPRESSION TYPE: Primary | ICD-10-CM

## 2025-04-03 PROCEDURE — PBNCHG PB NO CHARGE PLACEHOLDER

## 2025-04-03 NOTE — PROGRESS NOTES
SUBJECTIVE: depression and anxiety    OBJECTIVE:  [facility] contacted pt via telephone to address depression and anxiety.  Pt was advised that this session was telehealth and pt agreed.   Pt stated he is struggling with feeling life is passing him by and he is just an observer to his life.  Pt stated he feels stuck in his career and in his relationship.  Pt was encouraged to describe his thoughts on how he could change his life to become more fulfilling. Pt struggled with expressing his thoughts on change and what he would envision for his life moving forward.  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   Pt was encouraged to continue seeking outlets for his personal growth.  Pt was provided with positive reinforcements for his ability and willingness to share his concerns.     ASSSESSMENT: pt appears to be stuck in his thought process and is  struggling to move beyond what he is experiencing at the time. Pt appears frustrated and down on self as evidenced by his thoughts and opinions on self.     PLAN:  pt was encouraged to seek information on employment/hobbies that fulfill is artistic side. Client will meet with this [facility] next week.

## 2025-04-15 ENCOUNTER — PATIENT OUTREACH (OUTPATIENT)
Dept: SURGERY | Facility: CLINIC | Age: 50
End: 2025-04-15

## 2025-04-16 NOTE — PROGRESS NOTES
Chart review completed as ct has telehealth appt with [facility]. CM sent correspondence to ct asking if he received his April MAWD bill and he said he has not.

## 2025-04-22 ENCOUNTER — PATIENT OUTREACH (OUTPATIENT)
Dept: SURGERY | Facility: CLINIC | Age: 50
End: 2025-04-22

## 2025-04-22 NOTE — PROGRESS NOTES
CM and [facility] discussed ct and his cancelling telehealth appts with [facility] lately. Ct has not responded to CM with questions surrounding if he has SPBP or not.

## 2025-04-25 ENCOUNTER — PATIENT OUTREACH (OUTPATIENT)
Dept: SURGERY | Facility: CLINIC | Age: 50
End: 2025-04-25

## 2025-05-01 ENCOUNTER — PATIENT OUTREACH (OUTPATIENT)
Dept: SURGERY | Facility: CLINIC | Age: 50
End: 2025-05-01

## 2025-05-15 DIAGNOSIS — E78.5 DYSLIPIDEMIA: ICD-10-CM

## 2025-05-15 DIAGNOSIS — I10 ESSENTIAL HYPERTENSION: ICD-10-CM

## 2025-05-16 RX ORDER — LISINOPRIL AND HYDROCHLOROTHIAZIDE 10; 12.5 MG/1; MG/1
1 TABLET ORAL DAILY
Qty: 30 TABLET | Refills: 2 | Status: SHIPPED | OUTPATIENT
Start: 2025-05-16

## 2025-05-16 RX ORDER — ATORVASTATIN CALCIUM 40 MG/1
40 TABLET, FILM COATED ORAL DAILY
Qty: 30 TABLET | Refills: 2 | Status: SHIPPED | OUTPATIENT
Start: 2025-05-16

## 2025-05-20 DIAGNOSIS — F41.9 ANXIETY: ICD-10-CM

## 2025-05-20 DIAGNOSIS — F33.1 MODERATE EPISODE OF RECURRENT MAJOR DEPRESSIVE DISORDER (HCC): ICD-10-CM

## 2025-05-21 RX ORDER — FLUOXETINE 10 MG/1
10 CAPSULE ORAL DAILY
Qty: 30 CAPSULE | Refills: 1 | Status: SHIPPED | OUTPATIENT
Start: 2025-05-21

## 2025-05-22 ENCOUNTER — PATIENT OUTREACH (OUTPATIENT)
Dept: SURGERY | Facility: CLINIC | Age: 50
End: 2025-05-22

## 2025-05-22 NOTE — PROGRESS NOTES
CM completed May MAWD check req and sent bill and req to [place] supervisor. See media. CM also sent [employer] needs assessment correspondence to ct.

## 2025-06-09 ENCOUNTER — TELEPHONE (OUTPATIENT)
Dept: SURGERY | Facility: CLINIC | Age: 50
End: 2025-06-09

## 2025-06-10 ENCOUNTER — PATIENT OUTREACH (OUTPATIENT)
Dept: SURGERY | Facility: CLINIC | Age: 50
End: 2025-06-10

## 2025-06-12 ENCOUNTER — CLINICAL SUPPORT (OUTPATIENT)
Dept: SURGERY | Facility: CLINIC | Age: 50
End: 2025-06-12

## 2025-06-12 DIAGNOSIS — Z21 HIV INFECTION, UNSPECIFIED SYMPTOM STATUS (HCC): ICD-10-CM

## 2025-06-12 DIAGNOSIS — F41.9 ANXIETY: Primary | ICD-10-CM

## 2025-06-12 NOTE — PROGRESS NOTES
SUBJECTIVE: Depression and anxiety    OBJECTIVE:  Pt arrived for session to address ongoing depression and anxiety.  Pt stated he is in jeopardy of losing his job and is concerned how he will pay the bills.  Pt stated his spouse is still not employed full time and relies on pt to pay all the expenses.  Pt stated he is unhappy in his marriage.  He stated he feels the open marriage is not working due to his partner being distant with him.  Pt shared he agreed to a open marriage to appease his .      ASSESSMENT:  Pt appears to struggle with self esteem and self confidence.  He appears to take on respirability to make others happy and content while he ignores his needs.     PLAN:  [facility] will speak to pts CM to assist client in finding OP counseling.

## 2025-06-13 ENCOUNTER — PATIENT OUTREACH (OUTPATIENT)
Dept: SURGERY | Facility: CLINIC | Age: 50
End: 2025-06-13

## 2025-06-13 RX ORDER — DOLUTEGRAVIR SODIUM AND LAMIVUDINE 50; 300 MG/1; MG/1
1 TABLET, FILM COATED ORAL EVERY MORNING
Qty: 30 TABLET | Refills: 3 | Status: SHIPPED | OUTPATIENT
Start: 2025-06-13

## 2025-06-16 ENCOUNTER — PATIENT OUTREACH (OUTPATIENT)
Dept: SURGERY | Facility: CLINIC | Age: 50
End: 2025-06-16

## 2025-06-16 NOTE — PROGRESS NOTES
CM resent the [employer] assessment and asked ct to use the special code CM sent to be eligible for the gift card.

## 2025-06-17 NOTE — PROGRESS NOTES
CM sent letter to ct regarding changes to HOPE program and sent MH list of providers to ct so he can review and choose.

## 2025-06-20 ENCOUNTER — TELEPHONE (OUTPATIENT)
Age: 50
End: 2025-06-20

## 2025-06-20 NOTE — TELEPHONE ENCOUNTER
Contacted patient in regards to scheduling NP Talk Therapy appointment.     LVM to contact [phone].     Upon return call, please transfer to ONLY  or Kat PACHECO

## 2025-06-23 ENCOUNTER — APPOINTMENT (OUTPATIENT)
Dept: LAB | Facility: CLINIC | Age: 50
End: 2025-06-23
Attending: INTERNAL MEDICINE
Payer: COMMERCIAL

## 2025-06-23 ENCOUNTER — PATIENT OUTREACH (OUTPATIENT)
Dept: SURGERY | Facility: CLINIC | Age: 50
End: 2025-06-23

## 2025-06-23 LAB
ALBUMIN SERPL BCG-MCNC: 4.4 G/DL (ref 3.5–5)
ALP SERPL-CCNC: 44 U/L (ref 34–104)
ALT SERPL W P-5'-P-CCNC: 27 U/L (ref 7–52)
ANION GAP SERPL CALCULATED.3IONS-SCNC: 9 MMOL/L (ref 4–13)
AST SERPL W P-5'-P-CCNC: 23 U/L (ref 13–39)
BASOPHILS # BLD AUTO: 0.05 THOUSANDS/ÂΜL (ref 0–0.1)
BASOPHILS NFR BLD AUTO: 1 % (ref 0–1)
BILIRUB SERPL-MCNC: 0.45 MG/DL (ref 0.2–1)
BUN SERPL-MCNC: 19 MG/DL (ref 5–25)
CALCIUM SERPL-MCNC: 9.3 MG/DL (ref 8.4–10.2)
CHLORIDE SERPL-SCNC: 106 MMOL/L (ref 96–108)
CO2 SERPL-SCNC: 24 MMOL/L (ref 21–32)
CREAT SERPL-MCNC: 0.94 MG/DL (ref 0.6–1.3)
EOSINOPHIL # BLD AUTO: 0.09 THOUSAND/ÂΜL (ref 0–0.61)
EOSINOPHIL NFR BLD AUTO: 1 % (ref 0–6)
ERYTHROCYTE [DISTWIDTH] IN BLOOD BY AUTOMATED COUNT: 12.3 % (ref 11.6–15.1)
GFR SERPL CREATININE-BSD FRML MDRD: 93 ML/MIN/1.73SQ M
GLUCOSE P FAST SERPL-MCNC: 81 MG/DL (ref 65–99)
HCT VFR BLD AUTO: 48.1 % (ref 36.5–49.3)
HGB BLD-MCNC: 16.2 G/DL (ref 12–17)
IMM GRANULOCYTES # BLD AUTO: 0.01 THOUSAND/UL (ref 0–0.2)
IMM GRANULOCYTES NFR BLD AUTO: 0 % (ref 0–2)
LYMPHOCYTES # BLD AUTO: 2.99 THOUSANDS/ÂΜL (ref 0.6–4.47)
LYMPHOCYTES NFR BLD AUTO: 35 % (ref 14–44)
MCH RBC QN AUTO: 31.2 PG (ref 26.8–34.3)
MCHC RBC AUTO-ENTMCNC: 33.7 G/DL (ref 31.4–37.4)
MCV RBC AUTO: 93 FL (ref 82–98)
MONOCYTES # BLD AUTO: 0.62 THOUSAND/ÂΜL (ref 0.17–1.22)
MONOCYTES NFR BLD AUTO: 7 % (ref 4–12)
NEUTROPHILS # BLD AUTO: 4.68 THOUSANDS/ÂΜL (ref 1.85–7.62)
NEUTS SEG NFR BLD AUTO: 56 % (ref 43–75)
NRBC BLD AUTO-RTO: 0 /100 WBCS
PLATELET # BLD AUTO: 275 THOUSANDS/UL (ref 149–390)
PMV BLD AUTO: 11.6 FL (ref 8.9–12.7)
POTASSIUM SERPL-SCNC: 4.1 MMOL/L (ref 3.5–5.3)
PROT SERPL-MCNC: 7.5 G/DL (ref 6.4–8.4)
RBC # BLD AUTO: 5.19 MILLION/UL (ref 3.88–5.62)
SODIUM SERPL-SCNC: 139 MMOL/L (ref 135–147)
WBC # BLD AUTO: 8.44 THOUSAND/UL (ref 4.31–10.16)

## 2025-06-23 NOTE — TELEPHONE ENCOUNTER
The patient is returning a call, regarding the above ( NP Appointment Reschedule).     Writer was unable to successfully transfer the call, at this time.     Please contact Elvin, to reschedule the NP appointment for 6.25.98:  [phone]    Thank you.

## 2025-06-23 NOTE — TELEPHONE ENCOUNTER
Contacted patient in regards to rescheduling NP TT appt with Willis Tidwell on 6/25. LVM for patient to contact [phone] to discuss.     Upon return call, please warm transfer ONLY to writer or Diandra ROMANO

## 2025-06-23 NOTE — TELEPHONE ENCOUNTER
"Behavioral Health Outpatient Intake Questions    Referred By   : [facility]    Please advise interviewee that they need to answer all questions truthfully to allow for best care, and any misrepresentations of information may affect their ability to be seen at this clinic   => Was this discussed? Yes       Behavioral Health Outpatient Intake History -     Presenting Problem (in patient's own words): Trouble concentrating    Are there any communication barriers for this patient?     No                                                 Are you taking any psychiatric medications? Yes     If \"YES\" -What are they Prozac     If \"YES\" -Who prescribes? [facility]    Has the Patient previously received outpatient Talk Therapy or Medication Management from [facility]  Yes        If \"YES\"- When, Where and with Whom? [facility]    Has the Patient abused alcohol or other substances in the last 6 months ? Yes  No concerns of substance abuse are reported.     If \"YES\" -What substance, How much, How often?About 10 drinks per week    Legal History-     Is this treatment court ordered? No     Has the Patient been convicted of a felony?  No    ACCEPTED as a patient Yes  If \"Yes\" Appointment Date: 6/25 at 1pm with Willis Tidwell    Referred Elsewhere? No    Name of Insurance Co:[employer]  Insurance ID#[id number]   Insurance Phone #  If ins is primary or secondary?Primary  If patient is a minor, parents information such as Name, D.O.B of guarantor.  "

## 2025-06-23 NOTE — TELEPHONE ENCOUNTER
Contacted patient in regards to rescheduling appointment. Next available is not until July and patient states his current insurance will  by end of . Asked patient if he was interested in doing self-pay and patient disconnected the call.

## 2025-06-24 LAB
BASOPHILS # BLD AUTO: 0 X10E3/UL (ref 0–0.2)
BASOPHILS NFR BLD AUTO: 1 %
CD3+CD4+ CELLS # BLD: 1088 /UL (ref 359–1519)
CD3+CD4+ CELLS NFR BLD: 35.1 % (ref 30.8–58.5)
CD3+CD4+ CELLS/CD3+CD8+ CLL BLD: 1.1 % (ref 0.92–3.72)
CD3+CD8+ CELLS # BLD: 989 /UL (ref 109–897)
CD3+CD8+ CELLS NFR BLD: 31.9 % (ref 12–35.5)
EOSINOPHIL # BLD AUTO: 0.1 X10E3/UL (ref 0–0.4)
EOSINOPHIL NFR BLD AUTO: 1 %
ERYTHROCYTE [DISTWIDTH] IN BLOOD BY AUTOMATED COUNT: 13 % (ref 11.6–15.4)
HCT VFR BLD AUTO: 48.4 % (ref 37.5–51)
HGB BLD-MCNC: 16 G/DL (ref 13–17.7)
IMM GRANULOCYTES # BLD: 0 X10E3/UL (ref 0–0.1)
IMM GRANULOCYTES NFR BLD: 0 %
LYMPHOCYTES # BLD AUTO: 3.1 X10E3/UL (ref 0.7–3.1)
LYMPHOCYTES NFR BLD AUTO: 36 %
MCH RBC QN AUTO: 30.8 PG (ref 26.6–33)
MCHC RBC AUTO-ENTMCNC: 33.1 G/DL (ref 31.5–35.7)
MCV RBC AUTO: 93 FL (ref 79–97)
MONOCYTES # BLD AUTO: 0.7 X10E3/UL (ref 0.1–0.9)
MONOCYTES NFR BLD AUTO: 8 %
NEUTROPHILS # BLD AUTO: 4.7 X10E3/UL (ref 1.4–7)
NEUTROPHILS NFR BLD AUTO: 54 %
PLATELET # BLD AUTO: 254 X10E3/UL (ref 150–450)
RBC # BLD AUTO: 5.19 X10E6/UL (ref 4.14–5.8)
WBC # BLD AUTO: 8.7 X10E3/UL (ref 3.4–10.8)

## 2025-06-24 NOTE — TELEPHONE ENCOUNTER
Pt called to cx appt  due to insurance will be ending on 6/30 and will not have insurance. Pt will call back if he needs services Patient is calling regarding cancelling an appointment.    Date/Time: 6/25 @ 1    Reason: pt needed to r/s but insurance ends 6/30 and does not want service at this time    Patient was rescheduled: YES [] NO [x]  If yes, when was Patient reschedule for:     Patient requesting call back to reschedule: YES [] NO [x]

## 2025-06-25 ENCOUNTER — PATIENT OUTREACH (OUTPATIENT)
Dept: SURGERY | Facility: CLINIC | Age: 50
End: 2025-06-25

## 2025-06-25 LAB — HIV1 RNA # PLAS NAA DL=20: NOT DETECTED {COPIES}/ML

## 2025-06-25 NOTE — PROGRESS NOTES
CM sent correspondence to ct regarding if he wanted to meet to review the MH provider list that CM sent him previously. Discussion with [facility] regarding ct being terminated from job and offering assistance in calling MH providers.

## 2025-07-07 ENCOUNTER — PATIENT OUTREACH (OUTPATIENT)
Dept: SURGERY | Facility: CLINIC | Age: 50
End: 2025-07-07

## 2025-07-07 ENCOUNTER — OFFICE VISIT (OUTPATIENT)
Dept: SURGERY | Facility: CLINIC | Age: 50
End: 2025-07-07

## 2025-07-07 VITALS
OXYGEN SATURATION: 96 % | HEART RATE: 78 BPM | HEIGHT: 68 IN | SYSTOLIC BLOOD PRESSURE: 128 MMHG | TEMPERATURE: 97.6 F | WEIGHT: 182.6 LBS | BODY MASS INDEX: 27.68 KG/M2 | DIASTOLIC BLOOD PRESSURE: 93 MMHG

## 2025-07-07 DIAGNOSIS — F41.9 ANXIETY: ICD-10-CM

## 2025-07-07 DIAGNOSIS — B20 SYMPTOMATIC HIV INFECTION (HCC): Primary | ICD-10-CM

## 2025-07-07 DIAGNOSIS — J06.9 VIRAL URI WITH COUGH: ICD-10-CM

## 2025-07-07 DIAGNOSIS — E78.5 DYSLIPIDEMIA: ICD-10-CM

## 2025-07-07 DIAGNOSIS — F33.1 MODERATE EPISODE OF RECURRENT MAJOR DEPRESSIVE DISORDER (HCC): ICD-10-CM

## 2025-07-07 DIAGNOSIS — I10 ESSENTIAL HYPERTENSION: ICD-10-CM

## 2025-07-07 NOTE — ASSESSMENT & PLAN NOTE
"  Lab Review  Lab Results   Component Value Date    TRIG 84 11/06/2024    TRIG 157 (H) 10/07/2023    TRIG 116 11/30/2022    HDL 69 11/06/2024    HDL 43 10/07/2023    HDL 59 11/30/2022       Assessment & Plan       Explained to the patient the respective contributions of genetics, diet, and exercise to lipid levels and the use of medication in severe cases which do not respond to lifestyle alteration. The patient's interest and motivation in making lifestyle changes seems excellent.    The following changes are planned for the next 6 months, at which time the patient will return for repeat fasting lipids:    1. Dietary changes: Reduce saturated fat, \"trans\" monounsaturated fatty acids, and cholesterol  2. Exercise changes:  walk daily  3. Other treatment: None  4. Lipid-lowering medications: atorvastatin 40mg    (Recommended by NCEP after 3-6 mos of dietary therapy & lifestyle modification,   except if CHD is present or LDL well above 190.)         "

## 2025-07-07 NOTE — LETTER
July 7, 2025     Patient: Byron Alvarez  YOB: 1975  Date of Visit: 7/7/2025      To Whom it May Concern:    Byron Alvarez is under my professional care. Byron was seen in my office on 7/7/2025. Byron may return to work on 7/825.    If you have any questions or concerns, please don't hesitate to call.         Sincerely,          CHERRI Hartman        CC: No Recipients

## 2025-07-07 NOTE — ASSESSMENT & PLAN NOTE
Hypertension Assessment  See encounter diagnosis  Discussion: stage 2  Cardiovascular risk factors: dyslipidemia, hypertension, and male gender    Hypertension Plan  Continue lisinopril/HCTZ  Patient Education: Reviewed risks of hypertension and principles of   treatment.  Counseling time: 20 minutes.Blood pressure:   BP Readings from Last 3 Encounters:   07/07/25 128/93   02/11/25 131/95   01/31/25 137/86           Educated on the following lifestyle modifications to lower BP and decrease cardiovascular risk factors.  limit alcohol intake, reduce salt in diet, maintain a healthy weight, engage in 30 minutes of cardiovascular exercise daily, and not smoke.

## 2025-07-07 NOTE — LETTER
July 8, 2025     Patient: Byron Alvarez  YOB: 1975  Date of Visit: 7/7/2025      To Whom it May Concern:    Byron Alvarez is under my professional care. Byron was seen in my office on 7/7/2025. Byron may return to work on 7/9/25.    If you have any questions or concerns, please don't hesitate to call.         Sincerely,          CHERRI Hartman        CC: No Recipients

## 2025-07-07 NOTE — ASSESSMENT & PLAN NOTE
"No results found for: \"EH3IXFE\"  CD4 ABS   Date/Time Value Ref Range Status   2025 01:47 PM 1088 359 - 1519 /uL Final     HIV-1 RNA by PCR, Qn   Date/Time Value Ref Range Status   2023 01:15 PM 30 copies/mL Final     Comment:     The reportable range for this assay is 20 to 10,000,000  copies HIV-1 RNA/mL.     HIV-1 RNA Viral Load Log   Date/Time Value Ref Range Status   2023 01:15 PM 1.477 wsr32bjyl/mL Final         ART: Dovato        Denies side effects. Stressed the importance of adherence.  Continue follow up with ID clinic.       Reviewed most recent labs, including Cd4 and viral load. Discussed the risks and benefits of treatment options, instructions for management, importance of treatment adherence, and reduction of risk factor.Educated on possible  medication side effects.  Reviewed last ID visit.  Collaborated with ID to optimize medical treatment.    Counseled on routes of HIV transmission, including the risk of  infection. Emphasized that viral suppression is the best method to prevent HIV transmission.  At this time pt.denies the need for HIV testing of anyone in their life.     Total encounter time was 45 minutes. Greater then 20 minutes were spent on counseling and patient education. Pt voices understanding and agreement with treatment plan.           "

## 2025-07-07 NOTE — PROGRESS NOTES
"Name: Byron Alvarez      : 1975      MRN: [id number]  Encounter Provider: CHERRI Hartman  Encounter Date: 2025   Encounter department: [facility] AT [facility]  :  Assessment & Plan  Symptomatic HIV infection (HCC)  No results found for: \"[id number]\"  CD4 ABS   Date/Time Value Ref Range Status   2025 01:47 PM 1088 359 - 1519 /uL Final     HIV-1 RNA by PCR, Qn   Date/Time Value Ref Range Status   2023 01:15 PM 30 copies/mL Final     Comment:     The reportable range for this assay is 20 to 10,000,000  copies HIV-1 RNA/mL.     HIV-1 RNA Viral Load Log   Date/Time Value Ref Range Status   2023 01:15 PM 1.477 [id number]/mL Final         ART: Dovato        Denies side effects. Stressed the importance of adherence.  Continue follow up with ID clinic.       Reviewed most recent labs, including Cd4 and viral load. Discussed the risks and benefits of treatment options, instructions for management, importance of treatment adherence, and reduction of risk factor.Educated on possible  medication side effects.  Reviewed last ID visit.  Collaborated with ID to optimize medical treatment.    Counseled on routes of HIV transmission, including the risk of  infection. Emphasized that viral suppression is the best method to prevent HIV transmission.  At this time pt.denies the need for HIV testing of anyone in their life.     Total encounter time was 45 minutes. Greater then 20 minutes were spent on counseling and patient education. Pt voices understanding and agreement with treatment plan.           Moderate episode of recurrent major depressive disorder (HCC)  Doing well on fluoxetine. Continue medication. Follow up with mental health as needed.        Essential hypertension  Hypertension Assessment  See encounter diagnosis  Discussion: stage 2  Cardiovascular risk factors: dyslipidemia, hypertension, and male gender    Hypertension Plan  Continue lisinopril/HCTZ  Patient Education: " "Reviewed risks of hypertension and principles of   treatment.  Counseling time: 20 minutes.Blood pressure:   BP Readings from Last 3 Encounters:   07/07/25 128/93   02/11/25 131/95   01/31/25 137/86           Educated on the following lifestyle modifications to lower BP and decrease cardiovascular risk factors.  limit alcohol intake, reduce salt in diet, maintain a healthy weight, engage in 30 minutes of cardiovascular exercise daily, and not smoke.          Dyslipidemia    Lab Review  Lab Results   Component Value Date    TRIG 84 11/06/2024    TRIG 157 (H) 10/07/2023    TRIG 116 11/30/2022    HDL 69 11/06/2024    HDL 43 10/07/2023    HDL 59 11/30/2022       Assessment & Plan       Explained to the patient the respective contributions of genetics, diet, and exercise to lipid levels and the use of medication in severe cases which do not respond to lifestyle alteration. The patient's interest and motivation in making lifestyle changes seems excellent.    The following changes are planned for the next 6 months, at which time the patient will return for repeat fasting lipids:    1. Dietary changes: Reduce saturated fat, \"trans\" monounsaturated fatty acids, and cholesterol  2. Exercise changes:  walk daily  3. Other treatment: None  4. Lipid-lowering medications: atorvastatin 40mg    (Recommended by NCEP after 3-6 mos of dietary therapy & lifestyle modification,   except if CHD is present or LDL well above 190.)         Viral URI with cough  Symptoms improving. Suggested continued rest, increase fluids, and OTC cold medication as needed. Call clinic if symptoms do not continue to improve.            History of Present Illness   HPI  Byron Alvarez is a 50 y.o. male who presents for PCP follow up  History obtained from: patient    Review of Systems   Constitutional:  Negative for chills and fever.   HENT:  Negative for ear pain and sore throat.    Eyes:  Negative for pain and visual disturbance.   Respiratory:  " "Negative for cough and shortness of breath.    Cardiovascular:  Negative for chest pain and palpitations.   Gastrointestinal:  Negative for abdominal pain and vomiting.   Genitourinary:  Negative for dysuria and hematuria.   Musculoskeletal:  Negative for arthralgias and back pain.   Skin:  Negative for color change and rash.   Neurological:  Negative for seizures and syncope.   All other systems reviewed and are negative.    Medical History Reviewed by provider this encounter:     .  Past Medical History   Past Medical History[1]  Past Surgical History[2]  Family History[3]   reports that he has never smoked. He has never used smokeless tobacco. He reports current alcohol use of about 4.0 standard drinks of alcohol per week. He reports that he does not currently use drugs after having used the following drugs: Marijuana.  Current Outpatient Medications   Medication Instructions    atorvastatin (LIPITOR) 40 mg, Oral, Daily    cetirizine (ZYRTEC) 10 mg, Oral, Daily    Dovato  MG TABS 1 tablet, Oral, Every morning    FLUoxetine (PROZAC) 20 mg, Oral, Daily    FLUoxetine (PROZAC) 10 mg, Oral, Daily    lisinopril-hydrochlorothiazide (PRINZIDE,ZESTORETIC) 10-12.5 MG per tablet 1 tablet, Oral, Daily   Allergies[4]   Medications Ordered Prior to Encounter[5]   Social History[6]     Objective   /93 (BP Location: Right arm, Patient Position: Sitting, Cuff Size: Standard)   Pulse 78   Temp 97.6 °F (36.4 °C) (Tympanic)   Ht 5' 8\" (1.727 m)   Wt 82.8 kg (182 lb 9.6 oz)   SpO2 96%   BMI 27.76 kg/m²      Physical Exam  Vitals and nursing note reviewed.   Constitutional:       General: He is not in acute distress.     Appearance: He is well-developed.   HENT:      Head: Normocephalic and atraumatic.     Eyes:      Conjunctiva/sclera: Conjunctivae normal.       Cardiovascular:      Rate and Rhythm: Normal rate and regular rhythm.      Heart sounds: No murmur heard.  Pulmonary:      Effort: Pulmonary effort is " normal. No respiratory distress.      Breath sounds: Normal breath sounds.   Abdominal:      Palpations: Abdomen is soft.      Tenderness: There is no abdominal tenderness.     Musculoskeletal:         General: No swelling.      Cervical back: Neck supple.     Skin:     General: Skin is warm and dry.      Capillary Refill: Capillary refill takes less than 2 seconds.     Neurological:      Mental Status: He is alert.     Psychiatric:         Mood and Affect: Mood normal.         Administrative Statements   I have spent a total time of 45 minutes in caring for this patient on the day of the visit/encounter including Diagnostic results, Prognosis, Risks and benefits of tx options, Patient and family education, Importance of tx compliance, Impressions, Counseling / Coordination of care, and Documenting in the medical record.       [1]   Past Medical History:  Diagnosis Date    Abscess of left groin     Dental decay     Depression     Elevated BP without diagnosis of hypertension     HIV disease ([facility]) 11/25/1998    mode of transmission: MSM    Hx of herpes zoster     Hypogonadism in male     Tear of right biceps muscle 2017   [2] No past surgical history on file.  [3]   Family History  Problem Relation Name Age of Onset    Hypertension Mother      Hypertension Father      Heart attack Father     [4] No Known Allergies  [5]   Current Outpatient Medications on File Prior to Visit   Medication Sig Dispense Refill    atorvastatin (LIPITOR) 40 mg tablet TAKE 1 TABLET BY MOUTH DAILY 30 tablet 2    cetirizine (ZyrTEC) 10 mg tablet TAKE 1 TABLET BY MOUTH EVERY DAY 90 tablet 1    Dovato  MG TABS TAKE 1 TABLET BY MOUTH EVERY MORNING 30 tablet 3    FLUoxetine (PROzac) 10 mg capsule TAKE 1 CAPSULE BY MOUTH EVERY DAY 30 capsule 1    FLUoxetine (PROzac) 20 mg capsule TAKE 1 CAPSULE BY MOUTH DAILY 30 capsule 2    lisinopril-hydrochlorothiazide (PRINZIDE,ZESTORETIC) 10-12.5 MG per tablet TAKE 1 TABLET BY MOUTH DAILY 30 tablet 2      Current Facility-Administered Medications on File Prior to Visit   Medication Dose Route Frequency Provider Last Rate Last Admin    Penicillin G Benzathine (BICILLIN L-A) intramuscular injection 2.4 Million Units  2.4 Million Units Intramuscular Once        [6]   Social History  Tobacco Use    Smoking status: Never    Smokeless tobacco: Never   Vaping Use    Vaping status: Never Used   Substance and Sexual Activity    Alcohol use: Yes     Alcohol/week: 4.0 standard drinks of alcohol     Types: 4 Glasses of wine per week     Comment: 4 glass of wine a week    Drug use: Not Currently     Types: Marijuana    Sexual activity: Not Currently     Partners: Male     Birth control/protection: None

## 2025-07-08 ENCOUNTER — TELEPHONE (OUTPATIENT)
Dept: SURGERY | Facility: CLINIC | Age: 50
End: 2025-07-08

## 2025-07-08 NOTE — PROGRESS NOTES
CM met with ct to discuss his insurance status as he said he called [employer] and they told him he is no longer covered. However, [facility] portal was checked and his MA is active. Ct and CM called [facility] to get him an appt to see a therapist and psych. The rep informed ct that he needs to get a termination letter from his employer insurance company because it is still showing up as open when they pull his information. Ct said he will request the letter and then reach out to CM when he receives it. 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

## 2025-07-08 NOTE — TELEPHONE ENCOUNTER
Pt called and asked for another note for work. He tried to go back today but left early. He plans to go back tomorrow.

## 2025-07-09 ENCOUNTER — PATIENT OUTREACH (OUTPATIENT)
Dept: SURGERY | Facility: CLINIC | Age: 50
End: 2025-07-09

## 2025-07-10 ENCOUNTER — PATIENT OUTREACH (OUTPATIENT)
Dept: SURGERY | Facility: CLINIC | Age: 50
End: 2025-07-10

## 2025-07-10 NOTE — PROGRESS NOTES
LUIZA spoke with clinic staff about ct and him asking for another dr note to send to his job due to him being sick. LUIZA informed clinic staff that LUIZA is working on helping ct get into a new psych and therapy office,

## 2025-07-10 NOTE — PROGRESS NOTES
CM sent correspondence to ct asking how he is feeling since he was under the weather and also asked if/when he was planning to call his previous employer insurance to request the insurance term letter so we can proceed with getting him an appt with [facility].

## 2025-07-11 ENCOUNTER — PATIENT OUTREACH (OUTPATIENT)
Dept: SURGERY | Facility: CLINIC | Age: 50
End: 2025-07-11

## 2025-07-11 NOTE — PROGRESS NOTES
Informed [facility] supervisor of LUIZA working with ct to secure a psych and therapy appt with [facility]. CM told supervisor that ct is waiting on a termination letter from his employer insurance and then will send that over to [employer] [facility] so they can remove that insurance from his name.

## 2025-07-17 DIAGNOSIS — F33.1 MODERATE EPISODE OF RECURRENT MAJOR DEPRESSIVE DISORDER (HCC): ICD-10-CM

## 2025-07-17 DIAGNOSIS — F41.9 ANXIETY: ICD-10-CM

## 2025-07-18 ENCOUNTER — PATIENT OUTREACH (OUTPATIENT)
Dept: SURGERY | Facility: CLINIC | Age: 50
End: 2025-07-18

## 2025-07-18 NOTE — PROGRESS NOTES
CM sent correspondence to ct asking if he got a term letter from his previous employer insurance so we can acquire MA as his primary insurance. CM also informed ct that his previous counselor through [facility] could take him on as a client in her new location of practice through [facility]. CM informed ct that his insurance situation needed to be corrected first.

## 2025-07-21 ENCOUNTER — PATIENT OUTREACH (OUTPATIENT)
Dept: SURGERY | Facility: CLINIC | Age: 50
End: 2025-07-21

## 2025-07-21 RX ORDER — FLUOXETINE 10 MG/1
10 CAPSULE ORAL DAILY
Qty: 30 CAPSULE | Refills: 1 | Status: SHIPPED | OUTPATIENT
Start: 2025-07-21

## 2025-07-21 NOTE — PROGRESS NOTES
CM received insurance termination letter from ct from [facility]. CM and ct spoke and agreed for ct to come in tomorrow to call [employer] and ensure that MAWD is primary insurance and then schedule with Diana, previous P as she is within [facility] at a different site. CM sent message to Diana.

## 2025-07-22 ENCOUNTER — PATIENT OUTREACH (OUTPATIENT)
Dept: SURGERY | Facility: CLINIC | Age: 50
End: 2025-07-22

## 2025-07-22 NOTE — PROGRESS NOTES
CM and ct called [employer] to inquire about MAWD status and rep said that he is active through [employer]. Rep will report to HIP program that paid the employer insurance premium that he no longer is employed with [employer] and this program will end for him. Rep is going to send termination of employer insurance information to [employer] so that MAWD can become primary insurance for ct. 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 CM reminded ct that CM needs his June MAWD bill in order to pay it and rep informed him that his semi annual renewal (LATASHA) for MA is due and he needs to get that submitted ASAP. Rep suggested that ct and CM call next week to check on the status of making MAWD primary. CM and ct agreed for ct to return to office next Monday to call the [employer]. At that time, ct will bring in LATASHA and will try to locate the June MAWD bill. CM and ct discussed his relationship with spouse and he said they are considering to move in with spouses' family in [place] to save money. This idea has not been finalized yet as they still have 5 months left on their apartment lease.

## 2025-07-24 ENCOUNTER — PATIENT OUTREACH (OUTPATIENT)
Dept: SURGERY | Facility: CLINIC | Age: 50
End: 2025-07-24

## 2025-07-25 ENCOUNTER — PATIENT OUTREACH (OUTPATIENT)
Dept: SURGERY | Facility: CLINIC | Age: 50
End: 2025-07-25

## 2025-07-25 NOTE — PROGRESS NOTES
CM sent reminder text to ct to meet on Monday, 7/28, to continue the process to reinstate his MA. CM reminded him to bring in his LATASHA to that can be completed and submitted as well.

## 2025-07-28 ENCOUNTER — PATIENT OUTREACH (OUTPATIENT)
Dept: SURGERY | Facility: CLINIC | Age: 50
End: 2025-07-28

## 2025-07-30 ENCOUNTER — PATIENT OUTREACH (OUTPATIENT)
Dept: SURGERY | Facility: CLINIC | Age: 50
End: 2025-07-30

## 2025-07-31 ENCOUNTER — PATIENT OUTREACH (OUTPATIENT)
Dept: SURGERY | Facility: CLINIC | Age: 50
End: 2025-07-31

## 2025-07-31 DIAGNOSIS — E78.5 DYSLIPIDEMIA: ICD-10-CM

## 2025-07-31 DIAGNOSIS — J30.2 SEASONAL ALLERGIES: ICD-10-CM

## 2025-07-31 DIAGNOSIS — I10 ESSENTIAL HYPERTENSION: ICD-10-CM

## 2025-08-01 RX ORDER — LISINOPRIL AND HYDROCHLOROTHIAZIDE 10; 12.5 MG/1; MG/1
1 TABLET ORAL DAILY
Qty: 30 TABLET | Refills: 2 | Status: SHIPPED | OUTPATIENT
Start: 2025-08-01

## 2025-08-01 RX ORDER — CETIRIZINE HYDROCHLORIDE 10 MG/1
10 TABLET ORAL DAILY
Qty: 90 TABLET | Refills: 1 | Status: SHIPPED | OUTPATIENT
Start: 2025-08-01

## 2025-08-01 RX ORDER — ATORVASTATIN CALCIUM 40 MG/1
40 TABLET, FILM COATED ORAL DAILY
Qty: 30 TABLET | Refills: 2 | Status: SHIPPED | OUTPATIENT
Start: 2025-08-01

## 2025-08-07 ENCOUNTER — PATIENT OUTREACH (OUTPATIENT)
Dept: SURGERY | Facility: CLINIC | Age: 50
End: 2025-08-07

## 2025-08-08 ENCOUNTER — TELEPHONE (OUTPATIENT)
Age: 50
End: 2025-08-08

## 2025-08-08 ENCOUNTER — PATIENT OUTREACH (OUTPATIENT)
Dept: SURGERY | Facility: CLINIC | Age: 50
End: 2025-08-08

## 2025-08-11 ENCOUNTER — PATIENT OUTREACH (OUTPATIENT)
Dept: SURGERY | Facility: CLINIC | Age: 50
End: 2025-08-11

## 2025-08-12 ENCOUNTER — OFFICE VISIT (OUTPATIENT)
Dept: SURGERY | Facility: CLINIC | Age: 50
End: 2025-08-12
Payer: COMMERCIAL

## 2025-08-12 ENCOUNTER — PATIENT OUTREACH (OUTPATIENT)
Dept: SURGERY | Facility: CLINIC | Age: 50
End: 2025-08-12

## 2025-08-13 ENCOUNTER — TELEPHONE (OUTPATIENT)
Dept: OTHER | Facility: OTHER | Age: 50
End: 2025-08-13

## 2025-08-13 ENCOUNTER — PATIENT OUTREACH (OUTPATIENT)
Dept: SURGERY | Facility: CLINIC | Age: 50
End: 2025-08-13

## 2025-08-14 ENCOUNTER — PATIENT OUTREACH (OUTPATIENT)
Dept: SURGERY | Facility: CLINIC | Age: 50
End: 2025-08-14

## 2025-08-19 ENCOUNTER — PATIENT OUTREACH (OUTPATIENT)
Dept: SURGERY | Facility: CLINIC | Age: 50
End: 2025-08-19